# Patient Record
Sex: MALE | Race: WHITE | NOT HISPANIC OR LATINO | Employment: OTHER | URBAN - METROPOLITAN AREA
[De-identification: names, ages, dates, MRNs, and addresses within clinical notes are randomized per-mention and may not be internally consistent; named-entity substitution may affect disease eponyms.]

---

## 2017-01-19 ENCOUNTER — HOSPITAL ENCOUNTER (OUTPATIENT)
Dept: PET IMAGING | Facility: HOSPITAL | Age: 82
Discharge: HOME | End: 2017-01-19
Attending: INTERNAL MEDICINE | Admitting: INTERNAL MEDICINE

## 2017-01-19 ENCOUNTER — LAB (OUTPATIENT)
Dept: LAB | Facility: HOSPITAL | Age: 82
End: 2017-01-19

## 2017-01-19 ENCOUNTER — HOSPITAL ENCOUNTER (OUTPATIENT)
Dept: GENERAL RADIOLOGY | Facility: HOSPITAL | Age: 82
Discharge: HOME | End: 2017-01-19

## 2017-01-19 DIAGNOSIS — R05.9 COUGH: ICD-10-CM

## 2017-01-19 DIAGNOSIS — C20 RECTAL CANCER (HCC): ICD-10-CM

## 2017-01-19 DIAGNOSIS — R91.1 LUNG NODULE: ICD-10-CM

## 2017-01-19 LAB
ALBUMIN SERPL-MCNC: 4.4 G/DL (ref 3.5–5.2)
ALBUMIN/GLOB SERPL: 1.5 G/DL (ref 1.1–2.4)
ALP SERPL-CCNC: 76 U/L (ref 38–116)
ALT SERPL W P-5'-P-CCNC: 23 U/L (ref 0–41)
ANION GAP SERPL CALCULATED.3IONS-SCNC: 16.7 MMOL/L
AST SERPL-CCNC: 21 U/L (ref 0–40)
BASOPHILS # BLD AUTO: 0.06 10*3/MM3 (ref 0–0.1)
BASOPHILS NFR BLD AUTO: 0.7 % (ref 0–1.1)
BILIRUB SERPL-MCNC: 0.3 MG/DL (ref 0.1–1.2)
BUN BLD-MCNC: 37 MG/DL (ref 6–20)
BUN/CREAT SERPL: 18.9 (ref 7.3–30)
CALCIUM SPEC-SCNC: 9.8 MG/DL (ref 8.5–10.2)
CEA SERPL-MCNC: 5.2 NG/ML
CHLORIDE SERPL-SCNC: 100 MMOL/L (ref 98–107)
CO2 SERPL-SCNC: 24.3 MMOL/L (ref 22–29)
CREAT BLD-MCNC: 1.96 MG/DL (ref 0.7–1.3)
DEPRECATED RDW RBC AUTO: 42.5 FL (ref 37–49)
EOSINOPHIL # BLD AUTO: 0.17 10*3/MM3 (ref 0–0.36)
EOSINOPHIL NFR BLD AUTO: 1.9 % (ref 1–5)
ERYTHROCYTE [DISTWIDTH] IN BLOOD BY AUTOMATED COUNT: 12.4 % (ref 11.7–14.5)
GFR SERPL CREATININE-BSD FRML MDRD: 33 ML/MIN/1.73
GLOBULIN UR ELPH-MCNC: 3 GM/DL (ref 1.8–3.5)
GLUCOSE BLD-MCNC: 302 MG/DL (ref 74–124)
HCT VFR BLD AUTO: 43.4 % (ref 40–49)
HGB BLD-MCNC: 13.8 G/DL (ref 13.5–16.5)
IMM GRANULOCYTES # BLD: 0.07 10*3/MM3 (ref 0–0.03)
IMM GRANULOCYTES NFR BLD: 0.8 % (ref 0–0.5)
LYMPHOCYTES # BLD AUTO: 2.83 10*3/MM3 (ref 1–3.5)
LYMPHOCYTES NFR BLD AUTO: 32.1 % (ref 20–49)
MCH RBC QN AUTO: 29.6 PG (ref 27–33)
MCHC RBC AUTO-ENTMCNC: 31.8 G/DL (ref 32–35)
MCV RBC AUTO: 93.1 FL (ref 83–97)
MONOCYTES # BLD AUTO: 0.65 10*3/MM3 (ref 0.25–0.8)
MONOCYTES NFR BLD AUTO: 7.4 % (ref 4–12)
NEUTROPHILS # BLD AUTO: 5.04 10*3/MM3 (ref 1.5–7)
NEUTROPHILS NFR BLD AUTO: 57.1 % (ref 39–75)
NRBC BLD MANUAL-RTO: 0 /100 WBC (ref 0–0)
PLATELET # BLD AUTO: 321 10*3/MM3 (ref 150–375)
PMV BLD AUTO: 10.6 FL (ref 8.9–12.1)
POTASSIUM BLD-SCNC: 4.6 MMOL/L (ref 3.5–4.7)
PROT SERPL-MCNC: 7.4 G/DL (ref 6.3–8)
RBC # BLD AUTO: 4.66 10*6/MM3 (ref 4.3–5.5)
SODIUM BLD-SCNC: 141 MMOL/L (ref 134–145)
WBC NRBC COR # BLD: 8.82 10*3/MM3 (ref 4–10)

## 2017-01-19 PROCEDURE — 85025 COMPLETE CBC W/AUTO DIFF WBC: CPT

## 2017-01-19 PROCEDURE — 25510000001 DIATRIZOATE MEGLUMINE & SODIUM PER 1 ML: Performed by: INTERNAL MEDICINE

## 2017-01-19 PROCEDURE — 71020 HC CHEST PA AND LATERAL: CPT

## 2017-01-19 PROCEDURE — 71250 CT THORAX DX C-: CPT

## 2017-01-19 PROCEDURE — 80053 COMPREHEN METABOLIC PANEL: CPT

## 2017-01-19 PROCEDURE — 82378 CARCINOEMBRYONIC ANTIGEN: CPT | Performed by: INTERNAL MEDICINE

## 2017-01-19 PROCEDURE — 74176 CT ABD & PELVIS W/O CONTRAST: CPT

## 2017-01-19 PROCEDURE — 36415 COLL VENOUS BLD VENIPUNCTURE: CPT

## 2017-01-19 RX ADMIN — DIATRIZOATE MEGLUMINE AND DIATRIZOATE SODIUM 30 ML: 660; 100 LIQUID ORAL; RECTAL at 09:35

## 2017-01-24 ENCOUNTER — APPOINTMENT (OUTPATIENT)
Dept: GENERAL RADIOLOGY | Facility: HOSPITAL | Age: 82
End: 2017-01-24

## 2017-01-24 ENCOUNTER — HOSPITAL ENCOUNTER (EMERGENCY)
Facility: HOSPITAL | Age: 82
Discharge: HOME | End: 2017-01-24
Attending: FAMILY MEDICINE | Admitting: FAMILY MEDICINE

## 2017-01-24 VITALS
OXYGEN SATURATION: 96 % | DIASTOLIC BLOOD PRESSURE: 64 MMHG | WEIGHT: 152 LBS | HEART RATE: 68 BPM | HEIGHT: 68 IN | SYSTOLIC BLOOD PRESSURE: 118 MMHG | BODY MASS INDEX: 23.04 KG/M2 | RESPIRATION RATE: 16 BRPM | TEMPERATURE: 97.7 F

## 2017-01-24 DIAGNOSIS — IMO0001 UNCONTROLLED TYPE 2 DIABETES MELLITUS WITHOUT COMPLICATION, WITHOUT LONG-TERM CURRENT USE OF INSULIN: Primary | ICD-10-CM

## 2017-01-24 LAB
ACETONE BLD QL: NEGATIVE
ALBUMIN SERPL-MCNC: 4.1 G/DL (ref 3.5–5.2)
ALBUMIN/GLOB SERPL: 1.5 G/DL
ALP SERPL-CCNC: 63 U/L (ref 39–117)
ALT SERPL W P-5'-P-CCNC: 25 U/L (ref 1–41)
ANION GAP SERPL CALCULATED.3IONS-SCNC: 16.7 MMOL/L
AST SERPL-CCNC: 19 U/L (ref 1–40)
BASOPHILS # BLD AUTO: 0.03 10*3/MM3 (ref 0–0.2)
BASOPHILS NFR BLD AUTO: 0.2 % (ref 0–1.5)
BILIRUB SERPL-MCNC: 0.2 MG/DL (ref 0.1–1.2)
BILIRUB UR QL STRIP: NEGATIVE
BUN BLD-MCNC: 40 MG/DL (ref 8–23)
BUN/CREAT SERPL: 14.5 (ref 7–25)
CALCIUM SPEC-SCNC: 9.7 MG/DL (ref 8.6–10.5)
CHLORIDE SERPL-SCNC: 99 MMOL/L (ref 98–107)
CLARITY UR: CLEAR
CO2 SERPL-SCNC: 22.3 MMOL/L (ref 22–29)
COLOR UR: YELLOW
CREAT BLD-MCNC: 2.76 MG/DL (ref 0.76–1.27)
DEPRECATED RDW RBC AUTO: 42.7 FL (ref 37–54)
EOSINOPHIL # BLD AUTO: 0.07 10*3/MM3 (ref 0–0.7)
EOSINOPHIL NFR BLD AUTO: 0.4 % (ref 0.3–6.2)
ERYTHROCYTE [DISTWIDTH] IN BLOOD BY AUTOMATED COUNT: 12.6 % (ref 11.5–14.5)
GFR SERPL CREATININE-BSD FRML MDRD: 22 ML/MIN/1.73
GLOBULIN UR ELPH-MCNC: 2.8 GM/DL
GLUCOSE BLD-MCNC: 484 MG/DL (ref 65–99)
GLUCOSE BLDC GLUCOMTR-MCNC: 291 MG/DL (ref 70–130)
GLUCOSE BLDC GLUCOMTR-MCNC: 368 MG/DL (ref 70–130)
GLUCOSE BLDC GLUCOMTR-MCNC: 422 MG/DL (ref 70–130)
GLUCOSE UR STRIP-MCNC: ABNORMAL MG/DL
HCT VFR BLD AUTO: 40 % (ref 40.4–52.2)
HGB BLD-MCNC: 13.1 G/DL (ref 13.7–17.6)
HGB UR QL STRIP.AUTO: NEGATIVE
IMM GRANULOCYTES # BLD: 0.12 10*3/MM3 (ref 0–0.03)
IMM GRANULOCYTES NFR BLD: 0.7 % (ref 0–0.5)
KETONES UR QL STRIP: NEGATIVE
LEUKOCYTE ESTERASE UR QL STRIP.AUTO: NEGATIVE
LYMPHOCYTES # BLD AUTO: 2.91 10*3/MM3 (ref 0.9–4.8)
LYMPHOCYTES NFR BLD AUTO: 17.7 % (ref 19.6–45.3)
MCH RBC QN AUTO: 30.5 PG (ref 27–32.7)
MCHC RBC AUTO-ENTMCNC: 32.8 G/DL (ref 32.6–36.4)
MCV RBC AUTO: 93 FL (ref 79.8–96.2)
MONOCYTES # BLD AUTO: 0.83 10*3/MM3 (ref 0.2–1.2)
MONOCYTES NFR BLD AUTO: 5 % (ref 5–12)
NEUTROPHILS # BLD AUTO: 12.49 10*3/MM3 (ref 1.9–8.1)
NEUTROPHILS NFR BLD AUTO: 76 % (ref 42.7–76)
NITRITE UR QL STRIP: NEGATIVE
PH UR STRIP.AUTO: <=5 [PH] (ref 5–8)
PLATELET # BLD AUTO: 267 10*3/MM3 (ref 140–500)
PMV BLD AUTO: 10.6 FL (ref 6–12)
POTASSIUM BLD-SCNC: 5.1 MMOL/L (ref 3.5–5.2)
PROT SERPL-MCNC: 6.9 G/DL (ref 6–8.5)
PROT UR QL STRIP: NEGATIVE
RBC # BLD AUTO: 4.3 10*6/MM3 (ref 4.6–6)
SODIUM BLD-SCNC: 138 MMOL/L (ref 136–145)
SP GR UR STRIP: 1.03 (ref 1–1.03)
UROBILINOGEN UR QL STRIP: ABNORMAL
WBC NRBC COR # BLD: 16.45 10*3/MM3 (ref 4.5–10.7)

## 2017-01-24 PROCEDURE — 82962 GLUCOSE BLOOD TEST: CPT

## 2017-01-24 PROCEDURE — 96372 THER/PROPH/DIAG INJ SC/IM: CPT

## 2017-01-24 PROCEDURE — 82009 KETONE BODYS QUAL: CPT | Performed by: FAMILY MEDICINE

## 2017-01-24 PROCEDURE — 96361 HYDRATE IV INFUSION ADD-ON: CPT

## 2017-01-24 PROCEDURE — 63710000001 INSULIN ASPART PER 5 UNITS: Performed by: FAMILY MEDICINE

## 2017-01-24 PROCEDURE — 80053 COMPREHEN METABOLIC PANEL: CPT | Performed by: FAMILY MEDICINE

## 2017-01-24 PROCEDURE — 81003 URINALYSIS AUTO W/O SCOPE: CPT | Performed by: FAMILY MEDICINE

## 2017-01-24 PROCEDURE — 96360 HYDRATION IV INFUSION INIT: CPT

## 2017-01-24 PROCEDURE — 71020 HC CHEST PA AND LATERAL: CPT

## 2017-01-24 PROCEDURE — 99284 EMERGENCY DEPT VISIT MOD MDM: CPT

## 2017-01-24 PROCEDURE — 85025 COMPLETE CBC W/AUTO DIFF WBC: CPT | Performed by: FAMILY MEDICINE

## 2017-01-24 RX ADMIN — SODIUM CHLORIDE 500 ML: 9 INJECTION, SOLUTION INTRAVENOUS at 19:55

## 2017-01-24 RX ADMIN — SODIUM CHLORIDE 1000 ML: 9 INJECTION, SOLUTION INTRAVENOUS at 17:05

## 2017-01-24 RX ADMIN — INSULIN ASPART 10 UNITS: 100 INJECTION, SOLUTION INTRAVENOUS; SUBCUTANEOUS at 18:27

## 2017-01-24 NOTE — ED PROVIDER NOTES
EMERGENCY DEPARTMENT ENCOUNTER    CHIEF COMPLAINT  Chief Complaint: Hyperglycemia  History given by: Pt, EMS  History limited by: nothing  Room Number: 03/03  PMD: Ana María Atkinson MD      HPI:  Pt is a 81 y.o. male who presents complaining of hyperglycemia just PTA. Pt states he was bowling when he became dizzy and sat down. Per EMS, pt had a blood sugar>500 and has a hx of denise blood pressure, diabetes and was alert and awake while with them. Per EMS, pt did not faint but becomes lightheaded when coughing due to recent bronchitis. Pt also c/o a cough with an onset of last Friday. Pt does not know if he takes a steroid for that but suspects so. Pt denies chest pain, SOA, sight complications, loss of motion in his arms. Pt states he took his medication for blood sugar this morning but does not use insulin. Pt states he has not checked his blood sugar lately.     Duration:  Just PTA  Onset: gradual  Timing: constant  Quality: >500  Intensity/Severity: moderate  Progression: constant  Associated Symptoms: dizziness, lightheadedness when coughing  Aggravating Factors: coughing causes lightheadedness  Alleviating Factors: none  Previous Episodes: Pt has a hx of diabetes and high blood pressure.  Treatment before arrival: Pt was treated by EMS PTA.    PAST MEDICAL HISTORY  Active Ambulatory Problems     Diagnosis Date Noted   • Rectal cancer 05/06/2016   • Lung nodule 05/06/2016   • History of DVT (deep vein thrombosis) 05/06/2016     Resolved Ambulatory Problems     Diagnosis Date Noted   • No Resolved Ambulatory Problems     Past Medical History   Diagnosis Date   • Adenocarcinoma in situ    • Adenocarcinoma of rectum 2014   • Diabetes mellitus    • Duodenal ulcer 08/2014   • DVT (deep venous thrombosis) 01/2015   • Hemorrhagic shock    • Hypercholesterolemia    • Hypertension    • Peptic ulceration 2014       PAST SURGICAL HISTORY  Past Surgical History   Procedure Laterality Date   • Endoscopy  02/09/2015   •  Amputation foot  1971     PARTIAL DUE TO AN ELECTRICAL SHOCK INJURY   • Portacath placement  07/2014       FAMILY HISTORY  Family History   Problem Relation Age of Onset   • No Known Problems Mother    • No Known Problems Father    • No Known Problems Sister    • Cancer Brother      Lung cancer   • No Known Problems Daughter    • No Known Problems Son    • No Known Problems Maternal Aunt    • No Known Problems Maternal Uncle    • No Known Problems Paternal Aunt    • No Known Problems Paternal Uncle    • No Known Problems Maternal Grandmother    • No Known Problems Maternal Grandfather    • No Known Problems Paternal Grandmother    • No Known Problems Paternal Grandfather    • Diabetes Other        SOCIAL HISTORY  Social History     Social History   • Marital status: Single     Spouse name: N/A   • Number of children: N/A   • Years of education: High school     Occupational History   •  Retired     Social History Main Topics   • Smoking status: Former Smoker     Packs/day: 2.00     Years: 3.00     Types: Cigarettes   • Smokeless tobacco: Not on file   • Alcohol use No   • Drug use: No   • Sexual activity: Not on file     Other Topics Concern   • Not on file     Social History Narrative       ALLERGIES  Review of patient's allergies indicates no known allergies.    REVIEW OF SYSTEMS  Review of Systems   Constitutional: Negative for activity change, appetite change and fever.   HENT: Negative for congestion and sore throat.    Eyes: Negative.    Respiratory: Positive for cough. Negative for shortness of breath.    Cardiovascular: Negative for chest pain and leg swelling.   Gastrointestinal: Negative for abdominal pain, diarrhea and vomiting.   Endocrine: Negative.    Genitourinary: Negative for decreased urine volume and dysuria.   Musculoskeletal: Negative for neck pain.   Skin: Negative for rash and wound.   Allergic/Immunologic: Negative.    Neurological: Positive for dizziness and light-headedness. Negative for  weakness, numbness and headaches.   Hematological: Negative.    Psychiatric/Behavioral: Negative.    All other systems reviewed and are negative.      PHYSICAL EXAM  ED Triage Vitals   Temp Heart Rate Resp BP SpO2   01/24/17 1620 01/24/17 1620 01/24/17 1620 01/24/17 1620 01/24/17 1620   97.9 °F (36.6 °C) 100 16 116/68 97 %      Temp src Heart Rate Source Patient Position BP Location FiO2 (%)   01/24/17 1620 01/24/17 1620 01/24/17 1620 01/24/17 1620 --   Tympanic Monitor Lying Right arm        Physical Exam   Constitutional: He is oriented to person, place, and time and well-developed, well-nourished, and in no distress.   HENT:   Head: Normocephalic and atraumatic.   Right Ear: Decreased hearing is noted.   Left Ear: Decreased hearing is noted.   Eyes: EOM are normal. Pupils are equal, round, and reactive to light.   Neck: Normal range of motion. Neck supple.   Cardiovascular: Normal rate, regular rhythm and normal heart sounds.    Pulmonary/Chest: Effort normal and breath sounds normal. No respiratory distress.   Abdominal: Soft. There is no tenderness. There is no rebound and no guarding.   Abd ostomy    Musculoskeletal: Normal range of motion. He exhibits no edema.   Neurological: He is alert and oriented to person, place, and time. He has normal sensation and normal strength.   Skin: Skin is warm and dry.   Psychiatric: Mood and affect normal.   Nursing note and vitals reviewed.      LAB RESULTS  Lab Results (last 24 hours)     Procedure Component Value Units Date/Time    POC Glucose Fingerstick [[id number]]  (Abnormal) Collected:  01/24/17 1628    Specimen:  Blood Updated:  01/24/17 1630     Glucose 422 (H) mg/dL     Narrative:       Meter: [id number] : [id number] Tai Deleon    CBC & Differential [[id number]] Collected:  01/24/17 1705    Specimen:  Blood Updated:  01/24/17 1724    Narrative:       The following orders were created for panel order CBC & Differential.  Procedure                                Abnormality         Status                     ---------                               -----------         ------                     CBC Auto Differential[[id number]]         Abnormal            Final result                 Please view results for these tests on the individual orders.    Comprehensive Metabolic Panel [[id number]]  (Abnormal) Collected:  01/24/17 1705    Specimen:  Blood Updated:  01/24/17 1751     Glucose 484 (C) mg/dL      BUN 40 (H) mg/dL      Creatinine 2.76 (H) mg/dL      Sodium 138 mmol/L      Potassium 5.1 mmol/L      Chloride 99 mmol/L      CO2 22.3 mmol/L      Calcium 9.7 mg/dL      Total Protein 6.9 g/dL      Albumin 4.10 g/dL      ALT (SGPT) 25 U/L      AST (SGOT) 19 U/L      Alkaline Phosphatase 63 U/L      Total Bilirubin 0.2 mg/dL      eGFR Non African Amer 22 (L) mL/min/1.73      Globulin 2.8 gm/dL      A/G Ratio 1.5 g/dL      BUN/Creatinine Ratio 14.5      Anion Gap 16.7 mmol/L     Narrative:       The MDRD GFR formula is only valid for adults with stable renal function between ages 18 and 70.    Acetone [[id number]]  (Normal) Collected:  01/24/17 1705    Specimen:  Blood Updated:  01/24/17 1741     Acetone Negative     CBC Auto Differential [[id number]]  (Abnormal) Collected:  01/24/17 1705    Specimen:  Blood Updated:  01/24/17 1724     WBC 16.45 (H) 10*3/mm3      RBC 4.30 (L) 10*6/mm3      Hemoglobin 13.1 (L) g/dL      Hematocrit 40.0 (L) %      MCV 93.0 fL      MCH 30.5 pg      MCHC 32.8 g/dL      RDW 12.6 %      RDW-SD 42.7 fl      MPV 10.6 fL      Platelets 267 10*3/mm3      Neutrophil % 76.0 %      Lymphocyte % 17.7 (L) %      Monocyte % 5.0 %      Eosinophil % 0.4 %      Basophil % 0.2 %      Immature Grans % 0.7 (H) %      Neutrophils, Absolute 12.49 (H) 10*3/mm3      Lymphocytes, Absolute 2.91 10*3/mm3      Monocytes, Absolute 0.83 10*3/mm3      Eosinophils, Absolute 0.07 10*3/mm3      Basophils, Absolute 0.03 10*3/mm3      Immature Grans, Absolute 0.12 (H) 10*3/mm3     Urinalysis  With / Culture If Indicated [[id number]]  (Abnormal) Collected:  01/24/17 1803    Specimen:  Urine Updated:  01/24/17 1822     Color, UA Yellow      Appearance, UA Clear      pH, UA <=5.0      Specific Gravity, UA 1.028      Glucose, UA >=1000 mg/dL (3+) (A)      Ketones, UA Negative      Bilirubin, UA Negative      Blood, UA Negative      Protein, UA Negative      Leuk Esterase, UA Negative      Nitrite, UA Negative      Urobilinogen, UA 0.2 E.U./dL     Narrative:       Urine microscopic not indicated.    POC Glucose Fingerstick [[id number]]  (Abnormal) Collected:  01/24/17 1915    Specimen:  Blood Updated:  01/24/17 1921     Glucose 368 (H) mg/dL     Narrative:       Meter: [id number] : [id number] Haley HAWKINS    POC Glucose Fingerstick [[id number]]  (Abnormal) Collected:  01/24/17 1949    Specimen:  Blood Updated:  01/24/17 1953     Glucose 291 (H) mg/dL     Narrative:       Meter: [id number] : [id number] Tayla Boswell          I ordered the above labs and reviewed the results    RADIOLOGY  XR Chest 2 View   2 views of the chest demonstrates the heart to be within  normal limits in size. There is mild atelectasis at the left lung base  with no evidence of consolidation or of effusion.           I ordered the above noted radiological studies. Interpreted by radiologist. Discussed with radiologist. Reviewed by me in PACS.       PROCEDURES  Procedures      PROGRESS AND CONSULTS  ED Course   1647  Ordered CXR, blood work, UA w/ culture, and acetone for further evaluation    1748  Ordered novoLOG 10 units injection for hyperglycemia.     1817  Ordered POC glucose fingerstick for blood glucose analysis.     8:02 PM  Rechecked on pt who is resting with family. Discussed plan to stop steroid prescription. Discussed plan to discharge with f/u with her PMD. Discussed need to limit sugar intake. Pt and family understand and agree with plan and all questions were addressed.    18:25  Rechecked with pt, whose blood  sugar is below 300. Pt expresses desire for discharge to home.  A niece is here who says he was put on steroids and understands need to stop them.     MEDICAL DECISION MAKING  Results were reviewed/discussed with the patient and they were also made aware of online access. Pt also made aware that some labs, such as cultures, will not be resulted during ER visit and follow up with PMD is necessary.     MDM  Number of Diagnoses or Management Options     Amount and/or Complexity of Data Reviewed  Clinical lab tests: ordered and reviewed (High glucose has decreased to 291. )  Tests in the radiology section of CPT®: ordered and reviewed (CXR: 2 views of the chest demonstrates the heart to be within normal limits in size. There is mild atelectasis at the left lung base with no evidence of consolidation or of effusion.)           DIAGNOSIS  Final diagnoses:   Uncontrolled type 2 diabetes mellitus without complication, without long-term current use of insulin, due to recent steroid use.       DISPOSITION  DISCHARGE    Patient discharged in stable condition.    Reviewed implications of results, diagnosis, meds, responsibility to follow up, warning signs and symptoms of possible worsening, potential complications and reasons to return to ER.    Patient/Family voiced understanding of above instructions.    Discussed plan for discharge, as there is no emergent indication for admission.  Pt/family is agreeable and understands need for follow up and repeat testing.  Pt is aware that discharge does not mean that nothing is wrong but it indicates no emergency is present that requires admission and they must continue care with follow-up as given below or physician of their choice.     FOLLOW-UP  Ana María Atkinson MD  [address] [facility] [address] [phone]      Call in the AM for an appt later this week.         Medication List      Notice     No changes were made to your prescriptions during this visit.              Latest  Documented Vital Signs:  As of 1:12 AM  BP- 118/64 HR- 68 Temp- 97.7 °F (36.5 °C) (Tympanic) O2 sat- 96%    --  Documentation assistance provided by luzmaria Collins for Dr. Wray.  Information recorded by the scribe was done at my direction and has been verified and validated by me.            Henri Collins  01/24/17 2010       Henri Collins  01/24/17 2014       Henri Collins  01/24/17 2031       Lamin Wray MD  01/25/17 0114

## 2017-01-25 NOTE — DISCHARGE INSTRUCTIONS
Stop the steroids.  You can continue the antibiotic.  Monitor your blood sugars 3-4 times a day and record your values to show your MD.

## 2017-01-26 ENCOUNTER — OFFICE VISIT (OUTPATIENT)
Dept: ONCOLOGY | Facility: CLINIC | Age: 82
End: 2017-01-26

## 2017-01-26 ENCOUNTER — INFUSION (OUTPATIENT)
Dept: ONCOLOGY | Facility: HOSPITAL | Age: 82
End: 2017-01-26

## 2017-01-26 ENCOUNTER — APPOINTMENT (OUTPATIENT)
Dept: LAB | Facility: HOSPITAL | Age: 82
End: 2017-01-26

## 2017-01-26 VITALS
HEART RATE: 89 BPM | TEMPERATURE: 98.6 F | WEIGHT: 140.2 LBS | DIASTOLIC BLOOD PRESSURE: 72 MMHG | HEIGHT: 68 IN | OXYGEN SATURATION: 96 % | SYSTOLIC BLOOD PRESSURE: 132 MMHG | BODY MASS INDEX: 21.25 KG/M2 | RESPIRATION RATE: 16 BRPM

## 2017-01-26 DIAGNOSIS — C20 RECTAL CANCER (HCC): Primary | ICD-10-CM

## 2017-01-26 DIAGNOSIS — N17.9 ACUTE RENAL FAILURE, UNSPECIFIED ACUTE RENAL FAILURE TYPE (HCC): ICD-10-CM

## 2017-01-26 DIAGNOSIS — C20 RECTAL CANCER (HCC): ICD-10-CM

## 2017-01-26 LAB
ALBUMIN SERPL-MCNC: 4.2 G/DL (ref 3.5–5.2)
ALBUMIN/GLOB SERPL: 1.7 G/DL (ref 1.1–2.4)
ALP SERPL-CCNC: 62 U/L (ref 38–116)
ALT SERPL W P-5'-P-CCNC: 27 U/L (ref 0–41)
ANION GAP SERPL CALCULATED.3IONS-SCNC: 15.5 MMOL/L
AST SERPL-CCNC: 24 U/L (ref 0–40)
BILIRUB SERPL-MCNC: 0.3 MG/DL (ref 0.1–1.2)
BUN BLD-MCNC: 34 MG/DL (ref 6–20)
BUN/CREAT SERPL: 16.3 (ref 7.3–30)
CALCIUM SPEC-SCNC: 9.5 MG/DL (ref 8.5–10.2)
CHLORIDE SERPL-SCNC: 104 MMOL/L (ref 98–107)
CO2 SERPL-SCNC: 22.5 MMOL/L (ref 22–29)
CREAT BLD-MCNC: 2.08 MG/DL (ref 0.7–1.3)
GFR SERPL CREATININE-BSD FRML MDRD: 31 ML/MIN/1.73
GLOBULIN UR ELPH-MCNC: 2.5 GM/DL (ref 1.8–3.5)
GLUCOSE BLD-MCNC: 299 MG/DL (ref 74–124)
POTASSIUM BLD-SCNC: 4.6 MMOL/L (ref 3.5–4.7)
PROT SERPL-MCNC: 6.7 G/DL (ref 6.3–8)
SODIUM BLD-SCNC: 142 MMOL/L (ref 134–145)

## 2017-01-26 PROCEDURE — 96361 HYDRATE IV INFUSION ADD-ON: CPT | Performed by: INTERNAL MEDICINE

## 2017-01-26 PROCEDURE — 25010000002 ONDANSETRON PER 1 MG: Performed by: INTERNAL MEDICINE

## 2017-01-26 PROCEDURE — 96374 THER/PROPH/DIAG INJ IV PUSH: CPT | Performed by: INTERNAL MEDICINE

## 2017-01-26 PROCEDURE — 99214 OFFICE O/P EST MOD 30 MIN: CPT | Performed by: INTERNAL MEDICINE

## 2017-01-26 PROCEDURE — 36415 COLL VENOUS BLD VENIPUNCTURE: CPT | Performed by: INTERNAL MEDICINE

## 2017-01-26 PROCEDURE — 80053 COMPREHEN METABOLIC PANEL: CPT | Performed by: INTERNAL MEDICINE

## 2017-01-26 RX ORDER — SODIUM CHLORIDE 9 MG/ML
500 INJECTION, SOLUTION INTRAVENOUS ONCE
Status: COMPLETED | OUTPATIENT
Start: 2017-01-26 | End: 2017-01-26

## 2017-01-26 RX ORDER — ONDANSETRON 2 MG/ML
4 INJECTION INTRAMUSCULAR; INTRAVENOUS ONCE
Status: COMPLETED | OUTPATIENT
Start: 2017-01-26 | End: 2017-01-26

## 2017-01-26 RX ADMIN — SODIUM CHLORIDE 500 ML: 900 INJECTION, SOLUTION INTRAVENOUS at 13:15

## 2017-01-26 RX ADMIN — ONDANSETRON 4 MG: 2 INJECTION, SOLUTION INTRAMUSCULAR; INTRAVENOUS at 13:46

## 2017-01-26 NOTE — PROGRESS NOTES
REASONS FOR FOLLOW-UP:  1. Clinical T3N1M0 adenocarcinoma of the rectum, status post neoadjuvant chemoradiation with infusional 5-FU followed by low anterior resection by Dr. Armin Hurtado on 11/14/2014; pathology at surgery showed microscopic foci of residual adenocarcinoma in ulceration with the largest focus 2.5 mm.  He had a single tumor deposit but 14 benign lymph nodes.  Final pathologic stage [id number].   2. Completed 4 months of adjuvant Xeloda post-surgery.     3. Tiny right upper lobe pulmonary nodule being followed radiographically and stable      History of Present Illness    Mr. Stephen returns today for follow-up of his rectal cancer currently under observation.  He has history of a right upper lobe pulmonary nodule which has been stable radiographically.      He comes in today for scan and lab review.  He was doing well up until about one week ago when he developed cough productive of thick sputum and mild shortness of breath.  He was seen by his primary care physician and placed I believe on prednisone and an antibiotic.  On 1/24/17 he went bowling and became acutely dizzy and was found to have a blood sugar over 500.  EMS was called and the patient was evaluated at the [facility] emergency department and treated for hyperglycemia.  He was eventually discharged from the ER after the blood sugar was lowered.  It is noted that he had acute kidney injury on presentation with a creatinine of 2.7 from a baseline of 1.73 months ago.  He comes in today still with cough, generalized weakness, and reports poor oral intake with nausea and emesis for the past one week.  He denies fever.    Past Medical History    1. Diabetes.   2. Hypertension.  3. Hypercholesterolemia.  4. Hemorrhagic shock secondary to a duodenal ulcer August 2014   5. 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  Nondrinker.  Former smoker, but this is remote (35+ years ago). Assisted by his niece.    Family  "History have been reviewed and are without significant changes except as mentioned 11/16/15.    Review of Systems   Constitutional: Negative for activity change, fatigue and unexpected weight change.   Respiratory: Negative for cough and shortness of breath.    Cardiovascular: Negative for chest pain.   Gastrointestinal: Negative for abdominal pain, diarrhea and nausea.   Hematological: Negative for adenopathy.      A comprehensive 14 point review of systems was performed and was negative except as mentioned.    Medications:  The current medication list was reviewed in the EMR    ALLERGIES:  No Known Allergies    Objective      Vitals:    01/26/17 1148   BP: 132/72   Pulse: 89   Resp: 16   Temp: 98.6 °F (37 °C)   TempSrc: Oral   SpO2: 96%   Weight: 140 lb 3.2 oz (63.6 kg)   Height: 68\" (172.7 cm)   PainSc:   6     Current Status 1/26/2017   ECOG score 0       Physical Exam   Constitutional: He appears well-developed and well-nourished.   HENT:   Hard of hearing   Eyes: No scleral icterus.   Cardiovascular: Normal rate and regular rhythm.    Pulmonary/Chest: Effort normal and breath sounds normal. He has no wheezes.   Abdominal: Soft. Bowel sounds are normal. He exhibits no mass.   Ostomy present   Musculoskeletal: Normal range of motion. He exhibits no edema.   Skin: Skin is warm. No erythema.   Psychiatric: He has a normal mood and affect.          RECENT LABS:  Hematology WBC   Date Value Ref Range Status   01/24/2017 16.45 (H) 4.50 - 10.70 10*3/mm3 Final   04/02/2015 5.09 4.50 - 10.70 K/Cumm Final     RBC   Date Value Ref Range Status   01/24/2017 4.30 (L) 4.60 - 6.00 10*6/mm3 Final   04/02/2015 3.52 (L) 4.60 - 6.00 Million Final     HEMOGLOBIN   Date Value Ref Range Status   01/24/2017 13.1 (L) 13.7 - 17.6 g/dL Final   04/02/2015 10.8 (L) 13.7 - 17.6 g/dL Final     HEMATOCRIT   Date Value Ref Range Status   01/24/2017 40.0 (L) 40.4 - 52.2 % Final   04/02/2015 31.7 (L) 40.4 - 52.2 % Final     MCV   Date Value Ref " Range Status   01/24/2017 93.0 79.8 - 96.2 fL Final   04/02/2015 90.1 79.8 - 96.2 fL Final     MCH   Date Value Ref Range Status   01/24/2017 30.5 27.0 - 32.7 pg Final   04/02/2015 30.7 27.0 - 32.7 pg Final     MCHC   Date Value Ref Range Status   01/24/2017 32.8 32.6 - 36.4 g/dL Final   04/02/2015 34.1 32.6 - 36.4 g/dL Final     RDW   Date Value Ref Range Status   01/24/2017 12.6 11.5 - 14.5 % Final   04/02/2015 19.2 (H) 11.5 - 14.5 % Final     RDW-SD   Date Value Ref Range Status   01/24/2017 42.7 37.0 - 54.0 fl Final     MPV   Date Value Ref Range Status   01/24/2017 10.6 6.0 - 12.0 fL Final     PLATELETS   Date Value Ref Range Status   01/24/2017 267 140 - 500 10*3/mm3 Final   04/02/2015 167 140 - 500 K/Cumm Final     NEUTROPHIL %   Date Value Ref Range Status   01/24/2017 76.0 42.7 - 76.0 % Final     NEUTROPHIL REL %   Date Value Ref Range Status   04/02/2015 71.7 42.7 - 76.0 % Final     LYMPHOCYTE %   Date Value Ref Range Status   01/24/2017 17.7 (L) 19.6 - 45.3 % Final     LYMPHOCYTE REL %   Date Value Ref Range Status   04/02/2015 12.4 (L) 19.6 - 45.3 % Final     MONOCYTE %   Date Value Ref Range Status   01/24/2017 5.0 5.0 - 12.0 % Final     MONOCYTE REL %   Date Value Ref Range Status   04/02/2015 12.0 5.0 - 12.0 % Final     EOSINOPHIL %   Date Value Ref Range Status   01/24/2017 0.4 0.3 - 6.2 % Final     EOSINOPHIL REL %   Date Value Ref Range Status   04/02/2015 3.1 0.3 - 6.2 % Final     BASOPHIL %   Date Value Ref Range Status   01/24/2017 0.2 0.0 - 1.5 % Final     BASOPHIL REL %   Date Value Ref Range Status   04/02/2015 0.4 0.0 - 1.5 % Final     IMMATURE GRANS %   Date Value Ref Range Status   01/24/2017 0.7 (H) 0.0 - 0.5 % Final     NEUTROPHILS, ABSOLUTE   Date Value Ref Range Status   01/24/2017 12.49 (H) 1.90 - 8.10 10*3/mm3 Final     NEUTROPHILS ABSOLUTE   Date Value Ref Range Status   04/02/2015 3.7 1.9 - 8.1 K/Cumm Final     LYMPHOCYTES, ABSOLUTE   Date Value Ref Range Status   01/24/2017 2.91  0.90 - 4.80 10*3/mm3 Final     LYMPHOCYTES ABSOLUTE   Date Value Ref Range Status   04/02/2015 0.6 (L) 0.9 - 4.8 K/Cumm Final     MONOCYTES, ABSOLUTE   Date Value Ref Range Status   01/24/2017 0.83 0.20 - 1.20 10*3/mm3 Final     MONOCYTES ABSOLUTE   Date Value Ref Range Status   04/02/2015 0.6 0.2 - 1.2 K/Cumm Final     EOSINOPHILS, ABSOLUTE   Date Value Ref Range Status   01/24/2017 0.07 0.00 - 0.70 10*3/mm3 Final     EOSINOPHILS ABSOLUTE   Date Value Ref Range Status   04/02/2015 0.2 0.0 - 0.7 K/Cumm Final     BASOPHILS, ABSOLUTE   Date Value Ref Range Status   01/24/2017 0.03 0.00 - 0.20 10*3/mm3 Final     BASOPHILS ABSOLUTE   Date Value Ref Range Status   04/02/2015 0.0 0.0 - 0.2 K/Cumm Final     IMMATURE GRANS, ABSOLUTE   Date Value Ref Range Status   01/24/2017 0.12 (H) 0.00 - 0.03 10*3/mm3 Final     NRBC   Date Value Ref Range Status   01/19/2017 0.0 0.0 - 0.0 /100 WBC Final       GLUCOSE   Date Value Ref Range Status   01/24/2017 484 (C) 65 - 99 mg/dL Final   04/02/2015 174 (H) 65 - 99 mg/dL Final     SODIUM   Date Value Ref Range Status   01/24/2017 138 136 - 145 mmol/L Final   04/23/2015 141 136 - 145 mmol/L Final     POTASSIUM   Date Value Ref Range Status   01/24/2017 5.1 3.5 - 5.2 mmol/L Final   04/23/2015 4.7 3.5 - 5.2 mmol/L Final     CO2   Date Value Ref Range Status   01/24/2017 22.3 22.0 - 29.0 mmol/L Final   04/02/2015 21 (L) 22 - 29 mmol/L Final     TOTAL CO2   Date Value Ref Range Status   04/23/2015 23 22 - 29 mmol/L Final     CHLORIDE   Date Value Ref Range Status   01/24/2017 99 98 - 107 mmol/L Final   04/23/2015 105 98 - 107 mmol/L Final     ANION GAP   Date Value Ref Range Status   01/24/2017 16.7 mmol/L Final     CREATININE   Date Value Ref Range Status   01/24/2017 2.76 (H) 0.76 - 1.27 mg/dL Final   04/23/2015 1.47 (H) 0.76 - 1.27 mg/dL Final   04/02/2015 1.36 (H) 0.76 - 1.27 mg/dL Final     BUN   Date Value Ref Range Status   01/24/2017 40 (H) 8 - 23 mg/dL Final   04/23/2015 25 (H) 8 -  23 mg/dL Final     BUN/CREATININE RATIO   Date Value Ref Range Status   01/24/2017 14.5 7.0 - 25.0 Final   04/23/2015 17  Final     CALCIUM   Date Value Ref Range Status   01/24/2017 9.7 8.6 - 10.5 mg/dL Final   04/23/2015 10.2 8.6 - 10.5 mg/dL Final     EGFR NON  AMER   Date Value Ref Range Status   01/24/2017 22 (L) >60 mL/min/1.73 Final     EGFR NON  AM   Date Value Ref Range Status   04/23/2015 46 mL/min/1.732 Final     Comment:     GFR Normal                            >60  Chronic Kidney Disease          <60  Kidney Failure                         <15  The MDRD GFR formula is only valid for adults with stable renal function  between ages 18 and 70.       ALKALINE PHOSPHATASE   Date Value Ref Range Status   01/24/2017 63 39 - 117 U/L Final   04/23/2015 64 39 - 117 U/L Final     TOTAL PROTEIN   Date Value Ref Range Status   01/24/2017 6.9 6.0 - 8.5 g/dL Final   03/30/2015 6.1 6.0 - 8.5 g/dL Final     ALT (SGPT)   Date Value Ref Range Status   01/24/2017 25 1 - 41 U/L Final   04/23/2015 11 5 - 41 U/L Final     AST (SGOT)   Date Value Ref Range Status   01/24/2017 19 1 - 40 U/L Final   04/23/2015 14 5 - 40 U/L Final     TOTAL BILIRUBIN   Date Value Ref Range Status   01/24/2017 0.2 0.1 - 1.2 mg/dL Final   04/23/2015 0.3 0.1 - 1.2 mg/dL Final     ALBUMIN   Date Value Ref Range Status   01/24/2017 4.10 3.50 - 5.20 g/dL Final   04/23/2015 4.7 3.5 - 5.2 g/dL Final     GLOBULIN   Date Value Ref Range Status   01/24/2017 2.8 gm/dL Final     A/G RATIO   Date Value Ref Range Status   01/24/2017 1.5 g/dL Final   04/23/2015 2.1  Final     CEA 5.2     CT scans of chest abdomen and pelvis without IV contrast 1/19/17 shows a stable right upper lobe pulmonary nodule, equivocal 4 mm left lower lobe nodule versus vessel, calcified chest lymph nodes but no evidence of metastatic disease    Assessment/Plan   Mr. 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  He completed 4 months of Xeloda adjuvantly May 2015.   The CEA reviewed today is a little higher than usual at 5.2 but his CT scans disclosed no clear evidence of recurrent disease.  I recommended we repeat the CEA in 3 months and CT scans in 6 months unless the three-month CEA has further increased.    Incidentally, the patient comes in today after a ER visit with hyperglycemia related to steroids for treatment of bronchitis.  He had an elevated creatinine of 2.7 in the ER so I repeated a CMP today looks better with a creatinine of 2.0.  The blood sugar remains elevated at 300.  He is somewhat weak from nausea related to his bronchitis so I gave her a 500 cc fluid bolus today in the office.  He declined admission, but I recommended he see his PCP early next week with repeat chemistries and to make sure clinically improving from his bronchitis and nausea.  A copy of today's labs was sent with the patient and niece for his PCP review.  If he starts to feel worse, he will need to present to the ER.              1/26/2017      CC:

## 2017-03-27 RX ORDER — MONTELUKAST SODIUM 4 MG/1
TABLET, CHEWABLE ORAL
Qty: 360 TABLET | Refills: 0 | OUTPATIENT
Start: 2017-03-27

## 2017-04-20 ENCOUNTER — OFFICE VISIT (OUTPATIENT)
Dept: ONCOLOGY | Facility: CLINIC | Age: 82
End: 2017-04-20

## 2017-04-20 ENCOUNTER — LAB (OUTPATIENT)
Dept: LAB | Facility: HOSPITAL | Age: 82
End: 2017-04-20

## 2017-04-20 VITALS
SYSTOLIC BLOOD PRESSURE: 128 MMHG | RESPIRATION RATE: 18 BRPM | WEIGHT: 145.6 LBS | HEIGHT: 68 IN | DIASTOLIC BLOOD PRESSURE: 68 MMHG | TEMPERATURE: 98.3 F | BODY MASS INDEX: 22.07 KG/M2 | OXYGEN SATURATION: 95 % | HEART RATE: 78 BPM

## 2017-04-20 DIAGNOSIS — C20 RECTAL CANCER (HCC): Primary | ICD-10-CM

## 2017-04-20 DIAGNOSIS — C20 RECTAL CANCER (HCC): ICD-10-CM

## 2017-04-20 DIAGNOSIS — R91.1 LUNG NODULE: ICD-10-CM

## 2017-04-20 DIAGNOSIS — N17.9 ACUTE RENAL FAILURE, UNSPECIFIED ACUTE RENAL FAILURE TYPE (HCC): ICD-10-CM

## 2017-04-20 LAB
ALBUMIN SERPL-MCNC: 4.4 G/DL (ref 3.5–5.2)
ALBUMIN/GLOB SERPL: 1.8 G/DL (ref 1.1–2.4)
ALP SERPL-CCNC: 63 U/L (ref 38–116)
ALT SERPL W P-5'-P-CCNC: 11 U/L (ref 0–41)
ANION GAP SERPL CALCULATED.3IONS-SCNC: 16 MMOL/L
AST SERPL-CCNC: 14 U/L (ref 0–40)
BASOPHILS # BLD AUTO: 0.04 10*3/MM3 (ref 0–0.1)
BASOPHILS NFR BLD AUTO: 0.8 % (ref 0–1.1)
BILIRUB SERPL-MCNC: 0.3 MG/DL (ref 0.1–1.2)
BUN BLD-MCNC: 22 MG/DL (ref 6–20)
BUN/CREAT SERPL: 13.6 (ref 7.3–30)
CALCIUM SPEC-SCNC: 9.1 MG/DL (ref 8.5–10.2)
CEA SERPL-MCNC: 3.11 NG/ML
CHLORIDE SERPL-SCNC: 104 MMOL/L (ref 98–107)
CO2 SERPL-SCNC: 22 MMOL/L (ref 22–29)
CREAT BLD-MCNC: 1.62 MG/DL (ref 0.7–1.3)
DEPRECATED RDW RBC AUTO: 42.7 FL (ref 37–49)
EOSINOPHIL # BLD AUTO: 0.12 10*3/MM3 (ref 0–0.36)
EOSINOPHIL NFR BLD AUTO: 2.3 % (ref 1–5)
ERYTHROCYTE [DISTWIDTH] IN BLOOD BY AUTOMATED COUNT: 13 % (ref 11.7–14.5)
GFR SERPL CREATININE-BSD FRML MDRD: 41 ML/MIN/1.73
GLOBULIN UR ELPH-MCNC: 2.4 GM/DL (ref 1.8–3.5)
GLUCOSE BLD-MCNC: 302 MG/DL (ref 74–124)
HCT VFR BLD AUTO: 38.1 % (ref 40–49)
HGB BLD-MCNC: 12.8 G/DL (ref 13.5–16.5)
IMM GRANULOCYTES # BLD: 0.03 10*3/MM3 (ref 0–0.03)
IMM GRANULOCYTES NFR BLD: 0.6 % (ref 0–0.5)
LYMPHOCYTES # BLD AUTO: 1.21 10*3/MM3 (ref 1–3.5)
LYMPHOCYTES NFR BLD AUTO: 22.7 % (ref 20–49)
MCH RBC QN AUTO: 30.3 PG (ref 27–33)
MCHC RBC AUTO-ENTMCNC: 33.6 G/DL (ref 32–35)
MCV RBC AUTO: 90.3 FL (ref 83–97)
MONOCYTES # BLD AUTO: 0.35 10*3/MM3 (ref 0.25–0.8)
MONOCYTES NFR BLD AUTO: 6.6 % (ref 4–12)
NEUTROPHILS # BLD AUTO: 3.58 10*3/MM3 (ref 1.5–7)
NEUTROPHILS NFR BLD AUTO: 67 % (ref 39–75)
NRBC BLD MANUAL-RTO: 0 /100 WBC (ref 0–0)
PLATELET # BLD AUTO: 204 10*3/MM3 (ref 150–375)
PMV BLD AUTO: 10 FL (ref 8.9–12.1)
POTASSIUM BLD-SCNC: 4.4 MMOL/L (ref 3.5–4.7)
PROT SERPL-MCNC: 6.8 G/DL (ref 6.3–8)
RBC # BLD AUTO: 4.22 10*6/MM3 (ref 4.3–5.5)
SODIUM BLD-SCNC: 142 MMOL/L (ref 134–145)
WBC NRBC COR # BLD: 5.33 10*3/MM3 (ref 4–10)

## 2017-04-20 PROCEDURE — 99213 OFFICE O/P EST LOW 20 MIN: CPT | Performed by: INTERNAL MEDICINE

## 2017-04-20 PROCEDURE — 80053 COMPREHEN METABOLIC PANEL: CPT

## 2017-04-20 PROCEDURE — 85025 COMPLETE CBC W/AUTO DIFF WBC: CPT

## 2017-04-20 PROCEDURE — 82378 CARCINOEMBRYONIC ANTIGEN: CPT

## 2017-04-20 PROCEDURE — 36415 COLL VENOUS BLD VENIPUNCTURE: CPT

## 2017-04-20 RX ORDER — MULTIVIT WITH MINERALS/LUTEIN
250 TABLET ORAL DAILY
COMMUNITY
End: 2018-01-15

## 2017-04-20 NOTE — PROGRESS NOTES
REASONS FOR FOLLOW-UP:  1. Clinical T3N1M0 adenocarcinoma of the rectum, status post neoadjuvant chemoradiation with infusional 5-FU followed by low anterior resection by Dr. Armin Hurtado on 11/14/2014; pathology at surgery showed microscopic foci of residual adenocarcinoma in ulceration with the largest focus 2.5 mm.  He had a single tumor deposit but 14 benign lymph nodes.  Final pathologic stage [id number].   2. Completed 4 months of adjuvant Xeloda post-surgery.     3. Tiny right upper lobe pulmonary nodule being followed radiographically and stable      History of Present Illness    Mr. Stephen returns today for follow-up of his rectal cancer currently under observation.  He has history of a right upper lobe pulmonary nodule which has been stable radiographically.      He comes in today for follow-up doing reasonably well.  He has currently good energy and stamina.  He notes no blood in the stool and no abdominal pain.  He denies shortness of breath.  He has long-term mild dizziness and his niece notices some mild issues with his memory.  He is hard of hearing.    Past Medical History    1. Diabetes.   2. Hypertension.  3. Hypercholesterolemia.  4. Hemorrhagic shock secondary to a duodenal ulcer August 2014   5. 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  Nondrinker.  Former smoker, but this is remote (35+ years ago). Assisted by his niece.    Family History have been reviewed and are without significant changes except as mentioned 11/16/15.    Review of Systems   Constitutional: Negative for activity change, fatigue and unexpected weight change.   HENT: Positive for hearing loss.    Respiratory: Negative for cough and shortness of breath.    Cardiovascular: Negative for chest pain.   Gastrointestinal: Negative for abdominal pain, diarrhea and nausea.   Neurological: Positive for dizziness.   Hematological: Negative for adenopathy.      A comprehensive 14 point review of systems  "was performed and was negative except as mentioned.    Medications:  The current medication list was reviewed in the EMR    ALLERGIES:  No Known Allergies    Objective      Vitals:    04/20/17 1335   BP: 128/68   Pulse: 78   Resp: 18   Temp: 98.3 °F (36.8 °C)   SpO2: 95%   Weight: 145 lb 9.6 oz (66 kg)   Height: 68\" (172.7 cm)   PainSc: 0-No pain     Current Status 4/20/2017   ECOG score 1       Physical Exam   Constitutional: He appears well-developed and well-nourished.   HENT:   Hard of hearing   Eyes: No scleral icterus.   Cardiovascular: Normal rate and regular rhythm.    Pulmonary/Chest: Effort normal and breath sounds normal. He has no wheezes.   Abdominal: Soft. Bowel sounds are normal. He exhibits no mass.   Ostomy present   Musculoskeletal: Normal range of motion. He exhibits no edema.   Skin: Skin is warm. No erythema.   Psychiatric: He has a normal mood and affect.          RECENT LABS:  Hematology WBC   Date Value Ref Range Status   04/20/2017 5.33 4.00 - 10.00 10*3/mm3 Final     RBC   Date Value Ref Range Status   04/20/2017 4.22 (L) 4.30 - 5.50 10*6/mm3 Final     Hemoglobin   Date Value Ref Range Status   04/20/2017 12.8 (L) 13.5 - 16.5 g/dL Final     Hematocrit   Date Value Ref Range Status   04/20/2017 38.1 (L) 40.0 - 49.0 % Final     MCV   Date Value Ref Range Status   04/20/2017 90.3 83.0 - 97.0 fL Final     MCH   Date Value Ref Range Status   04/20/2017 30.3 27.0 - 33.0 pg Final     MCHC   Date Value Ref Range Status   04/20/2017 33.6 32.0 - 35.0 g/dL Final     RDW   Date Value Ref Range Status   04/20/2017 13.0 11.7 - 14.5 % Final     RDW-SD   Date Value Ref Range Status   04/20/2017 42.7 37.0 - 49.0 fl Final     MPV   Date Value Ref Range Status   04/20/2017 10.0 8.9 - 12.1 fL Final     Platelets   Date Value Ref Range Status   04/20/2017 204 150 - 375 10*3/mm3 Final     Neutrophil %   Date Value Ref Range Status   04/20/2017 67.0 39.0 - 75.0 % Final     Lymphocyte %   Date Value Ref Range " Status   04/20/2017 22.7 20.0 - 49.0 % Final     Monocyte %   Date Value Ref Range Status   04/20/2017 6.6 4.0 - 12.0 % Final     Eosinophil %   Date Value Ref Range Status   04/20/2017 2.3 1.0 - 5.0 % Final     Basophil %   Date Value Ref Range Status   04/20/2017 0.8 0.0 - 1.1 % Final     Immature Grans %   Date Value Ref Range Status   04/20/2017 0.6 (H) 0.0 - 0.5 % Final     Neutrophils, Absolute   Date Value Ref Range Status   04/20/2017 3.58 1.50 - 7.00 10*3/mm3 Final     Lymphocytes, Absolute   Date Value Ref Range Status   04/20/2017 1.21 1.00 - 3.50 10*3/mm3 Final     Monocytes, Absolute   Date Value Ref Range Status   04/20/2017 0.35 0.25 - 0.80 10*3/mm3 Final     Eosinophils, Absolute   Date Value Ref Range Status   04/20/2017 0.12 0.00 - 0.36 10*3/mm3 Final     Basophils, Absolute   Date Value Ref Range Status   04/20/2017 0.04 0.00 - 0.10 10*3/mm3 Final     Immature Grans, Absolute   Date Value Ref Range Status   04/20/2017 0.03 0.00 - 0.03 10*3/mm3 Final     nRBC   Date Value Ref Range Status   04/20/2017 0.0 0.0 - 0.0 /100 WBC Final       Glucose   Date Value Ref Range Status   01/26/2017 299 (H) 74 - 124 mg/dL Final     Sodium   Date Value Ref Range Status   01/26/2017 142 134 - 145 mmol/L Final     Potassium   Date Value Ref Range Status   01/26/2017 4.6 3.5 - 4.7 mmol/L Final     CO2   Date Value Ref Range Status   01/26/2017 22.5 22.0 - 29.0 mmol/L Final     Chloride   Date Value Ref Range Status   01/26/2017 104 98 - 107 mmol/L Final     Anion Gap   Date Value Ref Range Status   01/26/2017 15.5 mmol/L Final     Creatinine   Date Value Ref Range Status   01/26/2017 2.08 (C) 0.70 - 1.30 mg/dL Final     BUN   Date Value Ref Range Status   01/26/2017 34 (H) 6 - 20 mg/dL Final     BUN/Creatinine Ratio   Date Value Ref Range Status   01/26/2017 16.3 7.3 - 30.0 Final     Calcium   Date Value Ref Range Status   01/26/2017 9.5 8.5 - 10.2 mg/dL Final     eGFR Non  Amer   Date Value Ref Range Status    01/26/2017 31 (L) >60 mL/min/1.73 Final     Alkaline Phosphatase   Date Value Ref Range Status   01/26/2017 62 38 - 116 U/L Final     Total Protein   Date Value Ref Range Status   01/26/2017 6.7 6.3 - 8.0 g/dL Final     ALT (SGPT)   Date Value Ref Range Status   01/26/2017 27 0 - 41 U/L Final     AST (SGOT)   Date Value Ref Range Status   01/26/2017 24 0 - 40 U/L Final     Total Bilirubin   Date Value Ref Range Status   01/26/2017 0.3 0.1 - 1.2 mg/dL Final     Albumin   Date Value Ref Range Status   01/26/2017 4.20 3.50 - 5.20 g/dL Final     Globulin   Date Value Ref Range Status   01/26/2017 2.5 1.8 - 3.5 gm/dL Final     A/G Ratio   Date Value Ref Range Status   01/26/2017 1.7 1.1 - 2.4 g/dL Final     CEA 5.2-->Pending today         Assessment/Plan   Mr. Stephen return for follow-up of his adenocarcinoma of the rectosigmoid junction clinical stage III status post neoadjuvant chemoradiation followed by low anterior resection pathology wide [id number].  He completed 4 months of Xeloda adjuvantly May 2015.   He seems to be doing well today with no obvious signs or symptoms of recurrent disease.  Since his CEA was mildly elevated 3 months ago at 5.2 I recommended that we repeat a CT scan of the chest, abdomen, pelvis along with CBC, CMP, and repeat CEA 3 months from now.            4/20/2017      CC:

## 2017-05-18 RX ORDER — MONTELUKAST SODIUM 4 MG/1
TABLET, CHEWABLE ORAL
Qty: 360 TABLET | Refills: 0 | OUTPATIENT
Start: 2017-05-18

## 2017-05-25 RX ORDER — MONTELUKAST SODIUM 4 MG/1
TABLET, CHEWABLE ORAL
Qty: 360 TABLET | Refills: 0 | OUTPATIENT
Start: 2017-05-25

## 2017-06-02 RX ORDER — MONTELUKAST SODIUM 4 MG/1
TABLET, CHEWABLE ORAL
Qty: 360 TABLET | Refills: 0 | OUTPATIENT
Start: 2017-06-02

## 2017-06-09 RX ORDER — MONTELUKAST SODIUM 4 MG/1
TABLET, CHEWABLE ORAL
Qty: 360 TABLET | Refills: 0 | OUTPATIENT
Start: 2017-06-09

## 2017-07-10 ENCOUNTER — HOSPITAL ENCOUNTER (OUTPATIENT)
Dept: PET IMAGING | Facility: HOSPITAL | Age: 82
Discharge: HOME | End: 2017-07-10
Attending: INTERNAL MEDICINE | Admitting: INTERNAL MEDICINE

## 2017-07-10 ENCOUNTER — LAB (OUTPATIENT)
Dept: LAB | Facility: HOSPITAL | Age: 82
End: 2017-07-10

## 2017-07-10 DIAGNOSIS — R91.1 LUNG NODULE: ICD-10-CM

## 2017-07-10 DIAGNOSIS — C20 RECTAL CANCER (HCC): ICD-10-CM

## 2017-07-10 LAB
ALBUMIN SERPL-MCNC: 4.5 G/DL (ref 3.5–5.2)
ALBUMIN/GLOB SERPL: 1.5 G/DL (ref 1.1–2.4)
ALP SERPL-CCNC: 66 U/L (ref 38–116)
ALT SERPL W P-5'-P-CCNC: 8 U/L (ref 0–41)
ANION GAP SERPL CALCULATED.3IONS-SCNC: 16.6 MMOL/L
AST SERPL-CCNC: 13 U/L (ref 0–40)
BASOPHILS # BLD AUTO: 0.04 10*3/MM3 (ref 0–0.1)
BASOPHILS NFR BLD AUTO: 0.7 % (ref 0–1.1)
BILIRUB SERPL-MCNC: 0.4 MG/DL (ref 0.1–1.2)
BUN BLD-MCNC: 29 MG/DL (ref 6–20)
BUN/CREAT SERPL: 17.2 (ref 7.3–30)
CALCIUM SPEC-SCNC: 9.8 MG/DL (ref 8.5–10.2)
CEA SERPL-MCNC: 4.11 NG/ML
CHLORIDE SERPL-SCNC: 98 MMOL/L (ref 98–107)
CO2 SERPL-SCNC: 24.4 MMOL/L (ref 22–29)
CREAT BLD-MCNC: 1.69 MG/DL (ref 0.7–1.3)
DEPRECATED RDW RBC AUTO: 42 FL (ref 37–49)
EOSINOPHIL # BLD AUTO: 0.11 10*3/MM3 (ref 0–0.36)
EOSINOPHIL NFR BLD AUTO: 2 % (ref 1–5)
ERYTHROCYTE [DISTWIDTH] IN BLOOD BY AUTOMATED COUNT: 12.9 % (ref 11.7–14.5)
GFR SERPL CREATININE-BSD FRML MDRD: 39 ML/MIN/1.73
GLOBULIN UR ELPH-MCNC: 3.1 GM/DL (ref 1.8–3.5)
GLUCOSE BLD-MCNC: 251 MG/DL (ref 74–124)
HCT VFR BLD AUTO: 43.7 % (ref 40–49)
HGB BLD-MCNC: 14.4 G/DL (ref 13.5–16.5)
IMM GRANULOCYTES # BLD: 0.04 10*3/MM3 (ref 0–0.03)
IMM GRANULOCYTES NFR BLD: 0.7 % (ref 0–0.5)
LYMPHOCYTES # BLD AUTO: 1.26 10*3/MM3 (ref 1–3.5)
LYMPHOCYTES NFR BLD AUTO: 23 % (ref 20–49)
MCH RBC QN AUTO: 29.7 PG (ref 27–33)
MCHC RBC AUTO-ENTMCNC: 33 G/DL (ref 32–35)
MCV RBC AUTO: 90.1 FL (ref 83–97)
MONOCYTES # BLD AUTO: 0.48 10*3/MM3 (ref 0.25–0.8)
MONOCYTES NFR BLD AUTO: 8.7 % (ref 4–12)
NEUTROPHILS # BLD AUTO: 3.56 10*3/MM3 (ref 1.5–7)
NEUTROPHILS NFR BLD AUTO: 64.9 % (ref 39–75)
NRBC BLD MANUAL-RTO: 0 /100 WBC (ref 0–0)
PLATELET # BLD AUTO: 229 10*3/MM3 (ref 150–375)
PMV BLD AUTO: 11.5 FL (ref 8.9–12.1)
POTASSIUM BLD-SCNC: 4.8 MMOL/L (ref 3.5–4.7)
PROT SERPL-MCNC: 7.6 G/DL (ref 6.3–8)
RBC # BLD AUTO: 4.85 10*6/MM3 (ref 4.3–5.5)
SODIUM BLD-SCNC: 139 MMOL/L (ref 134–145)
WBC NRBC COR # BLD: 5.49 10*3/MM3 (ref 4–10)

## 2017-07-10 PROCEDURE — 74176 CT ABD & PELVIS W/O CONTRAST: CPT

## 2017-07-10 PROCEDURE — 85025 COMPLETE CBC W/AUTO DIFF WBC: CPT

## 2017-07-10 PROCEDURE — 80053 COMPREHEN METABOLIC PANEL: CPT

## 2017-07-10 PROCEDURE — 36415 COLL VENOUS BLD VENIPUNCTURE: CPT

## 2017-07-10 PROCEDURE — 71250 CT THORAX DX C-: CPT

## 2017-07-10 PROCEDURE — 82378 CARCINOEMBRYONIC ANTIGEN: CPT | Performed by: INTERNAL MEDICINE

## 2017-07-17 ENCOUNTER — APPOINTMENT (OUTPATIENT)
Dept: LAB | Facility: HOSPITAL | Age: 82
End: 2017-07-17

## 2017-07-17 ENCOUNTER — OFFICE VISIT (OUTPATIENT)
Dept: ONCOLOGY | Facility: CLINIC | Age: 82
End: 2017-07-17

## 2017-07-17 VITALS
HEART RATE: 69 BPM | WEIGHT: 143.8 LBS | TEMPERATURE: 98.5 F | OXYGEN SATURATION: 96 % | RESPIRATION RATE: 16 BRPM | BODY MASS INDEX: 21.79 KG/M2 | HEIGHT: 68 IN | DIASTOLIC BLOOD PRESSURE: 84 MMHG | SYSTOLIC BLOOD PRESSURE: 148 MMHG

## 2017-07-17 DIAGNOSIS — R91.1 LUNG NODULE: ICD-10-CM

## 2017-07-17 DIAGNOSIS — C20 RECTAL CANCER (HCC): Primary | ICD-10-CM

## 2017-07-17 PROCEDURE — G0463 HOSPITAL OUTPT CLINIC VISIT: HCPCS | Performed by: INTERNAL MEDICINE

## 2017-07-17 PROCEDURE — 99213 OFFICE O/P EST LOW 20 MIN: CPT | Performed by: INTERNAL MEDICINE

## 2017-07-17 RX ORDER — GLIPIZIDE 5 MG/1
5 TABLET ORAL
Status: ON HOLD | COMMUNITY
Start: 2017-07-13 | End: 2017-11-29

## 2017-07-17 NOTE — PROGRESS NOTES
REASONS FOR FOLLOW-UP:  1. Clinical T3N1M0 adenocarcinoma of the rectum, status post neoadjuvant chemoradiation with infusional 5-FU followed by low anterior resection by Dr. Armin Hurtado on 11/14/2014; pathology at surgery showed microscopic foci of residual adenocarcinoma in ulceration with the largest focus 2.5 mm.  He had a single tumor deposit but 14 benign lymph nodes.  Final pathologic stage [id number].   2. Completed 4 months of adjuvant Xeloda post-surgery.     3. Tiny right upper lobe pulmonary nodule being followed radiographically and stable      History of Present Illness    Mr. Stephen returns today for follow-up of his rectal cancer currently under observation.  He has history of a right upper lobe pulmonary nodule which has been stable radiographically.      He returns feeling well.  He has good appetite.  He denies changes in the bowel habits, blood in the stool.  He denies shortness of breath, cough, hemoptysis.    Past Medical History    1. Diabetes.   2. Hypertension.  3. Hypercholesterolemia.  4. Hemorrhagic shock secondary to a duodenal ulcer August 2014   5. 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  Nondrinker.  Former smoker, but this is remote (35+ years ago). Assisted by his niece.    Family History have been reviewed and are without significant changes except as mentioned 11/16/15.    Review of Systems   Constitutional: Negative for activity change, fatigue and unexpected weight change.   HENT: Positive for hearing loss.    Respiratory: Negative for cough and shortness of breath.    Cardiovascular: Negative for chest pain.   Gastrointestinal: Negative for abdominal pain, diarrhea and nausea.   Neurological: Positive for dizziness.   Hematological: Negative for adenopathy.      A comprehensive 14 point review of systems was performed and was negative except as mentioned.    Medications:  The current medication list was reviewed in the EMR    ALLERGIES:  No  Known Allergies    Objective      There were no vitals filed for this visit.  Current Status 4/20/2017   ECOG score 1       Physical Exam   Constitutional: He appears well-developed and well-nourished.   HENT:   Hard of hearing   Eyes: No scleral icterus.   Cardiovascular: Normal rate and regular rhythm.    Pulmonary/Chest: Effort normal and breath sounds normal. He has no wheezes.   Abdominal: Soft. Bowel sounds are normal. He exhibits no mass.   Ostomy present   Musculoskeletal: Normal range of motion. He exhibits no edema.   Skin: Skin is warm. No erythema.   Psychiatric: He has a normal mood and affect.          RECENT LABS:  Hematology WBC   Date Value Ref Range Status   07/10/2017 5.49 4.00 - 10.00 10*3/mm3 Final     RBC   Date Value Ref Range Status   07/10/2017 4.85 4.30 - 5.50 10*6/mm3 Final     Hemoglobin   Date Value Ref Range Status   07/10/2017 14.4 13.5 - 16.5 g/dL Final     Hematocrit   Date Value Ref Range Status   07/10/2017 43.7 40.0 - 49.0 % Final     MCV   Date Value Ref Range Status   07/10/2017 90.1 83.0 - 97.0 fL Final     MCH   Date Value Ref Range Status   07/10/2017 29.7 27.0 - 33.0 pg Final     MCHC   Date Value Ref Range Status   07/10/2017 33.0 32.0 - 35.0 g/dL Final     RDW   Date Value Ref Range Status   07/10/2017 12.9 11.7 - 14.5 % Final     RDW-SD   Date Value Ref Range Status   07/10/2017 42.0 37.0 - 49.0 fl Final     MPV   Date Value Ref Range Status   07/10/2017 11.5 8.9 - 12.1 fL Final     Platelets   Date Value Ref Range Status   07/10/2017 229 150 - 375 10*3/mm3 Final     Neutrophil %   Date Value Ref Range Status   07/10/2017 64.9 39.0 - 75.0 % Final     Lymphocyte %   Date Value Ref Range Status   07/10/2017 23.0 20.0 - 49.0 % Final     Monocyte %   Date Value Ref Range Status   07/10/2017 8.7 4.0 - 12.0 % Final     Eosinophil %   Date Value Ref Range Status   07/10/2017 2.0 1.0 - 5.0 % Final     Basophil %   Date Value Ref Range Status   07/10/2017 0.7 0.0 - 1.1 % Final      Immature Grans %   Date Value Ref Range Status   07/10/2017 0.7 (H) 0.0 - 0.5 % Final     Neutrophils, Absolute   Date Value Ref Range Status   07/10/2017 3.56 1.50 - 7.00 10*3/mm3 Final     Lymphocytes, Absolute   Date Value Ref Range Status   07/10/2017 1.26 1.00 - 3.50 10*3/mm3 Final     Monocytes, Absolute   Date Value Ref Range Status   07/10/2017 0.48 0.25 - 0.80 10*3/mm3 Final     Eosinophils, Absolute   Date Value Ref Range Status   07/10/2017 0.11 0.00 - 0.36 10*3/mm3 Final     Basophils, Absolute   Date Value Ref Range Status   07/10/2017 0.04 0.00 - 0.10 10*3/mm3 Final     Immature Grans, Absolute   Date Value Ref Range Status   07/10/2017 0.04 (H) 0.00 - 0.03 10*3/mm3 Final     nRBC   Date Value Ref Range Status   07/10/2017 0.0 0.0 - 0.0 /100 WBC Final       Glucose   Date Value Ref Range Status   07/10/2017 251 (H) 74 - 124 mg/dL Final     Sodium   Date Value Ref Range Status   07/10/2017 139 134 - 145 mmol/L Final     Potassium   Date Value Ref Range Status   07/10/2017 4.8 (H) 3.5 - 4.7 mmol/L Final     CO2   Date Value Ref Range Status   07/10/2017 24.4 22.0 - 29.0 mmol/L Final     Chloride   Date Value Ref Range Status   07/10/2017 98 98 - 107 mmol/L Final     Anion Gap   Date Value Ref Range Status   07/10/2017 16.6 mmol/L Final     Creatinine   Date Value Ref Range Status   07/10/2017 1.69 (H) 0.70 - 1.30 mg/dL Final     BUN   Date Value Ref Range Status   07/10/2017 29 (H) 6 - 20 mg/dL Final     BUN/Creatinine Ratio   Date Value Ref Range Status   07/10/2017 17.2 7.3 - 30.0 Final     Calcium   Date Value Ref Range Status   07/10/2017 9.8 8.5 - 10.2 mg/dL Final     eGFR Non  Amer   Date Value Ref Range Status   07/10/2017 39 (L) >60 mL/min/1.73 Final     Alkaline Phosphatase   Date Value Ref Range Status   07/10/2017 66 38 - 116 U/L Final     Total Protein   Date Value Ref Range Status   07/10/2017 7.6 6.3 - 8.0 g/dL Final     ALT (SGPT)   Date Value Ref Range Status   07/10/2017 8 0 -  41 U/L Final     AST (SGOT)   Date Value Ref Range Status   07/10/2017 13 0 - 40 U/L Final     Total Bilirubin   Date Value Ref Range Status   07/10/2017 0.4 0.1 - 1.2 mg/dL Final     Albumin   Date Value Ref Range Status   07/10/2017 4.50 3.50 - 5.20 g/dL Final     Globulin   Date Value Ref Range Status   07/10/2017 3.1 1.8 - 3.5 gm/dL Final     A/G Ratio   Date Value Ref Range Status   07/10/2017 1.5 1.1 - 2.4 g/dL Final     CEA 5.2-->4.1     CT chest, abdomen, pelvis reviewed from 7/10/17: This is a stable examination with no evidence of metastatic disease.  The tiny right upper lobe pulmonary nodule is stable (first mentioned May 2015)    Assessment/Plan   Mr. Stephen return for follow-up of his adenocarcinoma of the rectosigmoid junction clinical stage III status post neoadjuvant chemoradiation followed by low anterior resection pathology wide [id number].  He completed 4 months of Xeloda adjuvantly May 2015.       We reviewed today CT scan of the chest, abdomen, and pelvis in addition to CEA all of which show no evidence of recurrent disease.  There is been a stable right upper lobe lung nodule for approximately 2 years and now thought to be likely benign.  He has chronic kidney disease which is stable.  I recommended 6 month follow-up with CBC, CMP, CEA.  Pending his functional status, we will consider repeat CT scans in 1 year.              7/17/2017      CC:

## 2017-11-28 ENCOUNTER — APPOINTMENT (OUTPATIENT)
Dept: CT IMAGING | Facility: HOSPITAL | Age: 82
End: 2017-11-28

## 2017-11-28 ENCOUNTER — APPOINTMENT (OUTPATIENT)
Dept: GENERAL RADIOLOGY | Facility: HOSPITAL | Age: 82
End: 2017-11-28

## 2017-11-28 ENCOUNTER — HOSPITAL ENCOUNTER (INPATIENT)
Facility: HOSPITAL | Age: 82
LOS: 6 days | Discharge: HOME HEALTH | End: 2017-12-04
Attending: EMERGENCY MEDICINE | Admitting: HOSPITALIST

## 2017-11-28 DIAGNOSIS — N18.9 ACUTE ON CHRONIC RENAL INSUFFICIENCY: ICD-10-CM

## 2017-11-28 DIAGNOSIS — E87.5 HYPERKALEMIA: ICD-10-CM

## 2017-11-28 DIAGNOSIS — R26.89 DECREASED MOBILITY: ICD-10-CM

## 2017-11-28 DIAGNOSIS — R73.9 HYPERGLYCEMIA: ICD-10-CM

## 2017-11-28 DIAGNOSIS — G45.9 TRANSIENT CEREBRAL ISCHEMIA, UNSPECIFIED TYPE: Primary | ICD-10-CM

## 2017-11-28 DIAGNOSIS — IMO0002 UNCONTROLLED TYPE 2 DIABETES MELLITUS WITH OTHER DIABETIC KIDNEY COMPLICATION, WITHOUT LONG-TERM CURRENT USE OF INSULIN: ICD-10-CM

## 2017-11-28 DIAGNOSIS — N28.9 ACUTE ON CHRONIC RENAL INSUFFICIENCY: ICD-10-CM

## 2017-11-28 LAB
ALBUMIN SERPL-MCNC: 4.2 G/DL (ref 3.5–5.2)
ALBUMIN/GLOB SERPL: 1.5 G/DL
ALP SERPL-CCNC: 98 U/L (ref 39–117)
ALT SERPL W P-5'-P-CCNC: 18 U/L (ref 1–41)
ANION GAP SERPL CALCULATED.3IONS-SCNC: 14 MMOL/L
ANION GAP SERPL CALCULATED.3IONS-SCNC: 15.1 MMOL/L
ARTERIAL PATENCY WRIST A: POSITIVE
AST SERPL-CCNC: 12 U/L (ref 1–40)
ATMOSPHERIC PRESS: 753.5 MMHG
B-OH-BUTYR SERPL-SCNC: 0.13 MMOL/L (ref 0.02–0.27)
BASE EXCESS BLDA CALC-SCNC: -7.3 MMOL/L (ref 0–2)
BASOPHILS # BLD AUTO: 0.03 10*3/MM3 (ref 0–0.2)
BASOPHILS NFR BLD AUTO: 0.5 % (ref 0–1.5)
BDY SITE: ABNORMAL
BILIRUB SERPL-MCNC: 0.2 MG/DL (ref 0.1–1.2)
BILIRUB UR QL STRIP: NEGATIVE
BUN BLD-MCNC: 69 MG/DL (ref 8–23)
BUN BLD-MCNC: 76 MG/DL (ref 8–23)
BUN/CREAT SERPL: 25.7 (ref 7–25)
BUN/CREAT SERPL: 26.2 (ref 7–25)
CALCIUM SPEC-SCNC: 8.8 MG/DL (ref 8.6–10.5)
CALCIUM SPEC-SCNC: 9.2 MG/DL (ref 8.6–10.5)
CHLORIDE SERPL-SCNC: 101 MMOL/L (ref 98–107)
CHLORIDE SERPL-SCNC: 91 MMOL/L (ref 98–107)
CLARITY UR: CLEAR
CO2 SERPL-SCNC: 20.9 MMOL/L (ref 22–29)
CO2 SERPL-SCNC: 21 MMOL/L (ref 22–29)
COLOR UR: YELLOW
CREAT BLD-MCNC: 2.69 MG/DL (ref 0.76–1.27)
CREAT BLD-MCNC: 2.9 MG/DL (ref 0.76–1.27)
DEPRECATED RDW RBC AUTO: 42.7 FL (ref 37–54)
EOSINOPHIL # BLD AUTO: 0.08 10*3/MM3 (ref 0–0.7)
EOSINOPHIL NFR BLD AUTO: 1.3 % (ref 0.3–6.2)
ERYTHROCYTE [DISTWIDTH] IN BLOOD BY AUTOMATED COUNT: 12.6 % (ref 11.5–14.5)
GFR SERPL CREATININE-BSD FRML MDRD: 21 ML/MIN/1.73
GFR SERPL CREATININE-BSD FRML MDRD: 23 ML/MIN/1.73
GLOBULIN UR ELPH-MCNC: 2.8 GM/DL
GLUCOSE BLD-MCNC: 422 MG/DL (ref 65–99)
GLUCOSE BLD-MCNC: 904 MG/DL (ref 65–99)
GLUCOSE BLDC GLUCOMTR-MCNC: 365 MG/DL (ref 70–130)
GLUCOSE BLDC GLUCOMTR-MCNC: 375 MG/DL (ref 70–130)
GLUCOSE BLDC GLUCOMTR-MCNC: >599 MG/DL (ref 70–130)
GLUCOSE UR STRIP-MCNC: ABNORMAL MG/DL
HCO3 BLDA-SCNC: 17.8 MMOL/L (ref 22–28)
HCT VFR BLD AUTO: 38.9 % (ref 40.4–52.2)
HGB BLD-MCNC: 12.9 G/DL (ref 13.7–17.6)
HGB UR QL STRIP.AUTO: NEGATIVE
HOLD SPECIMEN: NORMAL
HOLD SPECIMEN: NORMAL
HOROWITZ INDEX BLD+IHG-RTO: 21 %
IMM GRANULOCYTES # BLD: 0.03 10*3/MM3 (ref 0–0.03)
IMM GRANULOCYTES NFR BLD: 0.5 % (ref 0–0.5)
KETONES UR QL STRIP: NEGATIVE
LEUKOCYTE ESTERASE UR QL STRIP.AUTO: NEGATIVE
LYMPHOCYTES # BLD AUTO: 0.86 10*3/MM3 (ref 0.9–4.8)
LYMPHOCYTES NFR BLD AUTO: 14.4 % (ref 19.6–45.3)
MAGNESIUM SERPL-MCNC: 2.3 MG/DL (ref 1.6–2.4)
MCH RBC QN AUTO: 30.8 PG (ref 27–32.7)
MCHC RBC AUTO-ENTMCNC: 33.2 G/DL (ref 32.6–36.4)
MCV RBC AUTO: 92.8 FL (ref 79.8–96.2)
MODALITY: ABNORMAL
MONOCYTES # BLD AUTO: 0.37 10*3/MM3 (ref 0.2–1.2)
MONOCYTES NFR BLD AUTO: 6.2 % (ref 5–12)
NEUTROPHILS # BLD AUTO: 4.6 10*3/MM3 (ref 1.9–8.1)
NEUTROPHILS NFR BLD AUTO: 77.1 % (ref 42.7–76)
NITRITE UR QL STRIP: NEGATIVE
O2 A-A PPRESDIFF RESPIRATORY: 0.7 MMHG
PCO2 BLDA: 33.7 MM HG (ref 35–45)
PH BLDA: 7.33 PH UNITS (ref 7.35–7.45)
PH UR STRIP.AUTO: <=5 [PH] (ref 5–8)
PLATELET # BLD AUTO: 199 10*3/MM3 (ref 140–500)
PMV BLD AUTO: 11.2 FL (ref 6–12)
PO2 BLDA: 86.3 MM HG (ref 80–100)
POTASSIUM BLD-SCNC: 4.2 MMOL/L (ref 3.5–5.2)
POTASSIUM BLD-SCNC: 6.5 MMOL/L (ref 3.5–5.2)
PROT SERPL-MCNC: 7 G/DL (ref 6–8.5)
PROT UR QL STRIP: NEGATIVE
RBC # BLD AUTO: 4.19 10*6/MM3 (ref 4.6–6)
SAO2 % BLDCOA: 96 % (ref 92–99)
SET MECH RESP RATE: 18
SODIUM BLD-SCNC: 126 MMOL/L (ref 136–145)
SODIUM BLD-SCNC: 137 MMOL/L (ref 136–145)
SP GR UR STRIP: >=1.03 (ref 1–1.03)
TROPONIN T SERPL-MCNC: <0.01 NG/ML (ref 0–0.03)
UROBILINOGEN UR QL STRIP: ABNORMAL
WBC NRBC COR # BLD: 5.97 10*3/MM3 (ref 4.5–10.7)
WHOLE BLOOD HOLD SPECIMEN: NORMAL
WHOLE BLOOD HOLD SPECIMEN: NORMAL

## 2017-11-28 PROCEDURE — 82962 GLUCOSE BLOOD TEST: CPT

## 2017-11-28 PROCEDURE — 94640 AIRWAY INHALATION TREATMENT: CPT

## 2017-11-28 PROCEDURE — 81003 URINALYSIS AUTO W/O SCOPE: CPT | Performed by: EMERGENCY MEDICINE

## 2017-11-28 PROCEDURE — 63710000001 INSULIN REGULAR HUMAN PER 5 UNITS: Performed by: EMERGENCY MEDICINE

## 2017-11-28 PROCEDURE — 84484 ASSAY OF TROPONIN QUANT: CPT | Performed by: EMERGENCY MEDICINE

## 2017-11-28 PROCEDURE — 70450 CT HEAD/BRAIN W/O DYE: CPT

## 2017-11-28 PROCEDURE — 85025 COMPLETE CBC W/AUTO DIFF WBC: CPT | Performed by: EMERGENCY MEDICINE

## 2017-11-28 PROCEDURE — 93005 ELECTROCARDIOGRAM TRACING: CPT

## 2017-11-28 PROCEDURE — 82010 KETONE BODYS QUAN: CPT | Performed by: EMERGENCY MEDICINE

## 2017-11-28 PROCEDURE — 83735 ASSAY OF MAGNESIUM: CPT | Performed by: EMERGENCY MEDICINE

## 2017-11-28 PROCEDURE — 71010 HC CHEST PA OR AP: CPT

## 2017-11-28 PROCEDURE — 83036 HEMOGLOBIN GLYCOSYLATED A1C: CPT | Performed by: HOSPITALIST

## 2017-11-28 PROCEDURE — 93010 ELECTROCARDIOGRAM REPORT: CPT | Performed by: INTERNAL MEDICINE

## 2017-11-28 PROCEDURE — 36600 WITHDRAWAL OF ARTERIAL BLOOD: CPT

## 2017-11-28 PROCEDURE — 99285 EMERGENCY DEPT VISIT HI MDM: CPT

## 2017-11-28 PROCEDURE — 80053 COMPREHEN METABOLIC PANEL: CPT | Performed by: EMERGENCY MEDICINE

## 2017-11-28 PROCEDURE — 82803 BLOOD GASES ANY COMBINATION: CPT

## 2017-11-28 RX ORDER — SODIUM CHLORIDE 0.9 % (FLUSH) 0.9 %
10 SYRINGE (ML) INJECTION AS NEEDED
Status: DISCONTINUED | OUTPATIENT
Start: 2017-11-28 | End: 2017-12-04

## 2017-11-28 RX ORDER — GLYBURIDE 3 MG/1
3 TABLET ORAL
Status: ON HOLD | COMMUNITY
End: 2017-11-29

## 2017-11-28 RX ORDER — SODIUM CHLORIDE 9 MG/ML
250 INJECTION, SOLUTION INTRAVENOUS CONTINUOUS
Status: DISCONTINUED | OUTPATIENT
Start: 2017-11-28 | End: 2017-11-29

## 2017-11-28 RX ORDER — ASPIRIN 325 MG
325 TABLET ORAL ONCE
Status: COMPLETED | OUTPATIENT
Start: 2017-11-28 | End: 2017-11-28

## 2017-11-28 RX ADMIN — ALBUTEROL SULFATE 10 MG: 2.5 SOLUTION RESPIRATORY (INHALATION) at 18:46

## 2017-11-28 RX ADMIN — ASPIRIN 325 MG: 325 TABLET ORAL at 21:25

## 2017-11-28 RX ADMIN — SODIUM CHLORIDE 250 ML/HR: 9 INJECTION, SOLUTION INTRAVENOUS at 19:31

## 2017-11-28 RX ADMIN — SODIUM CHLORIDE 1000 ML: 9 INJECTION, SOLUTION INTRAVENOUS at 18:17

## 2017-11-28 RX ADMIN — HUMAN INSULIN 10 UNITS: 100 INJECTION, SOLUTION SUBCUTANEOUS at 18:18

## 2017-11-29 ENCOUNTER — APPOINTMENT (OUTPATIENT)
Dept: ULTRASOUND IMAGING | Facility: HOSPITAL | Age: 82
End: 2017-11-29

## 2017-11-29 ENCOUNTER — APPOINTMENT (OUTPATIENT)
Dept: CARDIOLOGY | Facility: HOSPITAL | Age: 82
End: 2017-11-29
Attending: HOSPITALIST

## 2017-11-29 ENCOUNTER — APPOINTMENT (OUTPATIENT)
Dept: MRI IMAGING | Facility: HOSPITAL | Age: 82
End: 2017-11-29

## 2017-11-29 PROBLEM — E11.00 HYPEROSMOLAR NON-KETOTIC STATE IN PATIENT WITH TYPE 2 DIABETES MELLITUS (HCC): Status: ACTIVE | Noted: 2017-11-29

## 2017-11-29 PROBLEM — E87.1 HYPONATREMIA WITH EXTRACELLULAR FLUID DEPLETION: Status: ACTIVE | Noted: 2017-11-29

## 2017-11-29 PROBLEM — E13.00 DM (DIABETES MELLITUS), SECONDARY, UNCONTROLLED, WITH HYPEROSMOLARITY (HCC): Status: ACTIVE | Noted: 2017-11-29

## 2017-11-29 PROBLEM — E87.5 HYPERKALEMIA: Status: ACTIVE | Noted: 2017-11-29

## 2017-11-29 LAB
ANION GAP SERPL CALCULATED.3IONS-SCNC: 13.8 MMOL/L
BASOPHILS # BLD AUTO: 0.05 10*3/MM3 (ref 0–0.2)
BASOPHILS NFR BLD AUTO: 0.8 % (ref 0–1.5)
BH CV XLRA MEAS LEFT DIST CCA EDV: 18.9 CM/SEC
BH CV XLRA MEAS LEFT DIST CCA PSV: 99 CM/SEC
BH CV XLRA MEAS LEFT DIST ICA EDV: -11.8 CM/SEC
BH CV XLRA MEAS LEFT DIST ICA PSV: -50.1 CM/SEC
BH CV XLRA MEAS LEFT MID ICA EDV: -24.6 CM/SEC
BH CV XLRA MEAS LEFT MID ICA PSV: -79 CM/SEC
BH CV XLRA MEAS LEFT PROX CCA EDV: 22 CM/SEC
BH CV XLRA MEAS LEFT PROX CCA PSV: 83.3 CM/SEC
BH CV XLRA MEAS LEFT PROX ECA EDV: -15.7 CM/SEC
BH CV XLRA MEAS LEFT PROX ECA PSV: -123 CM/SEC
BH CV XLRA MEAS LEFT PROX ICA EDV: -11.8 CM/SEC
BH CV XLRA MEAS LEFT PROX ICA PSV: -83.5 CM/SEC
BH CV XLRA MEAS LEFT PROX SCLA EDV: 11.8 CM/SEC
BH CV XLRA MEAS LEFT PROX SCLA PSV: 97.6 CM/SEC
BH CV XLRA MEAS LEFT VERTEBRAL A EDV: -14.9 CM/SEC
BH CV XLRA MEAS LEFT VERTEBRAL A PSV: -60.5 CM/SEC
BH CV XLRA MEAS RIGHT DIST CCA EDV: -10.6 CM/SEC
BH CV XLRA MEAS RIGHT DIST CCA PSV: -63.9 CM/SEC
BH CV XLRA MEAS RIGHT DIST ICA EDV: -20.1 CM/SEC
BH CV XLRA MEAS RIGHT DIST ICA PSV: -63.4 CM/SEC
BH CV XLRA MEAS RIGHT MID ICA EDV: -21.2 CM/SEC
BH CV XLRA MEAS RIGHT MID ICA PSV: -69.9 CM/SEC
BH CV XLRA MEAS RIGHT PROX CCA EDV: 12.2 CM/SEC
BH CV XLRA MEAS RIGHT PROX CCA PSV: 75.8 CM/SEC
BH CV XLRA MEAS RIGHT PROX ECA EDV: -11 CM/SEC
BH CV XLRA MEAS RIGHT PROX ECA PSV: -127 CM/SEC
BH CV XLRA MEAS RIGHT PROX ICA EDV: -17.3 CM/SEC
BH CV XLRA MEAS RIGHT PROX ICA PSV: -61.3 CM/SEC
BH CV XLRA MEAS RIGHT PROX SCLA PSV: 195 CM/SEC
BH CV XLRA MEAS RIGHT VERTEBRAL A EDV: -8.5 CM/SEC
BH CV XLRA MEAS RIGHT VERTEBRAL A PSV: -47.8 CM/SEC
BUN BLD-MCNC: 56 MG/DL (ref 8–23)
BUN/CREAT SERPL: 24.2 (ref 7–25)
CALCIUM SPEC-SCNC: 8.8 MG/DL (ref 8.6–10.5)
CHLORIDE SERPL-SCNC: 104 MMOL/L (ref 98–107)
CHLORIDE UR-SCNC: 96 MMOL/L
CO2 SERPL-SCNC: 18.2 MMOL/L (ref 22–29)
CREAT BLD-MCNC: 2.31 MG/DL (ref 0.76–1.27)
CREAT UR-MCNC: 42.1 MG/DL
DEPRECATED RDW RBC AUTO: 41.3 FL (ref 37–54)
EOSINOPHIL # BLD AUTO: 0.27 10*3/MM3 (ref 0–0.7)
EOSINOPHIL NFR BLD AUTO: 4.5 % (ref 0.3–6.2)
ERYTHROCYTE [DISTWIDTH] IN BLOOD BY AUTOMATED COUNT: 12.4 % (ref 11.5–14.5)
GFR SERPL CREATININE-BSD FRML MDRD: 27 ML/MIN/1.73
GLUCOSE BLD-MCNC: 454 MG/DL (ref 65–99)
GLUCOSE BLDC GLUCOMTR-MCNC: 273 MG/DL (ref 70–130)
GLUCOSE BLDC GLUCOMTR-MCNC: 312 MG/DL (ref 70–130)
GLUCOSE BLDC GLUCOMTR-MCNC: 403 MG/DL (ref 70–130)
GLUCOSE BLDC GLUCOMTR-MCNC: 431 MG/DL (ref 70–130)
HBA1C MFR BLD: 12.58 % (ref 4.8–5.6)
HCT VFR BLD AUTO: 35.2 % (ref 40.4–52.2)
HGB BLD-MCNC: 11.7 G/DL (ref 13.7–17.6)
IMM GRANULOCYTES # BLD: 0.06 10*3/MM3 (ref 0–0.03)
IMM GRANULOCYTES NFR BLD: 1 % (ref 0–0.5)
LYMPHOCYTES # BLD AUTO: 1.54 10*3/MM3 (ref 0.9–4.8)
LYMPHOCYTES NFR BLD AUTO: 25.7 % (ref 19.6–45.3)
MCH RBC QN AUTO: 30.3 PG (ref 27–32.7)
MCHC RBC AUTO-ENTMCNC: 33.2 G/DL (ref 32.6–36.4)
MCV RBC AUTO: 91.2 FL (ref 79.8–96.2)
MONOCYTES # BLD AUTO: 0.57 10*3/MM3 (ref 0.2–1.2)
MONOCYTES NFR BLD AUTO: 9.5 % (ref 5–12)
NEUTROPHILS # BLD AUTO: 3.51 10*3/MM3 (ref 1.9–8.1)
NEUTROPHILS NFR BLD AUTO: 58.5 % (ref 42.7–76)
PLATELET # BLD AUTO: 177 10*3/MM3 (ref 140–500)
PMV BLD AUTO: 10.9 FL (ref 6–12)
POTASSIUM BLD-SCNC: 4.7 MMOL/L (ref 3.5–5.2)
PROT UR-MCNC: 5 MG/DL
RBC # BLD AUTO: 3.86 10*6/MM3 (ref 4.6–6)
RIGHT ARM BP: NORMAL MMHG
SODIUM BLD-SCNC: 136 MMOL/L (ref 136–145)
SODIUM UR-SCNC: 106 MMOL/L
WBC NRBC COR # BLD: 6 10*3/MM3 (ref 4.5–10.7)

## 2017-11-29 PROCEDURE — 85025 COMPLETE CBC W/AUTO DIFF WBC: CPT | Performed by: HOSPITALIST

## 2017-11-29 PROCEDURE — 63710000001 INSULIN ASPART PER 5 UNITS: Performed by: HOSPITALIST

## 2017-11-29 PROCEDURE — 63710000001 INSULIN DETEMER PER 5 UNITS: Performed by: HOSPITALIST

## 2017-11-29 PROCEDURE — 76775 US EXAM ABDO BACK WALL LIM: CPT

## 2017-11-29 PROCEDURE — 70551 MRI BRAIN STEM W/O DYE: CPT

## 2017-11-29 PROCEDURE — 82962 GLUCOSE BLOOD TEST: CPT

## 2017-11-29 PROCEDURE — 70544 MR ANGIOGRAPHY HEAD W/O DYE: CPT

## 2017-11-29 PROCEDURE — 82436 ASSAY OF URINE CHLORIDE: CPT | Performed by: INTERNAL MEDICINE

## 2017-11-29 PROCEDURE — 84300 ASSAY OF URINE SODIUM: CPT | Performed by: INTERNAL MEDICINE

## 2017-11-29 PROCEDURE — 93880 EXTRACRANIAL BILAT STUDY: CPT

## 2017-11-29 PROCEDURE — 99223 1ST HOSP IP/OBS HIGH 75: CPT | Performed by: NURSE PRACTITIONER

## 2017-11-29 PROCEDURE — 70547 MR ANGIOGRAPHY NECK W/O DYE: CPT

## 2017-11-29 PROCEDURE — 92610 EVALUATE SWALLOWING FUNCTION: CPT

## 2017-11-29 PROCEDURE — 82570 ASSAY OF URINE CREATININE: CPT | Performed by: INTERNAL MEDICINE

## 2017-11-29 PROCEDURE — 80048 BASIC METABOLIC PNL TOTAL CA: CPT | Performed by: HOSPITALIST

## 2017-11-29 PROCEDURE — 84156 ASSAY OF PROTEIN URINE: CPT | Performed by: INTERNAL MEDICINE

## 2017-11-29 RX ORDER — ASPIRIN 325 MG
325 TABLET ORAL ONCE
Status: DISCONTINUED | OUTPATIENT
Start: 2017-11-29 | End: 2017-11-29

## 2017-11-29 RX ORDER — MULTIPLE VITAMINS W/ MINERALS TAB 9MG-400MCG
1 TAB ORAL DAILY
Status: DISCONTINUED | OUTPATIENT
Start: 2017-11-29 | End: 2017-12-04 | Stop reason: HOSPADM

## 2017-11-29 RX ORDER — ASCORBIC ACID 500 MG
250 TABLET ORAL DAILY
Status: DISCONTINUED | OUTPATIENT
Start: 2017-11-29 | End: 2017-11-29

## 2017-11-29 RX ORDER — ONDANSETRON 2 MG/ML
4 INJECTION INTRAMUSCULAR; INTRAVENOUS EVERY 4 HOURS PRN
Status: DISCONTINUED | OUTPATIENT
Start: 2017-11-29 | End: 2017-12-04

## 2017-11-29 RX ORDER — ATORVASTATIN CALCIUM 80 MG/1
80 TABLET, FILM COATED ORAL DAILY
Status: DISCONTINUED | OUTPATIENT
Start: 2017-11-29 | End: 2017-12-02

## 2017-11-29 RX ORDER — NICOTINE POLACRILEX 4 MG
15 LOZENGE BUCCAL
Status: DISCONTINUED | OUTPATIENT
Start: 2017-11-29 | End: 2017-11-29

## 2017-11-29 RX ORDER — ASPIRIN 325 MG
325 TABLET ORAL DAILY
Status: DISCONTINUED | OUTPATIENT
Start: 2017-11-29 | End: 2017-12-04 | Stop reason: HOSPADM

## 2017-11-29 RX ORDER — L.ACID,PARA/B.BIFIDUM/S.THERM 8B CELL
1 CAPSULE ORAL DAILY
Status: DISCONTINUED | OUTPATIENT
Start: 2017-11-29 | End: 2017-12-04 | Stop reason: HOSPADM

## 2017-11-29 RX ORDER — DEXTROSE MONOHYDRATE 25 G/50ML
25 INJECTION, SOLUTION INTRAVENOUS
Status: DISCONTINUED | OUTPATIENT
Start: 2017-11-29 | End: 2017-11-29

## 2017-11-29 RX ORDER — PANTOPRAZOLE SODIUM 40 MG/1
40 TABLET, DELAYED RELEASE ORAL
Status: DISCONTINUED | OUTPATIENT
Start: 2017-11-29 | End: 2017-12-04 | Stop reason: HOSPADM

## 2017-11-29 RX ORDER — FERROUS SULFATE 325(65) MG
325 TABLET ORAL
Status: DISCONTINUED | OUTPATIENT
Start: 2017-11-29 | End: 2017-11-29

## 2017-11-29 RX ORDER — ACETAMINOPHEN 325 MG/1
650 TABLET ORAL EVERY 6 HOURS PRN
Status: DISCONTINUED | OUTPATIENT
Start: 2017-11-29 | End: 2017-12-04

## 2017-11-29 RX ORDER — SODIUM BICARBONATE 650 MG/1
650 TABLET ORAL 4 TIMES DAILY
Status: DISCONTINUED | OUTPATIENT
Start: 2017-11-29 | End: 2017-12-01

## 2017-11-29 RX ORDER — SODIUM CHLORIDE 9 MG/ML
75 INJECTION, SOLUTION INTRAVENOUS CONTINUOUS
Status: DISCONTINUED | OUTPATIENT
Start: 2017-11-29 | End: 2017-11-30

## 2017-11-29 RX ADMIN — INSULIN ASPART 24 UNITS: 100 INJECTION, SOLUTION INTRAVENOUS; SUBCUTANEOUS at 05:59

## 2017-11-29 RX ADMIN — INSULIN ASPART 5 UNITS: 100 INJECTION, SOLUTION INTRAVENOUS; SUBCUTANEOUS at 17:22

## 2017-11-29 RX ADMIN — INSULIN ASPART 7 UNITS: 100 INJECTION, SOLUTION INTRAVENOUS; SUBCUTANEOUS at 17:22

## 2017-11-29 RX ADMIN — INSULIN ASPART 3 UNITS: 100 INJECTION, SOLUTION INTRAVENOUS; SUBCUTANEOUS at 12:54

## 2017-11-29 RX ADMIN — SODIUM BICARBONATE 650 MG: 650 TABLET ORAL at 20:42

## 2017-11-29 RX ADMIN — SODIUM BICARBONATE 650 MG: 650 TABLET ORAL at 17:23

## 2017-11-29 RX ADMIN — PANTOPRAZOLE SODIUM 40 MG: 40 TABLET, DELAYED RELEASE ORAL at 05:59

## 2017-11-29 RX ADMIN — INSULIN ASPART 5 UNITS: 100 INJECTION, SOLUTION INTRAVENOUS; SUBCUTANEOUS at 21:23

## 2017-11-29 RX ADMIN — SODIUM CHLORIDE 125 ML/HR: 9 INJECTION, SOLUTION INTRAVENOUS at 00:25

## 2017-11-29 RX ADMIN — INSULIN DETEMIR 10 UNITS: 100 INJECTION, SOLUTION SUBCUTANEOUS at 22:03

## 2017-11-29 RX ADMIN — ATORVASTATIN CALCIUM 80 MG: 80 TABLET, FILM COATED ORAL at 09:00

## 2017-11-29 RX ADMIN — Medication 1 CAPSULE: at 17:23

## 2017-11-29 RX ADMIN — ASPIRIN 325 MG: 325 TABLET ORAL at 09:00

## 2017-11-29 RX ADMIN — MULTIPLE VITAMINS W/ MINERALS TAB 1 TABLET: TAB at 17:23

## 2017-11-29 RX ADMIN — INSULIN ASPART 5 UNITS: 100 INJECTION, SOLUTION INTRAVENOUS; SUBCUTANEOUS at 12:15

## 2017-11-30 ENCOUNTER — APPOINTMENT (OUTPATIENT)
Dept: CARDIOLOGY | Facility: HOSPITAL | Age: 82
End: 2017-11-30
Attending: HOSPITALIST

## 2017-11-30 PROBLEM — E03.9 HYPOTHYROIDISM: Status: ACTIVE | Noted: 2017-11-30

## 2017-11-30 LAB
ALBUMIN SERPL-MCNC: 3.6 G/DL (ref 3.5–5.2)
ALBUMIN/GLOB SERPL: 1.4 G/DL
ALP SERPL-CCNC: 65 U/L (ref 39–117)
ALT SERPL W P-5'-P-CCNC: 12 U/L (ref 1–41)
ANION GAP SERPL CALCULATED.3IONS-SCNC: 10.8 MMOL/L
AST SERPL-CCNC: 14 U/L (ref 1–40)
BASOPHILS # BLD AUTO: 0.03 10*3/MM3 (ref 0–0.2)
BASOPHILS NFR BLD AUTO: 0.5 % (ref 0–1.5)
BH CV ECHO MEAS - ACS: 1.8 CM
BH CV ECHO MEAS - AI DEC SLOPE: 174 CM/SEC^2
BH CV ECHO MEAS - AI MAX PG: 34.1 MMHG
BH CV ECHO MEAS - AI MAX VEL: 292 CM/SEC
BH CV ECHO MEAS - AI P1/2T: 491.5 MSEC
BH CV ECHO MEAS - AO MEAN PG (FULL): 2 MMHG
BH CV ECHO MEAS - AO MEAN PG: 5 MMHG
BH CV ECHO MEAS - AO ROOT AREA (BSA CORRECTED): 1.5
BH CV ECHO MEAS - AO ROOT AREA: 6.6 CM^2
BH CV ECHO MEAS - AO ROOT DIAM: 2.9 CM
BH CV ECHO MEAS - AO V2 MAX: 164 CM/SEC
BH CV ECHO MEAS - AO V2 MEAN: 105 CM/SEC
BH CV ECHO MEAS - AO V2 VTI: 33.7 CM
BH CV ECHO MEAS - ASC AORTA: 2.8 CM
BH CV ECHO MEAS - AVA(I,A): 2 CM^2
BH CV ECHO MEAS - AVA(I,D): 2 CM^2
BH CV ECHO MEAS - BSA(HAYCOCK): 1.9 M^2
BH CV ECHO MEAS - BSA: 1.9 M^2
BH CV ECHO MEAS - BZI_BMI: 24.7 KILOGRAMS/M^2
BH CV ECHO MEAS - BZI_METRIC_HEIGHT: 175.3 CM
BH CV ECHO MEAS - BZI_METRIC_WEIGHT: 75.8 KG
BH CV ECHO MEAS - CONTRAST EF (2CH): 59.7 ML/M^2
BH CV ECHO MEAS - CONTRAST EF 4CH: 62.8 ML/M^2
BH CV ECHO MEAS - EDV(CUBED): 148.9 ML
BH CV ECHO MEAS - EDV(MOD-SP2): 67 ML
BH CV ECHO MEAS - EDV(MOD-SP4): 78 ML
BH CV ECHO MEAS - EDV(TEICH): 135.3 ML
BH CV ECHO MEAS - EF(CUBED): 73.6 %
BH CV ECHO MEAS - EF(MOD-SP2): 59.7 %
BH CV ECHO MEAS - EF(MOD-SP4): 62.8 %
BH CV ECHO MEAS - EF(TEICH): 65 %
BH CV ECHO MEAS - ESV(CUBED): 39.3 ML
BH CV ECHO MEAS - ESV(MOD-SP2): 27 ML
BH CV ECHO MEAS - ESV(MOD-SP4): 29 ML
BH CV ECHO MEAS - ESV(TEICH): 47.4 ML
BH CV ECHO MEAS - FS: 35.8 %
BH CV ECHO MEAS - IVS/LVPW: 1
BH CV ECHO MEAS - IVSD: 1.3 CM
BH CV ECHO MEAS - LAT PEAK E' VEL: 6 CM/SEC
BH CV ECHO MEAS - LV DIASTOLIC VOL/BSA (35-75): 40.8 ML/M^2
BH CV ECHO MEAS - LV MASS(C)D: 286.9 GRAMS
BH CV ECHO MEAS - LV MASS(C)DI: 150 GRAMS/M^2
BH CV ECHO MEAS - LV MEAN PG: 3 MMHG
BH CV ECHO MEAS - LV SYSTOLIC VOL/BSA (12-30): 15.2 ML/M^2
BH CV ECHO MEAS - LV V1 MAX: 117 CM/SEC
BH CV ECHO MEAS - LV V1 MEAN: 73 CM/SEC
BH CV ECHO MEAS - LV V1 VTI: 21.5 CM
BH CV ECHO MEAS - LVIDD: 5.3 CM
BH CV ECHO MEAS - LVIDS: 3.4 CM
BH CV ECHO MEAS - LVLD AP2: 6.6 CM
BH CV ECHO MEAS - LVLD AP4: 7.3 CM
BH CV ECHO MEAS - LVLS AP2: 5.8 CM
BH CV ECHO MEAS - LVLS AP4: 6.1 CM
BH CV ECHO MEAS - LVOT AREA (M): 3.1 CM^2
BH CV ECHO MEAS - LVOT AREA: 3.1 CM^2
BH CV ECHO MEAS - LVOT DIAM: 2 CM
BH CV ECHO MEAS - LVPWD: 1.3 CM
BH CV ECHO MEAS - MED PEAK E' VEL: 3 CM/SEC
BH CV ECHO MEAS - MR MAX PG: 56.9 MMHG
BH CV ECHO MEAS - MR MAX VEL: 377 CM/SEC
BH CV ECHO MEAS - MV A DUR: 0.13 SEC
BH CV ECHO MEAS - MV A MAX VEL: 114 CM/SEC
BH CV ECHO MEAS - MV DEC SLOPE: 330 CM/SEC^2
BH CV ECHO MEAS - MV DEC TIME: 0.25 SEC
BH CV ECHO MEAS - MV E MAX VEL: 69.2 CM/SEC
BH CV ECHO MEAS - MV E/A: 0.61
BH CV ECHO MEAS - MV MEAN PG: 2 MMHG
BH CV ECHO MEAS - MV P1/2T MAX VEL: 90.3 CM/SEC
BH CV ECHO MEAS - MV P1/2T: 80.1 MSEC
BH CV ECHO MEAS - MV V2 MEAN: 62.8 CM/SEC
BH CV ECHO MEAS - MV V2 VTI: 38.3 CM
BH CV ECHO MEAS - MVA P1/2T LCG: 2.4 CM^2
BH CV ECHO MEAS - MVA(P1/2T): 2.7 CM^2
BH CV ECHO MEAS - MVA(VTI): 1.8 CM^2
BH CV ECHO MEAS - PA ACC SLOPE: 0 CM/SEC^2
BH CV ECHO MEAS - PA ACC TIME: 0.12 SEC
BH CV ECHO MEAS - PA MAX PG (FULL): 0.61 MMHG
BH CV ECHO MEAS - PA MAX PG: 4 MMHG
BH CV ECHO MEAS - PA PR(ACCEL): 26.8 MMHG
BH CV ECHO MEAS - PA V2 MAX: 99.9 CM/SEC
BH CV ECHO MEAS - PULM A REVS DUR: 0.11 SEC
BH CV ECHO MEAS - PULM A REVS VEL: 31.8 CM/SEC
BH CV ECHO MEAS - PULM DIAS VEL: 38.9 CM/SEC
BH CV ECHO MEAS - PULM S/D: 1.4
BH CV ECHO MEAS - PULM SYS VEL: 55.9 CM/SEC
BH CV ECHO MEAS - PVA(V,A): 4.2 CM^2
BH CV ECHO MEAS - PVA(V,D): 4.2 CM^2
BH CV ECHO MEAS - QP/QS: 1.1
BH CV ECHO MEAS - RAP SYSTOLE: 8 MMHG
BH CV ECHO MEAS - RV MAX PG: 3.4 MMHG
BH CV ECHO MEAS - RV MEAN PG: 2 MMHG
BH CV ECHO MEAS - RV V1 MAX: 91.9 CM/SEC
BH CV ECHO MEAS - RV V1 MEAN: 59.9 CM/SEC
BH CV ECHO MEAS - RV V1 VTI: 17.1 CM
BH CV ECHO MEAS - RVOT AREA: 4.5 CM^2
BH CV ECHO MEAS - RVOT DIAM: 2.4 CM
BH CV ECHO MEAS - RVSP: 31.4 MMHG
BH CV ECHO MEAS - SI(AO): 116.3 ML/M^2
BH CV ECHO MEAS - SI(CUBED): 57.3 ML/M^2
BH CV ECHO MEAS - SI(LVOT): 35.3 ML/M^2
BH CV ECHO MEAS - SI(MOD-SP2): 20.9 ML/M^2
BH CV ECHO MEAS - SI(MOD-SP4): 25.6 ML/M^2
BH CV ECHO MEAS - SI(TEICH): 45.9 ML/M^2
BH CV ECHO MEAS - SV(AO): 222.6 ML
BH CV ECHO MEAS - SV(CUBED): 109.6 ML
BH CV ECHO MEAS - SV(LVOT): 67.5 ML
BH CV ECHO MEAS - SV(MOD-SP2): 40 ML
BH CV ECHO MEAS - SV(MOD-SP4): 49 ML
BH CV ECHO MEAS - SV(RVOT): 77.4 ML
BH CV ECHO MEAS - SV(TEICH): 87.9 ML
BH CV ECHO MEAS - TAPSE (>1.6): 2.3 CM2
BH CV ECHO MEAS - TR MAX VEL: 242 CM/SEC
BH CV VAS BP RIGHT ARM: NORMAL MMHG
BH CV XLRA - RV BASE: 2.4 CM
BH CV XLRA - TDI S': 14 CM/SEC
BILIRUB SERPL-MCNC: 0.2 MG/DL (ref 0.1–1.2)
BUN BLD-MCNC: 36 MG/DL (ref 8–23)
BUN/CREAT SERPL: 18.2 (ref 7–25)
CALCIUM SPEC-SCNC: 9 MG/DL (ref 8.6–10.5)
CHLORIDE SERPL-SCNC: 112 MMOL/L (ref 98–107)
CHOLEST SERPL-MCNC: 167 MG/DL (ref 0–200)
CO2 SERPL-SCNC: 19.2 MMOL/L (ref 22–29)
CREAT BLD-MCNC: 1.98 MG/DL (ref 0.76–1.27)
DEPRECATED RDW RBC AUTO: 41.9 FL (ref 37–54)
E/E' RATIO: 17
EOSINOPHIL # BLD AUTO: 0.43 10*3/MM3 (ref 0–0.7)
EOSINOPHIL NFR BLD AUTO: 7.8 % (ref 0.3–6.2)
ERYTHROCYTE [DISTWIDTH] IN BLOOD BY AUTOMATED COUNT: 12.5 % (ref 11.5–14.5)
GFR SERPL CREATININE-BSD FRML MDRD: 33 ML/MIN/1.73
GLOBULIN UR ELPH-MCNC: 2.5 GM/DL
GLUCOSE BLD-MCNC: 258 MG/DL (ref 65–99)
GLUCOSE BLDC GLUCOMTR-MCNC: 204 MG/DL (ref 70–130)
GLUCOSE BLDC GLUCOMTR-MCNC: 228 MG/DL (ref 70–130)
GLUCOSE BLDC GLUCOMTR-MCNC: 242 MG/DL (ref 70–130)
GLUCOSE BLDC GLUCOMTR-MCNC: 302 MG/DL (ref 70–130)
HCT VFR BLD AUTO: 35 % (ref 40.4–52.2)
HDLC SERPL-MCNC: 35 MG/DL (ref 40–60)
HGB BLD-MCNC: 11.6 G/DL (ref 13.7–17.6)
IMM GRANULOCYTES # BLD: 0.03 10*3/MM3 (ref 0–0.03)
IMM GRANULOCYTES NFR BLD: 0.5 % (ref 0–0.5)
LDLC SERPL CALC-MCNC: 105 MG/DL (ref 0–100)
LDLC/HDLC SERPL: 3.01 {RATIO}
LEFT ATRIUM VOLUME INDEX: 17 ML/M2
LV EF 2D ECHO EST: 63 %
LYMPHOCYTES # BLD AUTO: 1.2 10*3/MM3 (ref 0.9–4.8)
LYMPHOCYTES NFR BLD AUTO: 21.9 % (ref 19.6–45.3)
MAXIMAL PREDICTED HEART RATE: 138 BPM
MCH RBC QN AUTO: 30.3 PG (ref 27–32.7)
MCHC RBC AUTO-ENTMCNC: 33.1 G/DL (ref 32.6–36.4)
MCV RBC AUTO: 91.4 FL (ref 79.8–96.2)
MONOCYTES # BLD AUTO: 0.38 10*3/MM3 (ref 0.2–1.2)
MONOCYTES NFR BLD AUTO: 6.9 % (ref 5–12)
NEUTROPHILS # BLD AUTO: 3.42 10*3/MM3 (ref 1.9–8.1)
NEUTROPHILS NFR BLD AUTO: 62.4 % (ref 42.7–76)
NT-PROBNP SERPL-MCNC: 856.5 PG/ML (ref 0–1800)
PLATELET # BLD AUTO: 177 10*3/MM3 (ref 140–500)
PMV BLD AUTO: 10.9 FL (ref 6–12)
POTASSIUM BLD-SCNC: 4.7 MMOL/L (ref 3.5–5.2)
PROT SERPL-MCNC: 6.1 G/DL (ref 6–8.5)
RBC # BLD AUTO: 3.83 10*6/MM3 (ref 4.6–6)
SODIUM BLD-SCNC: 142 MMOL/L (ref 136–145)
STRESS TARGET HR: 117 BPM
T3FREE SERPL-MCNC: 2.11 PG/ML (ref 2–4.4)
T4 FREE SERPL-MCNC: 0.91 NG/DL (ref 0.93–1.7)
TRIGL SERPL-MCNC: 133 MG/DL (ref 0–150)
TSH SERPL DL<=0.05 MIU/L-ACNC: 8.06 MIU/ML (ref 0.27–4.2)
VLDLC SERPL-MCNC: 26.6 MG/DL (ref 5–40)
WBC NRBC COR # BLD: 5.49 10*3/MM3 (ref 4.5–10.7)

## 2017-11-30 PROCEDURE — 82962 GLUCOSE BLOOD TEST: CPT

## 2017-11-30 PROCEDURE — 84481 FREE ASSAY (FT-3): CPT | Performed by: HOSPITALIST

## 2017-11-30 PROCEDURE — 84443 ASSAY THYROID STIM HORMONE: CPT | Performed by: HOSPITALIST

## 2017-11-30 PROCEDURE — 80061 LIPID PANEL: CPT | Performed by: HOSPITALIST

## 2017-11-30 PROCEDURE — 83880 ASSAY OF NATRIURETIC PEPTIDE: CPT | Performed by: HOSPITALIST

## 2017-11-30 PROCEDURE — 93306 TTE W/DOPPLER COMPLETE: CPT

## 2017-11-30 PROCEDURE — 80053 COMPREHEN METABOLIC PANEL: CPT | Performed by: HOSPITALIST

## 2017-11-30 PROCEDURE — 84439 ASSAY OF FREE THYROXINE: CPT | Performed by: HOSPITALIST

## 2017-11-30 PROCEDURE — 97162 PT EVAL MOD COMPLEX 30 MIN: CPT

## 2017-11-30 PROCEDURE — 85025 COMPLETE CBC W/AUTO DIFF WBC: CPT | Performed by: HOSPITALIST

## 2017-11-30 PROCEDURE — 63710000001 INSULIN DETEMER PER 5 UNITS: Performed by: HOSPITALIST

## 2017-11-30 PROCEDURE — 93306 TTE W/DOPPLER COMPLETE: CPT | Performed by: INTERNAL MEDICINE

## 2017-11-30 PROCEDURE — 63710000001 INSULIN ASPART PER 5 UNITS: Performed by: HOSPITALIST

## 2017-11-30 PROCEDURE — 99233 SBSQ HOSP IP/OBS HIGH 50: CPT | Performed by: NURSE PRACTITIONER

## 2017-11-30 RX ADMIN — SODIUM CHLORIDE 75 ML/HR: 9 INJECTION, SOLUTION INTRAVENOUS at 04:00

## 2017-11-30 RX ADMIN — MULTIPLE VITAMINS W/ MINERALS TAB 1 TABLET: TAB at 08:05

## 2017-11-30 RX ADMIN — SODIUM BICARBONATE 650 MG: 650 TABLET ORAL at 08:05

## 2017-11-30 RX ADMIN — INSULIN ASPART 3 UNITS: 100 INJECTION, SOLUTION INTRAVENOUS; SUBCUTANEOUS at 17:37

## 2017-11-30 RX ADMIN — ATORVASTATIN CALCIUM 80 MG: 80 TABLET, FILM COATED ORAL at 08:05

## 2017-11-30 RX ADMIN — SODIUM BICARBONATE 650 MG: 650 TABLET ORAL at 12:20

## 2017-11-30 RX ADMIN — INSULIN DETEMIR 20 UNITS: 100 INJECTION, SOLUTION SUBCUTANEOUS at 21:38

## 2017-11-30 RX ADMIN — INSULIN ASPART 5 UNITS: 100 INJECTION, SOLUTION INTRAVENOUS; SUBCUTANEOUS at 12:20

## 2017-11-30 RX ADMIN — SODIUM BICARBONATE 650 MG: 650 TABLET ORAL at 17:34

## 2017-11-30 RX ADMIN — SODIUM BICARBONATE 650 MG: 650 TABLET ORAL at 21:38

## 2017-11-30 RX ADMIN — INSULIN ASPART 3 UNITS: 100 INJECTION, SOLUTION INTRAVENOUS; SUBCUTANEOUS at 08:06

## 2017-11-30 RX ADMIN — PANTOPRAZOLE SODIUM 40 MG: 40 TABLET, DELAYED RELEASE ORAL at 06:32

## 2017-11-30 RX ADMIN — INSULIN ASPART 5 UNITS: 100 INJECTION, SOLUTION INTRAVENOUS; SUBCUTANEOUS at 08:05

## 2017-11-30 RX ADMIN — ASPIRIN 325 MG: 325 TABLET ORAL at 08:05

## 2017-11-30 RX ADMIN — INSULIN ASPART 7 UNITS: 100 INJECTION, SOLUTION INTRAVENOUS; SUBCUTANEOUS at 17:34

## 2017-11-30 RX ADMIN — Medication 1 CAPSULE: at 08:05

## 2017-11-30 RX ADMIN — INSULIN ASPART 3 UNITS: 100 INJECTION, SOLUTION INTRAVENOUS; SUBCUTANEOUS at 21:38

## 2017-12-01 LAB
ANION GAP SERPL CALCULATED.3IONS-SCNC: 10.7 MMOL/L
BASOPHILS # BLD AUTO: 0.05 10*3/MM3 (ref 0–0.2)
BASOPHILS NFR BLD AUTO: 0.9 % (ref 0–1.5)
BUN BLD-MCNC: 29 MG/DL (ref 8–23)
BUN/CREAT SERPL: 16.9 (ref 7–25)
CALCIUM SPEC-SCNC: 8.7 MG/DL (ref 8.6–10.5)
CHLORIDE SERPL-SCNC: 106 MMOL/L (ref 98–107)
CO2 SERPL-SCNC: 22.3 MMOL/L (ref 22–29)
CREAT BLD-MCNC: 1.72 MG/DL (ref 0.76–1.27)
DEPRECATED RDW RBC AUTO: 40.3 FL (ref 37–54)
EOSINOPHIL # BLD AUTO: 0.34 10*3/MM3 (ref 0–0.7)
EOSINOPHIL NFR BLD AUTO: 6.2 % (ref 0.3–6.2)
ERYTHROCYTE [DISTWIDTH] IN BLOOD BY AUTOMATED COUNT: 12.2 % (ref 11.5–14.5)
GFR SERPL CREATININE-BSD FRML MDRD: 38 ML/MIN/1.73
GLUCOSE BLD-MCNC: 163 MG/DL (ref 65–99)
GLUCOSE BLDC GLUCOMTR-MCNC: 166 MG/DL (ref 70–130)
GLUCOSE BLDC GLUCOMTR-MCNC: 228 MG/DL (ref 70–130)
GLUCOSE BLDC GLUCOMTR-MCNC: 229 MG/DL (ref 70–130)
GLUCOSE BLDC GLUCOMTR-MCNC: 392 MG/DL (ref 70–130)
HCT VFR BLD AUTO: 34.9 % (ref 40.4–52.2)
HGB BLD-MCNC: 11.8 G/DL (ref 13.7–17.6)
IMM GRANULOCYTES # BLD: 0.04 10*3/MM3 (ref 0–0.03)
IMM GRANULOCYTES NFR BLD: 0.7 % (ref 0–0.5)
LYMPHOCYTES # BLD AUTO: 1.49 10*3/MM3 (ref 0.9–4.8)
LYMPHOCYTES NFR BLD AUTO: 27.3 % (ref 19.6–45.3)
MCH RBC QN AUTO: 30.6 PG (ref 27–32.7)
MCHC RBC AUTO-ENTMCNC: 33.8 G/DL (ref 32.6–36.4)
MCV RBC AUTO: 90.4 FL (ref 79.8–96.2)
MONOCYTES # BLD AUTO: 0.4 10*3/MM3 (ref 0.2–1.2)
MONOCYTES NFR BLD AUTO: 7.3 % (ref 5–12)
NEUTROPHILS # BLD AUTO: 3.14 10*3/MM3 (ref 1.9–8.1)
NEUTROPHILS NFR BLD AUTO: 57.6 % (ref 42.7–76)
PLATELET # BLD AUTO: 167 10*3/MM3 (ref 140–500)
PMV BLD AUTO: 10.8 FL (ref 6–12)
POTASSIUM BLD-SCNC: 4.2 MMOL/L (ref 3.5–5.2)
RBC # BLD AUTO: 3.86 10*6/MM3 (ref 4.6–6)
SODIUM BLD-SCNC: 139 MMOL/L (ref 136–145)
WBC NRBC COR # BLD: 5.46 10*3/MM3 (ref 4.5–10.7)

## 2017-12-01 PROCEDURE — 80048 BASIC METABOLIC PNL TOTAL CA: CPT | Performed by: HOSPITALIST

## 2017-12-01 PROCEDURE — 82962 GLUCOSE BLOOD TEST: CPT

## 2017-12-01 PROCEDURE — 97110 THERAPEUTIC EXERCISES: CPT

## 2017-12-01 PROCEDURE — 63710000001 INSULIN ASPART PER 5 UNITS: Performed by: HOSPITALIST

## 2017-12-01 PROCEDURE — 85025 COMPLETE CBC W/AUTO DIFF WBC: CPT | Performed by: HOSPITALIST

## 2017-12-01 PROCEDURE — 97165 OT EVAL LOW COMPLEX 30 MIN: CPT

## 2017-12-01 RX ORDER — AMLODIPINE BESYLATE 5 MG/1
5 TABLET ORAL
Status: DISCONTINUED | OUTPATIENT
Start: 2017-12-01 | End: 2017-12-01

## 2017-12-01 RX ORDER — AMLODIPINE BESYLATE 10 MG/1
10 TABLET ORAL
Status: DISCONTINUED | OUTPATIENT
Start: 2017-12-02 | End: 2017-12-04 | Stop reason: HOSPADM

## 2017-12-01 RX ADMIN — INSULIN ASPART 3 UNITS: 100 INJECTION, SOLUTION INTRAVENOUS; SUBCUTANEOUS at 17:43

## 2017-12-01 RX ADMIN — INSULIN ASPART 2 UNITS: 100 INJECTION, SOLUTION INTRAVENOUS; SUBCUTANEOUS at 08:28

## 2017-12-01 RX ADMIN — ATORVASTATIN CALCIUM 80 MG: 80 TABLET, FILM COATED ORAL at 08:28

## 2017-12-01 RX ADMIN — SODIUM BICARBONATE 650 MG: 650 TABLET ORAL at 08:28

## 2017-12-01 RX ADMIN — SODIUM BICARBONATE 650 MG: 650 TABLET ORAL at 12:14

## 2017-12-01 RX ADMIN — INSULIN ASPART 3 UNITS: 100 INJECTION, SOLUTION INTRAVENOUS; SUBCUTANEOUS at 12:14

## 2017-12-01 RX ADMIN — INSULIN ASPART 7 UNITS: 100 INJECTION, SOLUTION INTRAVENOUS; SUBCUTANEOUS at 08:28

## 2017-12-01 RX ADMIN — PANTOPRAZOLE SODIUM 40 MG: 40 TABLET, DELAYED RELEASE ORAL at 06:46

## 2017-12-01 RX ADMIN — Medication 1 CAPSULE: at 08:27

## 2017-12-01 RX ADMIN — INSULIN ASPART 6 UNITS: 100 INJECTION, SOLUTION INTRAVENOUS; SUBCUTANEOUS at 22:13

## 2017-12-01 RX ADMIN — ASPIRIN 325 MG: 325 TABLET ORAL at 08:28

## 2017-12-01 RX ADMIN — ACETAMINOPHEN 650 MG: 325 TABLET ORAL at 00:29

## 2017-12-01 RX ADMIN — INSULIN ASPART 7 UNITS: 100 INJECTION, SOLUTION INTRAVENOUS; SUBCUTANEOUS at 12:14

## 2017-12-01 RX ADMIN — INSULIN ASPART 9 UNITS: 100 INJECTION, SOLUTION INTRAVENOUS; SUBCUTANEOUS at 17:43

## 2017-12-01 RX ADMIN — MULTIPLE VITAMINS W/ MINERALS TAB 1 TABLET: TAB at 08:28

## 2017-12-01 NOTE — PROGRESS NOTES
"Daily progress note    Chief complaints  Doing better  No new complaints    History of present illness  83-year-old white male with history of diabetes mellitus hypertension chronic kidney disease peptic ulcer disease hyperlipidemia presents to the [facility], [place] emergency room with generalized weakness and right-sided numbness not feeling well.  Patient evaluated in ER found to be in acute renal failure also high potassium admitted for management.  Patient denies any headache chest pain shortness of breath abdominal pain nausea vomiting diarrhea denies any vision problem but he said his gait is unsteady too.     REVIEW OF SYSTEMS  Review of Systems   Constitutional: Negative for chills and fever.   HENT: Negative.  Negative for sore throat.    Eyes: Negative.    Respiratory: Negative.  Negative for cough.    Cardiovascular: Negative.  Negative for chest pain.   Gastrointestinal: Negative.    Genitourinary: Negative.  Negative for dysuria.   Musculoskeletal: Positive for gait problem. Negative for back pain.   Skin: Negative.  Negative for rash.   Neurological: Positive for dizziness, weakness (RUE) and numbness (RUE). Negative for headaches.      PHYSICAL EXAM  Blood pressure 149/92, pulse 79, temperature 98 °F (36.7 °C), temperature source Oral, resp. rate 18, height 69\" (175.3 cm), weight 147 lb 14.4 oz (67.1 kg), SpO2 94 %.    Constitutional: He is oriented to person, place, and time. No distress  Head: Normocephalic and atraumatic.   Eyes: EOM are normal.   Neck: Normal range of motion.   Cardiovascular: Normal rate, regular rhythm and normal heart sounds.    Pulmonary/Chest: Effort normal and breath sounds normal. No respiratory distress.   Abdominal: Soft. There is no tenderness.   Left sided colostomy.    Musculoskeletal: He exhibits no edema.   Neurological: He is alert and oriented to person, place, and time.   Limb ataxia RUE   Skin: Skin is warm and dry.      LAB RESULTS  Lab Results (last 24 hours)  "    Procedure Component Value Units Date/Time    T4, Free [[id number]]  (Abnormal) Collected:  11/30/17 0538    Specimen:  Blood Updated:  11/30/17 1422     Free T4 0.91 (L) ng/dL     T3, Free [[id number]]  (Normal) Collected:  11/30/17 0538    Specimen:  Blood Updated:  11/30/17 1422     T3, Free 2.11 pg/mL     POC Glucose Fingerstick [[id number]]  (Abnormal) Collected:  11/30/17 1718    Specimen:  Blood Updated:  11/30/17 1720     Glucose 228 (H) mg/dL     Narrative:       Meter: [id number] : [id number] Marivel Mathis CNA    POC Glucose Fingerstick [[id number]]  (Abnormal) Collected:  11/30/17 2037    Specimen:  Blood Updated:  11/30/17 2038     Glucose 204 (H) mg/dL     Narrative:       Meter: [id number] : [id number] Jermaine Boswell    CBC & Differential [[id number]] Collected:  12/01/17 0505    Specimen:  Blood Updated:  12/01/17 0534    Narrative:       The following orders were created for panel order CBC & Differential.  Procedure                               Abnormality         Status                     ---------                               -----------         ------                     CBC Auto Differential[[id number]]        Abnormal            Final result                 Please view results for these tests on the individual orders.    CBC Auto Differential [[id number]]  (Abnormal) Collected:  12/01/17 0505    Specimen:  Blood Updated:  12/01/17 0534     WBC 5.46 10*3/mm3      RBC 3.86 (L) 10*6/mm3      Hemoglobin 11.8 (L) g/dL      Hematocrit 34.9 (L) %      MCV 90.4 fL      MCH 30.6 pg      MCHC 33.8 g/dL      RDW 12.2 %      RDW-SD 40.3 fl      MPV 10.8 fL      Platelets 167 10*3/mm3      Neutrophil % 57.6 %      Lymphocyte % 27.3 %      Monocyte % 7.3 %      Eosinophil % 6.2 %      Basophil % 0.9 %      Immature Grans % 0.7 (H) %      Neutrophils, Absolute 3.14 10*3/mm3      Lymphocytes, Absolute 1.49 10*3/mm3      Monocytes, Absolute 0.40 10*3/mm3      Eosinophils, Absolute 0.34 10*3/mm3       Basophils, Absolute 0.05 10*3/mm3      Immature Grans, Absolute 0.04 (H) 10*3/mm3     Basic Metabolic Panel [[id number]]  (Abnormal) Collected:  12/01/17 0505    Specimen:  Blood Updated:  12/01/17 0552     Glucose 163 (H) mg/dL      BUN 29 (H) mg/dL      Creatinine 1.72 (H) mg/dL      Sodium 139 mmol/L      Potassium 4.2 mmol/L      Chloride 106 mmol/L      CO2 22.3 mmol/L      Calcium 8.7 mg/dL      eGFR Non African Amer 38 (L) mL/min/1.73      BUN/Creatinine Ratio 16.9     Anion Gap 10.7 mmol/L     Narrative:       The MDRD GFR formula is only valid for adults with stable renal function between ages 18 and 70.    POC Glucose Fingerstick [[id number]]  (Abnormal) Collected:  12/01/17 0746    Specimen:  Blood Updated:  12/01/17 0748     Glucose 166 (H) mg/dL     Narrative:       Meter: [id number] : [id number] Marivel Mathis CNA    POC Glucose Fingerstick [[id number]]  (Abnormal) Collected:  12/01/17 1142    Specimen:  Blood Updated:  12/01/17 1147     Glucose 229 (H) mg/dL     Narrative:       Meter: [id number] : [id number] Benigno Crowell        Imaging Results (last 24 hours)     Procedure Component Value Units Date/Time    US Renal Limited [[id number]] Collected:  11/30/17 1311     Updated:  11/30/17 1405    Narrative:       BILATERAL RENAL SONOGRAM     HISTORY: Acute renal insufficiency.     TECHNIQUE: Bilateral renal sonogram was performed in standard fashion  and is correlated with noncontrast abdomen and pelvis CT dated  07/10/2017.     FINDINGS: The renal parenchyma appears normal bilaterally. There is no  hydronephrosis or parenchymal lesion. Both kidneys measure about 10 cm  long. The urinary bladder appears normal. Ureteral jets are observed  bilaterally as expected. There is some enlargement of the prostate  gland, which measures 4.7 x 5.1 x 4.3 cm.       Impression:       Enlarged prostate gland. Otherwise negative renal sonogram.  There is no evidence of obstruction.     This report was finalized on  11/30/2017 2:02 PM by Dr. Leroy Stearns MD.              ECG 12 Lead  Order: [id number]     Narrative      RR Interval= 870 ms  MA Interval= 180 ms  QRSD Interval= 124 ms  QT Interval= 412 ms  QTc Interval= 442 ms  Heart Rate= 69 ms  P Axis= 50 deg  QRS Axis= -42 deg  T Wave Axis= 21 deg  I: 40 Axis= 20 deg  T: 40 Axis= -49 deg  ST Axis= 6 deg  SINUS RHYTHM  LEFT BUNDLE BRANCH BLOCK  Possible ASMI  NO SIGNIFICANT CHANGE FROM PREVIOUS ECG                  Current Facility-Administered Medications:   •  acetaminophen (TYLENOL) tablet 650 mg, 650 mg, Oral, Q6H PRN, Jose Llanos MD, 650 mg at 12/01/17 0029  •  amLODIPine (NORVASC) tablet 5 mg, 5 mg, Oral, Q24H, Gurvinder Cuba MD  •  aspirin tablet 325 mg, 325 mg, Oral, Daily, Jose Llanos MD, 325 mg at 12/01/17 0828  •  atorvastatin (LIPITOR) tablet 80 mg, 80 mg, Oral, Daily, Jose Llanos MD, 80 mg at 12/01/17 0828  •  insulin aspart (novoLOG) injection 0-7 Units, 0-7 Units, Subcutaneous, 4x Daily With Meals & Nightly, Jose Llanos MD, 3 Units at 12/01/17 1214  •  insulin aspart (novoLOG) injection 7 Units, 7 Units, Subcutaneous, TID With Meals, Jose Llanos MD, 7 Units at 12/01/17 1214  •  insulin detemir (LEVEMIR) injection 20 Units, 20 Units, Subcutaneous, Nightly, Jose Llanos MD, 20 Units at 11/30/17 2138  •  lactobacillus acidophilus (RISAQUAD) capsule 1 capsule, 1 capsule, Oral, Daily, Jose Llanos MD, 1 capsule at 12/01/17 0827  •  multivitamin with minerals 1 tablet, 1 tablet, Oral, Daily, Jose lLanos MD, 1 tablet at 12/01/17 0828  •  ondansetron (ZOFRAN) injection 4 mg, 4 mg, Intravenous, Q4H PRN, Jose Llanos MD  •  pantoprazole (PROTONIX) EC tablet 40 mg, 40 mg, Oral, Q AM, Jose Llanos MD, 40 mg at 12/01/17 0646  •  sodium chloride 0.9 % flush 10 mL, 10 mL, Intravenous, PRN, Jonathan Mead MD     ASSESSMENT  Acute renal failure  Uncontrolled diabetes mellitus  Chronic kidney disease  Hyperkalemia  Transient cerebral  ischemia  Hypertension  Hyperlipidemia  Gastroesophageal reflux disease    PLAN  CPM  IV fluid  Neuro consult noted  PTOT   Aspirin and Lipitor  OFF ACE inhibitor  Stress ulcer DVT prophylaxis  Follow closely further recommendation chronic hospital course    RYLEE LE MD

## 2017-12-01 NOTE — PLAN OF CARE
Problem: Patient Care Overview (Adult)  Goal: Plan of Care Review  Outcome: Ongoing (interventions implemented as appropriate)    12/01/17 0442   Coping/Psychosocial Response Interventions   Plan Of Care Reviewed With patient   Patient Care Overview   Progress improving   Outcome Evaluation   Outcome Summary/Follow up Plan vitals stable. pt expressed pain. meds given per orders. diabetes education completed. will continue to monitor.          Problem: Diabetes, Type 2 (Adult)  Goal: Signs and Symptoms of Listed Potential Problems Will be Absent or Manageable (Diabetes, Type 2)  Outcome: Ongoing (interventions implemented as appropriate)    Problem: Fall Risk (Adult)  Goal: Identify Related Risk Factors and Signs and Symptoms  Outcome: Ongoing (interventions implemented as appropriate)  Goal: Absence of Falls  Outcome: Ongoing (interventions implemented as appropriate)

## 2017-12-01 NOTE — THERAPY TREATMENT NOTE
Acute Care - Physical Therapy Treatment Note  [facility]     Patient Name: Reece Stephen  : 1935  MRN: [id number]  Today's Date: 2017  Onset of Illness/Injury or Date of Surgery Date: 17     Referring Physician: Viet    Admit Date: 2017    Visit Dx:    ICD-10-CM ICD-9-CM   1. Transient cerebral ischemia, unspecified type G45.9 435.9   2. Acute on chronic renal insufficiency N28.9 593.9    N18.9 585.9   3. 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 Hyperkalemia (resolved) E87.5 276.7   5. Hyperglycemia R73.9 790.29   6. Decreased mobility R26.89 781.99     Patient Active Problem List   Diagnosis   • Rectal cancer   • Lung nodule   • History of DVT (deep vein thrombosis)   • Transient cerebral ischemia   • Hyperkalemia   • Hyperosmolar non-ketotic state in patient with type 2 diabetes mellitus   • DM (diabetes mellitus), secondary, uncontrolled, with hyperosmolarity   • Hyponatremia with extracellular fluid depletion   • Hypothyroidism               Adult Rehabilitation Note       17 1000          Rehab Assessment/Intervention    Discipline physical therapy assistant  -RW      Document Type therapy note (daily note)  -RW      Subjective Information agree to therapy;complains of;fatigue  -RW      Patient Effort, Rehab Treatment good  -RW      Precautions/Limitations fall precautions  -RW      Precautions/Limitations, Hearing hearing impairment, bilaterally  -RW      Recorded by [RW] Tenisha Polanco, PTA      Pain Assessment    Pain Assessment No/denies pain  -RW      Recorded by [RW] Tenisha Polanco, YOUNG      Cognitive Assessment/Intervention    Current Cognitive/Communication Assessment functional  -RW      Orientation Status oriented x 4  -RW      Follows Commands/Answers Questions 100% of the time;needs cueing  -RW      Personal Safety mild impairment;at risk behaviors  demonstrated;decreased awareness, need for assist;decreased awareness, need for safety  -RW      Personal Safety Interventions fall prevention program maintained;gait belt;nonskid shoes/slippers when out of bed  -RW      Recorded by [RW] Tenisha Polanco PTA      Bed Mobility, Assessment/Treatment    Bed Mobility, Assistive Device bed rails;head of bed elevated  -RW      Bed Mob, Supine to Sit, Ada conditional independence  -RW      Bed Mob, Sit to Supine, Ada not tested   Pt up in chair  -RW      Recorded by [RW] Tenisha Polanco PTA      Transfer Assessment/Treatment    Transfers, Sit-Stand [place] stand by assist  -RW      Transfers, Stand-Sit [place] stand by assist  -RW      Transfer, Impairments impaired balance  -RW      Recorded by [RW] Tenisha Polanco PTA      Gait Assessment/Treatment    Gait, [place] Level contact guard assist;minimum assist (75% patient effort);verbal cues required  -RW      Gait, Distance (Feet) 400   Pt ambulated 150' w/ RW and SBA as well  -RW      Gait, Gait Deviations forward flexed posture;bilateral:;stride width increased;weight-shifting ability decreased   anterior lean, very fast paced  -RW      Gait, Safety Issues balance decreased during turns;weight-shifting ability decreased  -RW      Gait, Impairments impaired balance  -RW      Gait, Comment 3 LOB requiring [place] to recover. Did better w/ use of RW.   -RW      Recorded by [RW] Tenisha Polanco PTA      Balance Skills Training    Standing-Level of Assistance Contact guard;Minimum assistance  -RW      Static Standing Balance Support No upper extremity supported  -RW      Standing-Balance Activities --   to use restroom  -RW      Recorded by [RW] Tenisha Polanco PTA      Positioning and Restraints    Pre-Treatment Position in bed  -RW      Post Treatment Position chair  -RW      In Chair reclined;call light within reach;encouraged to call for assist;exit alarm on;with nsg   NA present  -RW       Recorded by [RW] Tenisha Polanco PTA        User Key  (r) = Recorded By, (t) = Taken By, (c) = Cosigned By    Initials Name Effective Dates    RW Tenisha Polanco PTA 04/06/16 -                 IP PT Goals       11/30/17 1331          Transfer Training PT LTG    Transfer Training PT LTG, Date Established 11/30/17  -EE      Transfer Training PT LTG, Time to Achieve 1 wk  -EE      Transfer Training PT LTG, Activity Type all transfers  -EE      Transfer Training PT LTG, [place] Level supervision required  -EE      Gait Training PT LTG    Gait Training Goal PT LTG, Date Established 11/30/17  -EE      Gait Training Goal PT LTG, Time to Achieve 1 wk  -EE      Gait Training Goal PT LTG, [place] Level supervision required  -EE      Gait Training Goal PT LTG, Distance to Achieve 400  -EE      Dynamic Standing Balance PT LTG    Dynamic Standing Balance PT LTG, Date Established 11/30/17  -EE      Dynamic Standing Balance PT LTG, Time to Achieve 1 wk  -EE      Dynamic Standing Balance PT LTG, [place] Level supervision required   with dynamic balance activities  -EE        User Key  (r) = Recorded By, (t) = Taken By, (c) = Cosigned By    Initials Name Provider Type    EE Luz Elena Baez, PT Physical Therapist          Physical Therapy Education     Title: PT OT SLP Therapies (Active)     Topic: Physical Therapy (Active)     Point: Mobility training (Active)    Learning Progress Summary    Learner Readiness Method Response Comment Documented by Status   Patient Acceptance E,TB,D NR  RW 12/01/17 1035 Active    Acceptance E,TB VU,NR  EE 11/30/17 1211 Done               Point: Body mechanics (Active)    Learning Progress Summary    Learner Readiness Method Response Comment Documented by Status   Patient Acceptance E,TB,D NR  RW 12/01/17 1035 Active               Point: Precautions (Active)    Learning Progress Summary    Learner Readiness Method Response Comment Documented by Status   Patient Acceptance E,TB,D NR    12/01/17 1035 Active                      User Key     Initials Effective Dates Name Provider Type Discipline    EE 12/01/15 -  Luz Elena Baez, PT Physical Therapist PT    RW 04/06/16 -  Tenisha Polanco PTA Physical Therapy Assistant PT                    PT Recommendation and Plan  Anticipated Discharge Disposition: home, home with home health, home with outpatient services (may benefit from HH or OP PT for dynamic balance)  Planned Therapy Interventions: bed mobility training, gait training, balance training, home exercise program, patient/family education, strengthening, transfer training  PT Frequency: daily  Plan of Care Review  Plan Of Care Reviewed With: patient  Outcome Summary/Follow up Plan: Pt unsteady w/ ambulation. 3 LOB anteriorly during ambulation. Did better ambulating w/ use of RW. Impulsive and verbal cueing required for safety          Outcome Measures       12/01/17 1000 11/30/17 1300       How much help from another person do you currently need...    Turning from your back to your side while in flat bed without using bedrails? 4  -RW 4  -EE     Moving from lying on back to sitting on the side of a flat bed without bedrails? 4  -RW 4  -EE     Moving to and from a bed to a chair (including a wheelchair)? 3  -RW 3  -EE     Standing up from a chair using your arms (e.g., wheelchair, bedside chair)? 3  -RW 3  -EE     Climbing 3-5 steps with a railing? 3  -RW 3  -EE     To walk in hospital room? 3  -RW 3  -EE     AM-PAC 6 Clicks Score 20  -RW 20  -EE     Functional Assessment    Outcome Measure Options AM-PAC 6 Clicks Basic Mobility (PT)  -RW AM-PAC 6 Clicks Basic Mobility (PT)  -EE       User Key  (r) = Recorded By, (t) = Taken By, (c) = Cosigned By    Initials Name Provider Type    EE Luz Elena Baez, PT Physical Therapist    RW Tenisha Polanco PTA Physical Therapy Assistant           Time Calculation:         PT Charges       12/01/17 1017          Time Calculation    Start Time 1016  -RW      Stop Time  1032  -      Time Calculation (min) 16 min  -      PT Received On 12/01/17  -      PT - Next Appointment 12/02/17  -        User Key  (r) = Recorded By, (t) = Taken By, (c) = Cosigned By    Initials Name Provider Type     Tenisha Polanco PTA Physical Therapy Assistant          Therapy Charges for Today     Code Description Service Date Service Provider Modifiers Qty    65897699259 HC PT THER PROC EA 15 MIN 12/1/2017 Tenisha Polanco PTA GP 1          PT G-Codes  Outcome Measure Options: AM-PAC 6 Clicks Basic Mobility (PT)    Tenisha Polanco PTA  12/1/2017

## 2017-12-01 NOTE — PLAN OF CARE
Problem: Patient Care Overview (Adult)  Goal: Plan of Care Review  Outcome: Ongoing (interventions implemented as appropriate)    12/01/17 1436   Coping/Psychosocial Response Interventions   Plan Of Care Reviewed With patient   Outcome Evaluation   Outcome Summary/Follow up Plan Pt is close SBA/CGA for ambulation to bathroom, in room. Impulsive at times and cues to slow down. May benefit from skilled OT to increase safety and independence.          Problem: Inpatient Occupational Therapy  Goal: Transfer Training Goal 2 LTG- OT  Outcome: Ongoing (interventions implemented as appropriate)    12/01/17 1436   Transfer Training 2 OT LTG   Transfer Training 2 OT LTG, Date Established 12/01/17   Transfer Training 2 OT LTG, Time to Achieve 1 wk   Transfer Training 2 OT LTG, Activity Type sit to stand/stand to sit;bed to chair /chair to bed;toilet   Transfer Training 2 OT LTG, [place] Level (distant supervision)   Transfer Training 2 OT LTG, Assist Device (walker as needed)       Goal: ADL Goal LTG- OT  Outcome: Ongoing (interventions implemented as appropriate)    12/01/17 1436   ADL OT LTG   ADL OT LTG, Date Established 12/01/17   ADL OT LTG, Time to Achieve 1 wk   ADL OT LTG, Activity Type ADL skills   ADL OT LTG, [place] Level (distant SBA/ Mod I)       Goal: Functional Mobility Goal LTG- OT  Outcome: Ongoing (interventions implemented as appropriate)    12/01/17 1436   Functional Mobility OT LTG   Functional Mobility Goal OT LTG, Date Established 12/01/17   Functional Mobility Goal OT LTG, Time to Achieve 1 wk   Functional Mobility Goal OT LTG, [place] Level supervision   Functional Mobility Goal OT LTG, Distance to Achieve to the sink;to the bathroom

## 2017-12-01 NOTE — PLAN OF CARE
Problem: Patient Care Overview (Adult)  Goal: Plan of Care Review  Outcome: Ongoing (interventions implemented as appropriate)    12/01/17 1033   Coping/Psychosocial Response Interventions   Plan Of Care Reviewed With patient   Outcome Evaluation   Outcome Summary/Follow up Plan Pt unsteady w/ ambulation. 3 LOB anteriorly during ambulation. Did better ambulating w/ use of RW. Impulsive and verbal cueing required for safety

## 2017-12-01 NOTE — THERAPY EVALUATION
Acute Care - Occupational Therapy Initial Evaluation  [facility]     Patient Name: Reece Stephen  : 1935  MRN: [id number]  Today's Date: 2017  Onset of Illness/Injury or Date of Surgery Date: 17  Date of Referral to OT: 17  Referring Physician: Dr Llanos    Admit Date: 2017       ICD-10-CM ICD-9-CM   1. Transient cerebral ischemia, unspecified type G45.9 435.9   2. Acute on chronic renal insufficiency N28.9 593.9    N18.9 585.9   3. 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 Hyperkalemia (resolved) E87.5 276.7   5. Hyperglycemia R73.9 790.29   6. Decreased mobility R26.89 781.99     Patient Active Problem List   Diagnosis   • Rectal cancer   • Lung nodule   • History of DVT (deep vein thrombosis)   • Transient cerebral ischemia   • Hyperkalemia   • Hyperosmolar non-ketotic state in patient with type 2 diabetes mellitus   • DM (diabetes mellitus), secondary, uncontrolled, with hyperosmolarity   • Hyponatremia with extracellular fluid depletion   • Hypothyroidism     Past Medical History:   Diagnosis Date   • Adenocarcinoma in situ     RECTUM, T3N1M0   • Adenocarcinoma of rectum     T3N1M0   • Diabetes mellitus    • Duodenal ulcer 2014   • DVT (deep venous thrombosis) 2015    PORT ASSOCIATED OF LEFT UPPER EXTREMITY   • Hemorrhagic shock     SECONDARY TO BLEEDING DUODENAL ULCER   • Hypercholesterolemia    • Hypertension    • Peptic ulceration     Duodenal ulcer     Past Surgical History:   Procedure Laterality Date   • AMPUTATION FOOT  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    PARTIAL DUE TO AN ELECTRICAL SHOCK INJURY   • ENDOSCOPY  2015   • PORTACATH PLACEMENT  2014          OT ASSESSMENT FLOWSHEET (last 72 hours)      OT Evaluation       17 1423 17 1000 17 0924 17 1545 17 1135    Rehab Evaluation    Document Type evaluation  -LE therapy note (daily note)  -RW    evaluation  -EE    Subjective Information agree to therapy  -LE agree to therapy;complains of;fatigue  -RW   agree to therapy;no complaints  -EE    Evaluation Not Performed   other (see comments)   SLP following up today to see if patient in need of cognitive evaluation.Spoke w/ RN who confirmed that pt did not have a stroke. No further ST needed at this time.  -JJ      Patient Effort, Rehab Treatment good  -LE good  -RW   excellent  -EE    Symptoms Noted During/After Treatment     none  -EE    Symptoms Noted Comment notify aid pt needs ostomy pouch emptied.  pt states different pouch than he uses at home  -LE        General Information    Patient Profile Review yes  -LE        Onset of Illness/Injury or Date of Surgery Date     11/28/17  -EE    Referring Physician Dr Llanos  -LE    [place]  -EE    General Observations reclined in chair  -LE    Pt supine in bed in no acute distress  -EE    Pertinent History Of Current Problem from home wiht R side numbness  -LE    Admitted w/transient R UE weakness  -EE    Precautions/Limitations fall precautions   ostomy  -LE fall precautions  -RW   fall precautions  -EE    Prior Level of Function independent:;ADL's;transfer   manages ostomy, has a dog  -LE    independent:;all household mobility;community mobility  -EE    Equipment Currently Used at Home shower chair;walker, rolling  -LE   cane, straight;walker, rolling;other (see comments)   ostomy supplies  -KK none   has walker and cane; not using prior to admission  -EE    Plans/Goals Discussed With patient;agreed upon  -LE    patient;agreed upon  -EE    Barriers to Rehab     none identified  -EE    Living Environment    Lives With alone  -LE   alone  -KK alone  -EE    Living Arrangements house  -LE   house   SS home   -KK house  -EE    Home Accessibility    no concerns  -KK no concerns  -EE    Clinical Impression    Date of Referral to OT 11/29/17  -LE        OT Diagnosis need for assist with personal care  -LE         Impairments Found (describe specific impairments) gait, locomotion, and balance  -LE        Patient/Family Goals Statement return home  -LE        Criteria for Skilled Therapeutic Interventions Met yes;treatment indicated  -LE        Rehab Potential good, to achieve stated therapy goals  -LE        Therapy Frequency 3-5 times/wk  -LE        Anticipated Discharge Disposition home with home health  -LE        Pain Assessment    Pain Assessment No/denies pain  -LE No/denies pain  -RW   No/denies pain  -EE    Cognitive Assessment/Intervention    Current Cognitive/Communication Assessment functional  -LE functional  -RW   functional  -EE    Orientation Status oriented x 4  -LE oriented x 4  -RW   oriented to;person;place;situation;time  -EE    Follows Commands/Answers Questions  100% of the time;needs cueing  -RW   100% of the time;able to follow single-step instructions;needs repetition   very [place]  -EE    Personal Safety  mild impairment;at risk behaviors demonstrated;decreased awareness, need for assist;decreased awareness, need for safety  -RW   WNL/WFL  -EE    Personal Safety Interventions  fall prevention program maintained;gait belt;nonskid shoes/slippers when out of bed  -RW   fall prevention program maintained;gait belt;nonskid shoes/slippers when out of bed;supervised activity  -EE    ROM (Range of Motion)    General ROM no range of motion deficits identified   moves B UE freely.  states numbness resolved  -LE    no range of motion deficits identified  -EE    MMT (Manual Muscle Testing)    General MMT Assessment --   B UE 4+/5  -LE    no strength deficits identified  -EE    General MMT Assessment Detail     B LEs grossly 4+/5  -EE    Bed Mobility, Assessment/Treatment    Bed Mobility, Assistive Device  bed rails;head of bed elevated  -RW       Bed Mob, Supine to Sit, [place]  conditional independence  -RW   conditional independence  -EE    Bed Mob, Sit to Supine, [place] supervision required  -LE not  tested   Pt up in chair  -RW   not tested   sitting in chair  -EE    Transfer Assessment/Treatment    Transfers, Chair-Bed [place] supervision required  -LE        Transfers, Sit-Stand [place] set up required  -LE stand by assist  -RW   stand by assist  -EE    Transfers, Stand-Sit [place] set up required  -LE stand by assist  -RW   stand by assist  -EE    Toilet Transfer, [place] supervision required  -LE        Transfer, Impairments  impaired balance  -RW   impaired balance  -EE    Transfer, Comment initially with walker but then steps to side of walker.    -LE        Functional Mobility    Functional Mobility- Ind. Level supervision required;contact guard assist   began w/ walker, then without wx (pt doesn't think will use)  -LE        Functional Mobility- Device rolling walker  -LE        Functional Mobility-Distance (Feet) 40   chair to bathroom to sink to door to chair to bed  -LE        Functional Mobility- Safety Issues balance decreased during turns  -LE        Toileting Assessment/Training    Toileting Assess/Train, Comment has ostomy, nsg to empty. pt usually empties but has different pouch in hospital than has at home  -LE        Motor Skills/Interventions    Additional Documentation     Balance Skills Training (Group);Gross Motor Coordination Training (Group)  -EE    Balance Skills Training    Sitting-Level of Assistance     Independent  -EE    Standing-Level of Assistance  Contact guard;Minimum assistance  -RW   Close supervision  -EE    Static Standing Balance Support  No upper extremity supported  -RW       Standing-Balance Activities  --   to use restroom  -RW       Gait Balance-Level of Assistance     Close supervision;Contact guard  -EE    Gross Motor Coordination Training    Gross Motor Skill, Impairments Detail     B LE coordination grossly intact  -EE    Sensory Assessment/Intervention    Sensory Impairment     --   denies numbness/tingling  -EE    General Therapy  Interventions    Planned Therapy Interventions activity intolerance;ADL retraining;balance training;transfer training  -LE        Positioning and Restraints    Pre-Treatment Position sitting in chair/recliner  -LE in bed  -RW   in bed  -EE    Post Treatment Position bed  -LE chair  -RW   chair  -EE    In Bed notified nsg;supine;call light within reach;encouraged to call for assist;exit alarm on;heels elevated  -LE        In Chair  reclined;call light within reach;encouraged to call for assist;exit alarm on;with nsg   NA present  -RW   sitting;call light within reach;encouraged to call for assist;notified nsg   with MD  -EE      11/30/17 1035 11/29/17 1513 11/29/17 1000 11/28/17 2335 11/28/17 2327    Rehab Evaluation    Document Type   evaluation  -CP      Subjective Information   no complaints  -CP      Evaluation Not Performed --   pt off floor to cardio  -LE patient unavailable for evaluation   Off floor to vascular; will follow up tomorrow.   -EE       General Information    Equipment Currently Used at Home     none  -AK    Living Environment    Lives With    alone  -AK     Living Arrangements    house  -AK     Home Accessibility    no concerns  -AK     Stair Railings at Home    inside, present on left side  -AK     Type of Financial/Environmental Concern    none  -AK     Transportation Available    car  -AK     Functional Level Prior    Ambulation     0-->independent  -AK    Transferring     0-->independent  -AK    Toileting     0-->independent  -AK    Bathing     0-->independent  -AK    Dressing     0-->independent  -AK    Eating     0-->independent  -AK    Communication     0-->understands/communicates without difficulty  -AK    Swallowing     0-->swallows foods/liquids without difficulty  -AK    Cognitive Assessment/Intervention    Current Cognitive/Communication Assessment   impaired  -CP      Orientation Status   oriented to;person  -CP      Follows Commands/Answers Questions   needs repetition;other (see  comments)   confusion and VERY [place]  -CP        User Key  (r) = Recorded By, (t) = Taken By, (c) = Cosigned By    Initials Name Effective Dates    LE Randee Rowland, OTR 04/13/15 -     SUMEET [place], MS CCC-SLP 04/13/15 -     CP Tamra Ha, MS CCC-SLP 04/13/15 -     EE Luz Elena Baez, PT 12/01/15 -     KK Teri Newsome, RN 02/18/16 -     RW Tenisha Polanco, PTA 04/06/16 -     AK Ioana Stanford, AMBER 03/17/17 -            Occupational Therapy Education     Title: PT OT SLP Therapies (Active)     Topic: Occupational Therapy (Done)     Point: ADL training (Done)    Description: Instruct learner(s) on proper safety adaptation and remediation techniques during self care or transfers.   Instruct in proper use of assistive devices.    Learning Progress Summary    Learner Readiness Method Response Comment Documented by Status   Patient Acceptance E,D HENRIETTA,ROSA Ed pt on slowing down while walking to increase stability.  used walker intially but pt does not feel will use at home so CGA/close SBA without walker.  12/01/17 1435 Done                      User Key     Initials Effective Dates Name Provider Type Discipline     04/13/15 -  Randee Rowland, OTR Occupational Therapist OT                  OT Recommendation and Plan  Anticipated Discharge Disposition: home with home health  Planned Therapy Interventions: activity intolerance, ADL retraining, balance training, transfer training  Therapy Frequency: 3-5 times/wk  Plan of Care Review  Plan Of Care Reviewed With: patient  Outcome Summary/Follow up Plan: Pt is close SBA/CGA for ambulation to bathroom, in room.  Impulsive at times and cues to slow down.  May benefit from skilled OT to increase safety and independence.           OT Goals       12/01/17 1436          Transfer Training 2 OT LTG    Transfer Training 2 OT LTG, Date Established 12/01/17  -LE      Transfer Training 2 OT LTG, Time to Achieve 1 wk  -LE      Transfer Training 2 OT LTG, Activity Type sit to  stand/stand to sit;bed to chair /chair to bed;toilet  -LE      Transfer Training 2 OT LTG, [place] Level --   distant supervision  -LE      Transfer Training 2 OT LTG, Assist Device --   walker as needed  -LE      ADL OT LTG    ADL OT LTG, Date Established 12/01/17  -LE      ADL OT LTG, Time to Achieve 1 wk  -LE      ADL OT LTG, Activity Type ADL skills  -LE      ADL OT LTG, [place] Level --   distant SBA/ Mod I  -LE      Functional Mobility OT LTG    Functional Mobility Goal OT LTG, Date Established 12/01/17  -LE      Functional Mobility Goal OT LTG, Time to Achieve 1 wk  -LE      Functional Mobility Goal OT LTG, [place] Level supervision  -LE      Functional Mobility Goal OT LTG, Distance to Achieve to the sink;to the bathroom  -LE        User Key  (r) = Recorded By, (t) = Taken By, (c) = Cosigned By    Initials Name Provider Type    KIM Rowland OTR Occupational Therapist                Outcome Measures       12/01/17 1439 12/01/17 1400 12/01/17 1000    How much help from another person do you currently need...    Turning from your back to your side while in flat bed without using bedrails?   4  -RW    Moving from lying on back to sitting on the side of a flat bed without bedrails?   4  -RW    Moving to and from a bed to a chair (including a wheelchair)?   3  -RW    Standing up from a chair using your arms (e.g., wheelchair, bedside chair)?   3  -RW    Climbing 3-5 steps with a railing?   3  -RW    To walk in hospital room?   3  -RW    AM-PAC 6 Clicks Score   20  -RW    How much help from another is currently needed...    Putting on and taking off regular lower body clothing? 3  -LE      Bathing (including washing, rinsing, and drying) 3  -LE      Toileting (which includes using toilet bed pan or urinal) 3  -LE      Putting on and taking off regular upper body clothing 3  -LE      Taking care of personal grooming (such as brushing teeth) 3  -LE      Eating meals 4  -LE      Score 19  -LE       Functional Assessment    Outcome Measure Options  AM-PAC 6 Clicks Daily Activity (OT)  -LE AM-PAC 6 Clicks Basic Mobility (PT)  -RW      11/30/17 1300          How much help from another person do you currently need...    Turning from your back to your side while in flat bed without using bedrails? 4  -EE      Moving from lying on back to sitting on the side of a flat bed without bedrails? 4  -EE      Moving to and from a bed to a chair (including a wheelchair)? 3  -EE      Standing up from a chair using your arms (e.g., wheelchair, bedside chair)? 3  -EE      Climbing 3-5 steps with a railing? 3  -EE      To walk in hospital room? 3  -EE      AM-PAC 6 Clicks Score 20  -EE      Functional Assessment    Outcome Measure Options AM-PAC 6 Clicks Basic Mobility (PT)  -EE        User Key  (r) = Recorded By, (t) = Taken By, (c) = Cosigned By    Initials Name Provider Type    DOMINIK Mitchell Occupational Therapist    SAMSON Baez, PT Physical Therapist    RW Tenisha Polanco, PTA Physical Therapy Assistant          Time Calculation:   OT Start Time: 1407  OT Stop Time: 1421  OT Time Calculation (min): 14 min    Therapy Charges for Today     Code Description Service Date Service Provider Modifiers Qty    [id number] HC OT EVAL LOW COMPLEXITY 2 12/1/2017 DOMINIK Hermosillo GO 1               DOMINIK Hermosillo  12/1/2017

## 2017-12-01 NOTE — PROGRESS NOTES
"   LOS: 3 days   Patient Care Team:  Ana María Atkinson MD as PCP - General (Internal Medicine)  Jose Ochoa MD as Consulting Physician (Hematology and Oncology)  Armin Hurtado MD as Referring Physician (Colon and Rectal Surgery)    Chief Complaint/ Reason for encounter: Acute renal failure, dehydration        Subjective     Dizziness   Associated symptoms include weakness. Pertinent negatives include no chest pain, fever, nausea or vomiting.       Subjective:  Symptoms:  Improved.  He reports weakness.  No shortness of breath or chest pain.  (Resting, improved  Good oral intake, no shortness of breath).    Diet:  Adequate intake.  No nausea or vomiting.    Activity level: Returning to normal.    Pain:  He reports no pain.          History taken from: Patient and chart    Objective     Vital Signs  Temp:  [97.9 °F (36.6 °C)-98.3 °F (36.8 °C)] 98 °F (36.7 °C)  Heart Rate:  [62-79] 79  Resp:  [18-22] 18  BP: (144-185)/(67-98) 149/92       Wt Readings from Last 1 Encounters:   11/28/17 2319 147 lb 14.4 oz (67.1 kg)   11/28/17 1702 140 lb (63.5 kg)       Objective:  General Appearance:  Comfortable, well-appearing, in no acute distress and not in pain.    Vital signs: (most recent): Blood pressure 149/92, pulse 79, temperature 98 °F (36.7 °C), temperature source Oral, resp. rate 18, height 69\" (175.3 cm), weight 147 lb 14.4 oz (67.1 kg), SpO2 94 %.  Vital signs are normal.  No fever.    Output: Producing urine.    HEENT: Normal HEENT exam.    Lungs:  Normal respiratory rate and normal effort.  He is not in respiratory distress.  Breath sounds clear to auscultation.  There are decreased breath sounds.    Heart: Normal rate.  Regular rhythm.  S1 normal.    Abdomen: Abdomen is soft and non-distended.  Bowel sounds are normal.   There is no abdominal tenderness.  There is no epigastric area or no suprapubic area tenderness.     Extremities: Normal range of motion.  There is no deformity or dependent edema.    Pulses: " Distal pulses are intact.    Neurological: Patient is alert and oriented to person, place and time.    Skin:  Warm and dry.  No rash or cyanosis.             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  The following labs independently reviewed by me  Old records reviewed showing baseline stage III chronic kidney disease  Renal ultrasound independently reviewed, normal echotexture with no hydronephrosis or obstruction    Labs:   Recent Results (from the past 24 hour(s))   POC Glucose Fingerstick    Collection Time: 11/30/17  5:18 PM   Result Value Ref Range    Glucose 228 (H) 70 - 130 mg/dL   POC Glucose Fingerstick    Collection Time: 11/30/17  8:37 PM   Result Value Ref Range    Glucose 204 (H) 70 - 130 mg/dL   Basic Metabolic Panel    Collection Time: 12/01/17  5:05 AM   Result Value Ref Range    Glucose 163 (H) 65 - 99 mg/dL    BUN 29 (H) 8 - 23 mg/dL    Creatinine 1.72 (H) 0.76 - 1.27 mg/dL    Sodium 139 136 - 145 mmol/L    Potassium 4.2 3.5 - 5.2 mmol/L    Chloride 106 98 - 107 mmol/L    CO2 22.3 22.0 - 29.0 mmol/L    Calcium 8.7 8.6 - 10.5 mg/dL    eGFR Non African Amer 38 (L) >60 mL/min/1.73    BUN/Creatinine Ratio 16.9 7.0 - 25.0    Anion Gap 10.7 mmol/L   CBC Auto Differential     Collection Time: 12/01/17  5:05 AM   Result Value Ref Range    WBC 5.46 4.50 - 10.70 10*3/mm3    RBC 3.86 (L) 4.60 - 6.00 10*6/mm3    Hemoglobin 11.8 (L) 13.7 - 17.6 g/dL    Hematocrit 34.9 (L) 40.4 - 52.2 %    MCV 90.4 79.8 - 96.2 fL    MCH 30.6 27.0 - 32.7 pg    MCHC 33.8 32.6 - 36.4 g/dL    RDW 12.2 11.5 - 14.5 %    RDW-SD 40.3 37.0 - 54.0 fl    MPV 10.8 6.0 - 12.0 fL    Platelets 167 140 - 500 10*3/mm3    Neutrophil % 57.6 42.7 - 76.0 %    Lymphocyte % 27.3 19.6 - 45.3 %    Monocyte % 7.3 5.0 - 12.0 %    Eosinophil % 6.2 0.3 - 6.2 %    Basophil % 0.9 0.0 - 1.5 %    Immature Grans % 0.7 (H) 0.0 - 0.5 %    Neutrophils, Absolute 3.14 1.90 - 8.10 10*3/mm3    Lymphocytes, Absolute 1.49 0.90 - 4.80 10*3/mm3    Monocytes, Absolute 0.40 0.20 - 1.20 10*3/mm3    Eosinophils, Absolute 0.34 0.00 - 0.70 10*3/mm3    Basophils, Absolute 0.05 0.00 - 0.20 10*3/mm3    Immature Grans, Absolute 0.04 (H) 0.00 - 0.03 10*3/mm3   POC Glucose Fingerstick    Collection Time: 12/01/17  7:46 AM   Result Value Ref Range    Glucose 166 (H) 70 - 130 mg/dL   POC Glucose Fingerstick    Collection Time: 12/01/17 11:42 AM   Result Value Ref Range    Glucose 229 (H) 70 - 130 mg/dL       Radiology:  Imaging Results (last 24 hours)     Procedure Component Value Units Date/Time    US Renal Limited [[id number]] Collected:  11/30/17 1311     Updated:  11/30/17 1405    Narrative:       BILATERAL RENAL SONOGRAM     HISTORY: Acute renal insufficiency.     TECHNIQUE: Bilateral renal sonogram was performed in standard fashion  and is correlated with noncontrast abdomen and pelvis CT dated  07/10/2017.     FINDINGS: The renal parenchyma appears normal bilaterally. There is no  hydronephrosis or parenchymal lesion. Both kidneys measure about 10 cm  long. The urinary bladder appears normal. Ureteral jets are observed  bilaterally as expected. There is some enlargement of the prostate  gland, which measures 4.7 x 5.1 x 4.3 cm.       Impression:       Enlarged  prostate gland. Otherwise negative renal sonogram.  There is no evidence of obstruction.     This report was finalized on 11/30/2017 2:02 PM by Dr. Leroy Stearns MD.                Medications have been reviewed:  Current Facility-Administered Medications   Medication Dose Route Frequency Provider Last Rate Last Dose   • acetaminophen (TYLENOL) tablet 650 mg  650 mg Oral Q6H PRN Jose Llanos MD   650 mg at 12/01/17 0029   • amLODIPine (NORVASC) tablet 5 mg  5 mg Oral Q24H Gurvinder Cuba MD       • aspirin tablet 325 mg  325 mg Oral Daily Jose Llanos MD   325 mg at 12/01/17 0828   • atorvastatin (LIPITOR) tablet 80 mg  80 mg Oral Daily Jose Llanos MD   80 mg at 12/01/17 0828   • insulin aspart (novoLOG) injection 0-7 Units  0-7 Units Subcutaneous 4x Daily With Meals & Nightly Jose Llanos MD   3 Units at 12/01/17 1214   • insulin aspart (novoLOG) injection 7 Units  7 Units Subcutaneous TID With Meals Jose Llanos MD   7 Units at 12/01/17 1214   • insulin detemir (LEVEMIR) injection 20 Units  20 Units Subcutaneous Nightly Jose Llanos MD   20 Units at 11/30/17 2138   • lactobacillus acidophilus (RISAQUAD) capsule 1 capsule  1 capsule Oral Daily Jose Llanos MD   1 capsule at 12/01/17 0827   • multivitamin with minerals 1 tablet  1 tablet Oral Daily Jose Llanos MD   1 tablet at 12/01/17 0828   • ondansetron (ZOFRAN) injection 4 mg  4 mg Intravenous Q4H PRN Jose Llanos MD       • pantoprazole (PROTONIX) EC tablet 40 mg  40 mg Oral Q AM Jose Llanos MD   40 mg at 12/01/17 0646   • sodium chloride 0.9 % flush 10 mL  10 mL Intravenous PRN Jonathan Mead MD           Assessment/Plan     Active Problems:    Transient cerebral ischemia    Hyperkalemia    Hyperosmolar non-ketotic state in patient with type 2 diabetes mellitus    DM (diabetes mellitus), secondary, uncontrolled, with hyperosmolarity    Hyponatremia with extracellular fluid depletion    Hypothyroidism      Assessment:  (Assessment:  acute renal  failure, on chronic kidney disease stage III.  Improved, Secondary to dehydration from osmotic effects of glucosuria with profound hyperglycemia on admission  Chronic kidney disease stage III likely secondary to diabetes and hypertension given poor control  Osteoarthritis on NSAIDs  Metabolic acidosis likely more related to renal failure than DKA given negative ketones on urine  Diabetes mellitus type 2, very poor control, A1c 12.6  Hypertension, ACE inhibitor on hold currently  hyperkalemia from renal failure and insulin deficiency  Hyponatremia, pseudohyponatremia secondary to hyperglycemia, improved with fluids and improved glycemic control  Hyperosmolar nonketotic ketosis hyperglycemia   hypothyroidism, management per Dr. Llanos          Plan:   Renal function continues to improve, nearing baseline  Encourage oral fluid intake   Continue to hold lisinopril, avoid NSAIDs in the future  Discontinue oral sodium bicarbonate   add Norvasc for additional blood pressure control  Continue to monitor electrolytes and volume closely   Avoid IV contrast and nephrotoxic medications   Discharge planning).             Continue to monitor renal function, electroytes and volume closely   Please call me with any questions or concerns      Gurvinder Cuba MD   [facility]   [phone]    12/01/17  1:53 PM      Dictation performed using Dragon dictation software

## 2017-12-01 NOTE — NURSING NOTE
"Diabetes Education  Assessment/Teaching    Patient Name:  Reece Stephen  YOB: 1935  MRN: [id number]  Admit Date:  11/28/2017    Assessment Date:  11/30/17       Most Recent Value    General Information      Referral From:  Blood glucose    Height  5' 9\" (1.753 m)    Height Method  Stated    Weight  147 lb 14.4 oz (67.1 kg)    Weight Method  Bed scale    What type of diabetes do you have?  Type 2    Do you test your blood sugar at home?  no    Barriers to Learning  Pt is very [place]. Pt needs his glasses to see the dosage on the insulin pen as well as to see the number on an insulin syringe.     Assessment Topics     Taking Medication - Assessment  Needs education Assess today whether pt remembers instructions from yesterday on how to use Levemir/insulin pen.    Problem Solving - Assessment  Needs education    Healthy Coping - Assessment  Competent               Most Recent Value    DM Education Needs     Meter  Meter provided    Meter Type  Contour [replace pt's home BG meter. per pt he has a Contour.]    Medication  Insulin, Administration, Vial, Pen-Assess pt memory for steps to use insulin pen. Initiate instructions on how to draw up insulin from a vial. Pt bent the insulin needle in his attempts to draw up (NS) from a vial, but he was able to demonstrate drawing to 20 units. Pt was able to remember the dosage and frequency for his long-acting insulin but not able to remember (from yesterday) the steps to use the insulin pen.     Problem Solving  Hypoglycemia, Symptoms    Healthy Coping  Appropriate    Discharge Plan  Home    Motivation  Limited    Teaching Method  Teach back, Explanation, Demonstration, handouts    Patient Response  Needs reinforcement  Pt may benefit from practice/reinforcement but he would need supervision/assistance for any/all insulin administration at [place]. Possibly a family member may be amenable to this supervision with a once-daily insulin regimen.          "     Electronically signed by:  Tamra Waite RN, BSN, CDE   12/01/17 6:21 PM

## 2017-12-01 NOTE — SIGNIFICANT NOTE
12/01/17 0924   Rehab Treatment   Discipline speech language pathologist   Rehab Evaluation   Evaluation Not Performed other (see comments)  (SLP following up today to see if patient in need of cognitive evaluation.Spoke w/ RN who confirmed that pt did not have a stroke. No further ST needed at this time.)   Recommendations   SLP - Next Appointment (No further ST needed at this time. Please reconsult if needed.)

## 2017-12-02 LAB
ANION GAP SERPL CALCULATED.3IONS-SCNC: 9.4 MMOL/L
BUN BLD-MCNC: 31 MG/DL (ref 8–23)
BUN/CREAT SERPL: 16.3 (ref 7–25)
CALCIUM SPEC-SCNC: 8.9 MG/DL (ref 8.6–10.5)
CHLORIDE SERPL-SCNC: 104 MMOL/L (ref 98–107)
CO2 SERPL-SCNC: 25.6 MMOL/L (ref 22–29)
CREAT BLD-MCNC: 1.9 MG/DL (ref 0.76–1.27)
GFR SERPL CREATININE-BSD FRML MDRD: 34 ML/MIN/1.73
GLUCOSE BLD-MCNC: 203 MG/DL (ref 65–99)
GLUCOSE BLDC GLUCOMTR-MCNC: 110 MG/DL (ref 70–130)
GLUCOSE BLDC GLUCOMTR-MCNC: 160 MG/DL (ref 70–130)
GLUCOSE BLDC GLUCOMTR-MCNC: 224 MG/DL (ref 70–130)
GLUCOSE BLDC GLUCOMTR-MCNC: 231 MG/DL (ref 70–130)
POTASSIUM BLD-SCNC: 3.8 MMOL/L (ref 3.5–5.2)
SODIUM BLD-SCNC: 139 MMOL/L (ref 136–145)

## 2017-12-02 PROCEDURE — 97110 THERAPEUTIC EXERCISES: CPT

## 2017-12-02 PROCEDURE — 80048 BASIC METABOLIC PNL TOTAL CA: CPT | Performed by: HOSPITALIST

## 2017-12-02 PROCEDURE — 82962 GLUCOSE BLOOD TEST: CPT

## 2017-12-02 PROCEDURE — 63710000001 INSULIN ASPART PER 5 UNITS: Performed by: HOSPITALIST

## 2017-12-02 RX ORDER — ATORVASTATIN CALCIUM 20 MG/1
40 TABLET, FILM COATED ORAL DAILY
Status: DISCONTINUED | OUTPATIENT
Start: 2017-12-03 | End: 2017-12-04 | Stop reason: HOSPADM

## 2017-12-02 RX ADMIN — Medication 1 CAPSULE: at 09:43

## 2017-12-02 RX ADMIN — INSULIN ASPART 3 UNITS: 100 INJECTION, SOLUTION INTRAVENOUS; SUBCUTANEOUS at 20:56

## 2017-12-02 RX ADMIN — SODIUM CHLORIDE 500 ML: 9 INJECTION, SOLUTION INTRAVENOUS at 11:30

## 2017-12-02 RX ADMIN — INSULIN ASPART 9 UNITS: 100 INJECTION, SOLUTION INTRAVENOUS; SUBCUTANEOUS at 13:13

## 2017-12-02 RX ADMIN — INSULIN ASPART 9 UNITS: 100 INJECTION, SOLUTION INTRAVENOUS; SUBCUTANEOUS at 09:43

## 2017-12-02 RX ADMIN — ATORVASTATIN CALCIUM 80 MG: 80 TABLET, FILM COATED ORAL at 09:43

## 2017-12-02 RX ADMIN — PANTOPRAZOLE SODIUM 40 MG: 40 TABLET, DELAYED RELEASE ORAL at 06:45

## 2017-12-02 RX ADMIN — MULTIPLE VITAMINS W/ MINERALS TAB 1 TABLET: TAB at 09:44

## 2017-12-02 RX ADMIN — INSULIN ASPART 2 UNITS: 100 INJECTION, SOLUTION INTRAVENOUS; SUBCUTANEOUS at 09:44

## 2017-12-02 RX ADMIN — ASPIRIN 325 MG: 325 TABLET ORAL at 09:43

## 2017-12-02 RX ADMIN — INSULIN ASPART 3 UNITS: 100 INJECTION, SOLUTION INTRAVENOUS; SUBCUTANEOUS at 13:13

## 2017-12-02 RX ADMIN — INSULIN ASPART 11 UNITS: 100 INJECTION, SOLUTION INTRAVENOUS; SUBCUTANEOUS at 18:35

## 2017-12-02 RX ADMIN — AMLODIPINE BESYLATE 10 MG: 10 TABLET ORAL at 09:43

## 2017-12-02 NOTE — PLAN OF CARE
Problem: Patient Care Overview (Adult)  Goal: Plan of Care Review  Outcome: Ongoing (interventions implemented as appropriate)    12/02/17 0437   Coping/Psychosocial Response Interventions   Plan Of Care Reviewed With patient   Patient Care Overview   Progress progress toward functional goals as expected   Outcome Evaluation   Outcome Summary/Follow up Plan meds per order, no falls, see labs and vs       Goal: Adult Individualization and Mutuality  Outcome: Ongoing (interventions implemented as appropriate)  Goal: Discharge Needs Assessment  Outcome: Ongoing (interventions implemented as appropriate)    Problem: Stroke (Ischemic) (Adult)  Goal: Signs and Symptoms of Listed Potential Problems Will be Absent or Manageable (Stroke)  Outcome: Ongoing (interventions implemented as appropriate)    Problem: Diabetes, Type 2 (Adult)  Goal: Signs and Symptoms of Listed Potential Problems Will be Absent or Manageable (Diabetes, Type 2)  Outcome: Ongoing (interventions implemented as appropriate)    Problem: Fall Risk (Adult)  Goal: Identify Related Risk Factors and Signs and Symptoms  Outcome: Ongoing (interventions implemented as appropriate)  Goal: Absence of Falls  Outcome: Ongoing (interventions implemented as appropriate)

## 2017-12-02 NOTE — PROGRESS NOTES
"Daily progress note    Chief complaints  Doing same  No new complaints    History of present illness  83-year-old white male with history of diabetes mellitus hypertension chronic kidney disease peptic ulcer disease hyperlipidemia presents to the [facility] emergency room with generalized weakness and right-sided numbness not feeling well.  Patient evaluated in ER found to be in acute renal failure also high potassium admitted for management.  Patient denies any headache chest pain shortness of breath abdominal pain nausea vomiting diarrhea denies any vision problem but he said his gait is unsteady too.     REVIEW OF SYSTEMS  Review of Systems   Constitutional: Negative for chills and fever.   HENT: Negative.  Negative for sore throat.    Eyes: Negative.    Respiratory: Negative.  Negative for cough.    Cardiovascular: Negative.  Negative for chest pain.   Gastrointestinal: Negative.    Genitourinary: Negative.  Negative for dysuria.   Musculoskeletal: Positive for gait problem. Negative for back pain.   Skin: Negative.  Negative for rash.   Neurological: Positive for dizziness, weakness (RUE) and numbness (RUE). Negative for headaches.      PHYSICAL EXAM  Blood pressure 173/98, pulse 65, temperature 98.3 °F (36.8 °C), temperature source Oral, resp. rate 18, height 69\" (175.3 cm), weight 147 lb 14.4 oz (67.1 kg), SpO2 93 %.    Constitutional: He is oriented to person, place, and time. No distress  Head: Normocephalic and atraumatic.   Eyes: EOM are normal.   Neck: Normal range of motion.   Cardiovascular: Normal rate, regular rhythm and normal heart sounds.    Pulmonary/Chest: Effort normal and breath sounds normal. No respiratory distress.   Abdominal: Soft. There is no tenderness.   Left sided colostomy.    Musculoskeletal: He exhibits no edema.   Neurological: He is alert and oriented to person, place, and time.   Limb ataxia RUE   Skin: Skin is warm and dry.      LAB RESULTS  Lab Results (last 24 hours)  "    Procedure Component Value Units Date/Time    POC Glucose Fingerstick [[id number]]  (Abnormal) Collected:  12/01/17 1721    Specimen:  Blood Updated:  12/01/17 1727     Glucose 228 (H) mg/dL     Narrative:       Meter: [id number] : [id number] Sadi Dailey    POC Glucose Fingerstick [[id number]]  (Abnormal) Collected:  12/01/17 2136    Specimen:  Blood Updated:  12/01/17 2142     Glucose 392 (H) mg/dL     Narrative:       Meter: [id number] : [id number] Edison GARCIA    Basic Metabolic Panel [[id number]]  (Abnormal) Collected:  12/02/17 0525    Specimen:  Blood Updated:  12/02/17 0632     Glucose 203 (H) mg/dL      BUN 31 (H) mg/dL      Creatinine 1.90 (H) mg/dL      Sodium 139 mmol/L      Potassium 3.8 mmol/L      Chloride 104 mmol/L      CO2 25.6 mmol/L      Calcium 8.9 mg/dL      eGFR Non African Amer 34 (L) mL/min/1.73      BUN/Creatinine Ratio 16.3     Anion Gap 9.4 mmol/L     Narrative:       The MDRD GFR formula is only valid for adults with stable renal function between ages 18 and 70.    POC Glucose Fingerstick [[id number]]  (Abnormal) Collected:  12/02/17 0732    Specimen:  Blood Updated:  12/02/17 0734     Glucose 160 (H) mg/dL     Narrative:       Meter: [id number] : [id number] Blaze Macdonald    POC Glucose Fingerstick [[id number]]  (Abnormal) Collected:  12/02/17 1303    Specimen:  Blood Updated:  12/02/17 1304     Glucose 231 (H) mg/dL     Narrative:       Meter: [id number] : [id number] Blaze Macdonald        Imaging Results (last 24 hours)     ** No results found for the last 24 hours. **           ECG 12 Lead  Order: [id number]     Narrative      RR Interval= 870 ms  CO Interval= 180 ms  QRSD Interval= 124 ms  QT Interval= 412 ms  QTc Interval= 442 ms  Heart Rate= 69 ms  P Axis= 50 deg  QRS Axis= -42 deg  T Wave Axis= 21 deg  I: 40 Axis= 20 deg  T: 40 Axis= -49 deg  ST Axis= 6 deg  SINUS RHYTHM  LEFT BUNDLE BRANCH BLOCK  Possible ASMI  NO SIGNIFICANT CHANGE FROM PREVIOUS ECG                   Current Facility-Administered Medications:   •  acetaminophen (TYLENOL) tablet 650 mg, 650 mg, Oral, Q6H PRN, Rylee Llanos MD, 650 mg at 12/01/17 0029  •  amLODIPine (NORVASC) tablet 10 mg, 10 mg, Oral, Q24H, Rylee Llanos MD, 10 mg at 12/02/17 0943  •  aspirin tablet 325 mg, 325 mg, Oral, Daily, Rylee Llanos MD, 325 mg at 12/02/17 0943  •  atorvastatin (LIPITOR) tablet 80 mg, 80 mg, Oral, Daily, Rylee Llanos MD, 80 mg at 12/02/17 0943  •  insulin aspart (novoLOG) injection 0-7 Units, 0-7 Units, Subcutaneous, 4x Daily With Meals & Nightly, Rylee Llanos MD, 3 Units at 12/02/17 1313  •  insulin aspart (novoLOG) injection 9 Units, 9 Units, Subcutaneous, TID With Meals, Rylee Llanos MD, 9 Units at 12/02/17 1313  •  insulin detemir (LEVEMIR) injection 25 Units, 25 Units, Subcutaneous, Nightly, Rylee Llanos MD, 25 Units at 12/01/17 2214  •  lactobacillus acidophilus (RISAQUAD) capsule 1 capsule, 1 capsule, Oral, Daily, Rylee Llanos MD, 1 capsule at 12/02/17 0943  •  multivitamin with minerals 1 tablet, 1 tablet, Oral, Daily, Rylee Llanos MD, 1 tablet at 12/02/17 0944  •  ondansetron (ZOFRAN) injection 4 mg, 4 mg, Intravenous, Q4H PRN, Rylee Llanos MD  •  pantoprazole (PROTONIX) EC tablet 40 mg, 40 mg, Oral, Q AM, Rylee Llanos MD, 40 mg at 12/02/17 0645  •  sodium chloride 0.9 % bolus 500 mL, 500 mL, Intravenous, Once, Gurvinder Cuba MD, Last Rate: 125 mL/hr at 12/02/17 1130, 500 mL at 12/02/17 1130  •  sodium chloride 0.9 % flush 10 mL, 10 mL, Intravenous, PRN, Jonathan Mead MD     ASSESSMENT  Acute renal failure  Uncontrolled diabetes mellitus  Chronic kidney disease  Hyperkalemia  Transient cerebral ischemia  Hypertension  Hyperlipidemia  Gastroesophageal reflux disease    PLAN  CPM  IV fluid per renal  PTOT   Aspirin and Lipitor  OFF ACE inhibitor  Stress ulcer DVT prophylaxis  Discharge soon    RYLEEELVIA LLANOS MD

## 2017-12-02 NOTE — PLAN OF CARE
Problem: Patient Care Overview (Adult)  Goal: Plan of Care Review  Outcome: Ongoing (interventions implemented as appropriate)    12/01/17 1952   Coping/Psychosocial Response Interventions   Plan Of Care Reviewed With patient   Patient Care Overview   Progress progress towards functional goals is fair   Outcome Evaluation   Outcome Summary/Follow up Plan vital signs stable. no C/O nausea or vomiting. LOCX4. PT requires continued education on self administration of insulin injections.         Problem: Stroke (Ischemic) (Adult)  Goal: Signs and Symptoms of Listed Potential Problems Will be Absent or Manageable (Stroke)  Outcome: Ongoing (interventions implemented as appropriate)    11/30/17 1439 12/01/17 1952   Stroke (Ischemic)   Problems Assessed (Stroke (Ischemic)/TIA) all --    Problems Present (Stroke (Ischemic)/TIA) --  none         Problem: Diabetes, Type 2 (Adult)  Goal: Signs and Symptoms of Listed Potential Problems Will be Absent or Manageable (Diabetes, Type 2)  Outcome: Ongoing (interventions implemented as appropriate)    12/01/17 0442 12/01/17 1952   Diabetes, Type 2   Problems Assessed (Type 2 Diabetes) --  all   Problems Present (Type 2 Diabetes) impaired glycemic control;situational response --          Problem: Fall Risk (Adult)  Goal: Identify Related Risk Factors and Signs and Symptoms  Outcome: Ongoing (interventions implemented as appropriate)    12/01/17 0442   Fall Risk   Fall Risk: Related Risk Factors environment unfamiliar   Fall Risk: Signs and Symptoms presence of risk factors       Goal: Absence of Falls  Outcome: Ongoing (interventions implemented as appropriate)    12/01/17 1952   Fall Risk (Adult)   Absence of Falls making progress toward outcome

## 2017-12-02 NOTE — THERAPY TREATMENT NOTE
Acute Care - Physical Therapy Treatment Note  [facility]     Patient Name: Reece Stephen  : 1935  MRN: [id number]  Today's Date: 2017  Onset of Illness/Injury or Date of Surgery Date: 17     Referring Physician: Dr Llanos    Admit Date: 2017    Visit Dx:    ICD-10-CM ICD-9-CM   1. Transient cerebral ischemia, unspecified type G45.9 435.9   2. Acute on chronic renal insufficiency N28.9 593.9    N18.9 585.9   3. 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 Hyperkalemia (resolved) E87.5 276.7   5. Hyperglycemia R73.9 790.29   6. Decreased mobility R26.89 781.99     Patient Active Problem List   Diagnosis   • Rectal cancer   • Lung nodule   • History of DVT (deep vein thrombosis)   • Transient cerebral ischemia   • Hyperkalemia   • Hyperosmolar non-ketotic state in patient with type 2 diabetes mellitus   • DM (diabetes mellitus), secondary, uncontrolled, with hyperosmolarity   • Hyponatremia with extracellular fluid depletion   • Hypothyroidism               Adult Rehabilitation Note       17 1000 17 1000       Rehab Assessment/Intervention    Discipline physical therapy assistant  - physical therapy assistant  -     Document Type therapy note (daily note)  - therapy note (daily note)  -RW     Subjective Information agree to therapy  - agree to therapy;complains of;fatigue  -RW     Patient Effort, Rehab Treatment good  -RH good  -RW     Precautions/Limitations fall precautions  -RH fall precautions  -RW     Precautions/Limitations, Vision WFL  -RH      Precautions/Limitations, Hearing hearing impairment, left;hearing impairment, right  -RH hearing impairment, bilaterally  -RW     Recorded by [RH] Darryl Ward, PTA [RW] Tenisha Polanco, YOUNG     Vital Signs    Pre Systolic BP Rehab 173  -RH      Pre Treatment Diastolic BP 98  -RH      O2 Delivery Pre Treatment room  air  -RH      Recorded by [RH] Darryl Ward PTA      Pain Assessment    Pain Assessment No/denies pain  -RH No/denies pain  -RW     Recorded by [RH] Darryl Ward PTA [RW] Tenisha Polanco PTA     Cognitive Assessment/Intervention    Current Cognitive/Communication Assessment functional  -RH functional  -RW     Orientation Status  oriented x 4  -RW     Follows Commands/Answers Questions  100% of the time;needs cueing  -RW     Personal Safety  mild impairment;at risk behaviors demonstrated;decreased awareness, need for assist;decreased awareness, need for safety  -RW     Personal Safety Interventions  fall prevention program maintained;gait belt;nonskid shoes/slippers when out of bed  -RW     Recorded by [RH] Darryl Ward PTA [RW] Tenisha Polanco PTA     ROM (Range of Motion)    General ROM no range of motion deficits identified  -RH      Recorded by [RH] Darryl Ward PTA      Bed Mobility, Assessment/Treatment    Bed Mobility, Assistive Device  bed rails;head of bed elevated  -RW     Bed Mob, Supine to Sit, [place]  conditional independence  -RW     Bed Mob, Sit to Supine, [place]  not tested   Pt up in chair  -RW     Recorded by  [RW] Tenisha Polanco PTA     Transfer Assessment/Treatment    Transfers, Bed-Chair [place] contact guard assist  -RH      Transfers, Bed-Chair-Bed, Assist Device rolling walker  -RH      Transfers, Sit-Stand [place] stand by assist  -RH stand by assist  -RW     Transfers, Stand-Sit [place] stand by assist  -RH stand by assist  -RW     Transfer, Impairments  impaired balance  -RW     Recorded by [RH] Darryl Ward PTA [RW] Tenisha Polanco PTA     Gait Assessment/Treatment    Gait, [place] Level contact guard assist  -RH contact guard assist;minimum assist (75% patient effort);verbal cues required  -RW     Gait, Assistive Device rolling walker  -RH      Gait, Distance (Feet) 200  -   Pt ambulated 150' w/ RW and SBA as well   -RW     Gait, Gait Deviations polo decreased  -RH forward flexed posture;bilateral:;stride width increased;weight-shifting ability decreased   anterior lean, very fast paced  -RW     Gait, Safety Issues sequencing ability decreased  -RH balance decreased during turns;weight-shifting ability decreased  -RW     Gait, Impairments strength decreased;impaired balance  - impaired balance  -RW     Gait, Comment  3 LOB requiring [place] to recover. Did better w/ use of RW.   -RW     Recorded by [] Darryl Ward PTA [] Tenisha Polanco PTA     Balance Skills Training    Sitting-Level of Assistance Close supervision  -RH      Sitting-Balance Support Feet supported  -RH      Standing-Level of Assistance Contact guard  -RH Contact guard;Minimum assistance  -RW     Static Standing Balance Support No upper extremity supported  - No upper extremity supported  -RW     Standing-Balance Activities  --   to use restroom  -RW     Recorded by [] Darryl Ward PTA [RW] Tenisha Polanco PTA     Positioning and Restraints    Pre-Treatment Position in bed  -RH in bed  -RW     Post Treatment Position chair  -RH chair  -RW     In Chair notified nsg;reclined;call light within reach;exit alarm on  -RH reclined;call light within reach;encouraged to call for assist;exit alarm on;with nsg   NA present  -RW     Recorded by [] Darryl Ward PTA [] Tenisha Polanco PTA       User Key  (r) = Recorded By, (t) = Taken By, (c) = Cosigned By    Initials Name Effective Dates     Darryl Ward, YOUNG 02/18/16 -     RW Tenisha Polanco, YOUNG 04/06/16 -                 IP PT Goals       11/30/17 1331          Transfer Training PT LTG    Transfer Training PT LTG, Date Established 11/30/17  -EE      Transfer Training PT LTG, Time to Achieve 1 wk  -EE      Transfer Training PT LTG, Activity Type all transfers  -EE      Transfer Training PT LTG, [place] Level supervision required  -EE      Gait Training PT LTG    Gait Training  Goal PT LTG, Date Established 11/30/17  -EE      Gait Training Goal PT LTG, Time to Achieve 1 wk  -EE      Gait Training Goal PT LTG, [place] Level supervision required  -EE      Gait Training Goal PT LTG, Distance to Achieve 400  -EE      Dynamic Standing Balance PT LTG    Dynamic Standing Balance PT LTG, Date Established 11/30/17  -EE      Dynamic Standing Balance PT LTG, Time to Achieve 1 wk  -EE      Dynamic Standing Balance PT LTG, [place] Level supervision required   with dynamic balance activities  -EE        User Key  (r) = Recorded By, (t) = Taken By, (c) = Cosigned By    Initials Name Provider Type    EE Luz Elena Baez, PT Physical Therapist          Physical Therapy Education     Title: PT OT SLP Therapies (Active)     Topic: Physical Therapy (Active)     Point: Mobility training (Active)    Learning Progress Summary    Learner Readiness Method Response Comment Documented by Status   Patient Acceptance E,TB,D NR   12/01/17 1035 Active    Acceptance E,TB VU,NR  EE 11/30/17 1211 Done               Point: Body mechanics (Active)    Learning Progress Summary    Learner Readiness Method Response Comment Documented by Status   Patient Acceptance E,TB,D NR   12/01/17 1035 Active               Point: Precautions (Active)    Learning Progress Summary    Learner Readiness Method Response Comment Documented by Status   Patient Acceptance E,TB,D NR   12/01/17 1035 Active                      User Key     Initials Effective Dates Name Provider Type Discipline     12/01/15 -  Luz Elena Baez, PT Physical Therapist PT     04/06/16 -  Tenisha Polanco PTA Physical Therapy Assistant PT                    PT Recommendation and Plan  Anticipated Discharge Disposition: home, home with home health, home with outpatient services (may benefit from HH or OP PT for dynamic balance)  Planned Therapy Interventions: bed mobility training, gait training, balance training, home exercise program, patient/family education,  strengthening, transfer training  PT Frequency: daily  Plan of Care Review  Plan Of Care Reviewed With: patient  Progress: improving          Outcome Measures       12/02/17 1000 12/01/17 1439 12/01/17 1400    How much help from another person do you currently need...    Turning from your back to your side while in flat bed without using bedrails? 3  -RH      Moving from lying on back to sitting on the side of a flat bed without bedrails? 3  -RH      Moving to and from a bed to a chair (including a wheelchair)? 3  -RH      Standing up from a chair using your arms (e.g., wheelchair, bedside chair)? 3  -RH      Climbing 3-5 steps with a railing? 3  -RH      To walk in hospital room? 3  -RH      AM-PAC 6 Clicks Score 18  -RH      How much help from another is currently needed...    Putting on and taking off regular lower body clothing?  3  -LE     Bathing (including washing, rinsing, and drying)  3  -LE     Toileting (which includes using toilet bed pan or urinal)  3  -LE     Putting on and taking off regular upper body clothing  3  -LE     Taking care of personal grooming (such as brushing teeth)  3  -LE     Eating meals  4  -LE     Score  19  -LE     Functional Assessment    Outcome Measure Options   AM-PAC 6 Clicks Daily Activity (OT)  -LE      12/01/17 1000 11/30/17 1300       How much help from another person do you currently need...    Turning from your back to your side while in flat bed without using bedrails? 4  -RW 4  -EE     Moving from lying on back to sitting on the side of a flat bed without bedrails? 4  -RW 4  -EE     Moving to and from a bed to a chair (including a wheelchair)? 3  -RW 3  -EE     Standing up from a chair using your arms (e.g., wheelchair, bedside chair)? 3  -RW 3  -EE     Climbing 3-5 steps with a railing? 3  -RW 3  -EE     To walk in hospital room? 3  -RW 3  -EE     AM-PAC 6 Clicks Score 20  -RW 20  -EE     Functional Assessment    Outcome Measure Options AM-PAC 6 Clicks Basic Mobility  (PT)  -RW AM-PAC 6 Clicks Basic Mobility (PT)  -EE       User Key  (r) = Recorded By, (t) = Taken By, (c) = Cosigned By    Initials Name Provider Type    KIM Rowland, OTR Occupational Therapist    EE Luz Elena Baez, PT Physical Therapist     Darryl Ward, PTA Physical Therapy Assistant    RW Tenisha Polanco, PTA Physical Therapy Assistant           Time Calculation:         PT Charges       12/02/17 1044          Time Calculation    Start Time 1030  -RH      Stop Time 1045  -RH      Time Calculation (min) 15 min  -      PT Received On 12/02/17  -      PT - Next Appointment 12/03/17  -        User Key  (r) = Recorded By, (t) = Taken By, (c) = Cosigned By    Initials Name Provider Type     Darryl Ward, YOUNG Physical Therapy Assistant          Therapy Charges for Today     Code Description Service Date Service Provider Modifiers Qty    04922035441 HC PT THER PROC EA 15 MIN 12/2/2017 Darryl Ward, YOUNG GP 1          PT G-Codes  Outcome Measure Options: AM-PAC 6 Clicks Daily Activity (OT)    Darryl Ward PTA  12/2/2017

## 2017-12-02 NOTE — PROGRESS NOTES
"   LOS: 4 days   Patient Care Team:  Ana María Atkinson MD as PCP - General (Internal Medicine)  Jose Ochoa MD as Consulting Physician (Hematology and Oncology)  Armin Hurtado MD as Referring Physician (Colon and Rectal Surgery)    Chief Complaint/ Reason for encounter: Acute renal failure, dehydration        Subjective     Dizziness   Associated symptoms include weakness. Pertinent negatives include no chest pain, fever, nausea or vomiting.       Subjective:  Symptoms:  Improved.  He reports weakness.  No shortness of breath or chest pain.  (Resting, improved  Good oral intake, no shortness of breath  No new complaints, he feels well and is requesting to go home soon).    Diet:  Adequate intake.  No nausea or vomiting.    Activity level: Returning to normal.    Pain:  He reports no pain.          History taken from: Patient and chart    Objective     Vital Signs  Temp:  [97.8 °F (36.6 °C)-98.7 °F (37.1 °C)] 98.7 °F (37.1 °C)  Heart Rate:  [68-85] 68  Resp:  [16-18] 16  BP: (117-156)/(57-92) 156/91       Wt Readings from Last 1 Encounters:   11/28/17 2319 147 lb 14.4 oz (67.1 kg)   11/28/17 1702 140 lb (63.5 kg)       Objective:  General Appearance:  Comfortable, well-appearing, in no acute distress and not in pain.    Vital signs: (most recent): Blood pressure 156/91, pulse 68, temperature 98.7 °F (37.1 °C), temperature source Oral, resp. rate 16, height 69\" (175.3 cm), weight 147 lb 14.4 oz (67.1 kg), SpO2 96 %.  Vital signs are normal.  No fever.    Output: Producing urine.    HEENT: Normal HEENT exam.    Lungs:  Normal respiratory rate and normal effort.  He is not in respiratory distress.  Breath sounds clear to auscultation.  There are decreased breath sounds.    Heart: Normal rate.  Regular rhythm.  S1 normal.    Abdomen: Abdomen is soft and non-distended.  Bowel sounds are normal.   There is no epigastric area or no suprapubic area tenderness.     Extremities: Normal range of motion.  There is no " deformity or dependent edema.    Pulses: Distal pulses are intact.    Neurological: Patient is alert and oriented to person, place and time.    Skin:  Warm and dry.  No rash or cyanosis.             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  The following labs independently reviewed by me  Old records reviewed showing baseline stage III chronic kidney disease  Renal ultrasound independently reviewed, normal echotexture with no hydronephrosis or obstruction    Labs:   Recent Results (from the past 24 hour(s))   POC Glucose Fingerstick    Collection Time: 12/01/17 11:42 AM   Result Value Ref Range    Glucose 229 (H) 70 - 130 mg/dL   POC Glucose Fingerstick    Collection Time: 12/01/17  5:21 PM   Result Value Ref Range    Glucose 228 (H) 70 - 130 mg/dL   POC Glucose Fingerstick    Collection Time: 12/01/17  9:36 PM   Result Value Ref Range    Glucose 392 (H) 70 - 130 mg/dL   Basic Metabolic Panel    Collection Time: 12/02/17  5:25 AM   Result Value Ref Range    Glucose 203 (H) 65 - 99 mg/dL    BUN 31 (H) 8 - 23 mg/dL    Creatinine 1.90 (H) 0.76 - 1.27 mg/dL    Sodium 139 136 - 145 mmol/L    Potassium 3.8 3.5 - 5.2 mmol/L    Chloride 104 98 - 107 mmol/L    CO2 25.6 22.0 - 29.0 mmol/L     Calcium 8.9 8.6 - 10.5 mg/dL    eGFR Non African Amer 34 (L) >60 mL/min/1.73    BUN/Creatinine Ratio 16.3 7.0 - 25.0    Anion Gap 9.4 mmol/L   POC Glucose Fingerstick    Collection Time: 12/02/17  7:32 AM   Result Value Ref Range    Glucose 160 (H) 70 - 130 mg/dL       Radiology:  Imaging Results (last 24 hours)     ** No results found for the last 24 hours. **             Medications have been reviewed:  Current Facility-Administered Medications   Medication Dose Route Frequency Provider Last Rate Last Dose   • acetaminophen (TYLENOL) tablet 650 mg  650 mg Oral Q6H PRN Jose Llanos MD   650 mg at 12/01/17 0029   • amLODIPine (NORVASC) tablet 10 mg  10 mg Oral Q24H Jose Llanos MD   10 mg at 12/02/17 0943   • aspirin tablet 325 mg  325 mg Oral Daily Jose Llanos MD   325 mg at 12/02/17 0943   • atorvastatin (LIPITOR) tablet 80 mg  80 mg Oral Daily Jose Llanos MD   80 mg at 12/02/17 0943   • insulin aspart (novoLOG) injection 0-7 Units  0-7 Units Subcutaneous 4x Daily With Meals & Nightly Jose Llanos MD   2 Units at 12/02/17 0944   • insulin aspart (novoLOG) injection 9 Units  9 Units Subcutaneous TID With Meals Jose Llanos MD   9 Units at 12/02/17 0943   • insulin detemir (LEVEMIR) injection 25 Units  25 Units Subcutaneous Nightly Jose Llanos MD   25 Units at 12/01/17 2214   • lactobacillus acidophilus (RISAQUAD) capsule 1 capsule  1 capsule Oral Daily Jose Llanos MD   1 capsule at 12/02/17 0943   • multivitamin with minerals 1 tablet  1 tablet Oral Daily Jose Llanos MD   1 tablet at 12/02/17 0944   • ondansetron (ZOFRAN) injection 4 mg  4 mg Intravenous Q4H PRN Jose Llanos MD       • pantoprazole (PROTONIX) EC tablet 40 mg  40 mg Oral Q AM Jose Llanos MD   40 mg at 12/02/17 0645   • sodium chloride 0.9 % bolus 500 mL  500 mL Intravenous Once Gurvinder Cuba MD       • sodium chloride 0.9 % flush 10 mL  10 mL Intravenous PRN Jonathan Mead MD           Assessment/Plan     Active Problems:     Transient cerebral ischemia    Hyperkalemia    Hyperosmolar non-ketotic state in patient with type 2 diabetes mellitus    DM (diabetes mellitus), secondary, uncontrolled, with hyperosmolarity    Hyponatremia with extracellular fluid depletion    Hypothyroidism      Assessment:  (Assessment:  acute renal failure, on chronic kidney disease stage III.  Improved, Secondary to dehydration from osmotic effects of glucosuria with profound hyperglycemia on admission  Chronic kidney disease stage III likely secondary to diabetes and hypertension given poor control  Osteoarthritis on NSAIDs  Metabolic acidosis likely more related to renal failure than DKA given negative ketones on urine  Diabetes mellitus type 2, very poor control, A1c 12.6  Hypertension, ACE inhibitor on hold currently  hyperkalemia from renal failure and insulin deficiency  Hyponatremia, pseudohyponatremia secondary to hyperglycemia, improved with fluids and improved glycemic control  Hyperosmolar nonketotic ketosis hyperglycemia   hypothyroidism, management per Dr. Llanos          Plan:   Renal function had been improving slowly better but worse today  Normal saline 500 cc times one today, Encourage oral fluid intake   Continue to hold lisinopril, avoid NSAIDs in the future  Continue Norvasc, monitor blood pressure trends  Continue to monitor electrolytes and volume closely   Avoid IV contrast and nephrotoxic medications   Discharge planning).             Continue to monitor renal function, electroytes and volume closely   Please call me with any questions or concerns      Gurvinder Cuba MD   [facility]   [phone]    12/02/17  9:52 AM      Dictation performed using Dragon dictation software

## 2017-12-03 LAB
ANION GAP SERPL CALCULATED.3IONS-SCNC: 11.6 MMOL/L
BUN BLD-MCNC: 27 MG/DL (ref 8–23)
BUN/CREAT SERPL: 13.8 (ref 7–25)
CALCIUM SPEC-SCNC: 8.9 MG/DL (ref 8.6–10.5)
CHLORIDE SERPL-SCNC: 108 MMOL/L (ref 98–107)
CO2 SERPL-SCNC: 22.4 MMOL/L (ref 22–29)
CREAT BLD-MCNC: 1.95 MG/DL (ref 0.76–1.27)
GFR SERPL CREATININE-BSD FRML MDRD: 33 ML/MIN/1.73
GLUCOSE BLD-MCNC: 74 MG/DL (ref 65–99)
GLUCOSE BLDC GLUCOMTR-MCNC: 196 MG/DL (ref 70–130)
GLUCOSE BLDC GLUCOMTR-MCNC: 214 MG/DL (ref 70–130)
GLUCOSE BLDC GLUCOMTR-MCNC: 337 MG/DL (ref 70–130)
GLUCOSE BLDC GLUCOMTR-MCNC: 79 MG/DL (ref 70–130)
GLUCOSE BLDC GLUCOMTR-MCNC: 86 MG/DL (ref 70–130)
POTASSIUM BLD-SCNC: 3.7 MMOL/L (ref 3.5–5.2)
SODIUM BLD-SCNC: 142 MMOL/L (ref 136–145)

## 2017-12-03 PROCEDURE — 97110 THERAPEUTIC EXERCISES: CPT

## 2017-12-03 PROCEDURE — 82962 GLUCOSE BLOOD TEST: CPT

## 2017-12-03 PROCEDURE — 80048 BASIC METABOLIC PNL TOTAL CA: CPT | Performed by: HOSPITALIST

## 2017-12-03 PROCEDURE — 63710000001 INSULIN ASPART PER 5 UNITS: Performed by: HOSPITALIST

## 2017-12-03 RX ADMIN — INSULIN ASPART 11 UNITS: 100 INJECTION, SOLUTION INTRAVENOUS; SUBCUTANEOUS at 17:27

## 2017-12-03 RX ADMIN — PANTOPRAZOLE SODIUM 40 MG: 40 TABLET, DELAYED RELEASE ORAL at 05:29

## 2017-12-03 RX ADMIN — INSULIN ASPART 2 UNITS: 100 INJECTION, SOLUTION INTRAVENOUS; SUBCUTANEOUS at 21:26

## 2017-12-03 RX ADMIN — INSULIN ASPART 11 UNITS: 100 INJECTION, SOLUTION INTRAVENOUS; SUBCUTANEOUS at 12:01

## 2017-12-03 RX ADMIN — ASPIRIN 325 MG: 325 TABLET ORAL at 09:26

## 2017-12-03 RX ADMIN — INSULIN ASPART 3 UNITS: 100 INJECTION, SOLUTION INTRAVENOUS; SUBCUTANEOUS at 17:27

## 2017-12-03 RX ADMIN — Medication 1 CAPSULE: at 09:27

## 2017-12-03 RX ADMIN — ATORVASTATIN CALCIUM 40 MG: 20 TABLET, FILM COATED ORAL at 09:27

## 2017-12-03 RX ADMIN — MULTIPLE VITAMINS W/ MINERALS TAB 1 TABLET: TAB at 09:26

## 2017-12-03 RX ADMIN — AMLODIPINE BESYLATE 10 MG: 10 TABLET ORAL at 09:26

## 2017-12-03 RX ADMIN — INSULIN ASPART 5 UNITS: 100 INJECTION, SOLUTION INTRAVENOUS; SUBCUTANEOUS at 12:01

## 2017-12-03 NOTE — PLAN OF CARE
Problem: Patient Care Overview (Adult)  Goal: Plan of Care Review  Outcome: Ongoing (interventions implemented as appropriate)    12/02/17 2001   Coping/Psychosocial Response Interventions   Plan Of Care Reviewed With patient;durable power of    Patient Care Overview   Progress improving   Outcome Evaluation   Outcome Summary/Follow up Plan placement versus home. had bolus today per md order. glucose more controlled. meds adjusted. no complaints.          Problem: Stroke (Ischemic) (Adult)  Goal: Signs and Symptoms of Listed Potential Problems Will be Absent or Manageable (Stroke)  Outcome: Outcome(s) achieved Date Met:  12/02/17    Problem: Diabetes, Type 2 (Adult)  Goal: Signs and Symptoms of Listed Potential Problems Will be Absent or Manageable (Diabetes, Type 2)  Outcome: Ongoing (interventions implemented as appropriate)    Problem: Fall Risk (Adult)  Goal: Identify Related Risk Factors and Signs and Symptoms  Outcome: Outcome(s) achieved Date Met:  12/02/17  Goal: Absence of Falls  Outcome: Ongoing (interventions implemented as appropriate)

## 2017-12-03 NOTE — THERAPY TREATMENT NOTE
Acute Care - Physical Therapy Treatment Note  [facility]     Patient Name: Reece Stephen  : 1935  MRN: [id number]  Today's Date: 12/3/2017  Onset of Illness/Injury or Date of Surgery Date: 17     Referring Physician: Dr Llanos    Admit Date: 2017    Visit Dx:    ICD-10-CM ICD-9-CM   1. Transient cerebral ischemia, unspecified type G45.9 435.9   2. Acute on chronic renal insufficiency N28.9 593.9    N18.9 585.9   3. 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 Hyperkalemia (resolved) E87.5 276.7   5. Hyperglycemia R73.9 790.29   6. Decreased mobility R26.89 781.99     Patient Active Problem List   Diagnosis   • Rectal cancer   • Lung nodule   • History of DVT (deep vein thrombosis)   • Transient cerebral ischemia   • Hyperkalemia   • Hyperosmolar non-ketotic state in patient with type 2 diabetes mellitus   • DM (diabetes mellitus), secondary, uncontrolled, with hyperosmolarity   • Hyponatremia with extracellular fluid depletion   • Hypothyroidism               Adult Rehabilitation Note       17 1000 17 1000 17 1000    Rehab Assessment/Intervention    Discipline physical therapy assistant  - physical therapy assistant  - physical therapy assistant  -    Document Type therapy note (daily note)  - therapy note (daily note)  - therapy note (daily note)  -RW    Subjective Information agree to therapy  - agree to therapy  - agree to therapy;complains of;fatigue  -RW    Patient Effort, Rehab Treatment good  -RH good  -RH good  -RW    Precautions/Limitations fall precautions  -RH fall precautions  -RH fall precautions  -RW    Precautions/Limitations, Vision WFL  - WFL  -     Precautions/Limitations, Hearing hearing impairment, left;hearing impairment, right  -RH hearing impairment, left;hearing impairment, right  -RH hearing impairment, bilaterally  -RW     Recorded by [RH] Darryl Ward PTA [RH] Darryl Ward PTA [RW] Tenisha Polanco PTA    Vital Signs    Pre Systolic BP Rehab  173  -RH     Pre Treatment Diastolic BP  98  -RH     Posttreatment Heart Rate (beats/min) 77  -RH      O2 Delivery Pre Treatment room air  -RH room air  -RH     Recorded by [RH] Darryl Ward PTA [RH] Darryl Ward PTA     Pain Assessment    Pain Assessment 0-10  -RH No/denies pain  -RH No/denies pain  -RW    Pain Score 1  -RH      Pain Location Ankle   c/o minor L ankle pain duringg steps only.  -RH      Pain Orientation Left  -RH      Pain Frequency Rarely  -RH      Pain Onset Sudden   temporary  -RH      Recorded by [RH] Darryl Ward PTA [RH] Darryl Ward, YOUNG [RW] Tenisha Polanco PTA    Cognitive Assessment/Intervention    Current Cognitive/Communication Assessment functional  -RH functional  -RH functional  -RW    Orientation Status oriented x 4;required verbal cueing (specifiy in comments)  -RH  oriented x 4  -RW    Follows Commands/Answers Questions 100% of the time;able to follow single-step instructions;needs cueing  -RH  100% of the time;needs cueing  -RW    Personal Safety mild impairment;at risk behaviors demonstrated  -RH  mild impairment;at risk behaviors demonstrated;decreased awareness, need for assist;decreased awareness, need for safety  -RW    Personal Safety Interventions fall prevention program maintained;gait belt;nonskid shoes/slippers when out of bed  -RH  fall prevention program maintained;gait belt;nonskid shoes/slippers when out of bed  -RW    Recorded by [RH] Darryl Ward PTA [RH] Darryl Ward PTA [RW] Tenisha Polanco PTA    ROM (Range of Motion)    General ROM no range of motion deficits identified  -RH no range of motion deficits identified  -RH     Recorded by [RH] Darryl Ward PTA [RH] Darryl Ward PTA     Bed Mobility, Assessment/Treatment    Bed Mobility, Assistive Device bed rails  -RH  bed rails;head  of bed elevated  -RW    Bed Mob, Supine to Sit, [place] conditional independence  -RH  conditional independence  -RW    Bed Mob, Sit to Supine, [place] --   in chair  -RH  not tested   Pt up in chair  -RW    Recorded by [RH] Darryl Ward, YOUNG  [RW] Tenisha Polanco PTA    Transfer Assessment/Treatment    Transfers, Bed-Chair [place] stand by assist;contact guard assist  -RH contact guard assist  -RH     Transfers, Bed-Chair-Bed, Assist Device rolling walker  -RH rolling walker  -RH     Transfers, Sit-Stand [place] stand by assist  -RH stand by assist  -RH stand by assist  -RW    Transfers, Stand-Sit [place] stand by assist  -RH stand by assist  -RH stand by assist  -RW    Transfers, Sit-Stand-Sit, Assist Device rolling walker  -RH      Transfer, Impairments   impaired balance  -RW    Recorded by [RH] Darryl Ward PTA [RH] Darryl Ward PTA [RW] Tenisha Polanco PTA    Gait Assessment/Treatment    Gait, [place] Level stand by assist;contact guard assist  -RH contact guard assist  -RH contact guard assist;minimum assist (75% patient effort);verbal cues required  -RW    Gait, Assistive Device rolling walker  -RH rolling walker  -RH     Gait, Distance (Feet) 450  -  -   Pt ambulated 150' w/ RW and SBA as well  -RW    Gait, Gait Deviations polo decreased;decreased heel strike;step length decreased  -RH polo decreased  -RH forward flexed posture;bilateral:;stride width increased;weight-shifting ability decreased   anterior lean, very fast paced  -RW    Gait, Safety Issues step length decreased  -RH sequencing ability decreased  -RH balance decreased during turns;weight-shifting ability decreased  -RW    Gait, Impairments strength decreased;impaired balance  -RH strength decreased;impaired balance  -RH impaired balance  -RW    Gait, Comment   3 LOB requiring [place] to recover. Did better w/ use of RW.   -RW    Recorded by [RH] Darryl Ward PTA [RH] Darryl  ALESHIA Ward PTA [RW] Tenisha Polanco PTA    Stairs Assessment/Treatment    Number of Stairs 12  -RH      Stairs, Handrail Location both sides  -RH      Stairs, [place] Level contact guard assist  -RH      Stairs, Technique Used step over step (ascending);step to step (descending)  -      Stairs, Safety Issues sequencing ability decreased  -RH      Stairs, Impairments --   knee instability   -RH      Recorded by [] Darryl Ward PTA      Balance Skills Training    Sitting-Level of Assistance Independent  -RH Close supervision  -RH     Sitting-Balance Support Feet supported  -RH Feet supported  -RH     Standing-Level of Assistance Close supervision  -RH Contact guard  -RH Contact guard;Minimum assistance  -RW    Static Standing Balance Support assistive device  - No upper extremity supported  -RH No upper extremity supported  -RW    Standing-Balance Activities   --   to use restroom  -RW    Recorded by [] Darryl Ward PTA [RH] Darryl Ward PTA [RW] Tenisha Polanco PTA    Positioning and Restraints    Pre-Treatment Position in bed  -RH in bed  -RH in bed  -RW    Post Treatment Position chair  -RH chair  -RH chair  -RW    In Chair sitting;call light within reach;exit alarm on  -RH notified nsg;reclined;call light within reach;exit alarm on  -RH reclined;call light within reach;encouraged to call for assist;exit alarm on;with nsg   NA present  -RW    Recorded by [] Darryl Ward PTA [RH] Darryl Ward PTA [RW] Tenisha Polanco PTA      User Key  (r) = Recorded By, (t) = Taken By, (c) = Cosigned By    Initials Name Effective Dates     Darryl Ward PTA 02/18/16 -     RW Tenisha Polanco PTA 04/06/16 -                 IP PT Goals       11/30/17 1331          Transfer Training PT LTG    Transfer Training PT LTG, Date Established 11/30/17  -EE      Transfer Training PT LTG, Time to Achieve 1 wk  -EE      Transfer Training PT LTG, Activity Type all transfers  -EE      Transfer  Training PT LTG, [place] Level supervision required  -EE      Gait Training PT LTG    Gait Training Goal PT LTG, Date Established 11/30/17  -EE      Gait Training Goal PT LTG, Time to Achieve 1 wk  -EE      Gait Training Goal PT LTG, [place] Level supervision required  -EE      Gait Training Goal PT LTG, Distance to Achieve 400  -EE      Dynamic Standing Balance PT LTG    Dynamic Standing Balance PT LTG, Date Established 11/30/17  -EE      Dynamic Standing Balance PT LTG, Time to Achieve 1 wk  -EE      Dynamic Standing Balance PT LTG, [place] Level supervision required   with dynamic balance activities  -EE        User Key  (r) = Recorded By, (t) = Taken By, (c) = Cosigned By    Initials Name Provider Type    EE Luz Elena Baez, PT Physical Therapist          Physical Therapy Education     Title: PT OT SLP Therapies (Active)     Topic: Physical Therapy (Active)     Point: Mobility training (Active)    Learning Progress Summary    Learner Readiness Method Response Comment Documented by Status   Patient Acceptance E,TB,D NR   12/01/17 1035 Active    Acceptance E,TB VU,NR  EE 11/30/17 1211 Done               Point: Body mechanics (Active)    Learning Progress Summary    Learner Readiness Method Response Comment Documented by Status   Patient Acceptance E,TB,D NR   12/01/17 1035 Active               Point: Precautions (Active)    Learning Progress Summary    Learner Readiness Method Response Comment Documented by Status   Patient Acceptance E,TB,D NR   12/01/17 1035 Active                      User Key     Initials Effective Dates Name Provider Type Discipline     12/01/15 -  Luz Elena Baez, PT Physical Therapist PT     04/06/16 -  Tenisha Polanco, YOUNG Physical Therapy Assistant PT                    PT Recommendation and Plan  Anticipated Discharge Disposition: home, home with home health, home with outpatient services (may benefit from HH or OP PT for dynamic balance)  Planned Therapy Interventions:  bed mobility training, gait training, balance training, home exercise program, patient/family education, strengthening, transfer training  PT Frequency: daily  Plan of Care Review  Plan Of Care Reviewed With: patient  Progress: improving          Outcome Measures       12/03/17 1000 12/02/17 1000 12/01/17 1439    How much help from another person do you currently need...    Turning from your back to your side while in flat bed without using bedrails? 3  -RH 3  -RH     Moving from lying on back to sitting on the side of a flat bed without bedrails? 3  -RH 3  -RH     Moving to and from a bed to a chair (including a wheelchair)? 3  -RH 3  -RH     Standing up from a chair using your arms (e.g., wheelchair, bedside chair)? 3  -RH 3  -RH     Climbing 3-5 steps with a railing? 3  -RH 3  -RH     To walk in hospital room? 3  -RH 3  -RH     AM-PAC 6 Clicks Score 18  -RH 18  -RH     How much help from another is currently needed...    Putting on and taking off regular lower body clothing?   3  -LE    Bathing (including washing, rinsing, and drying)   3  -LE    Toileting (which includes using toilet bed pan or urinal)   3  -LE    Putting on and taking off regular upper body clothing   3  -LE    Taking care of personal grooming (such as brushing teeth)   3  -LE    Eating meals   4  -LE    Score   19  -LE      12/01/17 1400 12/01/17 1000 11/30/17 1300    How much help from another person do you currently need...    Turning from your back to your side while in flat bed without using bedrails?  4  -RW 4  -EE    Moving from lying on back to sitting on the side of a flat bed without bedrails?  4  -RW 4  -EE    Moving to and from a bed to a chair (including a wheelchair)?  3  -RW 3  -EE    Standing up from a chair using your arms (e.g., wheelchair, bedside chair)?  3  -RW 3  -EE    Climbing 3-5 steps with a railing?  3  -RW 3  -EE    To walk in hospital room?  3  -RW 3  -EE    AM-PAC 6 Clicks Score  20  -RW 20  -EE    Functional  Assessment    Outcome Measure Options AM-PAC 6 Clicks Daily Activity (OT)  -LE AM-PAC 6 Clicks Basic Mobility (PT)  -RW AM-PAC 6 Clicks Basic Mobility (PT)  -EE      User Key  (r) = Recorded By, (t) = Taken By, (c) = Cosigned By    Initials Name Provider Type    LE Randee Rowland, OTR Occupational Therapist    EE Luz Elena Baez, PT Physical Therapist     Darryl Ward, PTA Physical Therapy Assistant     Tenisha Polanco, PTA Physical Therapy Assistant           Time Calculation:         PT Charges       12/03/17 1043          Time Calculation    Start Time 1024  -      Stop Time 1044  -      Time Calculation (min) 20 min  -RH      PT Received On 12/03/17  -      PT - Next Appointment 12/04/17  -        User Key  (r) = Recorded By, (t) = Taken By, (c) = Cosigned By    Initials Name Provider Type     Darryl Ward, PTA Physical Therapy Assistant          Therapy Charges for Today     Code Description Service Date Service Provider Modifiers Qty    09630662752 HC PT THER PROC EA 15 MIN 12/2/2017 Darryl Ward, YOUNG GP 1    35695290531 HC PT THER PROC EA 15 MIN 12/3/2017 Darryl Ward, PTA GP 1          PT G-Codes  Outcome Measure Options: AM-PAC 6 Clicks Daily Activity (OT)    Darryl Ward PTA  12/3/2017

## 2017-12-03 NOTE — PLAN OF CARE
Problem: Patient Care Overview (Adult)  Goal: Plan of Care Review  Outcome: Ongoing (interventions implemented as appropriate)    12/03/17 1738   Coping/Psychosocial Response Interventions   Plan Of Care Reviewed With patient   Patient Care Overview   Progress no change   Outcome Evaluation   Outcome Summary/Follow up Plan A&Ox3, minimal assist needed for ADL's Patient requires frequent education on care for colostomy and insulin therapy. Placement encouraged at dc for medical assistance.          Problem: Diabetes, Type 2 (Adult)  Goal: Signs and Symptoms of Listed Potential Problems Will be Absent or Manageable (Diabetes, Type 2)  Outcome: Ongoing (interventions implemented as appropriate)    12/03/17 1738   Diabetes, Type 2   Problems Assessed (Type 2 Diabetes) all         Problem: Fall Risk (Adult)  Goal: Absence of Falls  Outcome: Ongoing (interventions implemented as appropriate)    12/03/17 1738   Fall Risk (Adult)   Absence of Falls making progress toward outcome

## 2017-12-03 NOTE — PLAN OF CARE
Problem: Patient Care Overview (Adult)  Goal: Plan of Care Review  Outcome: Ongoing (interventions implemented as appropriate)    12/03/17 0324   Coping/Psychosocial Response Interventions   Plan Of Care Reviewed With patient   Patient Care Overview   Progress improving   Outcome Evaluation   Outcome Summary/Follow up Plan VSS; SERG tonight was 224; No complaints noted; Will continue to monitor         Problem: Diabetes, Type 2 (Adult)  Goal: Signs and Symptoms of Listed Potential Problems Will be Absent or Manageable (Diabetes, Type 2)  Outcome: Ongoing (interventions implemented as appropriate)    Problem: Fall Risk (Adult)  Goal: Absence of Falls  Outcome: Ongoing (interventions implemented as appropriate)

## 2017-12-03 NOTE — PROGRESS NOTES
"   LOS: 5 days   Patient Care Team:  Ana María Atkinson MD as PCP - General (Internal Medicine)  Jose Ochoa MD as Consulting Physician (Hematology and Oncology)  Armin Hurtado MD as Referring Physician (Colon and Rectal Surgery)    Chief Complaint/ Reason for encounter: Acute renal failure, dehydration        Subjective     Dizziness   Pertinent negatives include no chest pain, fever, nausea or vomiting.       Subjective:  Symptoms:  Improved.  No shortness of breath or chest pain.  (  Good oral intake, no shortness of breath  No new complaints, he feels well and is requesting to go home soon).    Diet:  Adequate intake.  No nausea or vomiting.    Activity level: Returning to normal.    Pain:  He reports no pain.          History taken from: Patient and chart    Objective     Vital Signs  Temp:  [98 °F (36.7 °C)-98.4 °F (36.9 °C)] 98 °F (36.7 °C)  Heart Rate:  [65-78] 73  Resp:  [18-20] 18  BP: (138-173)/(77-98) 148/78       Wt Readings from Last 1 Encounters:   11/28/17 2319 147 lb 14.4 oz (67.1 kg)   11/28/17 1702 140 lb (63.5 kg)       Objective:  General Appearance:  Comfortable, well-appearing, in no acute distress and not in pain.    Vital signs: (most recent): Blood pressure 148/78, pulse 73, temperature 98 °F (36.7 °C), temperature source Oral, resp. rate 18, height 69\" (175.3 cm), weight 147 lb 14.4 oz (67.1 kg), SpO2 94 %.  Vital signs are normal.  No fever.    Output: Producing urine.    HEENT: Normal HEENT exam.    Lungs:  Normal respiratory rate and normal effort.  He is not in respiratory distress.  Breath sounds clear to auscultation.  No decreased breath sounds.    Heart: Normal rate.  Regular rhythm.  S1 normal.    Abdomen: Abdomen is soft and non-distended.  Bowel sounds are normal.   There is no epigastric area or no suprapubic area tenderness.     Extremities: Normal range of motion.  There is no deformity or dependent edema.    Pulses: Distal pulses are intact.    Neurological: Patient is " alert and oriented to person, place and time.    Skin:  Warm and dry.  No rash or cyanosis.             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  The following labs independently reviewed by me  Old records reviewed showing baseline stage III chronic kidney disease  Renal ultrasound independently reviewed, normal echotexture with no hydronephrosis or obstruction    Labs:   Recent Results (from the past 24 hour(s))   POC Glucose Fingerstick    Collection Time: 12/02/17  1:03 PM   Result Value Ref Range    Glucose 231 (H) 70 - 130 mg/dL   POC Glucose Fingerstick    Collection Time: 12/02/17  3:56 PM   Result Value Ref Range    Glucose 110 70 - 130 mg/dL   POC Glucose Fingerstick    Collection Time: 12/02/17  8:17 PM   Result Value Ref Range    Glucose 224 (H) 70 - 130 mg/dL   Basic Metabolic Panel    Collection Time: 12/03/17  5:27 AM   Result Value Ref Range    Glucose 74 65 - 99 mg/dL    BUN 27 (H) 8 - 23 mg/dL    Creatinine 1.95 (H) 0.76 - 1.27 mg/dL    Sodium 142 136 - 145 mmol/L    Potassium 3.7 3.5 - 5.2 mmol/L    Chloride 108 (H) 98 - 107 mmol/L    CO2 22.4 22.0 - 29.0 mmol/L    Calcium 8.9 8.6 - 10.5 mg/dL    eGFR Non African Amer 33 (L) >60 mL/min/1.73    BUN/Creatinine Ratio  13.8 7.0 - 25.0    Anion Gap 11.6 mmol/L   POC Glucose Fingerstick    Collection Time: 12/03/17  6:07 AM   Result Value Ref Range    Glucose 86 70 - 130 mg/dL   POC Glucose Fingerstick    Collection Time: 12/03/17  7:44 AM   Result Value Ref Range    Glucose 79 70 - 130 mg/dL       Radiology:  Imaging Results (last 24 hours)     ** No results found for the last 24 hours. **             Medications have been reviewed:  Current Facility-Administered Medications   Medication Dose Route Frequency Provider Last Rate Last Dose   • acetaminophen (TYLENOL) tablet 650 mg  650 mg Oral Q6H PRN Jose Llanos MD   650 mg at 12/01/17 0029   • amLODIPine (NORVASC) tablet 10 mg  10 mg Oral Q24H Jose Llanos MD   10 mg at 12/03/17 0926   • aspirin tablet 325 mg  325 mg Oral Daily Jose Llanos MD   325 mg at 12/03/17 0926   • atorvastatin (LIPITOR) tablet 40 mg  40 mg Oral Daily Jose Llanos MD   40 mg at 12/03/17 0927   • insulin aspart (novoLOG) injection 0-7 Units  0-7 Units Subcutaneous 4x Daily With Meals & Nightly Jose Llanos MD   3 Units at 12/02/17 2056   • insulin aspart (novoLOG) injection 11 Units  11 Units Subcutaneous TID With Meals Jose Llanos MD   11 Units at 12/02/17 1835   • insulin detemir (LEVEMIR) injection 30 Units  30 Units Subcutaneous Nightly Jose Llanos MD   30 Units at 12/02/17 2056   • lactobacillus acidophilus (RISAQUAD) capsule 1 capsule  1 capsule Oral Daily Jose Llanos MD   1 capsule at 12/03/17 0927   • multivitamin with minerals 1 tablet  1 tablet Oral Daily Jose Llanos MD   1 tablet at 12/03/17 0926   • ondansetron (ZOFRAN) injection 4 mg  4 mg Intravenous Q4H PRN Jose Llanos MD       • pantoprazole (PROTONIX) EC tablet 40 mg  40 mg Oral Q AM Jose Llanos MD   40 mg at 12/03/17 0529   • sodium chloride 0.9 % flush 10 mL  10 mL Intravenous PRN Jonathan Mead MD           Assessment/Plan     Active Problems:    Transient cerebral ischemia    Hyperkalemia    Hyperosmolar non-ketotic state in  patient with type 2 diabetes mellitus    DM (diabetes mellitus), secondary, uncontrolled, with hyperosmolarity    Hyponatremia with extracellular fluid depletion    Hypothyroidism      Assessment:  (Assessment:  acute renal failure, on chronic kidney disease stage III.  Improved, Secondary to dehydration from osmotic effects of glucosuria with profound hyperglycemia on admission  Chronic kidney disease stage III likely secondary to diabetes and hypertension given poor control  Osteoarthritis on NSAIDs  Metabolic acidosis likely more related to renal failure than DKA given negative ketones on urine  Diabetes mellitus type 2, very poor control, A1c 12.6  Hypertension, ACE inhibitor on hold currently  hyperkalemia from renal failure and insulin deficiency  Hyponatremia, pseudohyponatremia secondary to hyperglycemia, improved with fluids and improved glycemic control  Hyperosmolar nonketotic ketosis hyperglycemia   hypothyroidism, management per Dr. Llanos          Plan:   Renal function fairly stable, may be near baseline  Encourage oral fluid intake   Continue to hold lisinopril, avoid NSAIDs in the future  Continue Norvasc, blood pressure at goal  Discharge planning).             Continue to monitor renal function, electroytes and volume closely   Please call me with any questions or concerns      Gurvinder Cuba MD   [facility]   [phone]    12/03/17  10:19 AM      Dictation performed using Dragon dictation software

## 2017-12-03 NOTE — PROGRESS NOTES
"Daily progress note    Chief complaints  Doing same  No new complaints    History of present illness  83-year-old white male with history of diabetes mellitus hypertension chronic kidney disease peptic ulcer disease hyperlipidemia presents to the [facility] emergency room with generalized weakness and right-sided numbness not feeling well.  Patient evaluated in ER found to be in acute renal failure also high potassium admitted for management.  Patient denies any headache chest pain shortness of breath abdominal pain nausea vomiting diarrhea denies any vision problem but he said his gait is unsteady too.     REVIEW OF SYSTEMS  Review of Systems   Constitutional: Negative for chills and fever.   HENT: Negative.  Negative for sore throat.    Eyes: Negative.    Respiratory: Negative.  Negative for cough.    Cardiovascular: Negative.  Negative for chest pain.   Gastrointestinal: Negative.    Genitourinary: Negative.  Negative for dysuria.   Musculoskeletal: Positive for gait problem. Negative for back pain.   Skin: Negative.  Negative for rash.   Neurological: Positive for dizziness, weakness (RUE) and numbness (RUE). Negative for headaches.      PHYSICAL EXAM  Blood pressure 126/95, pulse 80, temperature 97.8 °F (36.6 °C), temperature source Oral, resp. rate 18, height 69\" (175.3 cm), weight 147 lb 14.4 oz (67.1 kg), SpO2 97 %.    Constitutional: He is oriented to person, place, and time. No distress  Head: Normocephalic and atraumatic.   Eyes: EOM are normal.   Neck: Normal range of motion.   Cardiovascular: Normal rate, regular rhythm and normal heart sounds.    Pulmonary/Chest: Effort normal and breath sounds normal. No respiratory distress.   Abdominal: Soft. There is no tenderness.   Left sided colostomy.    Musculoskeletal: He exhibits no edema.   Neurological: He is alert and oriented to person, place, and time.   Limb ataxia RUE   Skin: Skin is warm and dry.      LAB RESULTS  Lab Results (last 24 hours)  "    Procedure Component Value Units Date/Time    POC Glucose Fingerstick [[id number]]  (Normal) Collected:  12/02/17 1556    Specimen:  Blood Updated:  12/02/17 1558     Glucose 110 mg/dL     Narrative:       Meter: [id number] : [id number] Don Farideh LEVON DOE    POC Glucose Fingerstick [[id number]]  (Abnormal) Collected:  12/02/17 2017    Specimen:  Blood Updated:  12/02/17 2019     Glucose 224 (H) mg/dL     Narrative:       Meter: [id number] : [id number] Don DOE    POC Glucose Fingerstick [[id number]]  (Normal) Collected:  12/03/17 0607    Specimen:  Blood Updated:  12/03/17 0607     Glucose 86 mg/dL     Narrative:       Meter: [id number] : [id number] Arturo DOE    Basic Metabolic Panel [[id number]]  (Abnormal) Collected:  12/03/17 0527    Specimen:  Blood Updated:  12/03/17 0715     Glucose 74 mg/dL      BUN 27 (H) mg/dL      Creatinine 1.95 (H) mg/dL      Sodium 142 mmol/L      Potassium 3.7 mmol/L      Chloride 108 (H) mmol/L      CO2 22.4 mmol/L      Calcium 8.9 mg/dL      eGFR Non African Amer 33 (L) mL/min/1.73      BUN/Creatinine Ratio 13.8     Anion Gap 11.6 mmol/L     Narrative:       The MDRD GFR formula is only valid for adults with stable renal function between ages 18 and 70.    POC Glucose Fingerstick [[id number]]  (Normal) Collected:  12/03/17 0744    Specimen:  Blood Updated:  12/03/17 0745     Glucose 79 mg/dL     Narrative:       Meter: [id number] : [id number] [employer]    POC Glucose Fingerstick [[id number]]  (Abnormal) Collected:  12/03/17 1124    Specimen:  Blood Updated:  12/03/17 1125     Glucose 337 (H) mg/dL     Narrative:       Meter: [id number] : [id number]        Imaging Results (last 24 hours)     ** No results found for the last 24 hours. **           ECG 12 Lead  Order: [id number]     Narrative      RR Interval= 870 ms  MN Interval= 180 ms  QRSD Interval= 124 ms  QT Interval= 412 ms  QTc Interval= 442 ms  Heart Rate= 69 ms  P Axis= 50  deg  QRS Axis= -42 deg  T Wave Axis= 21 deg  I: 40 Axis= 20 deg  T: 40 Axis= -49 deg  ST Axis= 6 deg  SINUS RHYTHM  LEFT BUNDLE BRANCH BLOCK  Possible ASMI  NO SIGNIFICANT CHANGE FROM PREVIOUS ECG                  Current Facility-Administered Medications:   •  acetaminophen (TYLENOL) tablet 650 mg, 650 mg, Oral, Q6H PRN, Rylee Llanos MD, 650 mg at 12/01/17 0029  •  amLODIPine (NORVASC) tablet 10 mg, 10 mg, Oral, Q24H, Rylee Llanos MD, 10 mg at 12/03/17 0926  •  aspirin tablet 325 mg, 325 mg, Oral, Daily, Rylee Llanos MD, 325 mg at 12/03/17 0926  •  atorvastatin (LIPITOR) tablet 40 mg, 40 mg, Oral, Daily, Rylee Llanos MD, 40 mg at 12/03/17 0927  •  insulin aspart (novoLOG) injection 0-7 Units, 0-7 Units, Subcutaneous, 4x Daily With Meals & Nightly, Rylee Llanos MD, 5 Units at 12/03/17 1201  •  insulin aspart (novoLOG) injection 11 Units, 11 Units, Subcutaneous, TID With Meals, Rylee Llanos MD, 11 Units at 12/03/17 1201  •  insulin detemir (LEVEMIR) injection 30 Units, 30 Units, Subcutaneous, Nightly, Rylee Llanos MD, 30 Units at 12/02/17 2056  •  lactobacillus acidophilus (RISAQUAD) capsule 1 capsule, 1 capsule, Oral, Daily, Rylee Llanos MD, 1 capsule at 12/03/17 0927  •  multivitamin with minerals 1 tablet, 1 tablet, Oral, Daily, Rylee Llanos MD, 1 tablet at 12/03/17 0926  •  ondansetron (ZOFRAN) injection 4 mg, 4 mg, Intravenous, Q4H PRN, Rylee Llanos MD  •  pantoprazole (PROTONIX) EC tablet 40 mg, 40 mg, Oral, Q AM, Rylee Llanos MD, 40 mg at 12/03/17 0529  •  sodium chloride 0.9 % flush 10 mL, 10 mL, Intravenous, PRN, Jonathan Mead MD     ASSESSMENT  Acute renal failure Resolving  Uncontrolled diabetes mellitus  Chronic kidney disease  Hyperkalemia  Transient cerebral ischemia  Hypertension  Hyperlipidemia  Gastroesophageal reflux disease    PLAN  CPM  IV fluid per renal  PTOT   Aspirin and Lipitor  OFF ACE inhibitor  Stress ulcer DVT prophylaxis  Discharge to subacute rehabilitation in am    RYLEE LLANOS  MD

## 2017-12-04 VITALS
OXYGEN SATURATION: 92 % | SYSTOLIC BLOOD PRESSURE: 145 MMHG | WEIGHT: 147.9 LBS | TEMPERATURE: 98 F | BODY MASS INDEX: 21.91 KG/M2 | RESPIRATION RATE: 16 BRPM | HEART RATE: 80 BPM | HEIGHT: 69 IN | DIASTOLIC BLOOD PRESSURE: 85 MMHG

## 2017-12-04 LAB
ANION GAP SERPL CALCULATED.3IONS-SCNC: 14.7 MMOL/L
BUN BLD-MCNC: 30 MG/DL (ref 8–23)
BUN/CREAT SERPL: 14.6 (ref 7–25)
CALCIUM SPEC-SCNC: 8.8 MG/DL (ref 8.6–10.5)
CHLORIDE SERPL-SCNC: 104 MMOL/L (ref 98–107)
CO2 SERPL-SCNC: 21.3 MMOL/L (ref 22–29)
CREAT BLD-MCNC: 2.06 MG/DL (ref 0.76–1.27)
GFR SERPL CREATININE-BSD FRML MDRD: 31 ML/MIN/1.73
GLUCOSE BLD-MCNC: 147 MG/DL (ref 65–99)
GLUCOSE BLDC GLUCOMTR-MCNC: 208 MG/DL (ref 70–130)
GLUCOSE BLDC GLUCOMTR-MCNC: 244 MG/DL (ref 70–130)
GLUCOSE BLDC GLUCOMTR-MCNC: 84 MG/DL (ref 70–130)
POTASSIUM BLD-SCNC: 4.1 MMOL/L (ref 3.5–5.2)
SODIUM BLD-SCNC: 140 MMOL/L (ref 136–145)

## 2017-12-04 PROCEDURE — 63710000001 INSULIN ASPART PER 5 UNITS: Performed by: HOSPITALIST

## 2017-12-04 PROCEDURE — 80048 BASIC METABOLIC PNL TOTAL CA: CPT | Performed by: HOSPITALIST

## 2017-12-04 PROCEDURE — 82962 GLUCOSE BLOOD TEST: CPT

## 2017-12-04 PROCEDURE — 97110 THERAPEUTIC EXERCISES: CPT

## 2017-12-04 RX ORDER — ATORVASTATIN CALCIUM 40 MG/1
40 TABLET, FILM COATED ORAL DAILY
Qty: 30 TABLET | Refills: 0 | Status: SHIPPED | OUTPATIENT
Start: 2017-12-05 | End: 2018-01-04

## 2017-12-04 RX ORDER — ASPIRIN 325 MG
325 TABLET ORAL DAILY
Qty: 30 TABLET | Refills: 0 | Status: SHIPPED | OUTPATIENT
Start: 2017-12-05 | End: 2018-01-04

## 2017-12-04 RX ORDER — AMLODIPINE BESYLATE 10 MG/1
10 TABLET ORAL
Qty: 30 TABLET | Refills: 0 | Status: SHIPPED | OUTPATIENT
Start: 2017-12-05 | End: 2018-01-04

## 2017-12-04 RX ORDER — PANTOPRAZOLE SODIUM 40 MG/1
40 TABLET, DELAYED RELEASE ORAL DAILY
Qty: 30 TABLET | Refills: 0 | Status: SHIPPED | OUTPATIENT
Start: 2017-12-04 | End: 2018-01-03

## 2017-12-04 RX ADMIN — AMLODIPINE BESYLATE 10 MG: 10 TABLET ORAL at 08:04

## 2017-12-04 RX ADMIN — ATORVASTATIN CALCIUM 40 MG: 20 TABLET, FILM COATED ORAL at 08:04

## 2017-12-04 RX ADMIN — PANTOPRAZOLE SODIUM 40 MG: 40 TABLET, DELAYED RELEASE ORAL at 06:01

## 2017-12-04 RX ADMIN — ASPIRIN 325 MG: 325 TABLET ORAL at 08:04

## 2017-12-04 RX ADMIN — MULTIPLE VITAMINS W/ MINERALS TAB 1 TABLET: TAB at 08:04

## 2017-12-04 RX ADMIN — INSULIN ASPART 3 UNITS: 100 INJECTION, SOLUTION INTRAVENOUS; SUBCUTANEOUS at 11:48

## 2017-12-04 RX ADMIN — Medication 1 CAPSULE: at 08:04

## 2017-12-04 RX ADMIN — ACETAMINOPHEN 650 MG: 325 TABLET ORAL at 02:11

## 2017-12-04 NOTE — PROGRESS NOTES
"   LOS: 6 days   Patient Care Team:  Ana María Atkinson MD as PCP - General (Internal Medicine)  Jose Ochoa MD as Consulting Physician (Hematology and Oncology)  Armin Hurtado MD as Referring Physician (Colon and Rectal Surgery)    Chief Complaint/ Reason for encounter: Acute renal failure, dehydration        Subjective     Dizziness   Pertinent negatives include no chest pain, fever, nausea or vomiting.       Subjective:  Symptoms:  Improved.  No shortness of breath or chest pain.  (Feeling well, no new complaints  Anxious for discharge).    Diet:  Adequate intake.  No nausea or vomiting.    Activity level: Normal.    Pain:  He reports no pain.          History taken from: Patient and chart    Objective     Vital Signs  Temp:  [97.2 °F (36.2 °C)-98.2 °F (36.8 °C)] 97.8 °F (36.6 °C)  Heart Rate:  [64-68] 65  Resp:  [16-18] 16  BP: (129-145)/(68-86) 143/78       Wt Readings from Last 1 Encounters:   11/28/17 2319 147 lb 14.4 oz (67.1 kg)   11/28/17 1702 140 lb (63.5 kg)       Objective:  General Appearance:  Comfortable, well-appearing, in no acute distress and not in pain.    Vital signs: (most recent): Blood pressure 143/78, pulse 65, temperature 97.8 °F (36.6 °C), temperature source Oral, resp. rate 16, height 69\" (175.3 cm), weight 147 lb 14.4 oz (67.1 kg), SpO2 94 %.  Vital signs are normal.  No fever.    Output: Producing urine.    HEENT: Normal HEENT exam.    Lungs:  Normal respiratory rate and normal effort.  He is not in respiratory distress.  Breath sounds clear to auscultation.  No decreased breath sounds.    Heart: Normal rate.  Regular rhythm.  S1 normal.    Abdomen: Abdomen is soft and non-distended.  Bowel sounds are normal.   There is no epigastric area or no suprapubic area tenderness.     Extremities: Normal range of motion.  There is no deformity or dependent edema.    Pulses: Distal pulses are intact.    Neurological: Patient is alert and oriented to person, place and time.    Skin:  Warm and " dry.  No rash or cyanosis.             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  The following labs independently reviewed by me  Old records reviewed showing baseline stage III chronic kidney disease  Renal ultrasound independently reviewed, normal echotexture with no hydronephrosis or obstruction    Labs:   Recent Results (from the past 24 hour(s))   POC Glucose Fingerstick    Collection Time: 12/03/17  4:55 PM   Result Value Ref Range    Glucose 214 (H) 70 - 130 mg/dL   POC Glucose Fingerstick    Collection Time: 12/03/17  8:26 PM   Result Value Ref Range    Glucose 196 (H) 70 - 130 mg/dL   Basic Metabolic Panel    Collection Time: 12/04/17  4:52 AM   Result Value Ref Range    Glucose 147 (H) 65 - 99 mg/dL    BUN 30 (H) 8 - 23 mg/dL    Creatinine 2.06 (H) 0.76 - 1.27 mg/dL    Sodium 140 136 - 145 mmol/L    Potassium 4.1 3.5 - 5.2 mmol/L    Chloride 104 98 - 107 mmol/L    CO2 21.3 (L) 22.0 - 29.0 mmol/L    Calcium 8.8 8.6 - 10.5 mg/dL    eGFR Non African Amer 31 (L) >60 mL/min/1.73    BUN/Creatinine Ratio 14.6 7.0 - 25.0    Anion Gap 14.7 mmol/L   POC Glucose Fingerstick    Collection Time: 12/04/17  7:46 AM   Result Value Ref Range    Glucose 84 70 - 130 mg/dL   POC Glucose  Fingerstick    Collection Time: 12/04/17 11:25 AM   Result Value Ref Range    Glucose 208 (H) 70 - 130 mg/dL       Radiology:  Imaging Results (last 24 hours)     ** No results found for the last 24 hours. **             Medications have been reviewed:  Current Facility-Administered Medications   Medication Dose Route Frequency Provider Last Rate Last Dose   • amLODIPine (NORVASC) tablet 10 mg  10 mg Oral Q24H Jose Llanos MD   10 mg at 12/04/17 0804   • aspirin tablet 325 mg  325 mg Oral Daily Jose Llanos MD   325 mg at 12/04/17 0804   • atorvastatin (LIPITOR) tablet 40 mg  40 mg Oral Daily Jose Llanos MD   40 mg at 12/04/17 0804   • insulin aspart (novoLOG) injection 11 Units  11 Units Subcutaneous TID With Meals Jose Llanos MD   11 Units at 12/03/17 1727   • insulin detemir (LEVEMIR) injection 30 Units  30 Units Subcutaneous Nightly Jose Llanos MD   30 Units at 12/03/17 2126   • lactobacillus acidophilus (RISAQUAD) capsule 1 capsule  1 capsule Oral Daily Jose Llanos MD   1 capsule at 12/04/17 0804   • multivitamin with minerals 1 tablet  1 tablet Oral Daily Jose Llanos MD   1 tablet at 12/04/17 0804   • pantoprazole (PROTONIX) EC tablet 40 mg  40 mg Oral Q AM Jose Llanos MD   40 mg at 12/04/17 0601       Assessment/Plan     Active Problems:    Transient cerebral ischemia    Hyperkalemia    Hyperosmolar non-ketotic state in patient with type 2 diabetes mellitus    DM (diabetes mellitus), secondary, uncontrolled, with hyperosmolarity    Hyponatremia with extracellular fluid depletion    Hypothyroidism      Assessment:  (Assessment:  acute renal failure, on chronic kidney disease stage III.  Improved, Secondary to dehydration from osmotic effects of glucosuria with profound hyperglycemia on admission  Chronic kidney disease stage III likely secondary to diabetes and hypertension given poor control  Osteoarthritis on NSAIDs  Metabolic acidosis likely more related to renal failure than DKA given negative  ketones on urine  Diabetes mellitus type 2, very poor control, A1c 12.6  Hypertension, ACE inhibitor on hold currently  hyperkalemia from renal failure and insulin deficiency  Hyponatremia, pseudohyponatremia secondary to hyperglycemia, improved with fluids and improved glycemic control  Hyperosmolar nonketotic ketosis hyperglycemia   hypothyroidism, management per Dr. Llanos          Plan:   Renal function fairly stable, very likely near baseline  Continue to Encourage oral fluid intake   Continue to hold lisinopril, avoid NSAIDs in the future  Continue Norvasc, blood pressure at goal  Discharge planning).             Continue to monitor renal function, electroytes and volume closely   Please call me with any questions or concerns      Gurvinder Cuba MD   [facility]   [phone]    12/04/17  1:38 PM      Dictation performed using Dragon dictation software

## 2017-12-04 NOTE — NURSING NOTE
"Diabetes Education  Assessment/Teaching    Patient Name:  Reece Stephen  YOB: 1935  MRN: [id number]  Admit Date:  11/28/2017      Assessment Date:  11/30/17       Most Recent Value    General Information      Referral From:  A1c second f/u with pt at bedside in attempt to reinforce insulin pen teaching.    Height  5' 9\" (1.753 m)    Height Method  Stated    Weight  147 lb 14.4 oz (67.1 kg)    What type of diabetes do you have?  Type 2    Do you test your blood sugar at home?  no    Barriers to Learning  hearing loss- pt is very [place]. He was able to 'dial the dose' correctly on the Levemir practice pen without glasses, but he does have glasses.     Assessment Topics     Taking Medication - Assessment  -- pt able today to correctly state frequency ie once- to use (Levemir) insulin pen without additional instruction. Review: Levemir/insulin pen use.     Problem Solving - Assessment  -- Review: hypoglycemia tx.     Healthy Coping - Assessment  Competent    Monitoring - Assessment  -- Review: BG monitoring. Pt has reported that he checked BG at home in the past.     DM Goals     Monitoring - Goal  0-7 days from discharge               Most Recent Value    DM Education Needs     Meter  Meter provided    Meter Type  Contour [pt has checked BG in past;  review meter use. ]    Medication  Review Insulin, Pen- Levemir scheduling/frequency.     Problem Solving  Hypoglycemia, Treatment    Healthy Coping  Appropriate    Discharge Plan  Home [plan is home with home health f/u. ]    Motivation  Limited pt is anxious to dc home    Teaching Method  Teach back, Explanation, Handouts    Patient Response  Verbalized understanding, Needs reinforcement  Pt able to return correct demo for BG check with little assistance/cues.  this afternoon after lunch.            Electronically signed by:  Tamra Waite, RN, BSN, CDE   12/04/17 4:39 PM  "

## 2017-12-04 NOTE — PLAN OF CARE
Problem: Patient Care Overview (Adult)  Goal: Plan of Care Review  Outcome: Ongoing (interventions implemented as appropriate)    12/04/17 0453   Coping/Psychosocial Response Interventions   Plan Of Care Reviewed With patient;durable power of    Patient Care Overview   Progress no change   Outcome Evaluation   Outcome Summary/Follow up Plan A&Ox4. NIH remains 0. Patient still requiring frequent education on colostomy care and tx of dm 2. CCP needs to see later today to figure out where to go upon discharge. Pt neashley states she cannot take care of him upon discharge.          Problem: Diabetes, Type 2 (Adult)  Goal: Signs and Symptoms of Listed Potential Problems Will be Absent or Manageable (Diabetes, Type 2)  Outcome: Ongoing (interventions implemented as appropriate)    Problem: Fall Risk (Adult)  Goal: Absence of Falls  Outcome: Ongoing (interventions implemented as appropriate)

## 2017-12-04 NOTE — PROGRESS NOTES
"Daily progress note    Chief complaints  Doing better  No new complaints  Wants to go home with family support and refusing to go to rehabilitation    History of present illness  83-year-old white male with history of diabetes mellitus hypertension chronic kidney disease peptic ulcer disease hyperlipidemia presents to the [facility] emergency room with generalized weakness and right-sided numbness not feeling well.  Patient evaluated in ER found to be in acute renal failure also high potassium admitted for management.  Patient denies any headache chest pain shortness of breath abdominal pain nausea vomiting diarrhea denies any vision problem but he said his gait is unsteady too.     REVIEW OF SYSTEMS  Review of Systems   Constitutional: Negative for chills and fever.   HENT: Negative.  Negative for sore throat.    Eyes: Negative.    Respiratory: Negative.  Negative for cough.    Cardiovascular: Negative.  Negative for chest pain.   Gastrointestinal: Negative.    Genitourinary: Negative.  Negative for dysuria.   Musculoskeletal: Positive for gait problem. Negative for back pain.   Skin: Negative.  Negative for rash.   Neurological: Positive for dizziness, weakness (RUE) and numbness (RUE). Negative for headaches.      PHYSICAL EXAM  Blood pressure 143/78, pulse 65, temperature 97.8 °F (36.6 °C), temperature source Oral, resp. rate 16, height 69\" (175.3 cm), weight 147 lb 14.4 oz (67.1 kg), SpO2 94 %.    Constitutional: He is oriented to person, place, and time. No distress  Head: Normocephalic and atraumatic.   Eyes: EOM are normal.   Neck: Normal range of motion.   Cardiovascular: Normal rate, regular rhythm and normal heart sounds.    Pulmonary/Chest: Effort normal and breath sounds normal. No respiratory distress.   Abdominal: Soft. There is no tenderness.   Left sided colostomy.    Musculoskeletal: He exhibits no edema.   Neurological: He is alert and oriented to person, place, and time.   Limb ataxia RUE "   Skin: Skin is warm and dry.      LAB RESULTS  Lab Results (last 24 hours)     Procedure Component Value Units Date/Time    POC Glucose Fingerstick [[id number]]  (Abnormal) Collected:  12/03/17 1655    Specimen:  Blood Updated:  12/03/17 1656     Glucose 214 (H) mg/dL     Narrative:       Meter: [id number] : [id number] Benigno Crowell    POC Glucose Fingerstick [[id number]]  (Abnormal) Collected:  12/03/17 2026    Specimen:  Blood Updated:  12/03/17 2029     Glucose 196 (H) mg/dL     Narrative:       Meter: [id number] : [id number] Don DOE    Basic Metabolic Panel [[id number]]  (Abnormal) Collected:  12/04/17 0452    Specimen:  Blood Updated:  12/04/17 0551     Glucose 147 (H) mg/dL      BUN 30 (H) mg/dL      Creatinine 2.06 (H) mg/dL      Sodium 140 mmol/L      Potassium 4.1 mmol/L      Chloride 104 mmol/L      CO2 21.3 (L) mmol/L      Calcium 8.8 mg/dL      eGFR Non African Amer 31 (L) mL/min/1.73      BUN/Creatinine Ratio 14.6     Anion Gap 14.7 mmol/L     Narrative:       The MDRD GFR formula is only valid for adults with stable renal function between ages 18 and 70.    POC Glucose Fingerstick [[id number]]  (Normal) Collected:  12/04/17 0746    Specimen:  Blood Updated:  12/04/17 0747     Glucose 84 mg/dL     Narrative:       Meter: [id number] : [id number] Laurel Ochoa    POC Glucose Fingerstick [[id number]]  (Abnormal) Collected:  12/04/17 1125    Specimen:  Blood Updated:  12/04/17 1129     Glucose 208 (H) mg/dL     Narrative:       Meter: [id number] : [id number] Laurel Ochoa        Imaging Results (last 24 hours)     ** No results found for the last 24 hours. **           ECG 12 Lead  Order: [id number]     Narrative      RR Interval= 870 ms  TX Interval= 180 ms  QRSD Interval= 124 ms  QT Interval= 412 ms  QTc Interval= 442 ms  Heart Rate= 69 ms  P Axis= 50 deg  QRS Axis= -42 deg  T Wave Axis= 21 deg  I: 40 Axis= 20 deg  T: 40 Axis= -49 deg  ST Axis= 6 deg  SINUS RHYTHM  LEFT BUNDLE  BRANCH BLOCK  Possible ASMI  NO SIGNIFICANT CHANGE FROM PREVIOUS ECG                  Current Facility-Administered Medications:   •  acetaminophen (TYLENOL) tablet 650 mg, 650 mg, Oral, Q6H PRN, Rylee Llanos MD, 650 mg at 12/04/17 0211  •  amLODIPine (NORVASC) tablet 10 mg, 10 mg, Oral, Q24H, Rylee Llanos MD, 10 mg at 12/04/17 0804  •  aspirin tablet 325 mg, 325 mg, Oral, Daily, Rylee Llanos MD, 325 mg at 12/04/17 0804  •  atorvastatin (LIPITOR) tablet 40 mg, 40 mg, Oral, Daily, Rylee Llanos MD, 40 mg at 12/04/17 0804  •  insulin aspart (novoLOG) injection 0-7 Units, 0-7 Units, Subcutaneous, 4x Daily With Meals & Nightly, Rylee Llanos MD, 3 Units at 12/04/17 1148  •  insulin aspart (novoLOG) injection 11 Units, 11 Units, Subcutaneous, TID With Meals, Rylee Llanos MD, 11 Units at 12/03/17 1727  •  insulin detemir (LEVEMIR) injection 30 Units, 30 Units, Subcutaneous, Nightly, Rylee Llanos MD, 30 Units at 12/03/17 2126  •  lactobacillus acidophilus (RISAQUAD) capsule 1 capsule, 1 capsule, Oral, Daily, Rylee Llanos MD, 1 capsule at 12/04/17 0804  •  multivitamin with minerals 1 tablet, 1 tablet, Oral, Daily, Rylee Llanos MD, 1 tablet at 12/04/17 0804  •  ondansetron (ZOFRAN) injection 4 mg, 4 mg, Intravenous, Q4H PRN, Rylee Llanos MD  •  pantoprazole (PROTONIX) EC tablet 40 mg, 40 mg, Oral, Q AM, Rylee Llanos MD, 40 mg at 12/04/17 0601  •  sodium chloride 0.9 % flush 10 mL, 10 mL, Intravenous, PRN, Jonathan Mead MD     ASSESSMENT  Acute renal failure Resolving  Uncontrolled diabetes mellitus  Chronic kidney disease  Hyperkalemia  Transient cerebral ischemia  Hypertension  Hyperlipidemia  Gastroesophageal reflux disease    PLAN  Discharge home with family support and home health  Discharge summary dictated    RYLEE LLANOS MD

## 2017-12-04 NOTE — THERAPY TREATMENT NOTE
Acute Care - Physical Therapy Treatment Note  [facility]     Patient Name: Reece Stephen  : 1935  MRN: [id number]  Today's Date: 2017  Onset of Illness/Injury or Date of Surgery Date: 17     Referring Physician: Dr Llanos    Admit Date: 2017    Visit Dx:    ICD-10-CM ICD-9-CM   1. Transient cerebral ischemia, unspecified type G45.9 435.9   2. Acute on chronic renal insufficiency N28.9 593.9    N18.9 585.9   3. 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 Hyperkalemia (resolved) E87.5 276.7   5. Hyperglycemia R73.9 790.29   6. Decreased mobility R26.89 781.99     Patient Active Problem List   Diagnosis   • Rectal cancer   • Lung nodule   • History of DVT (deep vein thrombosis)   • Transient cerebral ischemia   • Hyperkalemia   • Hyperosmolar non-ketotic state in patient with type 2 diabetes mellitus   • DM (diabetes mellitus), secondary, uncontrolled, with hyperosmolarity   • Hyponatremia with extracellular fluid depletion   • Hypothyroidism               Adult Rehabilitation Note       17 1035 17 1000 17 1000    Rehab Assessment/Intervention    Discipline physical therapist  -EE physical therapy assistant  -RH physical therapy assistant  -RH    Document Type therapy note (daily note)  -EE therapy note (daily note)  -RH therapy note (daily note)  -RH    Subjective Information agree to therapy;no complaints  -EE agree to therapy  -RH agree to therapy  -RH    Patient Effort, Rehab Treatment good  -EE good  -RH good  -RH    Symptoms Noted During/After Treatment none  -EE      Precautions/Limitations fall precautions  -EE fall precautions  -RH fall precautions  -RH    Precautions/Limitations, Vision  WFL  -RH WFL  -RH    Precautions/Limitations, Hearing hearing impairment, bilaterally  -EE hearing impairment, left;hearing impairment, right  -RH hearing impairment,  left;hearing impairment, right  -RH    Recorded by [EE] Luz Elena Baez, PT [RH] Darryl Ward, YOUNG [RH] Darryl Ward PTA    Vital Signs    Pre Systolic BP Rehab  138   in sitting after ambulating   -  -RH    Pre Treatment Diastolic BP  92  -RH 98  -RH    Posttreatment Heart Rate (beats/min)  77  -RH     O2 Delivery Pre Treatment  room air  -RH room air  -RH    Recorded by  [RH] Darryl Ward PTA [] Darryl Ward PTA    Pain Assessment    Pain Assessment No/denies pain  -EE 0-10  -RH No/denies pain  -RH    Pain Score  1  -RH     Pain Location  Ankle   c/o minor L ankle pain duringg steps only.  -RH     Pain Orientation  Left  -RH     Pain Frequency  Rarely  -RH     Pain Onset  Sudden   temporary  -RH     Recorded by [EE] Luz Elena Baez, PT [RH] Darryl Ward, YOUNG [RH] Darryl Ward PTA    Cognitive Assessment/Intervention    Current Cognitive/Communication Assessment functional  -EE functional  -RH functional  -RH    Orientation Status oriented x 4  -EE oriented x 4;required verbal cueing (specifiy in comments)  -RH     Follows Commands/Answers Questions 100% of the time  -% of the time;able to follow single-step instructions;needs cueing  -RH     Personal Safety WNL/WFL  -EE mild impairment;at risk behaviors demonstrated  -RH     Personal Safety Interventions fall prevention program maintained;gait belt;muscle strengthening facilitated;nonskid shoes/slippers when out of bed;supervised activity  -EE fall prevention program maintained;gait belt;nonskid shoes/slippers when out of bed  -RH     Recorded by [EE] Luz Elena Baez, RADHA [] Darryl Ward, YOUNG [] Darryl Ward PTA    ROM (Range of Motion)    General ROM  no range of motion deficits identified  -RH no range of motion deficits identified  -RH    Recorded by  [] Darryl Ward, YOUNG [] Darryl Ward PTA    Bed Mobility, Assessment/Treatment    Bed Mobility, Assistive Device bed rails  -EE bed rails  -RH      Bed Mob, Supine to Sit, [place] conditional independence  -EE conditional independence  -RH     Bed Mob, Sit to Supine, [place] not tested   up in chair  -EE --   in chair  -RH     Recorded by [EE] Luz Elena Baez, PT [RH] Darryl Ward, YOUNG     Transfer Assessment/Treatment    Transfers, Bed-Chair [place]  stand by assist;contact guard assist  -RH contact guard assist  -RH    Transfers, Bed-Chair-Bed, Assist Device  rolling walker  -RH rolling walker  -RH    Transfers, Sit-Stand [place] stand by assist  -EE stand by assist  -RH stand by assist  -RH    Transfers, Stand-Sit [place] stand by assist  -EE stand by assist  -RH stand by assist  -RH    Transfers, Sit-Stand-Sit, Assist Device rolling walker  -EE rolling walker  -RH     Transfer, Impairments impaired balance  -EE      Recorded by [EE] Luz Elena Baez, PT [RH] Darryl Ward, PTA [RH] Darryl Ward, PTA    Gait Assessment/Treatment    Gait, [place] Level stand by assist;verbal cues required  -EE stand by assist;contact guard assist  -RH contact guard assist  -RH    Gait, Assistive Device rolling walker  -EE rolling walker  -RH rolling walker  -RH    Gait, Distance (Feet) 400  -  -  -RH    Gait, Gait Deviations forward flexed posture;weight-shifting ability decreased  -EE polo decreased;decreased heel strike;step length decreased  -RH polo decreased  -RH    Gait, Safety Issues balance decreased during turns;weight-shifting ability decreased  -EE step length decreased  -RH sequencing ability decreased  -RH    Gait, Impairments impaired balance  -EE strength decreased;impaired balance  -RH strength decreased;impaired balance  -RH    Gait, Comment Sightly unsteady during turns but no overt LOB. Verbal cues to stay close to rwx for safety.  -EE      Recorded by [EE] Luz Elena Baez, PT [RH] Darryl Ward, PTA [RH] Darryl Ward, PTA    Stairs Assessment/Treatment    Number of Stairs  12  -RH     Stairs,  Handrail Location  both sides  -RH     Stairs, [place] Level  contact guard assist  -RH     Stairs, Technique Used  step over step (ascending);step to step (descending)  -RH     Stairs, Safety Issues  sequencing ability decreased  -RH     Stairs, Impairments  --   knee instability   -RH     Recorded by  [RH] Darryl Ward PTA     Balance Skills Training    Sitting-Level of Assistance  Independent  -RH Close supervision  -RH    Sitting-Balance Support  Feet supported  -RH Feet supported  -RH    Standing-Level of Assistance  Close supervision  -RH Contact guard  -RH    Static Standing Balance Support  assistive device  -RH No upper extremity supported  -RH    Recorded by  [RH] Darryl Ward PTA [RH] Darryl Ward PTA    Positioning and Restraints    Pre-Treatment Position in bed  -EE in bed  -RH in bed  -RH    Post Treatment Position chair  -EE chair  -RH chair  -RH    In Chair reclined;call light within reach;encouraged to call for assist;exit alarm on;legs elevated;with nsg   with nsg assistant  -EE sitting;call light within reach;exit alarm on  -RH notified nsg;reclined;call light within reach;exit alarm on  -RH    Recorded by [EE] Luz Elena Baez, PT [RH] Darryl Ward, YOUNG [RH] Darryl Ward PTA      User Key  (r) = Recorded By, (t) = Taken By, (c) = Cosigned By    Initials Name Effective Dates    EE Luz Elena Baez, PT 12/01/15 -     RH Darryl Ward, YOUNG 02/18/16 -                 IP PT Goals       11/30/17 1331          Transfer Training PT LTG    Transfer Training PT LTG, Date Established 11/30/17  -EE      Transfer Training PT LTG, Time to Achieve 1 wk  -EE      Transfer Training PT LTG, Activity Type all transfers  -EE      Transfer Training PT LTG, [place] Level supervision required  -EE      Gait Training PT LTG    Gait Training Goal PT LTG, Date Established 11/30/17  -EE      Gait Training Goal PT LTG, Time to Achieve 1 wk  -EE      Gait Training Goal PT LTG, [place]  Level supervision required  -EE      Gait Training Goal PT LTG, Distance to Achieve 400  -EE      Dynamic Standing Balance PT LTG    Dynamic Standing Balance PT LTG, Date Established 11/30/17  -EE      Dynamic Standing Balance PT LTG, Time to Achieve 1 wk  -EE      Dynamic Standing Balance PT LTG, [place] Level supervision required   with dynamic balance activities  -EE        User Key  (r) = Recorded By, (t) = Taken By, (c) = Cosigned By    Initials Name Provider Type    EE Luz Elena Baez, PT Physical Therapist          Physical Therapy Education     Title: PT OT SLP Therapies (Active)     Topic: Physical Therapy (Active)     Point: Mobility training (Done)    Learning Progress Summary    Learner Readiness Method Response Comment Documented by Status   Patient Acceptance E VU,NR  EE 12/04/17 1050 Done    Acceptance E,TB,D NR   12/01/17 1035 Active    Acceptance E,TB VU,NR  EE 11/30/17 1211 Done               Point: Body mechanics (Active)    Learning Progress Summary    Learner Readiness Method Response Comment Documented by Status   Patient Acceptance E,TB,D NR   12/01/17 1035 Active               Point: Precautions (Active)    Learning Progress Summary    Learner Readiness Method Response Comment Documented by Status   Patient Acceptance E,TB,D NR   12/01/17 1035 Active                      User Key     Initials Effective Dates Name Provider Type Discipline     12/01/15 -  Luz Elena Baez, PT Physical Therapist PT     04/06/16 -  Tenisha Polanco PTA Physical Therapy Assistant PT                    PT Recommendation and Plan  Anticipated Discharge Disposition: home, home with home health, home with outpatient services (may benefit from HH or OP PT for dynamic balance)  Planned Therapy Interventions: bed mobility training, gait training, balance training, home exercise program, patient/family education, strengthening, transfer training  PT Frequency: daily  Plan of Care Review  Plan Of Care Reviewed With:  patient  Progress: progress toward functional goals as expected  Outcome Summary/Follow up Plan: Pt very pleasant and agreeable to PT. Ambulating in hallway with rwx; continues to require SBA and verbal cues for balance and safety. No new PT concerns; pt hopeful to DC home today. Recommend  PT for continued balance training.           Outcome Measures       12/04/17 1000 12/03/17 1000 12/02/17 1000    How much help from another person do you currently need...    Turning from your back to your side while in flat bed without using bedrails? 4  -EE 3  -RH 3  -RH    Moving from lying on back to sitting on the side of a flat bed without bedrails? 4  -EE 3  -RH 3  -RH    Moving to and from a bed to a chair (including a wheelchair)? 3  -EE 3  -RH 3  -RH    Standing up from a chair using your arms (e.g., wheelchair, bedside chair)? 3  -EE 3  -RH 3  -RH    Climbing 3-5 steps with a railing? 3  -EE 3  -RH 3  -RH    To walk in hospital room? 3  -EE 3  -RH 3  -RH    AM-PAC 6 Clicks Score 20  -EE 18  -RH 18  -RH    Functional Assessment    Outcome Measure Options AM-PAC 6 Clicks Basic Mobility (PT)  -EE        12/01/17 1439 12/01/17 1400       How much help from another is currently needed...    Putting on and taking off regular lower body clothing? 3  -LE      Bathing (including washing, rinsing, and drying) 3  -LE      Toileting (which includes using toilet bed pan or urinal) 3  -LE      Putting on and taking off regular upper body clothing 3  -LE      Taking care of personal grooming (such as brushing teeth) 3  -LE      Eating meals 4  -LE      Score 19  -LE      Functional Assessment    Outcome Measure Options  AM-PAC 6 Clicks Daily Activity (OT)  -LE       User Key  (r) = Recorded By, (t) = Taken By, (c) = Cosigned By    Initials Name Provider Type    KIM Rowland, OTR Occupational Therapist    SAMSON Baez, PT Physical Therapist    RH Darryl Ward, PTA Physical Therapy Assistant           Time Calculation:          PT Charges       12/04/17 1051          Time Calculation    Start Time 1035  -EE      Stop Time 1045  -EE      Time Calculation (min) 10 min  -EE      PT Received On 12/04/17  -EE      PT - Next Appointment 12/05/17  -EE        User Key  (r) = Recorded By, (t) = Taken By, (c) = Cosigned By    Initials Name Provider Type    EE Luz Elena Baez PT Physical Therapist          Therapy Charges for Today     Code Description Service Date Service Provider Modifiers Qty    91619958595 HC PT THER PROC EA 15 MIN 12/4/2017 Luz Elena Baez PT GP 1          PT G-Codes  Outcome Measure Options: AM-PAC 6 Clicks Basic Mobility (PT)    Luz Elena Baez PT  12/4/2017

## 2017-12-04 NOTE — NURSING NOTE
"Concerned about patient going home on insulin.  Spoke w/ Dr. Llanos, Diabetes Educator Tamra and CCP Trixie.  Handed patient syringe w/ lunchtime sliding scale (3 units) and asked how many units were pulled up and he replied \"20\".  Reinforced teaching.  Plan is to reinforce education and have home health check on patient.    "

## 2017-12-04 NOTE — DISCHARGE SUMMARY
Discharge summary    Date of admission 11/28/2017  Date of discharge  12/4/2017    Final diagnosis  Acute renal failure Resolving  Uncontrolled diabetes mellitus  Chronic kidney disease based 3  Hyperkalemia  Transient cerebral ischemia  Hypertension  Hyperlipidemia  Gastroesophageal reflux disease    Discharge medications    Current Facility-Administered Medications:   •  amLODIPine (NORVASC) tablet 10 mg, 10 mg, Oral, Q24H, Jose Llanos MD, 10 mg at 12/04/17 0804  •  aspirin tablet 325 mg, 325 mg, Oral, Daily, Jose Llanos MD, 325 mg at 12/04/17 0804  •  atorvastatin (LIPITOR) tablet 40 mg, 40 mg, Oral, Daily, Jose Llanos MD, 40 mg at 12/04/17 0804  •  insulin aspart (novoLOG) injection 11 Units, 11 Units, Subcutaneous, TID With Meals, Jose Llanos MD, 11 Units at 12/03/17 1727  •  insulin detemir (LEVEMIR) injection 30 Units, 30 Units, Subcutaneous, Nightly, Jose Llanos MD, 30 Units at 12/03/17 2126  •  lactobacillus acidophilus (RISAQUAD) capsule 1 capsule, 1 capsule, Oral, Daily, Joes Llanos MD, 1 capsule at 12/04/17 0804  •  multivitamin with minerals 1 tablet, 1 tablet, Oral, Daily, Jose Llanos MD, 1 tablet at 12/04/17 0804  •  pantoprazole (PROTONIX) EC tablet 40 mg, 40 mg, Oral, Q AM, Jose Llanos MD, 40 mg at 12/04/17 0601     Consult obtained  Nephrology  Neurology  Diabetic education nurse    Procedures  None    Hospital course  83-year-old white male with history of diabetes hypertension chronic kidney disease admitted through emergency room with high blood sugar and unsteady gait right-sided numbness.  Patient evaluated found to be in acute kidney injury with uncontrolled diabetes admitted for management.  Patient admitted his blood sugar controlled with insulin is acute kidney injury resolved and BUN and creatinine to baseline 30 and 2.06.  Patient probably had transient cerebral ischemia which is treated with aspirin and Lipitor.  Patient is doing better refusing to go to rehabilitation.  Patient  will be discharged home with home health and family support.    Discharge diet diabetic    Activity as tolerated    Medication as above    Follow-up with primary doctor in 1 week and follow with nephrology per their instruction and take medication as directed    RYLEE LE MD

## 2017-12-04 NOTE — PROGRESS NOTES
Continued Stay Note  [facility]     Patient Name: Reece Stephen  MRN: [id number]  Today's Date: 12/4/2017    Admit Date: 11/28/2017          Discharge Plan       12/04/17 1214    Case Management/Social Work Plan    Plan Home [facility] health    Additional Comments Met with patient at bedside.  Tried to call neice but no answer and voice mail full.  Patient wants to go home at discharge.  Discussed skilled care option and refuses.  Will follow for d/c needs.              Discharge Codes     None        Expected Discharge Date and Time     Expected Discharge Date Expected Discharge Time    Dec 1, 2017             Trixie Thakkar RN

## 2017-12-04 NOTE — PROGRESS NOTES
Continued Stay Note  [facility]     Patient Name: Reece Stephen  MRN: [id number]  Today's Date: 12/4/2017    Admit Date: 11/28/2017          Discharge Plan       12/04/17 1626    Case Management/Social Work Plan    Additional Comments Met with beth Iniguez who will take patient home today.  [facility] to follow with nursing and .  Discussed skilled care options for a short stay but patient refusing.  Patient is up and ambulating without difficulty.      Final Note    Final Note Home with [facility]              Discharge Codes       12/04/17 1630    Discharge Codes    Discharge Codes CC  Home with [facility] with [facility]        Expected Discharge Date and Time     Expected Discharge Date Expected Discharge Time    Dec 4, 2017             Trixie Thakkar RN

## 2017-12-04 NOTE — PROGRESS NOTES
Case Management Discharge Note    Final Note: Home with [facility]    Discharge Placement     Facility/Agency Request Status Selected? Address Phone Number Fax Number    [facility] Accepted    Yes [id number] [address]       [facility] Pending - No Request Sent     [address] [facility] [id number] [phone]         Other: Other (car)    Discharge Codes: CC  Home with A with [facility]

## 2017-12-04 NOTE — PLAN OF CARE
Problem: Patient Care Overview (Adult)  Goal: Plan of Care Review    12/04/17 1050   Coping/Psychosocial Response Interventions   Plan Of Care Reviewed With patient   Patient Care Overview   Progress progress toward functional goals as expected   Outcome Evaluation   Outcome Summary/Follow up Plan Pt very pleasant and agreeable to PT. Ambulating in hallway with rwx; continues to require SBA and verbal cues for balance and safety. No new PT concerns; pt hopeful to DC home today. Recommend  PT for continued balance training.

## 2018-01-04 ENCOUNTER — HOSPITAL ENCOUNTER (EMERGENCY)
Facility: HOSPITAL | Age: 83
Discharge: HOME | End: 2018-01-04
Attending: EMERGENCY MEDICINE | Admitting: EMERGENCY MEDICINE

## 2018-01-04 VITALS
HEIGHT: 69 IN | SYSTOLIC BLOOD PRESSURE: 170 MMHG | OXYGEN SATURATION: 98 % | DIASTOLIC BLOOD PRESSURE: 89 MMHG | WEIGHT: 160 LBS | RESPIRATION RATE: 16 BRPM | BODY MASS INDEX: 23.7 KG/M2 | TEMPERATURE: 98.1 F | HEART RATE: 71 BPM

## 2018-01-04 DIAGNOSIS — E16.2 HYPOGLYCEMIA: Primary | ICD-10-CM

## 2018-01-04 LAB
ALBUMIN SERPL-MCNC: 4.3 G/DL (ref 3.5–5.2)
ALBUMIN/GLOB SERPL: 1.3 G/DL
ALP SERPL-CCNC: 72 U/L (ref 39–117)
ALT SERPL W P-5'-P-CCNC: 17 U/L (ref 1–41)
ANION GAP SERPL CALCULATED.3IONS-SCNC: 12.7 MMOL/L
AST SERPL-CCNC: 18 U/L (ref 1–40)
BACTERIA UR QL AUTO: NORMAL /HPF
BASOPHILS # BLD AUTO: 0.02 10*3/MM3 (ref 0–0.2)
BASOPHILS NFR BLD AUTO: 0.3 % (ref 0–1.5)
BILIRUB SERPL-MCNC: 0.2 MG/DL (ref 0.1–1.2)
BILIRUB UR QL STRIP: NEGATIVE
BUN BLD-MCNC: 29 MG/DL (ref 8–23)
BUN/CREAT SERPL: 15.5 (ref 7–25)
CALCIUM SPEC-SCNC: 9.2 MG/DL (ref 8.6–10.5)
CHLORIDE SERPL-SCNC: 101 MMOL/L (ref 98–107)
CLARITY UR: CLEAR
CO2 SERPL-SCNC: 23.3 MMOL/L (ref 22–29)
COLOR UR: YELLOW
CREAT BLD-MCNC: 1.87 MG/DL (ref 0.76–1.27)
DEPRECATED RDW RBC AUTO: 45.5 FL (ref 37–54)
EOSINOPHIL # BLD AUTO: 0.04 10*3/MM3 (ref 0–0.7)
EOSINOPHIL NFR BLD AUTO: 0.5 % (ref 0.3–6.2)
ERYTHROCYTE [DISTWIDTH] IN BLOOD BY AUTOMATED COUNT: 13.2 % (ref 11.5–14.5)
GFR SERPL CREATININE-BSD FRML MDRD: 35 ML/MIN/1.73
GLOBULIN UR ELPH-MCNC: 3.2 GM/DL
GLUCOSE BLD-MCNC: 282 MG/DL (ref 65–99)
GLUCOSE BLDC GLUCOMTR-MCNC: 113 MG/DL (ref 70–130)
GLUCOSE BLDC GLUCOMTR-MCNC: 211 MG/DL (ref 70–130)
GLUCOSE BLDC GLUCOMTR-MCNC: 62 MG/DL (ref 70–130)
GLUCOSE UR STRIP-MCNC: ABNORMAL MG/DL
HCT VFR BLD AUTO: 40.2 % (ref 40.4–52.2)
HGB BLD-MCNC: 12.9 G/DL (ref 13.7–17.6)
HGB UR QL STRIP.AUTO: NEGATIVE
HYALINE CASTS UR QL AUTO: NORMAL /LPF
IMM GRANULOCYTES # BLD: 0.04 10*3/MM3 (ref 0–0.03)
IMM GRANULOCYTES NFR BLD: 0.5 % (ref 0–0.5)
KETONES UR QL STRIP: NEGATIVE
LEUKOCYTE ESTERASE UR QL STRIP.AUTO: NEGATIVE
LYMPHOCYTES # BLD AUTO: 0.89 10*3/MM3 (ref 0.9–4.8)
LYMPHOCYTES NFR BLD AUTO: 11.2 % (ref 19.6–45.3)
MCH RBC QN AUTO: 30.4 PG (ref 27–32.7)
MCHC RBC AUTO-ENTMCNC: 32.1 G/DL (ref 32.6–36.4)
MCV RBC AUTO: 94.6 FL (ref 79.8–96.2)
MONOCYTES # BLD AUTO: 0.65 10*3/MM3 (ref 0.2–1.2)
MONOCYTES NFR BLD AUTO: 8.2 % (ref 5–12)
NEUTROPHILS # BLD AUTO: 6.32 10*3/MM3 (ref 1.9–8.1)
NEUTROPHILS NFR BLD AUTO: 79.3 % (ref 42.7–76)
NITRITE UR QL STRIP: NEGATIVE
PH UR STRIP.AUTO: <=5 [PH] (ref 5–8)
PLATELET # BLD AUTO: 195 10*3/MM3 (ref 140–500)
PMV BLD AUTO: 10.4 FL (ref 6–12)
POTASSIUM BLD-SCNC: 3.9 MMOL/L (ref 3.5–5.2)
PROT SERPL-MCNC: 7.5 G/DL (ref 6–8.5)
PROT UR QL STRIP: ABNORMAL
RBC # BLD AUTO: 4.25 10*6/MM3 (ref 4.6–6)
RBC # UR: NORMAL /HPF
REF LAB TEST METHOD: NORMAL
SODIUM BLD-SCNC: 137 MMOL/L (ref 136–145)
SP GR UR STRIP: 1.02 (ref 1–1.03)
SQUAMOUS #/AREA URNS HPF: NORMAL /HPF
UROBILINOGEN UR QL STRIP: ABNORMAL
WBC NRBC COR # BLD: 7.96 10*3/MM3 (ref 4.5–10.7)
WBC UR QL AUTO: NORMAL /HPF

## 2018-01-04 PROCEDURE — 85025 COMPLETE CBC W/AUTO DIFF WBC: CPT | Performed by: NURSE PRACTITIONER

## 2018-01-04 PROCEDURE — 99284 EMERGENCY DEPT VISIT MOD MDM: CPT

## 2018-01-04 PROCEDURE — 82962 GLUCOSE BLOOD TEST: CPT

## 2018-01-04 PROCEDURE — 80053 COMPREHEN METABOLIC PANEL: CPT | Performed by: NURSE PRACTITIONER

## 2018-01-04 PROCEDURE — 81001 URINALYSIS AUTO W/SCOPE: CPT | Performed by: NURSE PRACTITIONER

## 2018-01-04 PROCEDURE — 96374 THER/PROPH/DIAG INJ IV PUSH: CPT

## 2018-01-04 RX ORDER — NICOTINE POLACRILEX 4 MG
24 LOZENGE BUCCAL AS NEEDED
COMMUNITY
Start: 2018-01-04 | End: 2018-01-15

## 2018-01-04 RX ORDER — DEXTROSE MONOHYDRATE 25 G/50ML
25 INJECTION, SOLUTION INTRAVENOUS ONCE
Status: COMPLETED | OUTPATIENT
Start: 2018-01-04 | End: 2018-01-04

## 2018-01-04 RX ORDER — DEXTROSE MONOHYDRATE 25 G/50ML
INJECTION, SOLUTION INTRAVENOUS
Status: COMPLETED
Start: 2018-01-04 | End: 2018-01-04

## 2018-01-04 RX ADMIN — DEXTROSE MONOHYDRATE 25 G: 25 INJECTION, SOLUTION INTRAVENOUS at 14:40

## 2018-01-04 NOTE — ED PROVIDER NOTES
EMERGENCY DEPARTMENT ENCOUNTER    CHIEF COMPLAINT  Chief Complaint: Hypoglycemia  History given by: Patient  History limited by: Nothing  Room Number: 11/11  PMD: Ana María Atkinson MD      HPI:  Pt is a 82 y.o. male who presents with hypoglycemia onset earlier today. The pt states he gave himself 11 units of Novolog because he was expecting to eat, but the pt states he did not get anything to eat. The pt arrived at an [facility] with diaphoresis and trouble staying awake.The pt denies pain and states his symptoms have improved.    Duration: Since the pt gave himself insulin earlier today  Timing: Constant  Radiation: None  Quality: Hypoglycemia  Intensity/Severity: Moderate  Progression: Improving  Associated Symptoms: None  Aggravating Factors: None stated  Alleviating Factors: None stated  Previous Episodes: None  Treatment before arrival: The pt states he gave himself 11 units of Novolog because he was expecting to eat, but the pt states he did not get anything to eat.    MEDICAL RECORD REVIEW  The pt has a hx of DVT, HTN, and insulin dependent diabetes. The pt's only blood thinner is ASA.  The pt was admitted from 11-28-17 to 12-4-17 secondary to TIA with electrolyte abnormalities. The pt was seen at a [facility] after being confused and diaphoretic after taking 11 units of fast acting insulin.    PAST MEDICAL HISTORY  Active Ambulatory Problems     Diagnosis Date Noted   • Rectal cancer 05/06/2016   • Lung nodule 05/06/2016   • History of DVT (deep vein thrombosis) 05/06/2016   • Transient cerebral ischemia 11/28/2017   • Hyperkalemia 11/29/2017   • Hyperosmolar non-ketotic state in patient with type 2 diabetes mellitus 11/29/2017   • DM (diabetes mellitus), secondary, uncontrolled, with hyperosmolarity 11/29/2017   • Hyponatremia with extracellular fluid depletion 11/29/2017   • Hypothyroidism 11/30/2017     Resolved Ambulatory Problems     Diagnosis Date Noted   • No Resolved Ambulatory Problems     Past Medical  History:   Diagnosis Date   • Adenocarcinoma in situ    • Adenocarcinoma of rectum 2014   • Diabetes mellitus    • Duodenal ulcer 08/2014   • DVT (deep venous thrombosis) 01/2015   • Hemorrhagic shock    • Hypercholesterolemia    • Hypertension    • Peptic ulceration 2014       PAST SURGICAL HISTORY  Past Surgical History:   Procedure Laterality Date   • AMPUTATION FOOT  1971    PARTIAL DUE TO AN ELECTRICAL SHOCK INJURY   • ENDOSCOPY  02/09/2015   • PORTACATH PLACEMENT  07/2014       FAMILY HISTORY  Family History   Problem Relation Age of Onset   • No Known Problems Mother    • No Known Problems Father    • No Known Problems Sister    • Cancer Brother      Lung cancer   • No Known Problems Daughter    • No Known Problems Son    • No Known Problems Maternal Aunt    • No Known Problems Maternal Uncle    • No Known Problems Paternal Aunt    • No Known Problems Paternal Uncle    • No Known Problems Maternal Grandmother    • No Known Problems Maternal Grandfather    • No Known Problems Paternal Grandmother    • No Known Problems Paternal Grandfather    • Diabetes Other        SOCIAL HISTORY  Social History     Social History   • Marital status: Single     Spouse name: N/A   • Number of children: N/A   • Years of education: High school     Occupational History   •  Retired     Social History Main Topics   • Smoking status: Former Smoker     Packs/day: 2.00     Years: 3.00     Types: Cigarettes   • Smokeless tobacco: Not on file   • Alcohol use No   • Drug use: No   • Sexual activity: Defer     Other Topics Concern   • Not on file     Social History Narrative       ALLERGIES  Review of patient's allergies indicates no known allergies.    REVIEW OF SYSTEMS  Review of Systems   Constitutional: Negative for activity change, appetite change and fever.        Hypoglycemia     HENT: Negative for congestion and sore throat.    Eyes: Negative.    Respiratory: Negative for cough and shortness of breath.    Cardiovascular: Negative  for chest pain and leg swelling.   Gastrointestinal: Negative for abdominal pain, diarrhea and vomiting.   Endocrine: Negative.    Genitourinary: Negative for decreased urine volume and dysuria.   Musculoskeletal: Negative for neck pain.   Skin: Negative for rash and wound.   Allergic/Immunologic: Negative.    Neurological: Negative for weakness, numbness and headaches.   Hematological: Negative.    Psychiatric/Behavioral: Negative.    All other systems reviewed and are negative.      PHYSICAL EXAM  ED Triage Vitals   Temp Heart Rate Resp BP SpO2   01/04/18 1227 01/04/18 1141 01/04/18 1218 01/04/18 1141 01/04/18 1141   94 °F (34.4 °C) 73 16 158/85 96 %      Temp src Heart Rate Source Patient Position BP Location FiO2 (%)   01/04/18 1227 -- -- -- --   Oral           Physical Exam   Constitutional: He is oriented to person, place, and time and well-developed, well-nourished, and in no distress. No distress.   HENT:   Head: Normocephalic and atraumatic.   Mouth/Throat: Oropharynx is clear and moist.   Eyes: EOM are normal. Pupils are equal, round, and reactive to light.   Neck: Normal range of motion. Neck supple.   Cardiovascular: Normal rate, regular rhythm and normal heart sounds.    Pulmonary/Chest: Effort normal and breath sounds normal. No respiratory distress. He has no wheezes. He exhibits no tenderness.   Abdominal: Soft. He exhibits no distension. There is no tenderness. There is no rebound and no guarding.   Musculoskeletal: Normal range of motion. He exhibits no edema.   Lymphadenopathy:     He has no cervical adenopathy.   Neurological: He is alert and oriented to person, place, and time.   Skin: Skin is warm and dry. No rash noted. He is not diaphoretic. No pallor.   Psychiatric: Mood, memory, affect and judgment normal.   Nursing note and vitals reviewed.      LAB RESULTS  Recent Results (from the past 24 hour(s))   POC Glucose Once    Collection Time: 01/04/18 12:22 PM   Result Value Ref Range     Glucose 113 70 - 130 mg/dL   POC Glucose Once    Collection Time: 01/04/18  2:20 PM   Result Value Ref Range    Glucose 62 (L) 70 - 130 mg/dL   Comprehensive Metabolic Panel    Collection Time: 01/04/18  2:47 PM   Result Value Ref Range    Glucose 282 (H) 65 - 99 mg/dL    BUN 29 (H) 8 - 23 mg/dL    Creatinine 1.87 (H) 0.76 - 1.27 mg/dL    Sodium 137 136 - 145 mmol/L    Potassium 3.9 3.5 - 5.2 mmol/L    Chloride 101 98 - 107 mmol/L    CO2 23.3 22.0 - 29.0 mmol/L    Calcium 9.2 8.6 - 10.5 mg/dL    Total Protein 7.5 6.0 - 8.5 g/dL    Albumin 4.30 3.50 - 5.20 g/dL    ALT (SGPT) 17 1 - 41 U/L    AST (SGOT) 18 1 - 40 U/L    Alkaline Phosphatase 72 39 - 117 U/L    Total Bilirubin 0.2 0.1 - 1.2 mg/dL    eGFR Non African Amer 35 (L) >60 mL/min/1.73    Globulin 3.2 gm/dL    A/G Ratio 1.3 g/dL    BUN/Creatinine Ratio 15.5 7.0 - 25.0    Anion Gap 12.7 mmol/L   CBC Auto Differential    Collection Time: 01/04/18  2:47 PM   Result Value Ref Range    WBC 7.96 4.50 - 10.70 10*3/mm3    RBC 4.25 (L) 4.60 - 6.00 10*6/mm3    Hemoglobin 12.9 (L) 13.7 - 17.6 g/dL    Hematocrit 40.2 (L) 40.4 - 52.2 %    MCV 94.6 79.8 - 96.2 fL    MCH 30.4 27.0 - 32.7 pg    MCHC 32.1 (L) 32.6 - 36.4 g/dL    RDW 13.2 11.5 - 14.5 %    RDW-SD 45.5 37.0 - 54.0 fl    MPV 10.4 6.0 - 12.0 fL    Platelets 195 140 - 500 10*3/mm3    Neutrophil % 79.3 (H) 42.7 - 76.0 %    Lymphocyte % 11.2 (L) 19.6 - 45.3 %    Monocyte % 8.2 5.0 - 12.0 %    Eosinophil % 0.5 0.3 - 6.2 %    Basophil % 0.3 0.0 - 1.5 %    Immature Grans % 0.5 0.0 - 0.5 %    Neutrophils, Absolute 6.32 1.90 - 8.10 10*3/mm3    Lymphocytes, Absolute 0.89 (L) 0.90 - 4.80 10*3/mm3    Monocytes, Absolute 0.65 0.20 - 1.20 10*3/mm3    Eosinophils, Absolute 0.04 0.00 - 0.70 10*3/mm3    Basophils, Absolute 0.02 0.00 - 0.20 10*3/mm3    Immature Grans, Absolute 0.04 (H) 0.00 - 0.03 10*3/mm3   POC Glucose Once    Collection Time: 01/04/18  3:34 PM   Result Value Ref Range    Glucose 211 (H) 70 - 130 mg/dL       I  "ordered the above labs and reviewed the results    COURSE & MEDICAL DECISION MAKING  Pertinent Labs and Imaging studies that were ordered and reviewed are noted above.  Results were reviewed/discussed with the patient and they were also made aware of online assess.  Pt also made aware that some labs, such as cultures, will not be resulted during ER visit and follow up with PMD is necessary.       PROGRESS AND CONSULTS    Progress Notes:    ED Course     1436  The pt received D50 during our initial encounter. The pt will have to pass a swallow screen before we allow him to eat.    1439  Labs ordered for further evaluation.    1545  The pt's BS has been rechecked and he has improved to 211. The pt will be discharged home due to his hypoglycemia being resolved.    1528  Reviewed pt's history and workup with Dr. Duque.  After a bedside evaluation; Dr. Duque agrees with the plan of care.    1547  The patient's history, physical exam, and lab findings were discussed with the physician, who also performed a face to face history and physical exam.  I discussed all results and noted any abnormalities with patient.  Discussed absoute need to recheck abnormalities with their family physician.  I answered any of the patient's questions.  Discussed plan for discharge, as there is no emergent indication for admission.  Pt is agreeable and understands need for follow up and repeat testing.  Pt is aware that discharge does not mean that nothing is wrong but it indicates no emergency is present and they must continue care with their family physician.  Pt is discharged with instructions to follow up with primary care doctor to have their blood pressure rechecked.            MEDICATIONS GIVEN IN ER  Medications   dextrose (D50W) solution 25 g (25 g Intravenous Given 1/4/18 1440)       /92  Pulse 71  Temp (S) 94 °F (34.4 °C) (Oral)   Resp 14  Ht 175.3 cm (69\")  Wt 72.6 kg (160 lb)  SpO2 98%  BMI 23.63 " kg/m2      DIAGNOSIS  Final diagnoses:   Hypoglycemia       FOLLOW UP   Ana María Atkinson MD  [address]  [facility] [address]  [phone]            RX     Medication List      Stop          insulin aspart 100 UNIT/ML injection   Commonly known as:  novoLOG       insulin detemir 100 UNIT/ML injection   Commonly known as:  LEVEMIR       pantoprazole 40 MG EC tablet   Commonly known as:  PROTONIX           Risks, benefits, alternatives discussed with patient.  Pt consents to treatment and agrees to follow up with PMD tomorrow for further care and any other prescriptions.       Documentation assistance provided by luzmaria Coleman for Sakshi Cruz PA-C.  Information recorded by the luzmaria was done at my direction and has been verified and validated by me.  Electronically signed by Barry Coleman on 1/4/2018 at time 3:50 PM       Barry Coleman  01/04/18 0252       Sakshi Cruz PA-C  01/04/18 [id number]

## 2018-01-04 NOTE — DISCHARGE INSTRUCTIONS
PLEASE READ AND REVIEW ALL DISCHARGE INSTRUCTIONS.     Please follow up with your primary care physician for any further evaluation/treatment and further management of your blood pressure.     Recheck in emergency department for any worsening and/or concerning symptoms.     Take all prescribed medicine as written and continue chronic medication.    Follow up with your family physician for refills of your chronic medications.     Continue to monitor your blood glucose.

## 2018-01-04 NOTE — ED NOTES
82 year old male presents to Ed with C/O hypoglycemia.  Pt reports that he took his insulin thinking that he would be eating soon.  Pt did not have lunch.  EMS was contacted for confusion.  Pt found to have low BS.  D50 given per EMS.  BS then was 255.  Upon rechecking POC BS while in ED, pt found to have glucose level of 62.  MD notified and orders received. Pt placed on monitor and provided with boxed lunch after passing swallow screen.      Tabitha Garcia RN  01/04/18 [id number]

## 2018-01-04 NOTE — ED PROVIDER NOTES
Pt with a hx of diabetes presents complaining of hypoglycemia onset PTA. He reports he did not eat after taking his 11 units of Novolog this morning. Pt reports he began to experience generalized weakness and diaphoresis shortly after taking his insulin. EMS found patient's accucheck as low and gave D50 PTA.  Here, patient's sugar was 66.  He was given a meal and OJ and now feels fine.     On exam:  Heart: RRR without murmur  Lungs: CTAB without respiratory distress  Abd: colostomy bag in L side of abd, soft nontender    Pt has had a meal and received dextrose while being in the ED. Pt's recent glucose levels have been about 282. He denies any current symptoms. Suggested pt follow up with his PCP and check his blood sugar often. I reviewed pt's lab results. Will discharge. Pt understands and agrees with the plan. All questions answered.    I supervised care provided by the midlevel provider.    We have discussed this patient's history, physical exam, and treatment plan.   I have reviewed the note and personally saw and examined the patient and agree with the plan of care.  Documentation assistance provided by luzmaria Merlos for Dr. Duque.  Information recorded by the luzmaria was done at my direction and has been verified and validated by me.     Arcadio Merlos  01/04/18 1542       Christiano Duque MD  01/04/18 [id number]

## 2018-01-04 NOTE — ED TRIAGE NOTES
"Patient states to this RN during triage assessment, \"I don't know why I cant stay awake\" patient was found in lobby sleeping, patient unkempt and was hard to arouse in lobby. Blood sugar was checked, please see labs for result.   Patient sent to ER today for low blood glucose due to patient not eating post insulin treatment   "

## 2018-01-15 ENCOUNTER — APPOINTMENT (OUTPATIENT)
Dept: LAB | Facility: HOSPITAL | Age: 83
End: 2018-01-15

## 2018-01-15 ENCOUNTER — LAB (OUTPATIENT)
Dept: LAB | Facility: HOSPITAL | Age: 83
End: 2018-01-15

## 2018-01-15 ENCOUNTER — OFFICE VISIT (OUTPATIENT)
Dept: ONCOLOGY | Facility: CLINIC | Age: 83
End: 2018-01-15

## 2018-01-15 ENCOUNTER — APPOINTMENT (OUTPATIENT)
Dept: ONCOLOGY | Facility: CLINIC | Age: 83
End: 2018-01-15

## 2018-01-15 VITALS
OXYGEN SATURATION: 97 % | TEMPERATURE: 97.8 F | WEIGHT: 156.6 LBS | DIASTOLIC BLOOD PRESSURE: 64 MMHG | BODY MASS INDEX: 23.19 KG/M2 | HEART RATE: 68 BPM | RESPIRATION RATE: 16 BRPM | SYSTOLIC BLOOD PRESSURE: 116 MMHG | HEIGHT: 69 IN

## 2018-01-15 DIAGNOSIS — C20 RECTAL CANCER (HCC): Primary | ICD-10-CM

## 2018-01-15 DIAGNOSIS — C20 RECTAL CANCER (HCC): ICD-10-CM

## 2018-01-15 DIAGNOSIS — R91.1 LUNG NODULE: ICD-10-CM

## 2018-01-15 LAB
ALBUMIN SERPL-MCNC: 4.4 G/DL (ref 3.5–5.2)
ALBUMIN/GLOB SERPL: 1.6 G/DL (ref 1.1–2.4)
ALP SERPL-CCNC: 73 U/L (ref 38–116)
ALT SERPL W P-5'-P-CCNC: 19 U/L (ref 0–41)
ANION GAP SERPL CALCULATED.3IONS-SCNC: 12.5 MMOL/L
AST SERPL-CCNC: 22 U/L (ref 0–40)
BASOPHILS # BLD AUTO: 0.07 10*3/MM3 (ref 0–0.1)
BASOPHILS NFR BLD AUTO: 1.1 % (ref 0–1.1)
BILIRUB SERPL-MCNC: 0.3 MG/DL (ref 0.1–1.2)
BUN BLD-MCNC: 43 MG/DL (ref 6–20)
BUN/CREAT SERPL: 18.5 (ref 7.3–30)
CALCIUM SPEC-SCNC: 9.7 MG/DL (ref 8.5–10.2)
CEA SERPL-MCNC: 4.94 NG/ML
CHLORIDE SERPL-SCNC: 106 MMOL/L (ref 98–107)
CO2 SERPL-SCNC: 25.5 MMOL/L (ref 22–29)
CREAT BLD-MCNC: 2.33 MG/DL (ref 0.7–1.3)
DEPRECATED RDW RBC AUTO: 43.8 FL (ref 37–49)
EOSINOPHIL # BLD AUTO: 0.21 10*3/MM3 (ref 0–0.36)
EOSINOPHIL NFR BLD AUTO: 3.4 % (ref 1–5)
ERYTHROCYTE [DISTWIDTH] IN BLOOD BY AUTOMATED COUNT: 13.1 % (ref 11.7–14.5)
GFR SERPL CREATININE-BSD FRML MDRD: 27 ML/MIN/1.73
GLOBULIN UR ELPH-MCNC: 2.7 GM/DL (ref 1.8–3.5)
GLUCOSE BLD-MCNC: 112 MG/DL (ref 74–124)
HCT VFR BLD AUTO: 39.7 % (ref 40–49)
HGB BLD-MCNC: 13.1 G/DL (ref 13.5–16.5)
IMM GRANULOCYTES # BLD: 0.07 10*3/MM3 (ref 0–0.03)
IMM GRANULOCYTES NFR BLD: 1.1 % (ref 0–0.5)
LYMPHOCYTES # BLD AUTO: 1.34 10*3/MM3 (ref 1–3.5)
LYMPHOCYTES NFR BLD AUTO: 22 % (ref 20–49)
MCH RBC QN AUTO: 30.5 PG (ref 27–33)
MCHC RBC AUTO-ENTMCNC: 33 G/DL (ref 32–35)
MCV RBC AUTO: 92.3 FL (ref 83–97)
MONOCYTES # BLD AUTO: 0.52 10*3/MM3 (ref 0.25–0.8)
MONOCYTES NFR BLD AUTO: 8.5 % (ref 4–12)
NEUTROPHILS # BLD AUTO: 3.88 10*3/MM3 (ref 1.5–7)
NEUTROPHILS NFR BLD AUTO: 63.9 % (ref 39–75)
NRBC BLD MANUAL-RTO: 0 /100 WBC (ref 0–0)
PLATELET # BLD AUTO: 247 10*3/MM3 (ref 150–375)
PMV BLD AUTO: 9.9 FL (ref 8.9–12.1)
POTASSIUM BLD-SCNC: 4.9 MMOL/L (ref 3.5–4.7)
PROT SERPL-MCNC: 7.1 G/DL (ref 6.3–8)
RBC # BLD AUTO: 4.3 10*6/MM3 (ref 4.3–5.5)
SODIUM BLD-SCNC: 144 MMOL/L (ref 134–145)
WBC NRBC COR # BLD: 6.09 10*3/MM3 (ref 4–10)

## 2018-01-15 PROCEDURE — 36415 COLL VENOUS BLD VENIPUNCTURE: CPT | Performed by: INTERNAL MEDICINE

## 2018-01-15 PROCEDURE — 82378 CARCINOEMBRYONIC ANTIGEN: CPT | Performed by: INTERNAL MEDICINE

## 2018-01-15 PROCEDURE — 99213 OFFICE O/P EST LOW 20 MIN: CPT | Performed by: INTERNAL MEDICINE

## 2018-01-15 PROCEDURE — 85025 COMPLETE CBC W/AUTO DIFF WBC: CPT | Performed by: INTERNAL MEDICINE

## 2018-01-15 PROCEDURE — 80053 COMPREHEN METABOLIC PANEL: CPT | Performed by: INTERNAL MEDICINE

## 2018-01-15 RX ORDER — INSULIN DETEMIR 100 [IU]/ML
INJECTION, SOLUTION SUBCUTANEOUS
COMMUNITY
Start: 2017-12-04 | End: 2019-11-20 | Stop reason: HOSPADM

## 2018-01-15 RX ORDER — GLYBURIDE 3 MG/1
3 TABLET ORAL
COMMUNITY
End: 2022-05-18

## 2018-01-15 RX ORDER — SIMVASTATIN 40 MG
40 TABLET ORAL NIGHTLY
COMMUNITY
End: 2019-02-15

## 2018-01-15 RX ORDER — PIOGLITAZONEHYDROCHLORIDE 45 MG/1
TABLET ORAL
COMMUNITY
Start: 2017-10-25 | End: 2019-02-15

## 2018-01-15 RX ORDER — LISINOPRIL 20 MG/1
20 TABLET ORAL DAILY
COMMUNITY
End: 2019-11-20 | Stop reason: HOSPADM

## 2018-01-15 NOTE — PROGRESS NOTES
REASONS FOR FOLLOW-UP:  1. Clinical T3N1M0 adenocarcinoma of the rectum, status post neoadjuvant chemoradiation with infusional 5-FU followed by low anterior resection by Dr. Armin Hurtado on 11/14/2014; pathology at surgery showed microscopic foci of residual adenocarcinoma in ulceration with the largest focus 2.5 mm.  He had a single tumor deposit but 14 benign lymph nodes.  Final pathologic stage [id number].   2. Completed 4 months of adjuvant Xeloda post-surgery.     3. Tiny right upper lobe pulmonary nodule being followed radiographically and stable      History of Present Illness    Mr. Stephen returns today for follow-up of his rectal cancer currently under observation.  He has history of a right upper lobe pulmonary nodule which has been stable radiographically.      He returned today doing okay.  He has had no unusual weight loss, abdominal pain or changes in bowel habit.  He had a hospital admission in December for acute kidney injury and elevated blood sugar.  No other medical problems have developed.    Past Medical History    1. Diabetes.   2. Hypertension.  3. Hypercholesterolemia.  4. Hemorrhagic shock secondary to a duodenal ulcer August 2014   5. 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  Nondrinker.  Former smoker, but this is remote (35+ years ago). Assisted by his niece.    Family History have been reviewed and are without significant changes except as mentioned 11/16/15.    Review of Systems   Constitutional: Negative for activity change, fatigue and unexpected weight change.   HENT: Positive for hearing loss.    Respiratory: Negative for cough and shortness of breath.    Cardiovascular: Negative for chest pain.   Gastrointestinal: Negative for abdominal pain, diarrhea and nausea.   Neurological: Positive for dizziness.   Hematological: Negative for adenopathy.      A comprehensive 14 point review of systems was performed and was negative except as  "mentioned.    Medications:  The current medication list was reviewed in the EMR    ALLERGIES:  No Known Allergies    Objective      Vitals:    01/15/18 1010   BP: 116/64   Pulse: 68   Resp: 16   Temp: 97.8 °F (36.6 °C)   TempSrc: Oral   SpO2: 97%   Weight: 71 kg (156 lb 9.6 oz)   Height: 175.3 cm (69.02\")   PainSc: 0-No pain     Current Status 1/15/2018   ECOG score 0       Physical Exam   Constitutional: He appears well-developed and well-nourished.   HENT:   Hard of hearing   Eyes: No scleral icterus.   Cardiovascular: Normal rate and regular rhythm.    Pulmonary/Chest: Effort normal and breath sounds normal. He has no wheezes.   Abdominal: Soft. Bowel sounds are normal. He exhibits no mass.   Ostomy present   Musculoskeletal: Normal range of motion. He exhibits no edema.   Skin: Skin is warm. No erythema.   Psychiatric: He has a normal mood and affect.          RECENT LABS:  Hematology WBC   Date Value Ref Range Status   01/15/2018 6.09 4.00 - 10.00 10*3/mm3 Final     RBC   Date Value Ref Range Status   01/15/2018 4.30 4.30 - 5.50 10*6/mm3 Final     Hemoglobin   Date Value Ref Range Status   01/15/2018 13.1 (L) 13.5 - 16.5 g/dL Final     Hematocrit   Date Value Ref Range Status   01/15/2018 39.7 (L) 40.0 - 49.0 % Final     MCV   Date Value Ref Range Status   01/15/2018 92.3 83.0 - 97.0 fL Final     MCH   Date Value Ref Range Status   01/15/2018 30.5 27.0 - 33.0 pg Final     MCHC   Date Value Ref Range Status   01/15/2018 33.0 32.0 - 35.0 g/dL Final     RDW   Date Value Ref Range Status   01/15/2018 13.1 11.7 - 14.5 % Final     RDW-SD   Date Value Ref Range Status   01/15/2018 43.8 37.0 - 49.0 fl Final     MPV   Date Value Ref Range Status   01/15/2018 9.9 8.9 - 12.1 fL Final     Platelets   Date Value Ref Range Status   01/15/2018 247 150 - 375 10*3/mm3 Final     Neutrophil %   Date Value Ref Range Status   01/15/2018 63.9 39.0 - 75.0 % Final     Lymphocyte %   Date Value Ref Range Status   01/15/2018 22.0 20.0 - " 49.0 % Final     Monocyte %   Date Value Ref Range Status   01/15/2018 8.5 4.0 - 12.0 % Final     Eosinophil %   Date Value Ref Range Status   01/15/2018 3.4 1.0 - 5.0 % Final     Basophil %   Date Value Ref Range Status   01/15/2018 1.1 0.0 - 1.1 % Final     Immature Grans %   Date Value Ref Range Status   01/15/2018 1.1 (H) 0.0 - 0.5 % Final     Neutrophils, Absolute   Date Value Ref Range Status   01/15/2018 3.88 1.50 - 7.00 10*3/mm3 Final     Lymphocytes, Absolute   Date Value Ref Range Status   01/15/2018 1.34 1.00 - 3.50 10*3/mm3 Final     Monocytes, Absolute   Date Value Ref Range Status   01/15/2018 0.52 0.25 - 0.80 10*3/mm3 Final     Eosinophils, Absolute   Date Value Ref Range Status   01/15/2018 0.21 0.00 - 0.36 10*3/mm3 Final     Basophils, Absolute   Date Value Ref Range Status   01/15/2018 0.07 0.00 - 0.10 10*3/mm3 Final     Immature Grans, Absolute   Date Value Ref Range Status   01/15/2018 0.07 (H) 0.00 - 0.03 10*3/mm3 Final     nRBC   Date Value Ref Range Status   01/15/2018 0.0 0.0 - 0.0 /100 WBC Final       Glucose   Date Value Ref Range Status   01/04/2018 282 (H) 65 - 99 mg/dL Final     Sodium   Date Value Ref Range Status   01/04/2018 137 136 - 145 mmol/L Final     Potassium   Date Value Ref Range Status   01/04/2018 3.9 3.5 - 5.2 mmol/L Final     CO2   Date Value Ref Range Status   01/04/2018 23.3 22.0 - 29.0 mmol/L Final     Chloride   Date Value Ref Range Status   01/04/2018 101 98 - 107 mmol/L Final     Anion Gap   Date Value Ref Range Status   01/04/2018 12.7 mmol/L Final     Creatinine   Date Value Ref Range Status   01/04/2018 1.87 (H) 0.76 - 1.27 mg/dL Final     BUN   Date Value Ref Range Status   01/04/2018 29 (H) 8 - 23 mg/dL Final     BUN/Creatinine Ratio   Date Value Ref Range Status   01/04/2018 15.5 7.0 - 25.0 Final     Calcium   Date Value Ref Range Status   01/04/2018 9.2 8.6 - 10.5 mg/dL Final     eGFR Non  Amer   Date Value Ref Range Status   01/04/2018 35 (L) >60  mL/min/1.73 Final     Alkaline Phosphatase   Date Value Ref Range Status   01/04/2018 72 39 - 117 U/L Final     Total Protein   Date Value Ref Range Status   01/04/2018 7.5 6.0 - 8.5 g/dL Final     ALT (SGPT)   Date Value Ref Range Status   01/04/2018 17 1 - 41 U/L Final     AST (SGOT)   Date Value Ref Range Status   01/04/2018 18 1 - 40 U/L Final     Total Bilirubin   Date Value Ref Range Status   01/04/2018 0.2 0.1 - 1.2 mg/dL Final     Albumin   Date Value Ref Range Status   01/04/2018 4.30 3.50 - 5.20 g/dL Final     Globulin   Date Value Ref Range Status   01/04/2018 3.2 gm/dL Final     A/G Ratio   Date Value Ref Range Status   01/04/2018 1.3 g/dL Final     CEA 5.2-->4.1     CT chest, abdomen, pelvis reviewed from 7/10/17: This is a stable examination with no evidence of metastatic disease.  The tiny right upper lobe pulmonary nodule is stable (first mentioned May 2015)    Assessment/Plan   Mr. 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  He completed 4 months of Xeloda adjuvantly May 2015.       The patient seems to be doing well today with no concerning signs or symptoms of recurrent disease.  I recommended he follow-up in 6 months with CBC, CMP, CEA and noncontrasted CT scans of the chest, abdomen, and pelvis for surveillance.  Today's CEA is pending and I will follow-up results when available.              1/15/2018      CC:

## 2018-01-22 ENCOUNTER — TELEPHONE (OUTPATIENT)
Dept: NEUROLOGY | Facility: CLINIC | Age: 83
End: 2018-01-22

## 2018-01-22 NOTE — TELEPHONE ENCOUNTER
"I called and spoke with Mr. Stephen.  He said he is doing just fine, feels back to normal.  He said his blood sugars have been good and he is eating well.  I told him I was making a follow up for our neuro office and he said \"good enough\".  We reviewed his current medications:  Norvasc 10 mg every day, SS Insulin and Detemir, Lipitor 40 mg at night and  mg daily.  We reviewed well signs and symptoms of stroke and to call 911 immediately.  He said he no longer has any home health.  mRS 1.  He can call me with any further questions.  LEVON White RN  "

## 2018-04-25 ENCOUNTER — OFFICE VISIT (OUTPATIENT)
Dept: NEUROLOGY | Facility: CLINIC | Age: 83
End: 2018-04-25

## 2018-04-25 ENCOUNTER — LAB (OUTPATIENT)
Dept: LAB | Facility: HOSPITAL | Age: 83
End: 2018-04-25

## 2018-04-25 VITALS
OXYGEN SATURATION: 97 % | DIASTOLIC BLOOD PRESSURE: 70 MMHG | HEART RATE: 70 BPM | WEIGHT: 169 LBS | BODY MASS INDEX: 25.03 KG/M2 | HEIGHT: 69 IN | SYSTOLIC BLOOD PRESSURE: 128 MMHG

## 2018-04-25 DIAGNOSIS — E13.00: ICD-10-CM

## 2018-04-25 DIAGNOSIS — G45.9 TRANSIENT CEREBRAL ISCHEMIA, UNSPECIFIED TYPE: ICD-10-CM

## 2018-04-25 DIAGNOSIS — E78.5 HYPERLIPIDEMIA LDL GOAL <70: ICD-10-CM

## 2018-04-25 DIAGNOSIS — E11.42 DIABETIC POLYNEUROPATHY ASSOCIATED WITH TYPE 2 DIABETES MELLITUS (HCC): ICD-10-CM

## 2018-04-25 LAB
ALBUMIN SERPL-MCNC: 4.3 G/DL (ref 3.5–5.2)
ALBUMIN/GLOB SERPL: 1.6 G/DL
ALP SERPL-CCNC: 72 U/L (ref 39–117)
ALT SERPL W P-5'-P-CCNC: 17 U/L (ref 1–41)
ANION GAP SERPL CALCULATED.3IONS-SCNC: 13.3 MMOL/L
AST SERPL-CCNC: 18 U/L (ref 1–40)
BILIRUB SERPL-MCNC: 0.2 MG/DL (ref 0.1–1.2)
BUN BLD-MCNC: 44 MG/DL (ref 8–23)
BUN/CREAT SERPL: 17.3 (ref 7–25)
CALCIUM SPEC-SCNC: 8.9 MG/DL (ref 8.6–10.5)
CHLORIDE SERPL-SCNC: 107 MMOL/L (ref 98–107)
CHOLEST SERPL-MCNC: 193 MG/DL (ref 0–200)
CO2 SERPL-SCNC: 22.7 MMOL/L (ref 22–29)
CREAT BLD-MCNC: 2.55 MG/DL (ref 0.76–1.27)
GFR SERPL CREATININE-BSD FRML MDRD: 24 ML/MIN/1.73
GLOBULIN UR ELPH-MCNC: 2.7 GM/DL
GLUCOSE BLD-MCNC: 135 MG/DL (ref 65–99)
HDLC SERPL-MCNC: 35 MG/DL (ref 40–60)
LDLC SERPL CALC-MCNC: 125 MG/DL (ref 0–100)
LDLC/HDLC SERPL: 3.57 {RATIO}
POTASSIUM BLD-SCNC: 4.8 MMOL/L (ref 3.5–5.2)
PROT SERPL-MCNC: 7 G/DL (ref 6–8.5)
SODIUM BLD-SCNC: 143 MMOL/L (ref 136–145)
TRIGL SERPL-MCNC: 166 MG/DL (ref 0–150)
VLDLC SERPL-MCNC: 33.2 MG/DL (ref 5–40)

## 2018-04-25 PROCEDURE — 80061 LIPID PANEL: CPT | Performed by: NURSE PRACTITIONER

## 2018-04-25 PROCEDURE — 80053 COMPREHEN METABOLIC PANEL: CPT

## 2018-04-25 PROCEDURE — 36415 COLL VENOUS BLD VENIPUNCTURE: CPT

## 2018-04-25 PROCEDURE — 99213 OFFICE O/P EST LOW 20 MIN: CPT | Performed by: NURSE PRACTITIONER

## 2018-04-25 RX ORDER — AMLODIPINE BESYLATE 10 MG/1
TABLET ORAL
COMMUNITY
Start: 2018-01-26 | End: 2022-05-18

## 2018-04-25 RX ORDER — ATORVASTATIN CALCIUM 40 MG/1
40 TABLET, FILM COATED ORAL DAILY
COMMUNITY
Start: 2018-01-26 | End: 2022-06-09 | Stop reason: HOSPADM

## 2018-04-25 NOTE — PROGRESS NOTES
"DOS: 2018  NAME: Reece Stephen   : 1935  PCP: Ana María Atkinson MD    Chief Complaint   Patient presents with   • Transient cerebral ischemia        Neurological Problem and Interval History:  82 y.o. RHW male with DM, HTN, HLD, renal insufficiency and Hx of DVT and colon CA s/p colectomy, radiation and chemo who presents today for follow-up of TIA and is accompanied by his niece.    Mr. Stephen presented on on  after having transient right hand weakness and numbness while bowling and dropping his bowling ball. He was evaluated by Dr. Gee and his imaging was negative for stroke but event was likely a TIA. He was not on any antiplatelet therapy and had uncontrolled risk factors (A1C was 12.58). He was discharged home on  mg and Lipitor 40 mg.     He presents today and states he is not on aspirin due to \"problems in the past with bleeding\" and \"I'm already taking aleeve\". He takes aleeve regularly to \"help him relax\". He lives alone and is independent of all his activities,manages his medications, finances and continues to drive and bowl. He was never  and with no children. He presents with his niece who helps when needed. He denies any recurrence of right sided weakness, vision changes, speech difficulty, headaches or any new stroke/TIA symptoms. He does not take his blood pressure regularly but states that it is well-controlled. He states he takes his blood sugar daily and that it runs in the \"130s\". We discussed his A1C done in December of last year was 12.58 which corresponds to an approximate daily average of 312. He states his PCP manages his medications. He denies smoking. States he quit drinking \"years ago\".      Review of Systems:        Review of Systems   Constitutional: Negative for chills, fatigue and fever.   HENT: Positive for hearing loss and sneezing. Negative for tinnitus and trouble swallowing.    Eyes: Negative for pain, redness, itching and visual disturbance. " "  Respiratory: Negative for cough, shortness of breath and wheezing.    Cardiovascular: Negative for chest pain, palpitations and leg swelling.   Gastrointestinal: Negative for constipation, diarrhea, nausea and vomiting.   Endocrine: Positive for cold intolerance. Negative for heat intolerance and polydipsia.   Genitourinary: Negative for decreased urine volume, difficulty urinating, frequency and urgency.   Musculoskeletal: Positive for back pain and gait problem. Negative for neck pain and neck stiffness.   Skin: Positive for pallor. Negative for color change, rash and wound.   Allergic/Immunologic: Negative for environmental allergies, food allergies and immunocompromised state.   Neurological: Positive for speech difficulty and weakness. Negative for dizziness, tremors, seizures, syncope, facial asymmetry, light-headedness, numbness and headaches.   Hematological: Negative for adenopathy. Does not bruise/bleed easily.   Psychiatric/Behavioral: Positive for decreased concentration. Negative for behavioral problems, confusion, dysphoric mood, hallucinations, self-injury, sleep disturbance and suicidal ideas. The patient is nervous/anxious. The patient is not hyperactive.        \"The following portions of the patient's history were reviewed and updated as appropriate: allergies, current medications, past family history, past medical history, past social history, past surgical history and problem list.\"  Review of Imaging:   MRI brain 11/29/1: IMPRESSION:  No evidence of acute infarction. Area of increased signal  intensity on the T1 sequence related to the head of the caudate nucleus  on the left extending posteriorly to the genu of the internal capsule  nonspecific but potentially representing an area of subacute petechial  hemorrhage. This may represent dystrophic mineralization. This is new  versus the MRI examination of 03/30/2015. Clinical correlation is  recommended. Further evaluation could be performed " with a follow-up MRI  examination of the brain with and without contrast.  MRA head 11/29/17: IMPRESSION: No evidence of focal high-grade stenosis or of aneurysm  MRA neck 11/29/17: IMPRESSION: Lack of contrast and patient motion limits evaluation but  there is 0% stenosis involving the carotid bifurcations. The origins of  the vertebral arteries could not be well visualized    Carotid US 11/29/17:   Blood Pressure Measurements       Right Side   Blood Pressure 133/63 mmHg            Carotid Velocities - Right Side      Systolic Diastolic   CCA Prox 75.8 cm/sec       12.2 cm/sec         CCA Mid           CCA Dist -63.9 cm/sec       -10.6 cm/sec         ICA Prox -61.3 cm/sec       -17.3 cm/sec         ICA Mid -69.9 cm/sec       -21.2 cm/sec         ICA Dist -63.4 cm/sec       -20.1 cm/sec         ECA -127 cm/sec       -11 cm/sec         Vertebral -47.8 cm/sec       -8.5 cm/sec         Subclavian 195 cm/sec             ICA/CCA              Carotid Velocities - Left Side      Systolic Diastolic   CCA Prox 83.3 cm/sec       22 cm/sec         CCA Mid           CCA Dist 99 cm/sec       18.9 cm/sec         ICA Prox -83.5 cm/sec       -11.8 cm/sec         ICA Mid -79 cm/sec       -24.6 cm/sec         ICA Dist -50.1 cm/sec       -11.8 cm/sec         ECA -123 cm/sec       -15.7 cm/sec         Vertebral -60.5 cm/sec       -14.9 cm/sec         Subclavian 97.6 cm/sec       11.8 cm/sec         ICA/CCA            EKG 11/28/17: SR, LBBB  TTE 11/30/17: LVEF 62%, moderate concentric hypertrophy; RV normal size; LA normal volume, saline tests negative; RA normal size    Laboratory Results:             Lab Results   Component Value Date    HGBA1C 12.58 (H) 11/28/2017         Lab Results   Component Value Date    CHOL 167 11/30/2017         Lab Results   Component Value Date    HDL 35 (L) 11/30/2017    HDL 44 04/23/2015         Lab Results   Component Value Date     (H) 11/30/2017    LDL 15 04/23/2015         Lab Results    Component Value Date    TRIG 133 11/30/2017    TRIG 245 (H) 04/23/2015     No results found for: RPR  Lab Results   Component Value Date    TSH 8.060 (H) 11/30/2017     Lab Results   Component Value Date    MRSYKPCX71 363 03/31/2015     Lab Results   Component Value Date    CHOL 167 11/30/2017    CHLPL 108 04/23/2015    TRIG 133 11/30/2017    HDL 35 (L) 11/30/2017     (H) 11/30/2017         Physical Examination: NIHSS: 2 mRS: 0  General Appearance:   Well developed, well nourished, well groomed, alert, and cooperative.  HEENT: Normocephalic.    Neck and Spine: Normal range of motion.  Normal alignment. No mass or tenderness.   Cardiac: Regular rate and rhythm. No murmurs.  Peripheral Vasculature: Radial pulses are equal and symmetric. No signs of distal embolization.  Extremities:    No edema. Missing left baby toe d/t injury as child. Normal joint ROM.  Skin:    No rashes or birth marks.    Neurological examination:  Higher Integrative  Function: Oriented to time, place and person. Normal registration, recall (2/3 with 1 clue), attention span and concentration. Normal language  including comprehension, spontaneous speech, repetition, reading, writing, naming and vocabulary. No neglect with normal visual-spatial function and construction. Normal fund of knowledge and higher integrative function.  CN II: Pupils are equal, round, and reactive to light. Normal visual acuity and visual fields.    CN III IV VI: Extraocular movements are full without nystagmus.   CN V: Normal facial sensation and strength of muscles of mastication.  CN VII: Facial movements are symmetric. No weakness.  CN VIII:   Decrease acuity bilaterally  CN IX & X:   Symmetric palatal movement. Mild dysarthria  CN XI: Sternocleidomastoid and trapezius are normal.  No weakness.  CN XII:   The tongue is midline.  No atrophy or fasciculations.  Motor: Normal muscle strength, bulk and tone in upper and lower extremities. Subtle decreased FFM on  the left. No fasciculations, rigidity, spasticity, or abnormal movements.  Sensation: Normal to light touch,vibration, temperature in upper extremities. Decreased vibratory sense up to knees bilaterally. Mild unsteadiness on Romberg testing.  Normal graphesthesia and no extinction on DSS.  Station and Gait: Normal gait and station.    Coordination: Finger to nose test shows no dysmetria.  Heel to shin normal.    Diagnoses / Discussion:  Mr. Stephen is doing well following his admission in December of last year with transient right sided weakness. He has essentially remained at his neurologic baseline and his event was likely a TIA. He was discharged on ASA and Liptor; however patient has not been taking his ASA. We discussed at length the importance of antiplatelet therapy for stroke prevention as well as the importance of aggressive control of risk factors, especially his blood sugar. He has not had a lipid panel rechecked since being discharged, will check today. For now, continue Lipitor 40 mg and restart his ASA 81 mg enteric coated daily. He and his niece voiced understanding. We reviewed the signs/symptoms of stroke and the importance of calling 911 if he were to experience any of these. Follow-up in 9 months, sooner if symptoms warrant.     Plan:   ASA 81 mg EC daily   Avoid NSAIDs, use extra strength tylenol for pain (< 4g per 24 hr period)   Check a lipid panel and CMP today   Blood pressure control to <130/80   Goal LDL <70-recommend high dose statins-    Aggressive control of blood sugar   Serum glucose < 140     Call 911 for stroke any stroke symptoms   Follow-up in 9 months  Reece was seen today for transient cerebral ischemia.    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

## 2018-05-02 ENCOUNTER — TELEPHONE (OUTPATIENT)
Dept: NEUROLOGY | Facility: CLINIC | Age: 83
End: 2018-05-02

## 2018-05-02 DIAGNOSIS — E78.5 HYPERLIPIDEMIA LDL GOAL <70: Primary | ICD-10-CM

## 2018-05-02 NOTE — TELEPHONE ENCOUNTER
I s/w pt niece and asked if he was taking the lipitor daily. She stated he organizes his pills and wants to live alone. She will go to his house and check his medications. She is aware that he needs to start taking 80mg daily and recheck his lipid panel in 6 weeks.

## 2018-05-02 NOTE — TELEPHONE ENCOUNTER
----- Message from María Carmen sent at 5/2/2018 11:21 AM EDT -----      ----- Message -----  From: María Carmen  Sent: 5/2/2018  11:11 AM  To: Violet Abbott MA        ----- Message -----  From: CARITO Boswell  Sent: 4/29/2018   8:33 AM  To: Whitley White RN    Please let patient (and his niece) know that I have reviewed his recent lab work and his LDL has increased since his discharge. Please verify that he is compliant with Lipitor 40 mg, if so, he will need to increase his dose to 80 mg. Thanks.

## 2018-07-09 ENCOUNTER — HOSPITAL ENCOUNTER (OUTPATIENT)
Dept: CT IMAGING | Facility: HOSPITAL | Age: 83
Discharge: HOME | End: 2018-07-09
Attending: INTERNAL MEDICINE | Admitting: INTERNAL MEDICINE

## 2018-07-09 ENCOUNTER — LAB (OUTPATIENT)
Dept: LAB | Facility: HOSPITAL | Age: 83
End: 2018-07-09

## 2018-07-09 DIAGNOSIS — C20 RECTAL CANCER (HCC): ICD-10-CM

## 2018-07-09 DIAGNOSIS — R91.1 LUNG NODULE: ICD-10-CM

## 2018-07-09 DIAGNOSIS — E78.5 HYPERLIPIDEMIA LDL GOAL <70: ICD-10-CM

## 2018-07-09 LAB
ALBUMIN SERPL-MCNC: 4.8 G/DL (ref 3.5–5.2)
ALBUMIN/GLOB SERPL: 1.7 G/DL
ALP SERPL-CCNC: 72 U/L (ref 39–117)
ALT SERPL W P-5'-P-CCNC: 16 U/L (ref 1–41)
ANION GAP SERPL CALCULATED.3IONS-SCNC: 13.7 MMOL/L
AST SERPL-CCNC: 15 U/L (ref 1–40)
BASOPHILS # BLD AUTO: 0.06 10*3/MM3 (ref 0–0.2)
BASOPHILS NFR BLD AUTO: 1.2 % (ref 0–1.5)
BILIRUB SERPL-MCNC: 0.3 MG/DL (ref 0.1–1.2)
BUN BLD-MCNC: 49 MG/DL (ref 8–23)
BUN/CREAT SERPL: 18.4 (ref 7–25)
CALCIUM SPEC-SCNC: 9.6 MG/DL (ref 8.6–10.5)
CEA SERPL-MCNC: 6.32 NG/ML
CHLORIDE SERPL-SCNC: 104 MMOL/L (ref 98–107)
CHOLEST SERPL-MCNC: 136 MG/DL (ref 0–200)
CO2 SERPL-SCNC: 23.3 MMOL/L (ref 22–29)
CREAT BLD-MCNC: 2.67 MG/DL (ref 0.76–1.27)
DEPRECATED RDW RBC AUTO: 44.8 FL (ref 37–54)
EOSINOPHIL # BLD AUTO: 0.19 10*3/MM3 (ref 0–0.7)
EOSINOPHIL NFR BLD AUTO: 3.7 % (ref 0.3–6.2)
ERYTHROCYTE [DISTWIDTH] IN BLOOD BY AUTOMATED COUNT: 13.2 % (ref 11.5–14.5)
GFR SERPL CREATININE-BSD FRML MDRD: 23 ML/MIN/1.73
GLOBULIN UR ELPH-MCNC: 2.8 GM/DL
GLUCOSE BLD-MCNC: 132 MG/DL (ref 65–99)
HCT VFR BLD AUTO: 40.3 % (ref 40.4–52.2)
HDLC SERPL-MCNC: 40 MG/DL (ref 40–60)
HGB BLD-MCNC: 13.2 G/DL (ref 13.7–17.6)
IMM GRANULOCYTES # BLD: 0.03 10*3/MM3 (ref 0–0.03)
IMM GRANULOCYTES NFR BLD: 0.6 % (ref 0–0.5)
LDLC SERPL CALC-MCNC: 77 MG/DL (ref 0–100)
LDLC/HDLC SERPL: 1.92 {RATIO}
LYMPHOCYTES # BLD AUTO: 1.46 10*3/MM3 (ref 0.9–4.8)
LYMPHOCYTES NFR BLD AUTO: 28.5 % (ref 19.6–45.3)
MCH RBC QN AUTO: 30.3 PG (ref 27–32.7)
MCHC RBC AUTO-ENTMCNC: 32.8 G/DL (ref 32.6–36.4)
MCV RBC AUTO: 92.6 FL (ref 79.8–96.2)
MONOCYTES # BLD AUTO: 0.52 10*3/MM3 (ref 0.2–1.2)
MONOCYTES NFR BLD AUTO: 10.1 % (ref 5–12)
NEUTROPHILS # BLD AUTO: 2.9 10*3/MM3 (ref 1.9–8.1)
NEUTROPHILS NFR BLD AUTO: 56.5 % (ref 42.7–76)
PLATELET # BLD AUTO: 219 10*3/MM3 (ref 140–500)
PMV BLD AUTO: 10.4 FL (ref 6–12)
POTASSIUM BLD-SCNC: 5.3 MMOL/L (ref 3.5–5.2)
PROT SERPL-MCNC: 7.6 G/DL (ref 6–8.5)
RBC # BLD AUTO: 4.35 10*6/MM3 (ref 4.6–6)
SODIUM BLD-SCNC: 141 MMOL/L (ref 136–145)
TRIGL SERPL-MCNC: 97 MG/DL (ref 0–150)
VLDLC SERPL-MCNC: 19.4 MG/DL (ref 5–40)
WBC NRBC COR # BLD: 5.13 10*3/MM3 (ref 4.5–10.7)

## 2018-07-09 PROCEDURE — 80053 COMPREHEN METABOLIC PANEL: CPT

## 2018-07-09 PROCEDURE — 85025 COMPLETE CBC W/AUTO DIFF WBC: CPT

## 2018-07-09 PROCEDURE — 82378 CARCINOEMBRYONIC ANTIGEN: CPT

## 2018-07-09 PROCEDURE — 36415 COLL VENOUS BLD VENIPUNCTURE: CPT

## 2018-07-09 PROCEDURE — 80061 LIPID PANEL: CPT

## 2018-07-09 PROCEDURE — 71250 CT THORAX DX C-: CPT

## 2018-07-09 PROCEDURE — 74176 CT ABD & PELVIS W/O CONTRAST: CPT

## 2018-07-18 ENCOUNTER — TELEPHONE (OUTPATIENT)
Dept: NEUROLOGY | Facility: CLINIC | Age: 83
End: 2018-07-18

## 2018-07-18 NOTE — TELEPHONE ENCOUNTER
I spoke with Lorena, Mr. Stephen's health care surrogate and let her know that Randee ARZATE reviewed his recent Lipid Panel and he should continue on the same medication regimen.  She verbalized she understood this.  LEVON White RN

## 2018-07-18 NOTE — TELEPHONE ENCOUNTER
----- Message from CARITO Boswell sent at 7/18/2018  1:52 PM EDT -----  Please let patient know that I have reviewed his recent lipid panel and he should continue on the same medication regimen. Thanks.

## 2018-07-19 ENCOUNTER — APPOINTMENT (OUTPATIENT)
Dept: LAB | Facility: HOSPITAL | Age: 83
End: 2018-07-19

## 2018-07-19 ENCOUNTER — OFFICE VISIT (OUTPATIENT)
Dept: ONCOLOGY | Facility: CLINIC | Age: 83
End: 2018-07-19

## 2018-07-19 VITALS
OXYGEN SATURATION: 96 % | WEIGHT: 168.8 LBS | BODY MASS INDEX: 25 KG/M2 | TEMPERATURE: 98.1 F | HEIGHT: 69 IN | SYSTOLIC BLOOD PRESSURE: 138 MMHG | DIASTOLIC BLOOD PRESSURE: 78 MMHG | HEART RATE: 72 BPM

## 2018-07-19 DIAGNOSIS — R91.8 LUNG NODULE, MULTIPLE: Primary | ICD-10-CM

## 2018-07-19 DIAGNOSIS — C20 RECTAL CANCER (HCC): ICD-10-CM

## 2018-07-19 DIAGNOSIS — N18.30 STAGE 3 CHRONIC KIDNEY DISEASE (HCC): ICD-10-CM

## 2018-07-19 PROCEDURE — 99214 OFFICE O/P EST MOD 30 MIN: CPT | Performed by: INTERNAL MEDICINE

## 2018-07-19 NOTE — PROGRESS NOTES
REASONS FOR FOLLOW-UP:  1. Clinical T3N1M0 adenocarcinoma of the rectum, status post neoadjuvant chemoradiation with infusional 5-FU followed by low anterior resection by Dr. Armin Hurtado on 11/14/2014; pathology at surgery showed microscopic foci of residual adenocarcinoma in ulceration with the largest focus 2.5 mm.  He had a single tumor deposit but 14 benign lymph nodes.  Final pathologic stage [id number].   2. Completed 4 months of adjuvant Xeloda post-surgery.     3. Tiny right upper lobe pulmonary nodule being followed radiographically and stable      History of Present Illness    Mr. Stephen returns today for follow-up of his rectal cancer currently under observation.  He has history of a right upper lobe pulmonary nodule which has been stable radiographically.      Returned today for review.  He denies cough or shortness of breath.  He complains of abdominal distention.  He complains that his ostomy bag does not fit well and he has frequent accidents.  He complains of leg weakness.        Past Medical History    1. Diabetes.   2. Hypertension.  3. Hypercholesterolemia.  4. 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 CKD    Social History:  Single.  Nondrinker.  Former smoker, but this is remote (35+ years ago). Assisted by his niece.    Family History have been reviewed and are without significant changes except as mentioned 11/16/15.    Review of Systems   Constitutional: Negative for activity change, fatigue and unexpected weight change.   HENT: Positive for hearing loss.    Respiratory: Negative for cough and shortness of breath.    Cardiovascular: Negative for chest pain.   Gastrointestinal: Positive for abdominal distention. Negative for abdominal pain, diarrhea and nausea.   Neurological: Positive for dizziness and weakness.   Hematological: Negative for adenopathy.      A comprehensive 14 point review of systems was performed and  was negative except as mentioned.    Medications:  The current medication list was reviewed in the EMR    ALLERGIES:  No Known Allergies    Objective      There were no vitals filed for this visit.  Current Status 1/15/2018   ECOG score 0       Physical Exam   Constitutional: He appears well-developed and well-nourished.   HENT:   Hard of hearing   Eyes: No scleral icterus.   Cardiovascular: Normal rate and regular rhythm.    Pulmonary/Chest: Effort normal and breath sounds normal. He has no wheezes.   Abdominal: Soft. Bowel sounds are normal. He exhibits no mass.   Ostomy present   Musculoskeletal: Normal range of motion. He exhibits no edema.   Skin: Skin is warm. No erythema.   Psychiatric: He has a normal mood and affect.          RECENT LABS:  Hematology WBC   Date Value Ref Range Status   07/09/2018 5.13 4.50 - 10.70 10*3/mm3 Final     RBC   Date Value Ref Range Status   07/09/2018 4.35 (L) 4.60 - 6.00 10*6/mm3 Final     Hemoglobin   Date Value Ref Range Status   07/09/2018 13.2 (L) 13.7 - 17.6 g/dL Final     Hematocrit   Date Value Ref Range Status   07/09/2018 40.3 (L) 40.4 - 52.2 % Final     MCV   Date Value Ref Range Status   07/09/2018 92.6 79.8 - 96.2 fL Final     MCH   Date Value Ref Range Status   07/09/2018 30.3 27.0 - 32.7 pg Final     MCHC   Date Value Ref Range Status   07/09/2018 32.8 32.6 - 36.4 g/dL Final     RDW   Date Value Ref Range Status   07/09/2018 13.2 11.5 - 14.5 % Final     RDW-SD   Date Value Ref Range Status   07/09/2018 44.8 37.0 - 54.0 fl Final     MPV   Date Value Ref Range Status   07/09/2018 10.4 6.0 - 12.0 fL Final     Platelets   Date Value Ref Range Status   07/09/2018 219 140 - 500 10*3/mm3 Final     Neutrophil %   Date Value Ref Range Status   07/09/2018 56.5 42.7 - 76.0 % Final     Lymphocyte %   Date Value Ref Range Status   07/09/2018 28.5 19.6 - 45.3 % Final     Monocyte %   Date Value Ref Range Status   07/09/2018 10.1 5.0 - 12.0 % Final     Eosinophil %   Date Value  Ref Range Status   07/09/2018 3.7 0.3 - 6.2 % Final     Basophil %   Date Value Ref Range Status   07/09/2018 1.2 0.0 - 1.5 % Final     Immature Grans %   Date Value Ref Range Status   07/09/2018 0.6 (H) 0.0 - 0.5 % Final     Neutrophils, Absolute   Date Value Ref Range Status   07/09/2018 2.90 1.90 - 8.10 10*3/mm3 Final     Lymphocytes, Absolute   Date Value Ref Range Status   07/09/2018 1.46 0.90 - 4.80 10*3/mm3 Final     Monocytes, Absolute   Date Value Ref Range Status   07/09/2018 0.52 0.20 - 1.20 10*3/mm3 Final     Eosinophils, Absolute   Date Value Ref Range Status   07/09/2018 0.19 0.00 - 0.70 10*3/mm3 Final     Basophils, Absolute   Date Value Ref Range Status   07/09/2018 0.06 0.00 - 0.20 10*3/mm3 Final     Immature Grans, Absolute   Date Value Ref Range Status   07/09/2018 0.03 0.00 - 0.03 10*3/mm3 Final     nRBC   Date Value Ref Range Status   01/15/2018 0.0 0.0 - 0.0 /100 WBC Final       Glucose   Date Value Ref Range Status   07/09/2018 132 (H) 65 - 99 mg/dL Final     Sodium   Date Value Ref Range Status   07/09/2018 141 136 - 145 mmol/L Final     Potassium   Date Value Ref Range Status   07/09/2018 5.3 (H) 3.5 - 5.2 mmol/L Final     CO2   Date Value Ref Range Status   07/09/2018 23.3 22.0 - 29.0 mmol/L Final     Chloride   Date Value Ref Range Status   07/09/2018 104 98 - 107 mmol/L Final     Anion Gap   Date Value Ref Range Status   07/09/2018 13.7 mmol/L Final     Creatinine   Date Value Ref Range Status   07/09/2018 2.67 (H) 0.76 - 1.27 mg/dL Final     BUN   Date Value Ref Range Status   07/09/2018 49 (H) 8 - 23 mg/dL Final     BUN/Creatinine Ratio   Date Value Ref Range Status   07/09/2018 18.4 7.0 - 25.0 Final     Calcium   Date Value Ref Range Status   07/09/2018 9.6 8.6 - 10.5 mg/dL Final     eGFR Non  Amer   Date Value Ref Range Status   07/09/2018 23 (L) >60 mL/min/1.73 Final     Alkaline Phosphatase   Date Value Ref Range Status   07/09/2018 72 39 - 117 U/L Final     Total Protein    Date Value Ref Range Status   07/09/2018 7.6 6.0 - 8.5 g/dL Final     ALT (SGPT)   Date Value Ref Range Status   07/09/2018 16 1 - 41 U/L Final     AST (SGOT)   Date Value Ref Range Status   07/09/2018 15 1 - 40 U/L Final     Total Bilirubin   Date Value Ref Range Status   07/09/2018 0.3 0.1 - 1.2 mg/dL Final     Albumin   Date Value Ref Range Status   07/09/2018 4.80 3.50 - 5.20 g/dL Final     Globulin   Date Value Ref Range Status   07/09/2018 2.8 gm/dL Final     A/G Ratio   Date Value Ref Range Status   04/23/2015 2.1  Final     CEA 5.2-->4.1-->6.32     CT chest, abdomen, pelvis reviewed from 7/9/18:  There are new tiny lung nodules in the left lower lobe, right lower lobe and a new mixed lytic/sclerotic lesion in the right ilium and subtle sclerosis of the lateral eighth rib.  Liver is unremarkable.  Personally reviewed the CT of the chest and agree with the radiologist interpretation    Assessment/Plan       1.   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  He completed 4 months of Xeloda adjuvantly May 2015.       2.  Bilateral pulmonary nodules (new):  CT scan of the chest, abdomen, pelvis reviewed today show new subcentimeter lung nodules in both lungs.  Unclear etiology but metastatic disease is on the differential.  In addition the CEA is slightly elevated 6.32 which is worrisome.  I reviewed the CT images, and the lung nodules are too small to biopsy at this time.  I recommended and ordered a repeat CT CAP in 4 months along with a repeat CEA.    3.  CKD:  Worsening.  I will refer back to Dr. Gurvinder Cuba who has seen him during previous hospitalization; no IV contrast for scans              7/19/2018      CC:

## 2018-07-30 ENCOUNTER — TRANSCRIBE ORDERS (OUTPATIENT)
Dept: ADMINISTRATIVE | Facility: HOSPITAL | Age: 83
End: 2018-07-30

## 2018-07-30 DIAGNOSIS — N18.30 CHRONIC KIDNEY DISEASE, STAGE III (MODERATE) (HCC): Primary | ICD-10-CM

## 2018-10-12 ENCOUNTER — HOSPITAL ENCOUNTER (OUTPATIENT)
Dept: PET IMAGING | Facility: HOSPITAL | Age: 83
Discharge: HOME | End: 2018-10-12
Attending: INTERNAL MEDICINE | Admitting: INTERNAL MEDICINE

## 2018-10-12 ENCOUNTER — LAB (OUTPATIENT)
Dept: LAB | Facility: HOSPITAL | Age: 83
End: 2018-10-12

## 2018-10-12 DIAGNOSIS — C20 RECTAL CANCER (HCC): ICD-10-CM

## 2018-10-12 DIAGNOSIS — R91.8 LUNG NODULE, MULTIPLE: ICD-10-CM

## 2018-10-12 LAB
ALBUMIN SERPL-MCNC: 4.8 G/DL (ref 3.5–5.2)
ALBUMIN/GLOB SERPL: 1.8 G/DL (ref 1.1–2.4)
ALP SERPL-CCNC: 76 U/L (ref 38–116)
ALT SERPL W P-5'-P-CCNC: 14 U/L (ref 0–41)
ANION GAP SERPL CALCULATED.3IONS-SCNC: 14.8 MMOL/L
AST SERPL-CCNC: 18 U/L (ref 0–40)
BASOPHILS # BLD AUTO: 0.04 10*3/MM3 (ref 0–0.1)
BASOPHILS NFR BLD AUTO: 0.6 % (ref 0–1.1)
BILIRUB SERPL-MCNC: 0.4 MG/DL (ref 0.1–1.2)
BUN BLD-MCNC: 44 MG/DL (ref 6–20)
BUN/CREAT SERPL: 19.2 (ref 7.3–30)
CALCIUM SPEC-SCNC: 9.5 MG/DL (ref 8.5–10.2)
CEA SERPL-MCNC: 5.78 NG/ML
CHLORIDE SERPL-SCNC: 102 MMOL/L (ref 98–107)
CO2 SERPL-SCNC: 23.2 MMOL/L (ref 22–29)
CREAT BLD-MCNC: 2.29 MG/DL (ref 0.7–1.3)
DEPRECATED RDW RBC AUTO: 43.8 FL (ref 37–49)
EOSINOPHIL # BLD AUTO: 0.21 10*3/MM3 (ref 0–0.36)
EOSINOPHIL NFR BLD AUTO: 3.2 % (ref 1–5)
ERYTHROCYTE [DISTWIDTH] IN BLOOD BY AUTOMATED COUNT: 12.8 % (ref 11.7–14.5)
GFR SERPL CREATININE-BSD FRML MDRD: 27 ML/MIN/1.73
GLOBULIN UR ELPH-MCNC: 2.7 GM/DL (ref 1.8–3.5)
GLUCOSE BLD-MCNC: 106 MG/DL (ref 74–124)
HCT VFR BLD AUTO: 43.5 % (ref 40–49)
HGB BLD-MCNC: 14.1 G/DL (ref 13.5–16.5)
IMM GRANULOCYTES # BLD: 0.04 10*3/MM3 (ref 0–0.03)
IMM GRANULOCYTES NFR BLD: 0.6 % (ref 0–0.5)
LYMPHOCYTES # BLD AUTO: 1.38 10*3/MM3 (ref 1–3.5)
LYMPHOCYTES NFR BLD AUTO: 21.1 % (ref 20–49)
MCH RBC QN AUTO: 30.1 PG (ref 27–33)
MCHC RBC AUTO-ENTMCNC: 32.4 G/DL (ref 32–35)
MCV RBC AUTO: 92.9 FL (ref 83–97)
MONOCYTES # BLD AUTO: 0.64 10*3/MM3 (ref 0.25–0.8)
MONOCYTES NFR BLD AUTO: 9.8 % (ref 4–12)
NEUTROPHILS # BLD AUTO: 4.23 10*3/MM3 (ref 1.5–7)
NEUTROPHILS NFR BLD AUTO: 64.7 % (ref 39–75)
NRBC BLD MANUAL-RTO: 0 /100 WBC (ref 0–0)
PLATELET # BLD AUTO: 263 10*3/MM3 (ref 150–375)
PMV BLD AUTO: 10.9 FL (ref 8.9–12.1)
POTASSIUM BLD-SCNC: 4.4 MMOL/L (ref 3.5–4.7)
PROT SERPL-MCNC: 7.5 G/DL (ref 6.3–8)
RBC # BLD AUTO: 4.68 10*6/MM3 (ref 4.3–5.5)
SODIUM BLD-SCNC: 140 MMOL/L (ref 134–145)
WBC NRBC COR # BLD: 6.54 10*3/MM3 (ref 4–10)

## 2018-10-12 PROCEDURE — 82378 CARCINOEMBRYONIC ANTIGEN: CPT | Performed by: INTERNAL MEDICINE

## 2018-10-12 PROCEDURE — 71250 CT THORAX DX C-: CPT

## 2018-10-12 PROCEDURE — 80053 COMPREHEN METABOLIC PANEL: CPT | Performed by: INTERNAL MEDICINE

## 2018-10-12 PROCEDURE — 74176 CT ABD & PELVIS W/O CONTRAST: CPT

## 2018-10-12 PROCEDURE — 85025 COMPLETE CBC W/AUTO DIFF WBC: CPT | Performed by: INTERNAL MEDICINE

## 2018-10-12 PROCEDURE — 36415 COLL VENOUS BLD VENIPUNCTURE: CPT | Performed by: INTERNAL MEDICINE

## 2018-10-19 ENCOUNTER — OFFICE VISIT (OUTPATIENT)
Dept: ONCOLOGY | Facility: CLINIC | Age: 83
End: 2018-10-19

## 2018-10-19 ENCOUNTER — APPOINTMENT (OUTPATIENT)
Dept: LAB | Facility: HOSPITAL | Age: 83
End: 2018-10-19

## 2018-10-19 VITALS
DIASTOLIC BLOOD PRESSURE: 66 MMHG | TEMPERATURE: 97.6 F | HEIGHT: 69 IN | RESPIRATION RATE: 14 BRPM | BODY MASS INDEX: 24.73 KG/M2 | WEIGHT: 167 LBS | HEART RATE: 69 BPM | SYSTOLIC BLOOD PRESSURE: 110 MMHG | OXYGEN SATURATION: 98 %

## 2018-10-19 DIAGNOSIS — R91.8 LUNG NODULE, MULTIPLE: ICD-10-CM

## 2018-10-19 DIAGNOSIS — C20 RECTAL CANCER (HCC): Primary | ICD-10-CM

## 2018-10-19 PROCEDURE — 99214 OFFICE O/P EST MOD 30 MIN: CPT | Performed by: INTERNAL MEDICINE

## 2018-10-19 PROCEDURE — G0463 HOSPITAL OUTPT CLINIC VISIT: HCPCS | Performed by: INTERNAL MEDICINE

## 2018-10-19 NOTE — PROGRESS NOTES
REASONS FOR FOLLOW-UP:  1. Clinical T3N1M0 adenocarcinoma of the rectum, status post neoadjuvant chemoradiation with infusional 5-FU followed by low anterior resection by Dr. Armin Hurtado on 11/14/2014; pathology at surgery showed microscopic foci of residual adenocarcinoma in ulceration with the largest focus 2.5 mm.  He had a single tumor deposit but 14 benign lymph nodes.  Final pathologic stage [id number].   2. Completed 4 months of adjuvant Xeloda post-surgery.     3. Tiny right upper lobe pulmonary nodule being followed radiographically and stable      History of Present Illness    Mr. Stephen returns today for follow-up of his rectal cancer currently under observation.  He returns today doing well denying shortness of breath, cough, abdominal pain, nausea, vomiting.  He has unsteady gait probably related to age and arthritis.      Past Medical History    1. Diabetes.   2. Hypertension.  3. Hypercholesterolemia.  4. 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 CKD    Social History:  Single.  Nondrinker.  Former smoker, but this is remote (35+ years ago). Assisted by his niece.    Family History have been reviewed and are without significant changes except as mentioned 11/16/15.    Review of Systems   Constitutional: Negative for activity change, fatigue and unexpected weight change.   HENT: Positive for hearing loss.    Respiratory: Negative for cough and shortness of breath.    Cardiovascular: Negative for chest pain.   Gastrointestinal: Negative for abdominal distention, abdominal pain, diarrhea and nausea.   Musculoskeletal: Positive for gait problem.   Neurological: Negative for dizziness and weakness.   Hematological: Negative for adenopathy.          Medications:  The current medication list was reviewed in the EMR    ALLERGIES:  No Known Allergies    Objective      Vitals:    10/19/18 1107   BP: 110/66   Pulse: 69   Resp: 14   Temp:  "97.6 °F (36.4 °C)   TempSrc: Oral   SpO2: 98%   Weight: 75.8 kg (167 lb)   Height: 175.3 cm (69.02\")   PainSc: 0-No pain     Current Status 10/19/2018   ECOG score 0       Physical Exam   Constitutional: He appears well-developed and well-nourished.   HENT:   Hard of hearing   Eyes: No scleral icterus.   Cardiovascular: Normal rate and regular rhythm.    Pulmonary/Chest: Effort normal and breath sounds normal. He has no wheezes.   Abdominal: Soft. Bowel sounds are normal. He exhibits no mass.   Ostomy present   Musculoskeletal: Normal range of motion. He exhibits no edema.   Skin: Skin is warm. No erythema.   Psychiatric: He has a normal mood and affect.        Exam unchanged-10-19-18    RECENT LABS:  Hematology WBC   Date Value Ref Range Status   10/12/2018 6.54 4.00 - 10.00 10*3/mm3 Final     RBC   Date Value Ref Range Status   10/12/2018 4.68 4.30 - 5.50 10*6/mm3 Final     Hemoglobin   Date Value Ref Range Status   10/12/2018 14.1 13.5 - 16.5 g/dL Final     Hematocrit   Date Value Ref Range Status   10/12/2018 43.5 40.0 - 49.0 % Final     MCV   Date Value Ref Range Status   10/12/2018 92.9 83.0 - 97.0 fL Final     MCH   Date Value Ref Range Status   10/12/2018 30.1 27.0 - 33.0 pg Final     MCHC   Date Value Ref Range Status   10/12/2018 32.4 32.0 - 35.0 g/dL Final     RDW   Date Value Ref Range Status   10/12/2018 12.8 11.7 - 14.5 % Final     RDW-SD   Date Value Ref Range Status   10/12/2018 43.8 37.0 - 49.0 fl Final     MPV   Date Value Ref Range Status   10/12/2018 10.9 8.9 - 12.1 fL Final     Platelets   Date Value Ref Range Status   10/12/2018 263 150 - 375 10*3/mm3 Final     Neutrophil %   Date Value Ref Range Status   10/12/2018 64.7 39.0 - 75.0 % Final     Lymphocyte %   Date Value Ref Range Status   10/12/2018 21.1 20.0 - 49.0 % Final     Monocyte %   Date Value Ref Range Status   10/12/2018 9.8 4.0 - 12.0 % Final     Eosinophil %   Date Value Ref Range Status   10/12/2018 3.2 1.0 - 5.0 % Final "     Basophil %   Date Value Ref Range Status   10/12/2018 0.6 0.0 - 1.1 % Final     Immature Grans %   Date Value Ref Range Status   10/12/2018 0.6 (H) 0.0 - 0.5 % Final     Neutrophils, Absolute   Date Value Ref Range Status   10/12/2018 4.23 1.50 - 7.00 10*3/mm3 Final     Lymphocytes, Absolute   Date Value Ref Range Status   10/12/2018 1.38 1.00 - 3.50 10*3/mm3 Final     Monocytes, Absolute   Date Value Ref Range Status   10/12/2018 0.64 0.25 - 0.80 10*3/mm3 Final     Eosinophils, Absolute   Date Value Ref Range Status   10/12/2018 0.21 0.00 - 0.36 10*3/mm3 Final     Basophils, Absolute   Date Value Ref Range Status   10/12/2018 0.04 0.00 - 0.10 10*3/mm3 Final     Immature Grans, Absolute   Date Value Ref Range Status   10/12/2018 0.04 (H) 0.00 - 0.03 10*3/mm3 Final     nRBC   Date Value Ref Range Status   10/12/2018 0.0 0.0 - 0.0 /100 WBC Final       Glucose   Date Value Ref Range Status   10/12/2018 106 74 - 124 mg/dL Final     Sodium   Date Value Ref Range Status   10/12/2018 140 134 - 145 mmol/L Final     Potassium   Date Value Ref Range Status   10/12/2018 4.4 3.5 - 4.7 mmol/L Final     CO2   Date Value Ref Range Status   10/12/2018 23.2 22.0 - 29.0 mmol/L Final     Chloride   Date Value Ref Range Status   10/12/2018 102 98 - 107 mmol/L Final     Anion Gap   Date Value Ref Range Status   10/12/2018 14.8 mmol/L Final     Creatinine   Date Value Ref Range Status   10/12/2018 2.29 (C) 0.70 - 1.30 mg/dL Final     BUN   Date Value Ref Range Status   10/12/2018 44 (H) 6 - 20 mg/dL Final     BUN/Creatinine Ratio   Date Value Ref Range Status   10/12/2018 19.2 7.3 - 30.0 Final     Calcium   Date Value Ref Range Status   10/12/2018 9.5 8.5 - 10.2 mg/dL Final     eGFR Non  Amer   Date Value Ref Range Status   10/12/2018 27 (L) >60 mL/min/1.73 Final     Alkaline Phosphatase   Date Value Ref Range Status   10/12/2018 76 38 - 116 U/L Final     Total Protein   Date Value Ref Range Status   10/12/2018 7.5 6.3 - 8.0  g/dL Final     ALT (SGPT)   Date Value Ref Range Status   10/12/2018 14 0 - 41 U/L Final     AST (SGOT)   Date Value Ref Range Status   10/12/2018 18 0 - 40 U/L Final     Total Bilirubin   Date Value Ref Range Status   10/12/2018 0.4 0.1 - 1.2 mg/dL Final     Albumin   Date Value Ref Range Status   10/12/2018 4.80 3.50 - 5.20 g/dL Final     Globulin   Date Value Ref Range Status   10/12/2018 2.7 1.8 - 3.5 gm/dL Final     A/G Ratio   Date Value Ref Range Status   04/23/2015 2.1  Final     CEA 5.2-->4.1-->6.32-->5.78     CT chest, abdomen, pelvis reviewed from 10/12/18: There has been resolution of the nodular opacities in the left lower lobe.  A few other tiny opacities are stable.  No new pulmonary nodules, fluid or lymphadenopathy, no evidence of disease in the abdomen or pelvis.  I personally reviewed the CT scan of the chest.  There has been improvement in the left lower lobe nodularity.    Assessment/Plan       1.   Adenocarcinoma of the rectosigmoid junction clinical stage III status post neoadjuvant chemoradiation followed by low anterior resection, [id number].  He completed 4 months of Xeloda adjuvantly May 2015.   CT of the chest, abdomen, pelvis reviewed today shows improved left lower lobe nodularities, no evidence of progressive or recurrent cancer.  We will check his CBC, CMP, CEA in 6 months and consider a follow-up CT scan for surveillance in one year unless the six-month CEA is higher.    2.  Bilateral pulmonary nodules:  Follow-up CT of the chest reviewed today shows improvement in nodular opacities in the left lower lobe.  A few other tiny opacities are stable.  CEA is stable at 5.78.    3.  CKD:  Followed by Dr. Gurvinder Cuba of nephrology: Creatinine stable 2.29 this visit              10/19/2018      CC:

## 2019-01-02 ENCOUNTER — LAB (OUTPATIENT)
Dept: LAB | Facility: HOSPITAL | Age: 84
End: 2019-01-02

## 2019-01-02 ENCOUNTER — TRANSCRIBE ORDERS (OUTPATIENT)
Dept: ADMINISTRATIVE | Facility: HOSPITAL | Age: 84
End: 2019-01-02

## 2019-01-02 DIAGNOSIS — I12.9 HYPERTENSIVE NEPHROPATHY: ICD-10-CM

## 2019-01-02 DIAGNOSIS — N18.30 CHRONIC KIDNEY DISEASE, STAGE III (MODERATE) (HCC): Primary | ICD-10-CM

## 2019-01-02 DIAGNOSIS — N18.30 CHRONIC KIDNEY DISEASE, STAGE III (MODERATE) (HCC): ICD-10-CM

## 2019-01-02 LAB
25(OH)D3 SERPL-MCNC: 22.2 NG/ML (ref 30–100)
ANION GAP SERPL CALCULATED.3IONS-SCNC: 13.8 MMOL/L
BASOPHILS # BLD AUTO: 0.03 10*3/MM3 (ref 0–0.2)
BASOPHILS NFR BLD AUTO: 0.5 % (ref 0–1.5)
BILIRUB UR QL STRIP: NEGATIVE
BUN BLD-MCNC: 40 MG/DL (ref 8–23)
BUN/CREAT SERPL: 17.5 (ref 7–25)
CALCIUM SPEC-SCNC: 9.5 MG/DL (ref 8.6–10.5)
CHLORIDE SERPL-SCNC: 105 MMOL/L (ref 98–107)
CLARITY UR: CLEAR
CO2 SERPL-SCNC: 22.2 MMOL/L (ref 22–29)
COLOR UR: YELLOW
CREAT BLD-MCNC: 2.29 MG/DL (ref 0.76–1.27)
CREAT UR-MCNC: 223.8 MG/DL
DEPRECATED RDW RBC AUTO: 44.9 FL (ref 37–54)
EOSINOPHIL # BLD AUTO: 0.2 10*3/MM3 (ref 0–0.7)
EOSINOPHIL NFR BLD AUTO: 3.6 % (ref 0.3–6.2)
ERYTHROCYTE [DISTWIDTH] IN BLOOD BY AUTOMATED COUNT: 13.3 % (ref 11.5–14.5)
GFR SERPL CREATININE-BSD FRML MDRD: 27 ML/MIN/1.73
GLUCOSE BLD-MCNC: 139 MG/DL (ref 65–99)
GLUCOSE UR STRIP-MCNC: ABNORMAL MG/DL
HCT VFR BLD AUTO: 42 % (ref 40.4–52.2)
HGB BLD-MCNC: 13.5 G/DL (ref 13.7–17.6)
HGB UR QL STRIP.AUTO: NEGATIVE
IMM GRANULOCYTES # BLD AUTO: 0.05 10*3/MM3 (ref 0–0.03)
IMM GRANULOCYTES NFR BLD AUTO: 0.9 % (ref 0–0.5)
KETONES UR QL STRIP: ABNORMAL
LEUKOCYTE ESTERASE UR QL STRIP.AUTO: NEGATIVE
LYMPHOCYTES # BLD AUTO: 1.77 10*3/MM3 (ref 0.9–4.8)
LYMPHOCYTES NFR BLD AUTO: 31.7 % (ref 19.6–45.3)
MCH RBC QN AUTO: 29.9 PG (ref 27–32.7)
MCHC RBC AUTO-ENTMCNC: 32.1 G/DL (ref 32.6–36.4)
MCV RBC AUTO: 93.1 FL (ref 79.8–96.2)
MONOCYTES # BLD AUTO: 0.62 10*3/MM3 (ref 0.2–1.2)
MONOCYTES NFR BLD AUTO: 11.1 % (ref 5–12)
NEUTROPHILS # BLD AUTO: 2.92 10*3/MM3 (ref 1.9–8.1)
NEUTROPHILS NFR BLD AUTO: 52.2 % (ref 42.7–76)
NITRITE UR QL STRIP: NEGATIVE
PH UR STRIP.AUTO: <=5 [PH] (ref 5–8)
PLATELET # BLD AUTO: 224 10*3/MM3 (ref 140–500)
PMV BLD AUTO: 9.9 FL (ref 6–12)
POTASSIUM BLD-SCNC: 4.4 MMOL/L (ref 3.5–5.2)
PROT UR QL STRIP: NEGATIVE
PROT UR-MCNC: 23 MG/DL
PROT/CREAT UR: 102.8 MG/G CREA (ref 0–200)
RBC # BLD AUTO: 4.51 10*6/MM3 (ref 4.6–6)
SODIUM BLD-SCNC: 141 MMOL/L (ref 136–145)
SP GR UR STRIP: 1.02 (ref 1–1.03)
UROBILINOGEN UR QL STRIP: ABNORMAL
WBC NRBC COR # BLD: 5.59 10*3/MM3 (ref 4.5–10.7)

## 2019-01-02 PROCEDURE — 82306 VITAMIN D 25 HYDROXY: CPT

## 2019-01-02 PROCEDURE — 84156 ASSAY OF PROTEIN URINE: CPT

## 2019-01-02 PROCEDURE — 36415 COLL VENOUS BLD VENIPUNCTURE: CPT

## 2019-01-02 PROCEDURE — 81003 URINALYSIS AUTO W/O SCOPE: CPT

## 2019-01-02 PROCEDURE — 85025 COMPLETE CBC W/AUTO DIFF WBC: CPT

## 2019-01-02 PROCEDURE — 82570 ASSAY OF URINE CREATININE: CPT

## 2019-01-02 PROCEDURE — 80048 BASIC METABOLIC PNL TOTAL CA: CPT

## 2019-02-15 ENCOUNTER — OFFICE VISIT (OUTPATIENT)
Dept: NEUROLOGY | Facility: CLINIC | Age: 84
End: 2019-02-15

## 2019-02-15 VITALS
DIASTOLIC BLOOD PRESSURE: 66 MMHG | HEIGHT: 69 IN | HEART RATE: 69 BPM | OXYGEN SATURATION: 94 % | SYSTOLIC BLOOD PRESSURE: 119 MMHG | BODY MASS INDEX: 24.73 KG/M2 | WEIGHT: 167 LBS

## 2019-02-15 DIAGNOSIS — Z91.14 NON COMPLIANCE W MEDICATION REGIMEN: ICD-10-CM

## 2019-02-15 DIAGNOSIS — E13.00: ICD-10-CM

## 2019-02-15 DIAGNOSIS — G45.8 OTHER SPECIFIED TRANSIENT CEREBRAL ISCHEMIAS: Primary | ICD-10-CM

## 2019-02-15 DIAGNOSIS — I10 ESSENTIAL HYPERTENSION: ICD-10-CM

## 2019-02-15 DIAGNOSIS — E78.5 HYPERLIPIDEMIA LDL GOAL <70: ICD-10-CM

## 2019-02-15 PROCEDURE — 99214 OFFICE O/P EST MOD 30 MIN: CPT | Performed by: NURSE PRACTITIONER

## 2019-02-15 RX ORDER — PANTOPRAZOLE SODIUM 40 MG/1
TABLET, DELAYED RELEASE ORAL
COMMUNITY
End: 2019-02-15

## 2019-02-15 NOTE — PROGRESS NOTES
"DOS: 2019  NAME: Reece Stephen   : 1935  PCP: Ana María Atkinson MD    Chief Complaint   Patient presents with   • Transient cerebral ischemia      SUBJECTIVE  Neurological Problem:  83 y.o. RHW male with DM, HTN, HLD, renal insufficiency and Hx of DVT and colon CA s/p colectomy, radiation and chemo.  He presents today for follow-up of TIA.  He is accompanied by his niece.      Interval History:   Mr. Stephen initially presented on on 17 after having transient right hand weakness and numbness while bowling and dropping his bowling ball. He was evaluated by Dr. Gee and his imaging was negative for stroke but event was likely a TIA. He was not on any antiplatelet therapy and had uncontrolled risk factors (A1C was 12.58). He was discharged home on  mg and Lipitor 40 mg.     He presents today and states he is not taking his ASA due history of gastric problems \"with bleeding\". Instead he has been taking Aleeve which was addressed at his last visit. He denies any recurrence of right-sided weakness, changes, speech difficulty headaches or any new stroke/TIA symptoms.  His niece was present agrees.  He denies any changes in his health since his last visit also states that he is not very compliant with his medications.    He lives alone and is independent of all his activities,manages his medications, finances and continues to drive and bowl. He was never  and with no children. He presents with his niece who helps when needed. Review of his most recent lab work from PCP shows A1C of 10, he does not have an endocrinonologist.  He does not take his blood pressure regularly but states it is well-controlled.  He denies smoking. He denies alcohol.     Review of Systems:Review of Systems   Constitutional: Negative for activity change, appetite change and fatigue.   HENT: Negative for ear pain, facial swelling and trouble swallowing.    Eyes: Negative for photophobia, pain and visual disturbance. "   Respiratory: Negative for choking, chest tightness and shortness of breath.    Cardiovascular: Negative for chest pain, palpitations and leg swelling.   Gastrointestinal: Negative for abdominal pain, constipation and nausea.   Endocrine: Negative for polydipsia, polyphagia and polyuria.   Genitourinary: Negative for difficulty urinating, frequency and urgency.   Musculoskeletal: Positive for gait problem. Negative for back pain and neck pain.   Skin: Negative for color change, rash and wound.   Allergic/Immunologic: Negative for environmental allergies, food allergies and immunocompromised state.   Neurological: Positive for weakness (legs). Negative for dizziness, tremors, seizures, syncope, facial asymmetry, speech difficulty, light-headedness, numbness and headaches.   Hematological: Negative for adenopathy. Does not bruise/bleed easily.   Psychiatric/Behavioral: Positive for sleep disturbance (insomnia). Negative for agitation, behavioral problems, confusion, decreased concentration, dysphoric mood, hallucinations, self-injury and suicidal ideas. The patient is not nervous/anxious and is not hyperactive.     Above ROS reviewed    Current Medications:   Current Outpatient Medications:   •  amLODIPine (NORVASC) 10 MG tablet, , Disp: , Rfl:   •  atorvastatin (LIPITOR) 40 MG tablet, , Disp: , Rfl:   •  glyBURIDE micronized (GLYNASE) 3 MG tablet, Take 3 mg by mouth 2 (Two) Times a Day Before Meals., Disp: , Rfl:   •  LEVEMIR 100 UNIT/ML injection, , Disp: , Rfl:   •  lisinopril (PRINIVIL,ZESTRIL) 20 MG tablet, Take 20 mg by mouth Daily., Disp: , Rfl:     The following portions of the patient's history were reviewed and updated as appropriate: allergies, current medications, past family history, past medical history, past social history, past surgical history and problem list.    OBJECTIVE  Vitals:    02/15/19 1021   BP: 119/66   Pulse: 69   SpO2: 94%       Diagnostics:    Laboratory Results:         Lab Results    Component Value Date    WBC 5.59 01/02/2019    HGB 13.5 (L) 01/02/2019    HCT 42.0 01/02/2019    MCV 93.1 01/02/2019     01/02/2019     Lab Results   Component Value Date    GLUCOSE 139 (H) 01/02/2019    BUN 40 (H) 01/02/2019    CREATININE 2.29 (H) 01/02/2019    EGFRIFNONA 27 (L) 01/02/2019    EGFRIFAFRI 56 04/23/2015    BCR 17.5 01/02/2019    K 4.4 01/02/2019    CO2 22.2 01/02/2019    CALCIUM 9.5 01/02/2019    PROTENTOTREF 6.9 04/23/2015    ALBUMIN 4.80 10/12/2018    LABIL2 2.1 04/23/2015    AST 18 10/12/2018    ALT 14 10/12/2018     Lab Results   Component Value Date    HGBA1C 12.58 (H) 11/28/2017     Lab Results   Component Value Date    CHOL 136 07/09/2018    CHOL 193 04/25/2018    CHOL 167 11/30/2017     Lab Results   Component Value Date    HDL 40 07/09/2018    HDL 35 (L) 04/25/2018    HDL 35 (L) 11/30/2017     Lab Results   Component Value Date    LDL 77 07/09/2018     (H) 04/25/2018     (H) 11/30/2017     Lab Results   Component Value Date    TRIG 97 07/09/2018    TRIG 166 (H) 04/25/2018    TRIG 133 11/30/2017     No results found for: RPR  Lab Results   Component Value Date    TSH 8.060 (H) 11/30/2017     Lab Results   Component Value Date    [id number] 363 03/31/2015     Outside labs (source: Dr. Atkinson's office, 2/1/19):  HgA1c: 10.0  No recent lipid panel    Physical Examination:   General Appearance:   Well developed, abdominal obesity, well groomed, alert, and cooperative.  HEENT: Normocephalic.    Neck and Spine: Normal range of motion.  Normal alignment. No mass or tenderness. No bruits.  Cardiac: Regular rate and rhythm. No murmurs.  Peripheral Vasculature: Radial pulses are equal and symmetric. No signs of distal embolization.  Extremities:    No edema or deformities. Normal joint ROM.  Skin:    No rashes or birth marks.    Neurological examination:  Higher Integrative  Function: Oriented to time, place and person. Normal registration, recall (3/3 with 1 clue), attention span  and concentration. Normal language including comprehension, spontaneous speech, repetition, reading, writing, naming and vocabulary. No neglect with normal visual-spatial function and construction. Fair fund of knowledge and higher integrative function.  CN II: Pupils are equal, round, and reactive to light. Normal visual acuity and visual fields.    CN III IV VI: Extraocular movements are full without nystagmus.   CN V: Normal facial sensation and strength of muscles of mastication.  CN VII: Facial movements are symmetric. No weakness.  CN VIII:   Auditory acuity is normal.  CN IX & X:   Symmetric palatal movement.  CN XI: Sternocleidomastoid and trapezius are normal.  No weakness.  CN XII:   The tongue is midline.  No atrophy or fasciculations.  Motor: Normal muscle strength, bulk and tone in upper and lower extremities.  No fasciculations, rigidity, spasticity, or abnormal movements.  Sensation: Normal to light touch, vibration and temperature in upper extremiteis. Decreased vibratory sense up to knees bilaterally. Normal graphesthesia and no extinction on DSS.  Station and Gait: Normal gait and station.    Coordination: Finger to nose test shows no dysmetria.  Heel to shin normal.    Impression:  Mr. Stephen continues to do well following his admission in November 2017 with right-sided weakness concerning for a TIA.  He was discharged at that time on aspirin and Lipitor however the patient continues to be noncompliant with medications and has not been taking his ASA regularly.  Review of his lab work from PCP show A1c has improved significantly from nearly 13 down to 10; however, there are no lipid panel results.  I discussed at length with patient to medication compliance.  I urged him to take a daily enteric coated baby aspirin and discontinue other NSAID use.  Also encouraged him to take a statin regularly. We discussed at length his personal risk factors for stroke and the importance of risk factor control  for stroke prevention, including BP and cholesterol control. He will monitor BP regularly and f/u with PCP for continued cholesterol surveillance.  He voiced understanding. We discussed the signs and symptoms of stroke and importance of calling 911 if he were to experience any of these.  He will follow-up in one year, sooner symptoms warrant.    Plan:     Obtain lab work from PCP  -- done  Start enteric coated ASA 81 mg, continue Lipitor  Warned against using other NSAIDs (ibuprofen, Aleeve)  Monitor BP regularly  Consider endocrinologist for better blood sugar control.  Keep well hydrated  Encouraged regular physical activity  Secondary stroke prevention: Ideal targets for stroke prevention would be Blood pressure < 130/80; B12 > 500 TSH in normal range and LDL < 70; HbA1c < 6.5 and smoking cessation if applicable.    Call 911 for stroke symptoms  Follow-up in one year      I spent 30 minutes face to face with patient and niece, with  > 50% spent counseling patient regarding diagnosis, review of diagnostics, personal risk factors for stroke and importance of risk factor control for stroke prevention.     Reece was seen today for transient cerebral ischemia.    Diagnoses and all orders for this visit:    Other specified transient cerebral ischemias    Hyperlipidemia LDL goal <70    DM (diabetes mellitus), secondary, uncontrolled, with hyperosmolarity (CMS/[facility])    Non compliance w medication regimen    Essential hypertension        Coding

## 2019-02-19 PROBLEM — Z91.148 NON COMPLIANCE W MEDICATION REGIMEN: Status: ACTIVE | Noted: 2019-02-19

## 2019-02-19 PROBLEM — Z91.14 NON COMPLIANCE W MEDICATION REGIMEN: Status: ACTIVE | Noted: 2019-02-19

## 2019-02-19 PROBLEM — I10 ESSENTIAL HYPERTENSION: Status: ACTIVE | Noted: 2019-02-19

## 2019-04-05 ENCOUNTER — APPOINTMENT (OUTPATIENT)
Dept: LAB | Facility: HOSPITAL | Age: 84
End: 2019-04-05

## 2019-04-05 ENCOUNTER — APPOINTMENT (OUTPATIENT)
Dept: ONCOLOGY | Facility: CLINIC | Age: 84
End: 2019-04-05

## 2019-04-12 ENCOUNTER — OFFICE VISIT (OUTPATIENT)
Dept: ONCOLOGY | Facility: CLINIC | Age: 84
End: 2019-04-12

## 2019-04-12 ENCOUNTER — LAB (OUTPATIENT)
Dept: LAB | Facility: HOSPITAL | Age: 84
End: 2019-04-12

## 2019-04-12 VITALS
BODY MASS INDEX: 24.56 KG/M2 | SYSTOLIC BLOOD PRESSURE: 107 MMHG | HEART RATE: 64 BPM | HEIGHT: 69 IN | TEMPERATURE: 98.5 F | DIASTOLIC BLOOD PRESSURE: 60 MMHG | OXYGEN SATURATION: 95 % | WEIGHT: 165.8 LBS | RESPIRATION RATE: 16 BRPM

## 2019-04-12 DIAGNOSIS — R91.8 LUNG NODULE, MULTIPLE: ICD-10-CM

## 2019-04-12 DIAGNOSIS — C20 RECTAL CANCER (HCC): ICD-10-CM

## 2019-04-12 DIAGNOSIS — C20 RECTAL CANCER (HCC): Primary | ICD-10-CM

## 2019-04-12 LAB
ALBUMIN SERPL-MCNC: 4.4 G/DL (ref 3.5–5.2)
ALBUMIN/GLOB SERPL: 1.6 G/DL (ref 1.1–2.4)
ALP SERPL-CCNC: 75 U/L (ref 38–116)
ALT SERPL W P-5'-P-CCNC: 19 U/L (ref 0–41)
ANION GAP SERPL CALCULATED.3IONS-SCNC: 14.3 MMOL/L
AST SERPL-CCNC: 19 U/L (ref 0–40)
BASOPHILS # BLD AUTO: 0.05 10*3/MM3 (ref 0–0.2)
BASOPHILS NFR BLD AUTO: 0.8 % (ref 0–1.5)
BILIRUB SERPL-MCNC: 0.3 MG/DL (ref 0.2–1.2)
BUN BLD-MCNC: 38 MG/DL (ref 6–20)
BUN/CREAT SERPL: 16.2 (ref 7.3–30)
CALCIUM SPEC-SCNC: 9.8 MG/DL (ref 8.5–10.2)
CEA SERPL-MCNC: 8.22 NG/ML
CHLORIDE SERPL-SCNC: 106 MMOL/L (ref 98–107)
CO2 SERPL-SCNC: 20.7 MMOL/L (ref 22–29)
CREAT BLD-MCNC: 2.35 MG/DL (ref 0.7–1.3)
DEPRECATED RDW RBC AUTO: 42.2 FL (ref 37–54)
EOSINOPHIL # BLD AUTO: 0.15 10*3/MM3 (ref 0–0.4)
EOSINOPHIL NFR BLD AUTO: 2.4 % (ref 0.3–6.2)
ERYTHROCYTE [DISTWIDTH] IN BLOOD BY AUTOMATED COUNT: 12.7 % (ref 12.3–15.4)
GFR SERPL CREATININE-BSD FRML MDRD: 27 ML/MIN/1.73
GLOBULIN UR ELPH-MCNC: 2.8 GM/DL (ref 1.8–3.5)
GLUCOSE BLD-MCNC: 170 MG/DL (ref 74–124)
HCT VFR BLD AUTO: 39 % (ref 37.5–51)
HGB BLD-MCNC: 12.9 G/DL (ref 13–17.7)
IMM GRANULOCYTES # BLD AUTO: 0.06 10*3/MM3 (ref 0–0.05)
IMM GRANULOCYTES NFR BLD AUTO: 1 % (ref 0–0.5)
LYMPHOCYTES # BLD AUTO: 1.52 10*3/MM3 (ref 0.7–3.1)
LYMPHOCYTES NFR BLD AUTO: 24.2 % (ref 19.6–45.3)
MCH RBC QN AUTO: 30.1 PG (ref 26.6–33)
MCHC RBC AUTO-ENTMCNC: 33.1 G/DL (ref 31.5–35.7)
MCV RBC AUTO: 91.1 FL (ref 79–97)
MONOCYTES # BLD AUTO: 0.59 10*3/MM3 (ref 0.1–0.9)
MONOCYTES NFR BLD AUTO: 9.4 % (ref 5–12)
NEUTROPHILS # BLD AUTO: 3.9 10*3/MM3 (ref 1.4–7)
NEUTROPHILS NFR BLD AUTO: 62.2 % (ref 42.7–76)
NRBC BLD AUTO-RTO: 0 /100 WBC (ref 0–0)
PLATELET # BLD AUTO: 220 10*3/MM3 (ref 140–450)
PMV BLD AUTO: 9.9 FL (ref 6–12)
POTASSIUM BLD-SCNC: 5 MMOL/L (ref 3.5–4.7)
PROT SERPL-MCNC: 7.2 G/DL (ref 6.3–8)
RBC # BLD AUTO: 4.28 10*6/MM3 (ref 4.14–5.8)
SODIUM BLD-SCNC: 141 MMOL/L (ref 134–145)
WBC NRBC COR # BLD: 6.27 10*3/MM3 (ref 3.4–10.8)

## 2019-04-12 PROCEDURE — 99213 OFFICE O/P EST LOW 20 MIN: CPT | Performed by: INTERNAL MEDICINE

## 2019-04-12 PROCEDURE — 36415 COLL VENOUS BLD VENIPUNCTURE: CPT | Performed by: INTERNAL MEDICINE

## 2019-04-12 PROCEDURE — 85025 COMPLETE CBC W/AUTO DIFF WBC: CPT | Performed by: INTERNAL MEDICINE

## 2019-04-12 PROCEDURE — 80053 COMPREHEN METABOLIC PANEL: CPT | Performed by: INTERNAL MEDICINE

## 2019-04-12 PROCEDURE — 82378 CARCINOEMBRYONIC ANTIGEN: CPT | Performed by: INTERNAL MEDICINE

## 2019-04-12 NOTE — PROGRESS NOTES
REASONS FOR FOLLOW-UP:  1. Clinical T3N1M0 adenocarcinoma of the rectum, status post neoadjuvant chemoradiation with infusional 5-FU followed by low anterior resection by Dr. Armin Hurtado on 11/14/2014; pathology at surgery showed microscopic foci of residual adenocarcinoma in ulceration with the largest focus 2.5 mm.  He had a single tumor deposit but 14 benign lymph nodes.  Final pathologic stage [id number].   2. Completed 4 months of adjuvant Xeloda post-surgery.     3. Tiny right upper lobe pulmonary nodule being followed radiographically and stable      History of Present Illness    Mr. Stephen returns today for follow-up of his rectal cancer currently under observation.   He returned today doing well.  He denied abdominal pain, nausea, diarrhea, changes in the stool habit.  He denies cough or shortness of breath.    Past Medical History    1. Diabetes.   2. Hypertension.  3. Hypercholesterolemia.  4. 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 CKD    Social History:  Single.  Nondrinker.  Former smoker, but this is remote (35+ years ago). Assisted by his niece.    Family History have been reviewed and are without significant changes except as mentioned 11/16/15.    Review of Systems   Constitutional: Negative for activity change, fatigue and unexpected weight change.   HENT: Positive for hearing loss.    Respiratory: Negative for cough and shortness of breath.    Cardiovascular: Negative for chest pain.   Gastrointestinal: Negative for abdominal distention, abdominal pain, diarrhea and nausea.   Musculoskeletal: Positive for gait problem.   Neurological: Negative for dizziness and weakness.   Hematological: Negative for adenopathy.   ROS unchanged-4/12/2019      Medications:  The current medication list was reviewed in the EMR    ALLERGIES:  No Known Allergies    Objective      Vitals:    04/12/19 1330   BP: 107/60   Pulse: 64   Resp: 16  "  Temp: 98.5 °F (36.9 °C)   SpO2: 95%   Weight: 75.2 kg (165 lb 12.8 oz)   Height: 175.3 cm (69.02\")   PainSc:   2   PainLoc: Leg  Comment: legs     Current Status 4/12/2019   ECOG score 1       Physical Exam   Constitutional: He appears well-developed and well-nourished.   HENT:   Hard of hearing   Eyes: No scleral icterus.   Cardiovascular: Normal rate and regular rhythm.   Pulmonary/Chest: Effort normal and breath sounds normal. He has no wheezes.   Abdominal: Soft. Bowel sounds are normal. He exhibits no mass.   Ostomy present   Musculoskeletal: Normal range of motion. He exhibits no edema.   Skin: Skin is warm. No erythema.   Psychiatric: He has a normal mood and affect.        Exam unchanged 4/12/19    RECENT LABS:  Hematology WBC   Date Value Ref Range Status   04/12/2019 6.27 3.40 - 10.80 10*3/mm3 Final     RBC   Date Value Ref Range Status   04/12/2019 4.28 4.14 - 5.80 10*6/mm3 Final     Hemoglobin   Date Value Ref Range Status   04/12/2019 12.9 (L) 13.0 - 17.7 g/dL Final     Hematocrit   Date Value Ref Range Status   04/12/2019 39.0 37.5 - 51.0 % Final     MCV   Date Value Ref Range Status   04/12/2019 91.1 79.0 - 97.0 fL Final     MCH   Date Value Ref Range Status   04/12/2019 30.1 26.6 - 33.0 pg Final     MCHC   Date Value Ref Range Status   04/12/2019 33.1 31.5 - 35.7 g/dL Final     RDW   Date Value Ref Range Status   04/12/2019 12.7 12.3 - 15.4 % Final     RDW-SD   Date Value Ref Range Status   04/12/2019 42.2 37.0 - 54.0 fl Final     MPV   Date Value Ref Range Status   04/12/2019 9.9 6.0 - 12.0 fL Final     Platelets   Date Value Ref Range Status   04/12/2019 220 140 - 450 10*3/mm3 Final     Neutrophil %   Date Value Ref Range Status   04/12/2019 62.2 42.7 - 76.0 % Final     Lymphocyte %   Date Value Ref Range Status   04/12/2019 24.2 19.6 - 45.3 % Final     Monocyte %   Date Value Ref Range Status   04/12/2019 9.4 5.0 - 12.0 % Final     Eosinophil %   Date Value Ref Range Status   04/12/2019 2.4 0.3 " - 6.2 % Final     Basophil %   Date Value Ref Range Status   04/12/2019 0.8 0.0 - 1.5 % Final     Immature Grans %   Date Value Ref Range Status   04/12/2019 1.0 (H) 0.0 - 0.5 % Final     Neutrophils, Absolute   Date Value Ref Range Status   04/12/2019 3.90 1.40 - 7.00 10*3/mm3 Final     Lymphocytes, Absolute   Date Value Ref Range Status   04/12/2019 1.52 0.70 - 3.10 10*3/mm3 Final     Monocytes, Absolute   Date Value Ref Range Status   04/12/2019 0.59 0.10 - 0.90 10*3/mm3 Final     Eosinophils, Absolute   Date Value Ref Range Status   04/12/2019 0.15 0.00 - 0.40 10*3/mm3 Final     Basophils, Absolute   Date Value Ref Range Status   04/12/2019 0.05 0.00 - 0.20 10*3/mm3 Final     Immature Grans, Absolute   Date Value Ref Range Status   04/12/2019 0.06 (H) 0.00 - 0.05 10*3/mm3 Final     nRBC   Date Value Ref Range Status   04/12/2019 0.0 0.0 - 0.0 /100 WBC Final       Glucose   Date Value Ref Range Status   01/02/2019 139 (H) 65 - 99 mg/dL Final     Sodium   Date Value Ref Range Status   01/02/2019 141 136 - 145 mmol/L Final     Potassium   Date Value Ref Range Status   01/02/2019 4.4 3.5 - 5.2 mmol/L Final     CO2   Date Value Ref Range Status   01/02/2019 22.2 22.0 - 29.0 mmol/L Final     Chloride   Date Value Ref Range Status   01/02/2019 105 98 - 107 mmol/L Final     Anion Gap   Date Value Ref Range Status   01/02/2019 13.8 mmol/L Final     Creatinine   Date Value Ref Range Status   01/02/2019 2.29 (H) 0.76 - 1.27 mg/dL Final     BUN   Date Value Ref Range Status   01/02/2019 40 (H) 8 - 23 mg/dL Final     BUN/Creatinine Ratio   Date Value Ref Range Status   01/02/2019 17.5 7.0 - 25.0 Final     Calcium   Date Value Ref Range Status   01/02/2019 9.5 8.6 - 10.5 mg/dL Final     eGFR Non  Amer   Date Value Ref Range Status   01/02/2019 27 (L) >60 mL/min/1.73 Final     Alkaline Phosphatase   Date Value Ref Range Status   10/12/2018 76 38 - 116 U/L Final     Total Protein   Date Value Ref Range Status    10/12/2018 7.5 6.3 - 8.0 g/dL Final     ALT (SGPT)   Date Value Ref Range Status   10/12/2018 14 0 - 41 U/L Final     AST (SGOT)   Date Value Ref Range Status   10/12/2018 18 0 - 40 U/L Final     Total Bilirubin   Date Value Ref Range Status   10/12/2018 0.4 0.1 - 1.2 mg/dL Final     Albumin   Date Value Ref Range Status   10/12/2018 4.80 3.50 - 5.20 g/dL Final     Globulin   Date Value Ref Range Status   10/12/2018 2.7 1.8 - 3.5 gm/dL Final     A/G Ratio   Date Value Ref Range Status   04/23/2015 2.1  Final     CEA 5.2-->4.1-->6.32-->5.78     CT chest, abdomen, pelvis reviewed from 10/12/18: There has been resolution of the nodular opacities in the left lower lobe.  A few other tiny opacities are stable.  No new pulmonary nodules, fluid or lymphadenopathy, no evidence of disease in the abdomen or pelvis.  I personally reviewed the CT scan of the chest.  There has been improvement in the left lower lobe nodularity.    Assessment/Plan       1.   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  He completed 4 months of Xeloda adjuvantly May 2015.   He is doing well today with no concerning symptoms.  CEA pending.  I ordered a follow-up CBC, CMP, CEA and CT scan of the chest, abdomen, pelvis without IV contrast for 6 months.    2.  Bilateral pulmonary nodules: Follow-up CT chest ordered in 6 months.    3.  CKD:  Followed by Dr. Gurvinder Cuba of nephrology              4/12/2019      CC:

## 2019-07-11 ENCOUNTER — TELEPHONE (OUTPATIENT)
Dept: SURGERY | Facility: CLINIC | Age: 84
End: 2019-07-11

## 2019-07-11 DIAGNOSIS — Z93.9 HISTORY OF CREATION OF OSTOMY (HCC): Primary | ICD-10-CM

## 2019-07-11 NOTE — TELEPHONE ENCOUNTER
Patients niece (Geetha Stephen--[id number]) called asking if he can see the ostomy nurse. His bags don't fit properly.

## 2019-07-18 ENCOUNTER — HOSPITAL ENCOUNTER (OUTPATIENT)
Dept: WOUND CARE | Facility: HOSPITAL | Age: 84
Discharge: HOME | End: 2019-07-18

## 2019-07-18 NOTE — NURSING NOTE
CWOCN consult for outpatient visit. Patient has been having trouble keeping the pouch on at times. His niece helps him with groceries, cleaning, activities, but not with ostomy. Patient has been wearing a flat Coloplast. He has a hernia midline but the skin at the stoma is irregular. He may benefit from convexity so we tried him with the new Convatec 1 piece flexconvex. We also measured him for a hernia- this is likely causing some of his pouching problems. Geetha (niece) had mentioned on the phone that sometimes his stool is soft/liquid. We discussed considering imodium to slow the stool down.   Stoma is moist, red healthy.   They will be in touch. We send pouches home. I will call Katina with measurements and get the number for the hernia belt and update this note when I have that number. I will call Geetha with the number as well. She understands this might be next week.

## 2019-08-19 ENCOUNTER — TRANSCRIBE ORDERS (OUTPATIENT)
Dept: LAB | Facility: HOSPITAL | Age: 84
End: 2019-08-19

## 2019-08-19 ENCOUNTER — LAB (OUTPATIENT)
Dept: LAB | Facility: HOSPITAL | Age: 84
End: 2019-08-19

## 2019-08-19 DIAGNOSIS — N18.30 CHRONIC KIDNEY DISEASE, STAGE III (MODERATE) (HCC): ICD-10-CM

## 2019-08-19 DIAGNOSIS — D64.9 ANEMIA, UNSPECIFIED TYPE: ICD-10-CM

## 2019-08-19 DIAGNOSIS — I12.9 HYPERTENSIVE NEPHROPATHY: Primary | ICD-10-CM

## 2019-08-19 DIAGNOSIS — I12.9 HYPERTENSIVE NEPHROPATHY: ICD-10-CM

## 2019-08-19 DIAGNOSIS — E55.9 VITAMIN D DEFICIENCY, UNSPECIFIED: ICD-10-CM

## 2019-08-19 LAB
25(OH)D3 SERPL-MCNC: 41.5 NG/ML (ref 30–100)
ANION GAP SERPL CALCULATED.3IONS-SCNC: 15.1 MMOL/L (ref 5–15)
BILIRUB UR QL STRIP: NEGATIVE
BUN BLD-MCNC: 33 MG/DL (ref 8–23)
BUN/CREAT SERPL: 14 (ref 7–25)
CALCIUM SPEC-SCNC: 9.1 MG/DL (ref 8.6–10.5)
CHLORIDE SERPL-SCNC: 105 MMOL/L (ref 98–107)
CLARITY UR: CLEAR
CO2 SERPL-SCNC: 19.9 MMOL/L (ref 22–29)
COLOR UR: ABNORMAL
CREAT BLD-MCNC: 2.35 MG/DL (ref 0.76–1.27)
DEPRECATED RDW RBC AUTO: 44.3 FL (ref 37–54)
ERYTHROCYTE [DISTWIDTH] IN BLOOD BY AUTOMATED COUNT: 13.2 % (ref 12.3–15.4)
FERRITIN SERPL-MCNC: 88.9 NG/ML (ref 30–400)
GFR SERPL CREATININE-BSD FRML MDRD: 27 ML/MIN/1.73
GLUCOSE BLD-MCNC: 315 MG/DL (ref 65–99)
GLUCOSE UR STRIP-MCNC: ABNORMAL MG/DL
HCT VFR BLD AUTO: 39.3 % (ref 37.5–51)
HGB BLD-MCNC: 12.7 G/DL (ref 13–17.7)
HGB UR QL STRIP.AUTO: NEGATIVE
IRON 24H UR-MRATE: 71 MCG/DL (ref 59–158)
IRON SATN MFR SERPL: 22 % (ref 20–50)
KETONES UR QL STRIP: ABNORMAL
LEUKOCYTE ESTERASE UR QL STRIP.AUTO: NEGATIVE
MCH RBC QN AUTO: 29.7 PG (ref 26.6–33)
MCHC RBC AUTO-ENTMCNC: 32.3 G/DL (ref 31.5–35.7)
MCV RBC AUTO: 92 FL (ref 79–97)
NITRITE UR QL STRIP: NEGATIVE
PH UR STRIP.AUTO: <=5 [PH] (ref 5–8)
PLATELET # BLD AUTO: 234 10*3/MM3 (ref 140–450)
PMV BLD AUTO: 10.5 FL (ref 6–12)
POTASSIUM BLD-SCNC: 4.8 MMOL/L (ref 3.5–5.2)
PROT UR QL STRIP: NEGATIVE
RBC # BLD AUTO: 4.27 10*6/MM3 (ref 4.14–5.8)
SODIUM BLD-SCNC: 140 MMOL/L (ref 136–145)
SP GR UR STRIP: 1.02 (ref 1–1.03)
TIBC SERPL-MCNC: 323 MCG/DL (ref 298–536)
TRANSFERRIN SERPL-MCNC: 217 MG/DL (ref 200–360)
UROBILINOGEN UR QL STRIP: ABNORMAL
WBC NRBC COR # BLD: 6.33 10*3/MM3 (ref 3.4–10.8)

## 2019-08-19 PROCEDURE — 36415 COLL VENOUS BLD VENIPUNCTURE: CPT

## 2019-08-19 PROCEDURE — 82306 VITAMIN D 25 HYDROXY: CPT

## 2019-08-19 PROCEDURE — 84466 ASSAY OF TRANSFERRIN: CPT

## 2019-08-19 PROCEDURE — 82728 ASSAY OF FERRITIN: CPT

## 2019-08-19 PROCEDURE — 85027 COMPLETE CBC AUTOMATED: CPT

## 2019-08-19 PROCEDURE — 81003 URINALYSIS AUTO W/O SCOPE: CPT

## 2019-08-19 PROCEDURE — 83540 ASSAY OF IRON: CPT

## 2019-08-19 PROCEDURE — 80048 BASIC METABOLIC PNL TOTAL CA: CPT

## 2019-09-10 ENCOUNTER — OFFICE VISIT (OUTPATIENT)
Dept: SURGERY | Facility: CLINIC | Age: 84
End: 2019-09-10

## 2019-09-10 VITALS
SYSTOLIC BLOOD PRESSURE: 120 MMHG | HEIGHT: 69 IN | OXYGEN SATURATION: 96 % | TEMPERATURE: 97.9 F | HEART RATE: 73 BPM | DIASTOLIC BLOOD PRESSURE: 84 MMHG | BODY MASS INDEX: 23.55 KG/M2 | WEIGHT: 159 LBS

## 2019-09-10 DIAGNOSIS — C20 RECTAL CANCER (HCC): Primary | ICD-10-CM

## 2019-09-10 PROBLEM — M19.90 OSTEOARTHRITIS: Status: ACTIVE | Noted: 2019-09-10

## 2019-09-10 PROBLEM — D64.9 ANEMIA: Status: ACTIVE | Noted: 2019-09-10

## 2019-09-10 PROBLEM — T30.0 BURN INJURY: Status: ACTIVE | Noted: 2019-09-10

## 2019-09-10 PROBLEM — K21.9 GASTROESOPHAGEAL REFLUX DISEASE: Status: ACTIVE | Noted: 2019-09-10

## 2019-09-10 PROBLEM — K92.2 UPPER GASTROINTESTINAL HEMORRHAGE: Status: ACTIVE | Noted: 2019-09-10

## 2019-09-10 PROBLEM — I82.409 DEEP VEIN THROMBOSIS (HCC): Status: ACTIVE | Noted: 2019-09-10

## 2019-09-10 PROBLEM — A04.72 COLITIS DUE TO CLOSTRIDIUM DIFFICILE: Status: ACTIVE | Noted: 2019-09-10

## 2019-09-10 PROCEDURE — 99204 OFFICE O/P NEW MOD 45 MIN: CPT | Performed by: COLON & RECTAL SURGERY

## 2019-09-10 RX ORDER — LEVOTHYROXINE SODIUM 0.07 MG/1
75 TABLET ORAL
COMMUNITY
Start: 2019-08-09 | End: 2022-08-04 | Stop reason: HOSPADM

## 2019-09-27 ENCOUNTER — LAB (OUTPATIENT)
Dept: LAB | Facility: HOSPITAL | Age: 84
End: 2019-09-27

## 2019-09-27 ENCOUNTER — HOSPITAL ENCOUNTER (OUTPATIENT)
Dept: CT IMAGING | Facility: HOSPITAL | Age: 84
Discharge: HOME | End: 2019-09-27
Admitting: INTERNAL MEDICINE

## 2019-09-27 DIAGNOSIS — C20 RECTAL CANCER (HCC): ICD-10-CM

## 2019-09-27 DIAGNOSIS — R91.8 LUNG NODULE, MULTIPLE: ICD-10-CM

## 2019-09-27 LAB
ALBUMIN SERPL-MCNC: 4.6 G/DL (ref 3.5–5.2)
ALBUMIN/GLOB SERPL: 1.8 G/DL
ALP SERPL-CCNC: 77 U/L (ref 39–117)
ALT SERPL W P-5'-P-CCNC: 31 U/L (ref 1–41)
ANION GAP SERPL CALCULATED.3IONS-SCNC: 11.8 MMOL/L (ref 5–15)
AST SERPL-CCNC: 29 U/L (ref 1–40)
BASOPHILS # BLD AUTO: 0.06 10*3/MM3 (ref 0–0.2)
BASOPHILS NFR BLD AUTO: 1.1 % (ref 0–1.5)
BILIRUB SERPL-MCNC: 0.3 MG/DL (ref 0.1–1.2)
BUN BLD-MCNC: 34 MG/DL (ref 8–23)
BUN/CREAT SERPL: 15.9 (ref 7–25)
CALCIUM SPEC-SCNC: 10 MG/DL (ref 8.6–10.5)
CEA SERPL-MCNC: 7.04 NG/ML
CHLORIDE SERPL-SCNC: 108 MMOL/L (ref 98–107)
CO2 SERPL-SCNC: 22.2 MMOL/L (ref 22–29)
CREAT BLD-MCNC: 2.14 MG/DL (ref 0.76–1.27)
DEPRECATED RDW RBC AUTO: 42.8 FL (ref 37–54)
EOSINOPHIL # BLD AUTO: 0.24 10*3/MM3 (ref 0–0.4)
EOSINOPHIL NFR BLD AUTO: 4.3 % (ref 0.3–6.2)
ERYTHROCYTE [DISTWIDTH] IN BLOOD BY AUTOMATED COUNT: 13.1 % (ref 12.3–15.4)
GFR SERPL CREATININE-BSD FRML MDRD: 30 ML/MIN/1.73
GLOBULIN UR ELPH-MCNC: 2.6 GM/DL
GLUCOSE BLD-MCNC: 95 MG/DL (ref 65–99)
HCT VFR BLD AUTO: 38.9 % (ref 37.5–51)
HGB BLD-MCNC: 13 G/DL (ref 13–17.7)
IMM GRANULOCYTES # BLD AUTO: 0.03 10*3/MM3 (ref 0–0.05)
IMM GRANULOCYTES NFR BLD AUTO: 0.5 % (ref 0–0.5)
LYMPHOCYTES # BLD AUTO: 1.3 10*3/MM3 (ref 0.7–3.1)
LYMPHOCYTES NFR BLD AUTO: 23.3 % (ref 19.6–45.3)
MCH RBC QN AUTO: 29.8 PG (ref 26.6–33)
MCHC RBC AUTO-ENTMCNC: 33.4 G/DL (ref 31.5–35.7)
MCV RBC AUTO: 89.2 FL (ref 79–97)
MONOCYTES # BLD AUTO: 0.63 10*3/MM3 (ref 0.1–0.9)
MONOCYTES NFR BLD AUTO: 11.3 % (ref 5–12)
NEUTROPHILS # BLD AUTO: 3.31 10*3/MM3 (ref 1.7–7)
NEUTROPHILS NFR BLD AUTO: 59.5 % (ref 42.7–76)
NRBC BLD AUTO-RTO: 0 /100 WBC (ref 0–0.2)
PLATELET # BLD AUTO: 203 10*3/MM3 (ref 140–450)
PMV BLD AUTO: 9.9 FL (ref 6–12)
POTASSIUM BLD-SCNC: 4.2 MMOL/L (ref 3.5–5.2)
PROT SERPL-MCNC: 7.2 G/DL (ref 6–8.5)
RBC # BLD AUTO: 4.36 10*6/MM3 (ref 4.14–5.8)
SODIUM BLD-SCNC: 142 MMOL/L (ref 136–145)
WBC NRBC COR # BLD: 5.57 10*3/MM3 (ref 3.4–10.8)

## 2019-09-27 PROCEDURE — 80053 COMPREHEN METABOLIC PANEL: CPT

## 2019-09-27 PROCEDURE — 82378 CARCINOEMBRYONIC ANTIGEN: CPT

## 2019-09-27 PROCEDURE — 36415 COLL VENOUS BLD VENIPUNCTURE: CPT

## 2019-09-27 PROCEDURE — 71250 CT THORAX DX C-: CPT

## 2019-09-27 PROCEDURE — 74176 CT ABD & PELVIS W/O CONTRAST: CPT

## 2019-09-27 PROCEDURE — 85025 COMPLETE CBC W/AUTO DIFF WBC: CPT

## 2019-09-30 ENCOUNTER — OFFICE VISIT (OUTPATIENT)
Dept: ONCOLOGY | Facility: CLINIC | Age: 84
End: 2019-09-30

## 2019-09-30 ENCOUNTER — APPOINTMENT (OUTPATIENT)
Dept: LAB | Facility: HOSPITAL | Age: 84
End: 2019-09-30

## 2019-09-30 VITALS
SYSTOLIC BLOOD PRESSURE: 144 MMHG | DIASTOLIC BLOOD PRESSURE: 83 MMHG | TEMPERATURE: 98.8 F | WEIGHT: 163.5 LBS | HEIGHT: 69 IN | OXYGEN SATURATION: 95 % | BODY MASS INDEX: 24.22 KG/M2 | HEART RATE: 67 BPM | RESPIRATION RATE: 12 BRPM

## 2019-09-30 DIAGNOSIS — C20 RECTAL CANCER (HCC): Primary | ICD-10-CM

## 2019-09-30 PROCEDURE — 99213 OFFICE O/P EST LOW 20 MIN: CPT | Performed by: INTERNAL MEDICINE

## 2019-09-30 PROCEDURE — G0463 HOSPITAL OUTPT CLINIC VISIT: HCPCS | Performed by: INTERNAL MEDICINE

## 2019-09-30 RX ORDER — PANTOPRAZOLE SODIUM 40 MG/1
40 TABLET, DELAYED RELEASE ORAL DAILY
COMMUNITY
End: 2022-05-18

## 2019-09-30 RX ORDER — INSULIN GLARGINE 100 [IU]/ML
34 INJECTION, SOLUTION SUBCUTANEOUS DAILY
COMMUNITY
End: 2022-05-18

## 2019-09-30 RX ORDER — ACETAMINOPHEN 325 MG/1
650 TABLET ORAL 3 TIMES DAILY PRN
COMMUNITY
End: 2019-11-20 | Stop reason: HOSPADM

## 2019-09-30 NOTE — PROGRESS NOTES
REASONS FOR FOLLOW-UP:  1. Clinical T3N1M0 adenocarcinoma of the rectum, status post neoadjuvant chemoradiation with infusional 5-FU followed by low anterior resection by Dr. Armin Hurtado on 11/14/2014; pathology at surgery showed microscopic foci of residual adenocarcinoma in ulceration with the largest focus 2.5 mm.  He had a single tumor deposit but 14 benign lymph nodes.  Final pathologic stage [id number].   2. Completed 4 months of adjuvant Xeloda post-surgery.     3. Tiny right upper lobe pulmonary nodule being followed radiographically and stable      History of Present Illness    Mr. Stephen returns today for follow-up of his rectal cancer currently under observation.  Patient is doing well.  He denies increasing shortness of breath, cough, abdominal pain, blood in the stool.    Past Medical History    1. Diabetes.   2. Hypertension.  3. Hypercholesterolemia.  4. 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 CKD    Social History:  Single.  Nondrinker.  Former smoker, but this is remote (35+ years ago). Assisted by his niece.    Family History have been reviewed and are without significant changes except as mentioned 11/16/15.    Review of Systems   Constitutional: Negative for activity change, fatigue and unexpected weight change.   HENT: Positive for hearing loss.    Respiratory: Negative for cough and shortness of breath.    Cardiovascular: Negative for chest pain.   Gastrointestinal: Negative for abdominal distention, abdominal pain, diarrhea and nausea.   Musculoskeletal: Positive for gait problem.   Neurological: Negative for dizziness and weakness.   Hematological: Negative for adenopathy.   ROS unchanged- 9/30/2019      Medications:  The current medication list was reviewed in the EMR    ALLERGIES:  No Known Allergies    Objective      Vitals:    09/30/19 1326   BP: 144/83   Pulse: 67   Resp: 12   Temp: 98.8 °F (37.1 °C)   TempSrc:  "Oral   SpO2: 95%   Weight: 74.2 kg (163 lb 8 oz)   Height: 175.3 cm (69.02\")   PainSc: 0-No pain     Current Status 9/30/2019   ECOG score 0       Physical Exam   Constitutional: He appears well-developed and well-nourished.   HENT:   Hard of hearing   Eyes: No scleral icterus.   Cardiovascular: Normal rate and regular rhythm.   Pulmonary/Chest: Effort normal and breath sounds normal. He has no wheezes.   Abdominal: Soft. Bowel sounds are normal. He exhibits no mass.   Ostomy present   Musculoskeletal: Normal range of motion. He exhibits no edema.   Skin: Skin is warm. No erythema.   Psychiatric: He has a normal mood and affect.        Exam unchanged 9/30/2019    RECENT LABS:  Hematology WBC   Date Value Ref Range Status   09/27/2019 5.57 3.40 - 10.80 10*3/mm3 Final     RBC   Date Value Ref Range Status   09/27/2019 4.36 4.14 - 5.80 10*6/mm3 Final     Hemoglobin   Date Value Ref Range Status   09/27/2019 13.0 13.0 - 17.7 g/dL Final     Hematocrit   Date Value Ref Range Status   09/27/2019 38.9 37.5 - 51.0 % Final     MCV   Date Value Ref Range Status   09/27/2019 89.2 79.0 - 97.0 fL Final     MCH   Date Value Ref Range Status   09/27/2019 29.8 26.6 - 33.0 pg Final     MCHC   Date Value Ref Range Status   09/27/2019 33.4 31.5 - 35.7 g/dL Final     RDW   Date Value Ref Range Status   09/27/2019 13.1 12.3 - 15.4 % Final     RDW-SD   Date Value Ref Range Status   09/27/2019 42.8 37.0 - 54.0 fl Final     MPV   Date Value Ref Range Status   09/27/2019 9.9 6.0 - 12.0 fL Final     Platelets   Date Value Ref Range Status   09/27/2019 203 140 - 450 10*3/mm3 Final     Neutrophil %   Date Value Ref Range Status   09/27/2019 59.5 42.7 - 76.0 % Final     Lymphocyte %   Date Value Ref Range Status   09/27/2019 23.3 19.6 - 45.3 % Final     Monocyte %   Date Value Ref Range Status   09/27/2019 11.3 5.0 - 12.0 % Final     Eosinophil %   Date Value Ref Range Status   09/27/2019 4.3 0.3 - 6.2 % Final     Basophil %   Date Value Ref " Range Status   09/27/2019 1.1 0.0 - 1.5 % Final     Immature Grans %   Date Value Ref Range Status   09/27/2019 0.5 0.0 - 0.5 % Final     Neutrophils, Absolute   Date Value Ref Range Status   09/27/2019 3.31 1.70 - 7.00 10*3/mm3 Final     Lymphocytes, Absolute   Date Value Ref Range Status   09/27/2019 1.30 0.70 - 3.10 10*3/mm3 Final     Monocytes, Absolute   Date Value Ref Range Status   09/27/2019 0.63 0.10 - 0.90 10*3/mm3 Final     Eosinophils, Absolute   Date Value Ref Range Status   09/27/2019 0.24 0.00 - 0.40 10*3/mm3 Final     Basophils, Absolute   Date Value Ref Range Status   09/27/2019 0.06 0.00 - 0.20 10*3/mm3 Final     Immature Grans, Absolute   Date Value Ref Range Status   09/27/2019 0.03 0.00 - 0.05 10*3/mm3 Final     nRBC   Date Value Ref Range Status   09/27/2019 0.0 0.0 - 0.2 /100 WBC Final       Glucose   Date Value Ref Range Status   09/27/2019 95 65 - 99 mg/dL Final     Sodium   Date Value Ref Range Status   09/27/2019 142 136 - 145 mmol/L Final     Potassium   Date Value Ref Range Status   09/27/2019 4.2 3.5 - 5.2 mmol/L Final     CO2   Date Value Ref Range Status   09/27/2019 22.2 22.0 - 29.0 mmol/L Final     Chloride   Date Value Ref Range Status   09/27/2019 108 (H) 98 - 107 mmol/L Final     Anion Gap   Date Value Ref Range Status   09/27/2019 11.8 5.0 - 15.0 mmol/L Final     Creatinine   Date Value Ref Range Status   09/27/2019 2.14 (H) 0.76 - 1.27 mg/dL Final     BUN   Date Value Ref Range Status   09/27/2019 34 (H) 8 - 23 mg/dL Final     BUN/Creatinine Ratio   Date Value Ref Range Status   09/27/2019 15.9 7.0 - 25.0 Final     Calcium   Date Value Ref Range Status   09/27/2019 10.0 8.6 - 10.5 mg/dL Final     eGFR Non  Amer   Date Value Ref Range Status   09/27/2019 30 (L) >60 mL/min/1.73 Final     Alkaline Phosphatase   Date Value Ref Range Status   09/27/2019 77 39 - 117 U/L Final     Total Protein   Date Value Ref Range Status   09/27/2019 7.2 6.0 - 8.5 g/dL Final     ALT (SGPT)    Date Value Ref Range Status   09/27/2019 31 1 - 41 U/L Final     AST (SGOT)   Date Value Ref Range Status   09/27/2019 29 1 - 40 U/L Final     Total Bilirubin   Date Value Ref Range Status   09/27/2019 0.3 0.1 - 1.2 mg/dL Final     Albumin   Date Value Ref Range Status   09/27/2019 4.60 3.50 - 5.20 g/dL Final     Globulin   Date Value Ref Range Status   09/27/2019 2.6 gm/dL Final     A/G Ratio   Date Value Ref Range Status   04/23/2015 2.1  Final     CEA 7.0     CT chest, abdomen, pelvis reviewed from 9/27/2019:No evidence of metastatic disease.  There is fibrotic change in the lung bases.    Assessment/Plan       1.   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  He completed 4 months of Xeloda adjuvantly May 2015.       The patient's CEA remains mildly elevated but stable at 7.0.  CT scan of the chest, abdomen, and pelvis reviewed today continue to show no clear evidence of recurrent or metastatic disease.  Given his age of 84 years I have not ordered a PET scan.    We will check a CBC, CMP and CEA in 6 months.    2.  Bilateral pulmonary nodules/fibrotic changes: Stable    3.  CKD:  Followed by Dr. Gurvinder Cuba of nephrology              9/30/2019      CC:

## 2019-11-17 ENCOUNTER — APPOINTMENT (OUTPATIENT)
Dept: GENERAL RADIOLOGY | Facility: HOSPITAL | Age: 84
End: 2019-11-17

## 2019-11-17 ENCOUNTER — APPOINTMENT (OUTPATIENT)
Dept: CT IMAGING | Facility: HOSPITAL | Age: 84
End: 2019-11-17

## 2019-11-17 ENCOUNTER — HOSPITAL ENCOUNTER (INPATIENT)
Facility: HOSPITAL | Age: 84
LOS: 3 days | Discharge: HOME HEALTH | End: 2019-11-20
Attending: EMERGENCY MEDICINE | Admitting: HOSPITALIST

## 2019-11-17 DIAGNOSIS — N28.9 RENAL INSUFFICIENCY: ICD-10-CM

## 2019-11-17 DIAGNOSIS — R73.9 HYPERGLYCEMIA: ICD-10-CM

## 2019-11-17 DIAGNOSIS — I63.9 CEREBROVASCULAR ACCIDENT (CVA), UNSPECIFIED MECHANISM (HCC): Primary | ICD-10-CM

## 2019-11-17 LAB
ALBUMIN SERPL-MCNC: 4.5 G/DL (ref 3.5–5.2)
ALBUMIN/GLOB SERPL: 1.9 G/DL
ALP SERPL-CCNC: 84 U/L (ref 39–117)
ALT SERPL W P-5'-P-CCNC: 24 U/L (ref 1–41)
ANION GAP SERPL CALCULATED.3IONS-SCNC: 12.8 MMOL/L (ref 5–15)
AST SERPL-CCNC: 13 U/L (ref 1–40)
BASOPHILS # BLD AUTO: 0.05 10*3/MM3 (ref 0–0.2)
BASOPHILS NFR BLD AUTO: 0.8 % (ref 0–1.5)
BILIRUB SERPL-MCNC: 0.4 MG/DL (ref 0.2–1.2)
BILIRUB UR QL STRIP: NEGATIVE
BUN BLD-MCNC: 37 MG/DL (ref 8–23)
BUN/CREAT SERPL: 14.7 (ref 7–25)
CALCIUM SPEC-SCNC: 9.4 MG/DL (ref 8.6–10.5)
CHLORIDE SERPL-SCNC: 105 MMOL/L (ref 98–107)
CLARITY UR: CLEAR
CO2 SERPL-SCNC: 19.2 MMOL/L (ref 22–29)
COLOR UR: YELLOW
CREAT BLD-MCNC: 2.52 MG/DL (ref 0.76–1.27)
DEPRECATED RDW RBC AUTO: 41.6 FL (ref 37–54)
EOSINOPHIL # BLD AUTO: 0.1 10*3/MM3 (ref 0–0.4)
EOSINOPHIL NFR BLD AUTO: 1.7 % (ref 0.3–6.2)
ERYTHROCYTE [DISTWIDTH] IN BLOOD BY AUTOMATED COUNT: 12.4 % (ref 12.3–15.4)
GFR SERPL CREATININE-BSD FRML MDRD: 25 ML/MIN/1.73
GLOBULIN UR ELPH-MCNC: 2.4 GM/DL
GLUCOSE BLD-MCNC: 422 MG/DL (ref 65–99)
GLUCOSE BLDC GLUCOMTR-MCNC: 307 MG/DL (ref 70–130)
GLUCOSE BLDC GLUCOMTR-MCNC: 371 MG/DL (ref 70–130)
GLUCOSE BLDC GLUCOMTR-MCNC: 375 MG/DL (ref 70–130)
GLUCOSE BLDC GLUCOMTR-MCNC: 414 MG/DL (ref 70–130)
GLUCOSE UR STRIP-MCNC: ABNORMAL MG/DL
HCT VFR BLD AUTO: 36.9 % (ref 37.5–51)
HGB BLD-MCNC: 12.4 G/DL (ref 13–17.7)
HGB UR QL STRIP.AUTO: NEGATIVE
IMM GRANULOCYTES # BLD AUTO: 0.04 10*3/MM3 (ref 0–0.05)
IMM GRANULOCYTES NFR BLD AUTO: 0.7 % (ref 0–0.5)
KETONES UR QL STRIP: NEGATIVE
LEUKOCYTE ESTERASE UR QL STRIP.AUTO: NEGATIVE
LYMPHOCYTES # BLD AUTO: 0.94 10*3/MM3 (ref 0.7–3.1)
LYMPHOCYTES NFR BLD AUTO: 15.7 % (ref 19.6–45.3)
MCH RBC QN AUTO: 30.9 PG (ref 26.6–33)
MCHC RBC AUTO-ENTMCNC: 33.6 G/DL (ref 31.5–35.7)
MCV RBC AUTO: 92 FL (ref 79–97)
MONOCYTES # BLD AUTO: 0.59 10*3/MM3 (ref 0.1–0.9)
MONOCYTES NFR BLD AUTO: 9.8 % (ref 5–12)
NEUTROPHILS # BLD AUTO: 4.28 10*3/MM3 (ref 1.7–7)
NEUTROPHILS NFR BLD AUTO: 71.3 % (ref 42.7–76)
NITRITE UR QL STRIP: NEGATIVE
NRBC BLD AUTO-RTO: 0 /100 WBC (ref 0–0.2)
PH UR STRIP.AUTO: <=5 [PH] (ref 5–8)
PLATELET # BLD AUTO: 195 10*3/MM3 (ref 140–450)
PMV BLD AUTO: 10.2 FL (ref 6–12)
POTASSIUM BLD-SCNC: 4.6 MMOL/L (ref 3.5–5.2)
PROT SERPL-MCNC: 6.9 G/DL (ref 6–8.5)
PROT UR QL STRIP: NEGATIVE
RBC # BLD AUTO: 4.01 10*6/MM3 (ref 4.14–5.8)
SODIUM BLD-SCNC: 137 MMOL/L (ref 136–145)
SP GR UR STRIP: 1.02 (ref 1–1.03)
TROPONIN T SERPL-MCNC: 0.01 NG/ML (ref 0–0.03)
UROBILINOGEN UR QL STRIP: ABNORMAL
WBC NRBC COR # BLD: 6 10*3/MM3 (ref 3.4–10.8)

## 2019-11-17 PROCEDURE — 84484 ASSAY OF TROPONIN QUANT: CPT | Performed by: EMERGENCY MEDICINE

## 2019-11-17 PROCEDURE — 72110 X-RAY EXAM L-2 SPINE 4/>VWS: CPT

## 2019-11-17 PROCEDURE — 85025 COMPLETE CBC W/AUTO DIFF WBC: CPT | Performed by: EMERGENCY MEDICINE

## 2019-11-17 PROCEDURE — 80053 COMPREHEN METABOLIC PANEL: CPT | Performed by: EMERGENCY MEDICINE

## 2019-11-17 PROCEDURE — 82962 GLUCOSE BLOOD TEST: CPT

## 2019-11-17 PROCEDURE — 81003 URINALYSIS AUTO W/O SCOPE: CPT | Performed by: EMERGENCY MEDICINE

## 2019-11-17 PROCEDURE — 99285 EMERGENCY DEPT VISIT HI MDM: CPT

## 2019-11-17 PROCEDURE — 71046 X-RAY EXAM CHEST 2 VIEWS: CPT

## 2019-11-17 PROCEDURE — 70450 CT HEAD/BRAIN W/O DYE: CPT

## 2019-11-17 PROCEDURE — 93005 ELECTROCARDIOGRAM TRACING: CPT | Performed by: EMERGENCY MEDICINE

## 2019-11-17 PROCEDURE — 63710000001 INSULIN LISPRO (HUMAN) PER 5 UNITS: Performed by: HOSPITALIST

## 2019-11-17 PROCEDURE — 73610 X-RAY EXAM OF ANKLE: CPT

## 2019-11-17 PROCEDURE — 93010 ELECTROCARDIOGRAM REPORT: CPT | Performed by: INTERNAL MEDICINE

## 2019-11-17 RX ORDER — ATORVASTATIN CALCIUM 80 MG/1
80 TABLET, FILM COATED ORAL NIGHTLY
Status: DISCONTINUED | OUTPATIENT
Start: 2019-11-17 | End: 2019-11-19

## 2019-11-17 RX ORDER — SODIUM CHLORIDE 9 MG/ML
75 INJECTION, SOLUTION INTRAVENOUS CONTINUOUS
Status: DISCONTINUED | OUTPATIENT
Start: 2019-11-17 | End: 2019-11-19

## 2019-11-17 RX ORDER — ASPIRIN 325 MG
325 TABLET ORAL DAILY
Status: DISCONTINUED | OUTPATIENT
Start: 2019-11-18 | End: 2019-11-18

## 2019-11-17 RX ORDER — NICOTINE POLACRILEX 4 MG
15 LOZENGE BUCCAL
Status: DISCONTINUED | OUTPATIENT
Start: 2019-11-17 | End: 2019-11-18

## 2019-11-17 RX ORDER — OMEGA-3S/DHA/EPA/FISH OIL/D3 300MG-1000
800 CAPSULE ORAL DAILY
COMMUNITY
End: 2022-05-18

## 2019-11-17 RX ORDER — PANTOPRAZOLE SODIUM 40 MG/1
40 TABLET, DELAYED RELEASE ORAL
Status: DISCONTINUED | OUTPATIENT
Start: 2019-11-18 | End: 2019-11-18

## 2019-11-17 RX ORDER — PHENOL 1.4 %
600 AEROSOL, SPRAY (ML) MUCOUS MEMBRANE DAILY
COMMUNITY
End: 2022-05-18

## 2019-11-17 RX ORDER — ALOGLIPTIN 6.25 MG/1
TABLET, FILM COATED ORAL
COMMUNITY
End: 2022-05-18

## 2019-11-17 RX ORDER — ASPIRIN 300 MG/1
300 SUPPOSITORY RECTAL ONCE
Status: COMPLETED | OUTPATIENT
Start: 2019-11-17 | End: 2019-11-17

## 2019-11-17 RX ORDER — DEXTROSE MONOHYDRATE 25 G/50ML
25 INJECTION, SOLUTION INTRAVENOUS
Status: DISCONTINUED | OUTPATIENT
Start: 2019-11-17 | End: 2019-11-18

## 2019-11-17 RX ADMIN — INSULIN LISPRO 5 UNITS: 100 INJECTION, SOLUTION INTRAVENOUS; SUBCUTANEOUS at 21:37

## 2019-11-17 RX ADMIN — ASPIRIN 300 MG: 300 SUPPOSITORY RECTAL at 16:36

## 2019-11-17 RX ADMIN — SODIUM CHLORIDE 75 ML/HR: 9 INJECTION, SOLUTION INTRAVENOUS at 20:02

## 2019-11-17 NOTE — ED TRIAGE NOTES
Pt has left sided facial droop and slurred speech, niece went to check on him today states faucet was running and patient could not button his coat. Pt last seen normal Tuesday. Pt told niece he fell on Tuesday.
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
## 2019-11-18 ENCOUNTER — APPOINTMENT (OUTPATIENT)
Dept: CARDIOLOGY | Facility: HOSPITAL | Age: 84
End: 2019-11-18

## 2019-11-18 ENCOUNTER — APPOINTMENT (OUTPATIENT)
Dept: MRI IMAGING | Facility: HOSPITAL | Age: 84
End: 2019-11-18

## 2019-11-18 LAB
ANION GAP SERPL CALCULATED.3IONS-SCNC: 11.4 MMOL/L (ref 5–15)
BASOPHILS # BLD AUTO: 0.07 10*3/MM3 (ref 0–0.2)
BASOPHILS NFR BLD AUTO: 1.1 % (ref 0–1.5)
BH CV XLRA MEAS LEFT CCA RATIO VEL: -72.1 CM/SEC
BH CV XLRA MEAS LEFT DIST CCA EDV: -17.6 CM/SEC
BH CV XLRA MEAS LEFT DIST CCA PSV: -72.1 CM/SEC
BH CV XLRA MEAS LEFT DIST ICA EDV: -8.5 CM/SEC
BH CV XLRA MEAS LEFT DIST ICA PSV: -36.1 CM/SEC
BH CV XLRA MEAS LEFT ICA RATIO VEL: 55.8 CM/SEC
BH CV XLRA MEAS LEFT ICA/CCA RATIO: -0.77
BH CV XLRA MEAS LEFT MID ICA EDV: 14.1 CM/SEC
BH CV XLRA MEAS LEFT MID ICA PSV: 55.8 CM/SEC
BH CV XLRA MEAS LEFT PROX CCA EDV: 19.6 CM/SEC
BH CV XLRA MEAS LEFT PROX CCA PSV: 97.4 CM/SEC
BH CV XLRA MEAS LEFT PROX ECA EDV: -11.7 CM/SEC
BH CV XLRA MEAS LEFT PROX ECA PSV: -85 CM/SEC
BH CV XLRA MEAS LEFT PROX ICA EDV: -15.3 CM/SEC
BH CV XLRA MEAS LEFT PROX ICA PSV: -45.2 CM/SEC
BH CV XLRA MEAS LEFT PROX SCLA PSV: 70.9 CM/SEC
BH CV XLRA MEAS LEFT VERTEBRAL A EDV: -9.8 CM/SEC
BH CV XLRA MEAS LEFT VERTEBRAL A PSV: -44 CM/SEC
BH CV XLRA MEAS RIGHT CCA RATIO VEL: -51.9 CM/SEC
BH CV XLRA MEAS RIGHT DIST CCA EDV: -9.4 CM/SEC
BH CV XLRA MEAS RIGHT DIST CCA PSV: -51.9 CM/SEC
BH CV XLRA MEAS RIGHT DIST ICA EDV: -25.1 CM/SEC
BH CV XLRA MEAS RIGHT DIST ICA PSV: -75.8 CM/SEC
BH CV XLRA MEAS RIGHT ICA RATIO VEL: -75.8 CM/SEC
BH CV XLRA MEAS RIGHT ICA/CCA RATIO: 1.5
BH CV XLRA MEAS RIGHT MID ICA EDV: -22.4 CM/SEC
BH CV XLRA MEAS RIGHT MID ICA PSV: -63.6 CM/SEC
BH CV XLRA MEAS RIGHT PROX CCA EDV: -21.7 CM/SEC
BH CV XLRA MEAS RIGHT PROX CCA PSV: -86.2 CM/SEC
BH CV XLRA MEAS RIGHT PROX ECA EDV: -11 CM/SEC
BH CV XLRA MEAS RIGHT PROX ECA PSV: -99 CM/SEC
BH CV XLRA MEAS RIGHT PROX ICA EDV: -16.5 CM/SEC
BH CV XLRA MEAS RIGHT PROX ICA PSV: -52.6 CM/SEC
BH CV XLRA MEAS RIGHT PROX SCLA PSV: 302 CM/SEC
BH CV XLRA MEAS RIGHT VERTEBRAL A EDV: -14 CM/SEC
BH CV XLRA MEAS RIGHT VERTEBRAL A PSV: -39.3 CM/SEC
BUN BLD-MCNC: 35 MG/DL (ref 8–23)
BUN/CREAT SERPL: 15.7 (ref 7–25)
CALCIUM SPEC-SCNC: 9.1 MG/DL (ref 8.6–10.5)
CHLORIDE SERPL-SCNC: 111 MMOL/L (ref 98–107)
CO2 SERPL-SCNC: 19.6 MMOL/L (ref 22–29)
CREAT BLD-MCNC: 2.23 MG/DL (ref 0.76–1.27)
DEPRECATED RDW RBC AUTO: 42.5 FL (ref 37–54)
EOSINOPHIL # BLD AUTO: 0.24 10*3/MM3 (ref 0–0.4)
EOSINOPHIL NFR BLD AUTO: 3.8 % (ref 0.3–6.2)
ERYTHROCYTE [DISTWIDTH] IN BLOOD BY AUTOMATED COUNT: 12.9 % (ref 12.3–15.4)
FOLATE SERPL-MCNC: >20 NG/ML (ref 4.78–24.2)
GFR SERPL CREATININE-BSD FRML MDRD: 28 ML/MIN/1.73
GLUCOSE BLD-MCNC: 154 MG/DL (ref 65–99)
GLUCOSE BLDC GLUCOMTR-MCNC: 150 MG/DL (ref 70–130)
GLUCOSE BLDC GLUCOMTR-MCNC: 283 MG/DL (ref 70–130)
GLUCOSE BLDC GLUCOMTR-MCNC: 297 MG/DL (ref 70–130)
GLUCOSE BLDC GLUCOMTR-MCNC: 99 MG/DL (ref 70–130)
HCT VFR BLD AUTO: 36.6 % (ref 37.5–51)
HGB BLD-MCNC: 11.9 G/DL (ref 13–17.7)
IMM GRANULOCYTES # BLD AUTO: 0.02 10*3/MM3 (ref 0–0.05)
IMM GRANULOCYTES NFR BLD AUTO: 0.3 % (ref 0–0.5)
LEFT ARM BP: NORMAL MMHG
LYMPHOCYTES # BLD AUTO: 1.52 10*3/MM3 (ref 0.7–3.1)
LYMPHOCYTES NFR BLD AUTO: 23.8 % (ref 19.6–45.3)
MCH RBC QN AUTO: 29.2 PG (ref 26.6–33)
MCHC RBC AUTO-ENTMCNC: 32.5 G/DL (ref 31.5–35.7)
MCV RBC AUTO: 89.9 FL (ref 79–97)
MONOCYTES # BLD AUTO: 0.66 10*3/MM3 (ref 0.1–0.9)
MONOCYTES NFR BLD AUTO: 10.3 % (ref 5–12)
NEUTROPHILS # BLD AUTO: 3.89 10*3/MM3 (ref 1.7–7)
NEUTROPHILS NFR BLD AUTO: 60.7 % (ref 42.7–76)
NRBC BLD AUTO-RTO: 0 /100 WBC (ref 0–0.2)
PLATELET # BLD AUTO: 199 10*3/MM3 (ref 140–450)
PMV BLD AUTO: 10.6 FL (ref 6–12)
POTASSIUM BLD-SCNC: 4.4 MMOL/L (ref 3.5–5.2)
RBC # BLD AUTO: 4.07 10*6/MM3 (ref 4.14–5.8)
RIGHT ARM BP: NORMAL MMHG
SODIUM BLD-SCNC: 142 MMOL/L (ref 136–145)
VIT B12 BLD-MCNC: 909 PG/ML (ref 211–946)
WBC NRBC COR # BLD: 6.4 10*3/MM3 (ref 3.4–10.8)

## 2019-11-18 PROCEDURE — 70551 MRI BRAIN STEM W/O DYE: CPT

## 2019-11-18 PROCEDURE — 82962 GLUCOSE BLOOD TEST: CPT

## 2019-11-18 PROCEDURE — 92610 EVALUATE SWALLOWING FUNCTION: CPT

## 2019-11-18 PROCEDURE — 82746 ASSAY OF FOLIC ACID SERUM: CPT | Performed by: PSYCHIATRY & NEUROLOGY

## 2019-11-18 PROCEDURE — 63710000001 INSULIN LISPRO (HUMAN) PER 5 UNITS: Performed by: HOSPITALIST

## 2019-11-18 PROCEDURE — 99222 1ST HOSP IP/OBS MODERATE 55: CPT | Performed by: PSYCHIATRY & NEUROLOGY

## 2019-11-18 PROCEDURE — 97110 THERAPEUTIC EXERCISES: CPT

## 2019-11-18 PROCEDURE — 63710000001 INSULIN GLARGINE PER 5 UNITS: Performed by: HOSPITALIST

## 2019-11-18 PROCEDURE — 93880 EXTRACRANIAL BILAT STUDY: CPT

## 2019-11-18 PROCEDURE — 80048 BASIC METABOLIC PNL TOTAL CA: CPT | Performed by: HOSPITALIST

## 2019-11-18 PROCEDURE — 85025 COMPLETE CBC W/AUTO DIFF WBC: CPT | Performed by: HOSPITALIST

## 2019-11-18 PROCEDURE — 97535 SELF CARE MNGMENT TRAINING: CPT

## 2019-11-18 PROCEDURE — 97165 OT EVAL LOW COMPLEX 30 MIN: CPT

## 2019-11-18 PROCEDURE — 94799 UNLISTED PULMONARY SVC/PX: CPT

## 2019-11-18 PROCEDURE — 97162 PT EVAL MOD COMPLEX 30 MIN: CPT

## 2019-11-18 PROCEDURE — 82607 VITAMIN B-12: CPT | Performed by: PSYCHIATRY & NEUROLOGY

## 2019-11-18 RX ORDER — LEVOTHYROXINE SODIUM 0.05 MG/1
50 TABLET ORAL
Status: DISCONTINUED | OUTPATIENT
Start: 2019-11-19 | End: 2019-11-20 | Stop reason: HOSPADM

## 2019-11-18 RX ORDER — PANTOPRAZOLE SODIUM 40 MG/1
40 TABLET, DELAYED RELEASE ORAL DAILY
Status: DISCONTINUED | OUTPATIENT
Start: 2019-11-18 | End: 2019-11-18

## 2019-11-18 RX ORDER — ASPIRIN 300 MG/1
300 SUPPOSITORY RECTAL ONCE
Status: DISCONTINUED | OUTPATIENT
Start: 2019-11-18 | End: 2019-11-18

## 2019-11-18 RX ORDER — CLOPIDOGREL BISULFATE 75 MG/1
75 TABLET ORAL DAILY
Status: DISCONTINUED | OUTPATIENT
Start: 2019-11-18 | End: 2019-11-20 | Stop reason: HOSPADM

## 2019-11-18 RX ORDER — OMEGA-3S/DHA/EPA/FISH OIL/D3 300MG-1000
800 CAPSULE ORAL DAILY
Status: DISCONTINUED | OUTPATIENT
Start: 2019-11-18 | End: 2019-11-18

## 2019-11-18 RX ORDER — ASPIRIN 325 MG
325 TABLET ORAL DAILY
Status: DISCONTINUED | OUTPATIENT
Start: 2019-11-18 | End: 2019-11-19

## 2019-11-18 RX ORDER — ASPIRIN 300 MG/1
300 SUPPOSITORY RECTAL DAILY
Status: DISCONTINUED | OUTPATIENT
Start: 2019-11-18 | End: 2019-11-18

## 2019-11-18 RX ORDER — INSULIN GLARGINE 100 [IU]/ML
20 INJECTION, SOLUTION SUBCUTANEOUS DAILY
Status: DISCONTINUED | OUTPATIENT
Start: 2019-11-18 | End: 2019-11-20

## 2019-11-18 RX ORDER — FAMOTIDINE 20 MG/1
20 TABLET, FILM COATED ORAL 2 TIMES DAILY
Status: DISCONTINUED | OUTPATIENT
Start: 2019-11-18 | End: 2019-11-20 | Stop reason: DRUGHIGH

## 2019-11-18 RX ORDER — LISINOPRIL 20 MG/1
20 TABLET ORAL DAILY
Status: DISCONTINUED | OUTPATIENT
Start: 2019-11-18 | End: 2019-11-18

## 2019-11-18 RX ORDER — AMLODIPINE BESYLATE 10 MG/1
10 TABLET ORAL
Status: DISCONTINUED | OUTPATIENT
Start: 2019-11-18 | End: 2019-11-20 | Stop reason: HOSPADM

## 2019-11-18 RX ORDER — BUDESONIDE 0.5 MG/2ML
0.5 INHALANT ORAL
Status: DISCONTINUED | OUTPATIENT
Start: 2019-11-18 | End: 2019-11-20 | Stop reason: HOSPADM

## 2019-11-18 RX ORDER — PANTOPRAZOLE SODIUM 40 MG/10ML
40 INJECTION, POWDER, LYOPHILIZED, FOR SOLUTION INTRAVENOUS
Status: DISCONTINUED | OUTPATIENT
Start: 2019-11-18 | End: 2019-11-18

## 2019-11-18 RX ADMIN — INSULIN LISPRO 7 UNITS: 100 INJECTION, SOLUTION INTRAVENOUS; SUBCUTANEOUS at 16:42

## 2019-11-18 RX ADMIN — CLOPIDOGREL 75 MG: 75 TABLET, FILM COATED ORAL at 22:35

## 2019-11-18 RX ADMIN — ATORVASTATIN CALCIUM 80 MG: 80 TABLET, FILM COATED ORAL at 22:35

## 2019-11-18 RX ADMIN — BUDESONIDE 0.5 MG: 0.5 INHALANT RESPIRATORY (INHALATION) at 12:30

## 2019-11-18 RX ADMIN — ASPIRIN 325 MG: 325 TABLET ORAL at 11:21

## 2019-11-18 RX ADMIN — INSULIN LISPRO 4 UNITS: 100 INJECTION, SOLUTION INTRAVENOUS; SUBCUTANEOUS at 11:21

## 2019-11-18 RX ADMIN — BUDESONIDE 0.5 MG: 0.5 INHALANT RESPIRATORY (INHALATION) at 20:37

## 2019-11-18 RX ADMIN — INSULIN GLARGINE 20 UNITS: 100 INJECTION, SOLUTION SUBCUTANEOUS at 16:42

## 2019-11-18 RX ADMIN — INSULIN LISPRO 4 UNITS: 100 INJECTION, SOLUTION INTRAVENOUS; SUBCUTANEOUS at 16:40

## 2019-11-18 RX ADMIN — FAMOTIDINE 20 MG: 20 TABLET, FILM COATED ORAL at 22:35

## 2019-11-18 NOTE — NURSING NOTE
11/18/19 0954   Colostomy LLQ   Placement Date/Time: 01/01/14 0000   Location: LLQ   Stomal Appliance 1 piece;Changed   Stoma Appearance round;moist;pink   Peristomal Assessment Clean;Intact   Accessories/Skin Care appliance belt;cleansed with water;skin barrier ring;skin barrier strip   Stoma Function flatus;stool   Stool Color brown   Stool Consistency soft;formed   Treatment Bag change   Output (mL) (formed stool)   CWOCN For pouch change.  Patient with hernia belt in place.  Supplies in room.

## 2019-11-18 NOTE — H&P
History and physical    Primary care physician  Dr. HYDE    Chief complaint   Slurred speech    History of present illness  54-year-old -American male with history of hypertension hyperlipidemia diabetes mellitus TIA and chronic kidney disease stage III presented to [facility]'s emergency room with slurred speech started yesterday.  Patient is a poor historian and according to family that he was doing okay 5 days ago and then he developed this confusion followed by slurred speech.  Patient denies any focal weakness.  Patient evaluated in ER initial work-up was negative but according to family he has left facial droop.  Patient admitted for further management.  At the time of interview he wants to eat and onset all questions appropriately and again no headache chest pain shortness of breath or focal deficit    PAST MEDICAL HISTORY  • Anemia     • Arthritis     • Chronic kidney disease     • Clostridium difficile infection 10/2014   • Diabetes mellitus (CMS/[facility])     • Disease of thyroid gland     • Duodenal ulcer 08/2014   • DVT (deep venous thrombosis) (CMS/[facility]) 01/2015   • Electrocution 1971   • GERD (gastroesophageal reflux disease)     • Hearing loss     • Hemorrhagic shock (CMS/[facility]) 2015   • Histiocytoma (CMS/[facility])     • History of blood transfusion 2015   • History of chemotherapy     • History of radiation therapy 2014   • Hypercholesterolemia     • Hyperkalemia     • Hypertension     • Hypoglycemia 01/04/2018   • Mixed hyperlipidemia     • Multiple lung nodules     • Neuropathy     • OA (osteoarthritis)     • ANNE MARIE (obstructive sleep apnea)     • Peptic ulceration 2014   • Pneumonia 03/29/2013   • Rectal cancer (CMS/[facility]) 06/2014   • Stroke (CMS/[facility]) 11/28/2017   • Vitamin D deficiency        PAST SURGICAL HISTORY  Surgical History         Past Surgical History:   Procedure Laterality Date   • AMPUTATION FOOT Left 1971     PARTIAL DUE TO AN ELECTRICAL SHOCK INJURY   • ARM TENDON REPAIR Right        DONE BY DR. KUTZ AND KLEINERT   • COLON RESECTION N/A 11/14/2014     LOW ANTERIOR RESECTION WITH CREATION OF COLOSTOMY, DR. TRACE HERRING AT [facility]   • COLONOSCOPY W/ BIOPSIES N/A 06/26/2014     ULCERATED 5CM MASS IN RECTUM, PATH: MODERATELY DIFFERENTIATED ADENOCARCINOMA, MULTIPLE DIVERTICULA IN SIGMOID, DR. LAUREN JAMES AT [facility]   • COLONOSCOPY W/ BIOPSIES N/A 07/09/2014     RECTAL MASS: INVASIVE MODERATLEY DIFFERIENTIATED ADENOCARCINOMA, AREA TATTOOED, DR. TRACE HERRING AT [facility]   • ENDOSCOPY   02/09/2015   • ENDOSCOPY N/A 02/09/2015     RESOLVED DUODENAL ULCER, EGD WNL, DR. FREDDIE MALCOLM AT [facility]   • FLEXIBLE SIGMOIDOSCOPY N/A 10/06/2014     MALIGNANT PARTIALLY OBSTRUCTING TUMOR IN MID RECTUM, DR. DELTA HERRING AT [facility]   • PORTACATH PLACEMENT Left 07/24/2014     LEFT SUBCLAVIAN, DR. KWAKU TAY AT [facility]   • RECTAL ULTRASOUND N/A 07/09/2014     ENDORECTAL US FOR CANCER STAGING, T3N1 RECTAL CANCER AT 7 CM, DR. TRACE HERRING AT [facility]   • TRACHEAL SURGERY N/A 08/31/2014     ENDOTRACHEAL INTUBATION, DR. ALMA ROSA HAGAN AT [facility]   • VENOUS ACCESS DEVICE (PORT) REMOVAL Left 03/12/2015     LEFT SUBCLAVIAN, DR. KWAKU TAY AT [facility]         FAMILY HISTORY        Family History   Problem Relation Age of Onset   • No Known Problems Mother     • No Known Problems Father     • No Known Problems Sister     • Cancer Brother           Lung cancer   • Lung cancer Brother     • No Known Problems Maternal Grandmother     • No Known Problems Maternal Grandfather     • No Known Problems Paternal Grandmother     • No Known Problems Paternal Grandfather     • Diabetes Other        SOCIAL HISTORY  Social History   Social History            Socioeconomic History   • Marital status: Single       Spouse name: Not on file   • Number of children: 0   • Years of education: High school   • Highest education level: Not on file   Occupational History   • Occupation:        Employer: RETIRED   Tobacco Use   • Smoking status: Former  "Smoker       Packs/day: 2.00       Years: 3.00       Pack years: 6.00       Types: Cigarettes       Last attempt to quit: 1980       Years since quittin.9   • Smokeless tobacco: Never Used   Substance and Sexual Activity   • Alcohol use: No   • Drug use: No   • Sexual activity: Defer         ALLERGIES  Patient has no known allergies.  Home medications reviewed    REVIEW OF SYSTEMS  Unable to perform ROS: Other (AMS)      PHYSICAL EXAM  Blood pressure 125/81, pulse 57, temperature 97.7 °F (36.5 °C), temperature source Oral, resp. rate 18, height 175.3 cm (69\"), weight 74.5 kg (164 lb 3.9 oz), SpO2 93 %.    Constitutional: He is oriented to person, place, and time. No distress.   Head: Normocephalic and atraumatic.   Eyes: EOM are normal. Pupils are equal, round, and reactive to light.   Neck: Normal range of motion. Neck supple.   Cardiovascular: Normal rate, regular rhythm and normal heart sounds.   Pulmonary/Chest: Effort normal and breath sounds normal. No respiratory distress.   Abdominal: Soft. There is no tenderness. There is no rebound and no guarding.   Musculoskeletal: Normal range of motion. He exhibits no edema.   Neurological: He is alert and oriented to person, place, and time. He has normal sensation and normal strength. Left facial droopSlurred speechAbnormal coordination to the left hand   Skin: Skin is warm and dry.   Psychiatric: Mood and affect normal.     LAB RESULTS  Lab Results (last 24 hours)     Procedure Component Value Units Date/Time    POC Glucose Once [[id number]]  (Abnormal) Collected:  19 1101    Specimen:  Blood Updated:  19 1102     Glucose 283 mg/dL     POC Glucose Once [[id number]]  (Abnormal) Collected:  19 0650    Specimen:  Blood Updated:  19 0652     Glucose 150 mg/dL     Basic Metabolic Panel [[id number]]  (Abnormal) Collected:  19 0508    Specimen:  Blood Updated:  19 0604     Glucose 154 mg/dL      BUN 35 mg/dL      Creatinine 2.23 mg/dL  "     Sodium 142 mmol/L      Potassium 4.4 mmol/L      Chloride 111 mmol/L      CO2 19.6 mmol/L      Calcium 9.1 mg/dL      eGFR Non African Amer 28 mL/min/1.73      BUN/Creatinine Ratio 15.7     Anion Gap 11.4 mmol/L     Narrative:       GFR Normal >60  Chronic Kidney Disease <60  Kidney Failure <15    CBC & Differential [[id number]] Collected:  11/18/19 0508    Specimen:  Blood Updated:  11/18/19 0554    Narrative:       The following orders were created for panel order CBC & Differential.  Procedure                               Abnormality         Status                     ---------                               -----------         ------                     CBC Auto Differential[[id number]]        Abnormal            Final result                 Please view results for these tests on the individual orders.    CBC Auto Differential [[id number]]  (Abnormal) Collected:  11/18/19 0508    Specimen:  Blood Updated:  11/18/19 0554     WBC 6.40 10*3/mm3      RBC 4.07 10*6/mm3      Hemoglobin 11.9 g/dL      Hematocrit 36.6 %      MCV 89.9 fL      MCH 29.2 pg      MCHC 32.5 g/dL      RDW 12.9 %      RDW-SD 42.5 fl      MPV 10.6 fL      Platelets 199 10*3/mm3      Neutrophil % 60.7 %      Lymphocyte % 23.8 %      Monocyte % 10.3 %      Eosinophil % 3.8 %      Basophil % 1.1 %      Immature Grans % 0.3 %      Neutrophils, Absolute 3.89 10*3/mm3      Lymphocytes, Absolute 1.52 10*3/mm3      Monocytes, Absolute 0.66 10*3/mm3      Eosinophils, Absolute 0.24 10*3/mm3      Basophils, Absolute 0.07 10*3/mm3      Immature Grans, Absolute 0.02 10*3/mm3      nRBC 0.0 /100 WBC     POC Glucose Once [[id number]]  (Abnormal) Collected:  11/17/19 2113    Specimen:  Blood Updated:  11/17/19 2115     Glucose 307 mg/dL     POC Glucose Once [[id number]]  (Abnormal) Collected:  11/17/19 1812    Specimen:  Blood Updated:  11/17/19 1814     Glucose 375 mg/dL     Comprehensive Metabolic Panel [[id number]]  (Abnormal) Collected:  11/17/19 1515     Specimen:  Blood Updated:  11/17/19 1559     Glucose 422 mg/dL      BUN 37 mg/dL      Creatinine 2.52 mg/dL      Sodium 137 mmol/L      Potassium 4.6 mmol/L      Chloride 105 mmol/L      CO2 19.2 mmol/L      Calcium 9.4 mg/dL      Total Protein 6.9 g/dL      Albumin 4.50 g/dL      ALT (SGPT) 24 U/L      AST (SGOT) 13 U/L      Alkaline Phosphatase 84 U/L      Total Bilirubin 0.4 mg/dL      eGFR Non African Amer 25 mL/min/1.73      Globulin 2.4 gm/dL      A/G Ratio 1.9 g/dL      BUN/Creatinine Ratio 14.7     Anion Gap 12.8 mmol/L     Narrative:       GFR Normal >60  Chronic Kidney Disease <60  Kidney Failure <15    Troponin [[id number]]  (Normal) Collected:  11/17/19 1515    Specimen:  Blood Updated:  11/17/19 1546     Troponin T 0.011 ng/mL     Narrative:       Troponin T Reference Range:  <= 0.03 ng/mL-   Negative for AMI  >0.03 ng/mL-     Abnormal for myocardial necrosis.  Clinicians would have to utilize clinical acumen, EKG, Troponin and serial changes to determine if it is an Acute Myocardial Infarction or myocardial injury due to an underlying chronic condition.     Urinalysis With Microscopic If Indicated (No Culture) - Urine, Clean Catch [[id number]]  (Abnormal) Collected:  11/17/19 1530    Specimen:  Urine, Clean Catch Updated:  11/17/19 1539     Color, UA Yellow     Appearance, UA Clear     pH, UA <=5.0     Specific Gravity, UA 1.022     Glucose, UA >=1000 mg/dL (3+)     Ketones, UA Negative     Bilirubin, UA Negative     Blood, UA Negative     Protein, UA Negative     Leuk Esterase, UA Negative     Nitrite, UA Negative     Urobilinogen, UA 0.2 E.U./dL    Narrative:       Urine microscopic not indicated.    CBC & Differential [[id number]] Collected:  11/17/19 1515    Specimen:  Blood Updated:  11/17/19 1523    Narrative:       The following orders were created for panel order CBC & Differential.  Procedure                               Abnormality         Status                     ---------                                -----------         ------                     CBC Auto Differential[[id number]]        Abnormal            Final result                 Please view results for these tests on the individual orders.    CBC Auto Differential [[id number]]  (Abnormal) Collected:  11/17/19 1515    Specimen:  Blood Updated:  11/17/19 1523     WBC 6.00 10*3/mm3      RBC 4.01 10*6/mm3      Hemoglobin 12.4 g/dL      Hematocrit 36.9 %      MCV 92.0 fL      MCH 30.9 pg      MCHC 33.6 g/dL      RDW 12.4 %      RDW-SD 41.6 fl      MPV 10.2 fL      Platelets 195 10*3/mm3      Neutrophil % 71.3 %      Lymphocyte % 15.7 %      Monocyte % 9.8 %      Eosinophil % 1.7 %      Basophil % 0.8 %      Immature Grans % 0.7 %      Neutrophils, Absolute 4.28 10*3/mm3      Lymphocytes, Absolute 0.94 10*3/mm3      Monocytes, Absolute 0.59 10*3/mm3      Eosinophils, Absolute 0.10 10*3/mm3      Basophils, Absolute 0.05 10*3/mm3      Immature Grans, Absolute 0.04 10*3/mm3      nRBC 0.0 /100 WBC     POC Glucose Once [[id number]]  (Abnormal) Collected:  11/17/19 1501    Specimen:  Blood Updated:  11/17/19 1503     Glucose 371 mg/dL     POC Glucose Once [[id number]]  (Abnormal) Collected:  11/17/19 1500    Specimen:  Blood Updated:  11/17/19 1503     Glucose 414 mg/dL         Imaging Results (Last 24 Hours)     Procedure Component Value Units Date/Time    CT Head Without Contrast [[id number]] Collected:  11/17/19 1624     Updated:  11/17/19 1845    Narrative:       CT SCAN OF THE BRAIN WITHOUT CONTRAST     HISTORY: Left facial droop and slurred speech. Fell several days ago.     The CT scan was performed as an emergency procedure through the brain  without contrast. There is prominent diffuse atrophy and chronic small  vessel ischemic change similar to the MRI scan dated 11/29/2017. There  is no evidence of acute intracranial hemorrhage are mass effect and the  visualized sinuses are clear.                 Radiation dose reduction techniques were utilized,  including automated  exposure control and exposure modulation based on body size.     This report was finalized on 11/17/2019 4:44 PM by Dr. Eloy Parmar M.D.       XR Chest 2 View [[id number]] Collected:  11/17/19 1642     Updated:  11/17/19 1646    Narrative:       TWO-VIEW CHEST     HISTORY: Shortness of breath. Rectal cancer.     FINDINGS: The lungs are moderately expanded and clear except for some  minimal vascular crowding and atelectasis at the bases. The heart is  borderline enlarged and no acute abnormality is seen.     FIVE-VIEW LUMBAR SPINE     HISTORY: Fell. Back pain.     FINDINGS: There is mild disc space narrowing and spurring at L5-S1.  There is also some mild facet spurring. The remainder of the lumbar  spine is unremarkable. There is no evidence of acute compression  fracture.     THREE-VIEW LEFT ANKLE     HISTORY: Fell. Pain.     FINDINGS: There is no fracture.     This report was finalized on 11/17/2019 4:43 PM by Dr. Eloy Parmar M.D.       XR Spine Lumbar Complete 4+VW [[id number]] Collected:  11/17/19 1642     Updated:  11/17/19 1646    Narrative:       TWO-VIEW CHEST     HISTORY: Shortness of breath. Rectal cancer.     FINDINGS: The lungs are moderately expanded and clear except for some  minimal vascular crowding and atelectasis at the bases. The heart is  borderline enlarged and no acute abnormality is seen.     FIVE-VIEW LUMBAR SPINE     HISTORY: Fell. Back pain.     FINDINGS: There is mild disc space narrowing and spurring at L5-S1.  There is also some mild facet spurring. The remainder of the lumbar  spine is unremarkable. There is no evidence of acute compression  fracture.     THREE-VIEW LEFT ANKLE     HISTORY: Fell. Pain.     FINDINGS: There is no fracture.     This report was finalized on 11/17/2019 4:43 PM by Dr. Eloy Parmar M.D.       XR Ankle 3+ View Left [[id number]] Collected:  11/17/19 1642     Updated:  11/17/19 1646    Narrative:       TWO-VIEW CHEST     HISTORY:  Shortness of breath. Rectal cancer.     FINDINGS: The lungs are moderately expanded and clear except for some  minimal vascular crowding and atelectasis at the bases. The heart is  borderline enlarged and no acute abnormality is seen.     FIVE-VIEW LUMBAR SPINE     HISTORY: Fell. Back pain.     FINDINGS: There is mild disc space narrowing and spurring at L5-S1.  There is also some mild facet spurring. The remainder of the lumbar  spine is unremarkable. There is no evidence of acute compression  fracture.     THREE-VIEW LEFT ANKLE     HISTORY: Fell. Pain.     FINDINGS: There is no fracture.     This report was finalized on 11/17/2019 4:43 PM by Dr. Eloy Parmar M.D.           ECG 12 Lead        HEART RATE= 75  bpm  RR Interval= 805  ms  VT Interval= 181  ms  P Horizontal Axis= 6  deg  P Front Axis= 44  deg  QRSD Interval= 120  ms  QT Interval= 394  ms  QRS Axis= -26  deg  T Wave Axis= -8  deg  - ABNORMAL ECG -  Sinus rhythm  Supraventricular bigeminy  Incomplete left bundle branch block  Probable left ventricular hypertrophy  NO SIGNIFICANT CHANGE FROM PREVIOUS ECG             Current Facility-Administered Medications:   •  amLODIPine (NORVASC) tablet 10 mg, 10 mg, Oral, Q24H, Jose Llanos MD  •  [DISCONTINUED] aspirin suppository 300 mg, 300 mg, Rectal, Daily **OR** aspirin tablet 325 mg, 325 mg, Oral, Daily, Jose Llanos MD, 325 mg at 11/18/19 1121  •  atorvastatin (LIPITOR) tablet 80 mg, 80 mg, Oral, Nightly, Jose Llanos MD, Stopped at 11/17/19 2100  •  budesonide (PULMICORT) nebulizer solution 0.5 mg, 0.5 mg, Nebulization, BID - RT, Jose Llanos MD  •  calcium-vitamin D 500-200 MG-UNIT tablet 1,000 mg, 1,000 mg, Oral, Daily, Jose Llanos MD  •  famotidine (PEPCID) tablet 20 mg, 20 mg, Oral, BID, Jose Llanos MD  •  insulin glargine (LANTUS) injection 20 Units, 20 Units, Subcutaneous, Daily, Jose Llanos MD  •  insulin lispro (humaLOG) injection 0-7 Units, 0-7 Units, Subcutaneous, 4x Daily With Meals &  Nightly, Rylee Llanos MD, 4 Units at 11/18/19 1121  •  [START ON 11/19/2019] levothyroxine (SYNTHROID, LEVOTHROID) tablet 50 mcg, 50 mcg, Oral, Q AM, Rylee Llanos MD  •  lisinopril (PRINIVIL,ZESTRIL) tablet 20 mg, 20 mg, Oral, Daily, Rylee Llanos MD  •  sodium chloride 0.9 % infusion, 75 mL/hr, Intravenous, Continuous, Rylee Llanos MD, Last Rate: 75 mL/hr at 11/17/19 2002, 75 mL/hr at 11/17/19 2002     ASSESSMENT  Speech with left facial droop and history of TIA rule out new CVA versus TIA  Poorly controlled diabetes mellitus  Hypertension  Hyperlipidemia  Chronic kidney disease stage III  Gastroesophageal reflux disease    PLAN  Admit  Aspirin Lipitor  IV fluid  Continue home medications  Accu-Chek with sliding scale insulin  Stress ulcer DVT prophylaxis  Neurology and nephrology consult  MRI of the brain 2D echo and carotid Doppler  PT OT and speech therapy eval and treatment  Supportive care  Patient is full code  Follow closely further recommendation according to hospital course    RYLEE LLANOS MD
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
## 2019-11-18 NOTE — PLAN OF CARE
Problem: Patient Care Overview  Goal: Plan of Care Review  Outcome: Ongoing (interventions implemented as appropriate)   11/17/19 5593   Coping/Psychosocial   Plan of Care Reviewed With patient   Plan of Care Review   Progress no change   OTHER   Outcome Summary Admitted from ER to floor at 1800. Blood glucose 375, other VSS. Dr. Llanos paged for admission orders. SCDs on. Urinal at bedside. NIHSS of 3.

## 2019-11-18 NOTE — PLAN OF CARE
Problem: Patient Care Overview  Goal: Plan of Care Review  Outcome: Ongoing (interventions implemented as appropriate)   11/18/19 5576   Coping/Psychosocial   Plan of Care Reviewed With patient   OTHER   Outcome Summary Pt presents to OT with decreased safety for adl tasks. Pt states lives alone . Pt will continue to benefit from OT for increasing safety and independence for adls

## 2019-11-18 NOTE — PLAN OF CARE
Problem: Patient Care Overview  Goal: Plan of Care Review   11/18/19 1932   Coping/Psychosocial   Plan of Care Reviewed With patient   OTHER   Outcome Summary Patient is a pleasant 84 y.o. male admitted to [facility] for slurred speech and L facial droop on 11/17/2019- CVA workup with CT negative but Neuro consult still pending. Patient is ambulatory and independent at baseline and lives at home alone- no prior use of AD. Today, patient performed bed mobility with SV, required SV-SBA for transfers, and ambulated a total of 150' SBA-CGA without an AD. Strength appears WFL and symmetrical. Mild balance deficits noted. Although patient appears near his baseline, patient may benefit from skilled PT services acutely to address functional deficits as well as improve level of independence prior to discharge. RN did report family is concerned with patient living at home alone- currently patient does not appear to be appropriate for SNF- may recommend home with  PT or hired assist at home if patient is still voicing concern.

## 2019-11-18 NOTE — PROGRESS NOTES
Discharge Planning Assessment  [facility]     Patient Name: Reece Stephen  MRN: [id number]  Today's Date: 11/18/2019    Admit Date: 11/17/2019    Discharge Needs Assessment     Row Name 11/18/19 1800       Living Environment    Lives With  alone    Current Living Arrangements  home/apartment/condo    Primary Care Provided by  self    Provides Primary Care For  no one    Family Caregiver if Needed  other relative(s)    Family Caregiver Names  niece, Geetha Stephen, [phone]    Quality of Family Relationships  helpful;involved;supportive    Able to Return to Prior Arrangements  yes       Resource/Environmental Concerns    Resource/Environmental Concerns  none       Transition Planning    Patient/Family Anticipates Transition to  home    Patient/Family Anticipated Services at Transition  none    Transportation Anticipated  family or friend will provide       Discharge Needs Assessment    Concerns to be Addressed  no discharge needs identified;denies needs/concerns at this time    Equipment Currently Used at Home  none    Discharge Coordination/Progress  Home        Discharge Plan     Row Name 11/18/19 1801       Plan    Plan  Home, denies needs    Patient/Family in Agreement with Plan  yes    Plan Comments  CCP met with pt to discuss d/c planning. IMM letter given. Facesheet verified. CCP role explained. Pt resides alone in a single level home, denies DME use, and reports past home health via [facility] [place] and past sub-acute rehab at [facility]. [facility], but opts to fill any d/c prescriptions via [facility] pharmacy (Meds to Beds enrollment updated in EPIC). Pt denies known d/c needs at this time and declines home health referral/need. CCP to follow to assist should d/c needs arise. Julianne Pugh LCSW        Destination      No service coordination in this encounter.      Durable Medical Equipment      No service coordination in this encounter.      Dialysis/Infusion      No  service coordination in this encounter.      Home Medical Care      No service coordination in this encounter.      Therapy      No service coordination in this encounter.      Community Resources      No service coordination in this encounter.          Demographic Summary     Row Name 11/18/19 1800       General Information    Admission Type  inpatient    Arrived From  home    Required Notices Provided  Important Message from Medicare    Referral Source  admission list    Reason for Consult  discharge planning    Preferred Language  English        Functional Status     Row Name 11/18/19 1800       Functional Status    Usual Activity Tolerance  good    Current Activity Tolerance  moderate       Functional Status, IADL    Medications  independent    Meal Preparation  independent    Housekeeping  independent    Laundry  independent    Shopping  independent       Mental Status Summary    Recent Changes in Mental Status/Cognitive Functioning  no changes        Psychosocial    No documentation.       Abuse/Neglect    No documentation.       Legal    No documentation.       Substance Abuse    No documentation.       Patient Forms    No documentation.           Maxine Pugh LCSW

## 2019-11-18 NOTE — PLAN OF CARE
Problem: Patient Care Overview  Goal: Plan of Care Review  Outcome: Ongoing (interventions implemented as appropriate)   11/18/19 0421   Coping/Psychosocial   Plan of Care Reviewed With patient   Plan of Care Review   Progress improving   OTHER   Outcome Summary Pt. NIH improved to a 2. Up with PT, pt recommends home with home health, MRI head pending VSS     Goal: Individualization and Mutuality  Outcome: Ongoing (interventions implemented as appropriate)    Goal: Discharge Needs Assessment  Outcome: Ongoing (interventions implemented as appropriate)    Goal: Interprofessional Rounds/Family Conf  Outcome: Ongoing (interventions implemented as appropriate)      Problem: Stroke (Ischemic) (Adult)  Goal: Signs and Symptoms of Listed Potential Problems Will be Absent, Minimized or Managed (Stroke)  Outcome: Ongoing (interventions implemented as appropriate)      Problem: Diabetes, Type 2 (Adult)  Goal: Signs and Symptoms of Listed Potential Problems Will be Absent, Minimized or Managed (Diabetes, Type 2)  Outcome: Ongoing (interventions implemented as appropriate)      Problem: Fall Risk (Adult)  Goal: Identify Related Risk Factors and Signs and Symptoms  Outcome: Outcome(s) achieved Date Met: 11/18/19    Goal: Absence of Fall  Outcome: Ongoing (interventions implemented as appropriate)

## 2019-11-18 NOTE — THERAPY EVALUATION
Acute Care - Occupational Therapy Initial Evaluation  [facility]     Patient Name: Reece Stephen  : 1935  MRN: [id number]  Today's Date: 2019             Admit Date: 2019       ICD-10-CM ICD-9-CM   1. Cerebrovascular accident (CVA), unspecified mechanism (CMS/HCC) I63.9 434.91   2. Hyperglycemia R73.9 790.29   3. Renal insufficiency N28.9 593.9     Patient Active Problem List   Diagnosis   • Rectal cancer (CMS/HCC)   • Lung nodule, multiple   • History of DVT (deep vein thrombosis)   • Transient cerebral ischemia   • Hyperkalemia   • Hyperosmolar non-ketotic state in patient with type 2 diabetes mellitus (CMS/HCC)   • DM (diabetes mellitus), secondary, uncontrolled, with hyperosmolarity (CMS/HCC)   • Hyponatremia with extracellular fluid depletion   • Hypothyroidism   • Hyperlipidemia LDL goal <70   • Diabetic polyneuropathy associated with type 2 diabetes mellitus (CMS/HCC)   • Stage 3 chronic kidney disease (CMS/HCC)   • Non compliance w medication regimen   • Essential hypertension   • Upper gastrointestinal hemorrhage   • Osteoarthritis   • Gastroesophageal reflux disease   • Deep vein thrombosis (CMS/HCC)   • Colitis due to Clostridium difficile   • Burn injury   • Anemia   • Cerebrovascular accident (CVA) (CMS/HCC)     Past Medical History:   Diagnosis Date   • Anemia    • Arthritis    • Chronic kidney disease     STAGE III, FOLLOWED BY DR. FREDDIE GONZALES   • Clostridium difficile infection 10/2014   • Diabetes mellitus (CMS/HCC)     TYPE 2, IDDM   • Disease of thyroid gland     ACQUIRED HYPOTHYROIDISM   • Duodenal ulcer 2014   • DVT (deep venous thrombosis) (CMS/HCC) 2015    PORT ASSOCIATED OF LEFT UPPER EXTREMITY   • Electrocution     HAD TO HAVE PARTIAL AMPUTATION OF LEFT FOOT   • GERD (gastroesophageal reflux disease)    • Hearing loss    • Hemorrhagic shock (CMS/HCC)     SECONDARY TO BLEEDING DUODENAL ULCER, ADMITTED TO [facility]   • Histiocytoma (CMS/HCC)    • History of  blood transfusion 2015    D/T BLEEDING DUODENAL ULCER   • History of chemotherapy     FOLLOWED BY DR. NIGEL NOBLE   • History of radiation therapy 2014    FOLLOWED BY DR. RJ HUI   • Hypercholesterolemia    • Hyperkalemia    • Hypertension    • Hypoglycemia 01/04/2018    [facility]   • Mixed hyperlipidemia    • Multiple lung nodules    • Neuropathy    • OA (osteoarthritis)    • ANNE MARIE (obstructive sleep apnea)    • Peptic ulceration 2014    Duodenal ulcer   • Pneumonia 03/29/2013    [facility]   • Rectal cancer (CMS/HCC) 06/2014    T3N1M0, S/P CHEMO, XRT, AND RESECTION, FOLLOWED BY DR. TRACE HERRING   • Stroke (CMS/HCC) 11/28/2017    TIA, [facility]   • Vitamin D deficiency      Past Surgical History:   Procedure Laterality Date   • AMPUTATION FOOT Left 1971    PARTIAL DUE TO AN ELECTRICAL SHOCK INJURY   • ARM TENDON REPAIR Right     DONE BY DR. OLEARY AND KLEINERT   • COLON RESECTION N/A 11/14/2014    LOW ANTERIOR RESECTION WITH CREATION OF COLOSTOMY, DR. TRACE [facility]ERRING [facility]   • COLONOSCOPY W/ BIOPSIES N/A 06/26/2014    ULCERATED 5CM MASS IN RECTUM, PATH: MODERATELY DIFFERENTIATED ADENOCARCINOMA, MULTIPLE DIVERTICULA IN SIGMOID, DR. LAUREN JAMES AT [facility]   • COLONOSCOPY W/ BIOPSIES N/A 07/09/2014    RECTAL MASS: INVASIVE MODERATLEY DIFFERIENTIATED ADENOCARCINOMA, AREA TATTOOED, DR. TRACE HERRING AT [facility]   • ENDOSCOPY  02/09/2015   • ENDOSCOPY N/A 02/09/2015    RESOLVED DUODENAL ULCER, EGD WNL, DR. FREDDIE MALCOLM AT [facility]   • FLEXIBLE SIGMOIDOSCOPY N/A 10/06/2014    MALIGNANT PARTIALLY OBSTRUCTING TUMOR IN MID RECTUM, DR. DELTA HERRING AT [facility]   • PORTACATH PLACEMENT Left 07/24/2014    LEFT SUBCLAVIAN, DR. KWAKU TAY AT [facility]   • RECTAL ULTRASOUND N/A 07/09/2014    ENDORECTAL US FOR CANCER STAGING, T3N1 RECTAL CANCER AT 7 CM, DR. TRACE HERRING AT [facility]   • TRACHEAL SURGERY N/A 08/31/2014    ENDOTRACHEAL INTUBATION, DR. ALMA ROSA HAGAN AT [facility]   • VENOUS ACCESS DEVICE (PORT) REMOVAL Left  03/12/2015    LEFT SUBCLAVIAN, DR. KWAKU TAY AT [facility]          OT ASSESSMENT FLOWSHEET (last 12 hours)      Occupational Therapy Evaluation     Row Name 11/18/19 1527                   OT Evaluation Time/Intention    Subjective Information  no complaints  -SG        Document Type  evaluation  -SG        Mode of Treatment  individual therapy;occupational therapy  -SG        Patient Effort  good  -SG           General Information    Patient Profile Reviewed?  yes  -SG        Patient Observations  alert;cooperative;agree to therapy  -SG        General Observations of Patient  sitting in chair  -SG        Prior Level of Function  independent:;ADL's  -SG           Cognitive Assessment/Intervention- PT/OT    Orientation Status (Cognition)  oriented x 3  -SG        Follows Commands (Cognition)  WFL  -SG           ADL Assessment/Intervention    BADL Assessment/Intervention  upper body dressing;toileting  -SG           Upper Body Dressing Assessment/Training    Upper Body Dressing [place] Level  upper body dressing skills;supervision  -SG        Upper Body Dressing Position  supported sitting  -SG           BADL Safety/Performance    Impairments, BADL Safety/Performance  endurance/activity tolerance  -SG        Skilled BADL Treatment/Intervention  BADL process/adaptation training  -SG           General ROM    GENERAL ROM COMMENTS  BUE's WFL AROM  -SG           Sensory Assessment/Intervention    Sensory General Assessment  no sensation deficits identified BUE's  -SG           Positioning and Restraints    Pre-Treatment Position  sitting in chair/recliner  -SG        Post Treatment Position  chair  -SG        In Chair  reclined;call light within reach;encouraged to call for assist;exit alarm on  -SG           Pain Scale: Numbers Pre/Post-Treatment    Pain Scale: Numbers, Pretreatment  0/10 - no pain  -SG        Pain Scale: Numbers, Post-Treatment  0/10 - no pain  -SG           Clinical Impression (OT)    OT Diagnosis   need for assist with personal care  -        Criteria for Skilled Therapeutic Interventions Met (OT Eval)  yes;treatment indicated  -SG        Therapy Frequency (OT Eval)  5 times/wk  -SG        Care Plan Review (OT)  evaluation/treatment results reviewed  -SG           Planned OT Interventions    Planned Therapy Interventions (OT Eval)  BADL retraining;transfer/mobility retraining  -SG           OT Goals    Transfer Goal Selection (OT)  transfer, OT goal 1  -SG        Dressing Goal Selection (OT)  dressing, OT goal 1  -SG        Toileting Goal Selection (OT)  toileting, OT goal 1  -SG           Transfer Goal 1 (OT)    Activity/Assistive Device (Transfer Goal 1, OT)  toilet  -SG        [place] Level/Cues Needed (Transfer Goal 1, OT)  supervision required  -SG        Time Frame (Transfer Goal 1, OT)  1 week  -SG        Progress/Outcome (Transfer Goal 1, OT)  goal ongoing  -SG           Dressing Goal 1 (OT)    Activity/Assistive Device (Dressing Goal 1, OT)  dressing skills, all  -SG        [place]/Cues Needed (Dressing Goal 1, OT)  supervision required  -SG        Time Frame (Dressing Goal 1, OT)  1 week  -SG        Progress/Outcome (Dressing Goal 1, OT)  goal ongoing  -SG           Toileting Goal 1 (OT)    Activity/Device (Toileting Goal 1, OT)  toileting skills, all  -SG        [place] Level/Cues Needed (Toileting Goal 1, OT)  supervision required  -SG        Time Frame (Toileting Goal 1, OT)  1 week  -SG        Progress/Outcome (Toileting Goal 1, OT)  goal ongoing  -SG          User Key  (r) = Recorded By, (t) = Taken By, (c) = Cosigned By    Initials Name Effective Dates    Elke Garica, OTR 06/08/18 -          Occupational Therapy Education     Title: PT OT SLP Therapies (In Progress)     Topic: Occupational Therapy (In Progress)     Point: ADL training (Done)     Description: Instruct learner(s) on proper safety adaptation and remediation techniques during self care or transfers.    Instruct in proper use of assistive devices.    Learning Progress Summary           Patient Acceptance, E,TB,D, VU by  at 11/18/2019  3:38 PM    Comment:  role of OT and POC, safety for adl                               User Key     Initials Effective Dates Name Provider Type Discipline     06/08/18 -  Elke Gross OTR Occupational Therapist OT                  OT Recommendation and Plan  Planned Therapy Interventions (OT Eval): BADL retraining, transfer/mobility retraining  Therapy Frequency (OT Eval): 5 times/wk  Plan of Care Review  Plan of Care Reviewed With: patient  Plan of Care Reviewed With: patient  Outcome Summary: Pt presents to OT with decreased safety for adl tasks. Pt states lives alone .  Pt will continue to benefit from OT for increasing safety and independence for adls    Outcome Measures     Row Name 11/18/19 4050             How much help from another is currently needed...    Putting on and taking off regular lower body clothing?  3  -SG      Bathing (including washing, rinsing, and drying)  3  -SG      Toileting (which includes using toilet bed pan or urinal)  3  -SG      Putting on and taking off regular upper body clothing  3  -SG      Taking care of personal grooming (such as brushing teeth)  3  -SG      Eating meals  4  -SG      AM-PAC 6 Clicks Score (OT)  19  -SG         Modified [place] Scale    Modified [place] Scale  4 - Moderately severe disability.  Unable to walk without assistance, and unable to attend to own bodily needs without assistance.  -         Functional Assessment    Outcome Measure Options  AM-PAC 6 Clicks Daily Activity (OT);Modified [place]  -SG        User Key  (r) = Recorded By, (t) = Taken By, (c) = Cosigned By    Initials Name Provider Type    SG Elke Gross OTR Occupational Therapist          Time Calculation:   Time Calculation- OT     Row Name 11/18/19 9812             Time Calculation- OT    OT Start Time  1441  -SG      OT Stop Time  1454  -SG       OT Time Calculation (min)  13 min  -      Total Timed Code Minutes- OT  8 minute(s)  -SG      OT Received On  11/18/19  -      OT Goal Re-Cert Due Date  11/25/19  -SG        User Key  (r) = Recorded By, (t) = Taken By, (c) = Cosigned By    Initials Name Provider Type    Elke Garcia OTR Occupational Therapist        Therapy Charges for Today     Code Description Service Date Service Provider Modifiers Qty    [id number]  OT EVAL LOW COMPLEXITY 2 11/18/2019 Elke Gross OTR GO 1    [id number]  OT SELF CARE/MGMT/TRAIN EA 15 MIN 11/18/2019 Elke Gross OTR GO 1               DOMINIK Jones  11/18/2019

## 2019-11-18 NOTE — PLAN OF CARE
Problem: Patient Care Overview  Goal: Plan of Care Review  Outcome: Ongoing (interventions implemented as appropriate)   11/17/19 1858 11/18/19 0016 11/18/19 0600   Coping/Psychosocial   Plan of Care Reviewed With --  patient --    Plan of Care Review   Progress no change --  --    OTHER   Outcome Summary --  --  AOx4, NIH=3, somewhat impulsive but follows commands appropriately. VSS on room air. Neuro consult called. Glucose 301 overnight and treated with sliding scale insulin. Patient remains NPO d/t failed bedside dysphagia screen. Labs, SLP, Neuro, and WOCN consult pending this AM. No s/s distress observed. Will cont to monitor.       Problem: Stroke (Ischemic) (Adult)  Goal: Signs and Symptoms of Listed Potential Problems Will be Absent, Minimized or Managed (Stroke)  Outcome: Ongoing (interventions implemented as appropriate)      Problem: Diabetes, Type 2 (Adult)  Goal: Signs and Symptoms of Listed Potential Problems Will be Absent, Minimized or Managed (Diabetes, Type 2)  Outcome: Ongoing (interventions implemented as appropriate)      Problem: Fall Risk (Adult)  Goal: Identify Related Risk Factors and Signs and Symptoms  Outcome: Ongoing (interventions implemented as appropriate)    Goal: Absence of Fall  Outcome: Ongoing (interventions implemented as appropriate)

## 2019-11-18 NOTE — CONSULTS
Neurology Note    Patient:  Reece Stephen    YOB: 1935    REFERRING PHYSICIAN:  Jose Llanos MD    CHIEF COMPLAINT:    Facial droop    HISTORY OF PRESENT ILLNESS:   The patient is a 84 y.o. male with DM, HTN, HLP, colon CA, GIB, TIA in 2017, supposed to be on low dose aspirin but not taking, seen in the neurology clinic by Randee Watts in February. The patient presented to ED last pm with c/o slurred speech and facial droop, unsteady gait and loss of coordination, left hand clumsiness and confusion noted by his family. He lives alone and his LKN was 5 days ago. He also had a fall on 11/16 with lower back and ankle injury. In ED /66, NSR, T99.2. CT head with chronic small vessel disease, personally reviewed. He has been cleared by dysphagia, reports feeling better today.    Past Medical History:  Past Medical History:   Diagnosis Date   • Anemia    • Arthritis    • Chronic kidney disease     STAGE III, FOLLOWED BY DR. FREDDIE GONZALES   • Clostridium difficile infection 10/2014   • Diabetes mellitus (CMS/[facility])     TYPE 2, IDDM   • Disease of thyroid gland     ACQUIRED HYPOTHYROIDISM   • Duodenal ulcer 08/2014   • DVT (deep venous thrombosis) (CMS/HCC) 01/2015    PORT ASSOCIATED OF LEFT UPPER EXTREMITY   • Electrocution 1971    HAD TO HAVE PARTIAL AMPUTATION OF LEFT FOOT   • GERD (gastroesophageal reflux disease)    • Hearing loss    • Hemorrhagic shock (CMS/HCC) 2015    SECONDARY TO BLEEDING DUODENAL ULCER, ADMITTED TO [facility]   • Histiocytoma (CMS/HCC)    • History of blood transfusion 2015    D/T BLEEDING DUODENAL ULCER   • History of chemotherapy     FOLLOWED BY DR. NIGEL NOBLE   • History of radiation therapy 2014    FOLLOWED BY DR. RJ HUI   • Hypercholesterolemia    • Hyperkalemia    • Hypertension    • Hypoglycemia 01/04/2018    SEEN AT [facility] ER   • Mixed hyperlipidemia    • Multiple lung nodules    • Neuropathy    • OA (osteoarthritis)    • ANNE MARIE (obstructive sleep apnea)    • Peptic  ulceration 2014    Duodenal ulcer   • Pneumonia 03/29/2013    ADMITTED TO [facility]   • Rectal cancer (CMS/[facility]) 06/2014    T3N1M0, S/P CHEMO, XRT, AND RESECTION, FOLLOWED BY DR. TRACE HERRING   • Stroke (CMS/[facility]) 11/28/2017    TIA, ADMITTED TO [facility]   • Vitamin D deficiency        Past Surgical History:  Past Surgical History:   Procedure Laterality Date   • AMPUTATION FOOT Left 1971    PARTIAL DUE TO AN ELECTRICAL SHOCK INJURY   • ARM TENDON REPAIR Right     DONE BY DR. OLEARY AND KLEINERT   • COLON RESECTION N/A 11/14/2014    LOW ANTERIOR RESECTION WITH CREATION OF COLOSTOMY, DR. TRACE HERRING AT [facility]   • COLONOSCOPY W/ BIOPSIES N/A 06/26/2014    ULCERATED 5CM MASS IN RECTUM, PATH: MODERATELY DIFFERENTIATED ADENOCARCINOMA, MULTIPLE DIVERTICULA IN SIGMOID, DR. LAUREN JAMES AT [facility]   • COLONOSCOPY W/ BIOPSIES N/A 07/09/2014    RECTAL MASS: INVASIVE MODERATLEY DIFFERIENTIATED ADENOCARCINOMA, AREA TATTOOED, DR. TRACE HERRING AT [facility]   • ENDOSCOPY  02/09/2015   • ENDOSCOPY N/A 02/09/2015    RESOLVED DUODENAL ULCER, EGD WNL, DR. FREDDIE MALCOLM AT [facility]   • FLEXIBLE SIGMOIDOSCOPY N/A 10/06/2014    MALIGNANT PARTIALLY OBSTRUCTING TUMOR IN MID RECTUM, DR. DELTA HERRING AT [facility]   • PORTACATH PLACEMENT Left 07/24/2014    LEFT VIRGIL, DR. KWAKU TAY AT [facility]   • RECTAL ULTRASOUND N/A 07/09/2014    ENDORECTAL US FOR CANCER STAGING, T3N1 RECTAL CANCER AT 7 CM, DR. TRACE HERRING AT [facility]   • TRACHEAL SURGERY N/A 08/31/2014    ENDOTRACHEAL INTUBATION, DR. ALMA ROSA HAGAN AT [facility]   • VENOUS ACCESS DEVICE (PORT) REMOVAL Left 03/12/2015    LEFT SUBCLAL, DR. KWAKU TAY AT [facility]       Social History:   Social History     Socioeconomic History   • Marital status: Single     Spouse name: Not on file   • Number of children: 0   • Years of education: High school   • Highest education level: Not on file   Occupational History   • Occupation:      Employer: RETIRED   Tobacco Use   • Smoking status: Former Smoker     Packs/day:  2.00     Years: 3.00     Pack years: 6.00     Types: Cigarettes     Last attempt to quit: 1980     Years since quittin.9   • Smokeless tobacco: Never Used   Substance and Sexual Activity   • Alcohol use: No   • Drug use: No   • Sexual activity: Defer        Family History:   Family History   Problem Relation Age of Onset   • No Known Problems Mother    • No Known Problems Father    • No Known Problems Sister    • Cancer Brother         Lung cancer   • Lung cancer Brother    • No Known Problems Maternal Grandmother    • No Known Problems Maternal Grandfather    • No Known Problems Paternal Grandmother    • No Known Problems Paternal Grandfather    • Diabetes Other        Medications Prior to Admission:    Prior to Admission medications    Medication Sig Start Date End Date Taking? Authorizing Provider   acetaminophen (TYLENOL) 325 MG tablet Take 650 mg by mouth 3 (Three) Times a Day As Needed for Mild Pain .   Yes Malcolm Macdonald MD   Alogliptin Benzoate 6.25 MG tablet Take  by mouth.   Yes Malcolm Macdonald MD   amLODIPine (NORVASC) 10 MG tablet  18  Yes Malcolm Macdonald MD   atorvastatin (LIPITOR) 40 MG tablet  18  Yes Malcolm Macdonald MD   calcium carbonate (OS-NABILA) 600 MG tablet Take 600 mg by mouth Daily.   Yes Malcolm Macdonald MD   cholecalciferol (VITAMIN D3) 10 MCG (400 UNIT) tablet Take 800 Units by mouth Daily.   Yes Malcolm Macdonald MD   Fluticasone Furoate 200 MCG/ACT aerosol powder  Inhale.   Yes Malcolm Macdonald MD   glucose blood test strip Contour Next Test Strips   USE TO TEST BLOOD SUGAR 4 TIMES DAILY   Yes Malcolm Macdonald MD   insulin glargine (LANTUS) 100 UNIT/ML injection Inject 20 Units under the skin into the appropriate area as directed Daily.   Yes Malcolm Macdonald MD   levothyroxine (SYNTHROID, LEVOTHROID) 50 MCG tablet  19  Yes Malcolm Macdonald MD   lisinopril (PRINIVIL,ZESTRIL) 20 MG tablet Take 20 mg by mouth Daily.    Yes Provider, MD Malcolm   pantoprazole (PROTONIX) 40 MG EC tablet Take 40 mg by mouth Daily.   Yes Provider, MD Malcolm   glyBURIDE micronized (GLYNASE) 3 MG tablet Take 3 mg by mouth 2 (Two) Times a Day Before Meals.    ProviderMalcolm MD   LEVEMIR 100 UNIT/ML injection  12/4/17   Provider, MD Malcolm       Allergies:  Patient has no known allergies.      Review of system  Review of Systems   HENT: Positive for hearing loss.    Musculoskeletal: Positive for gait problem.   Neurological: Positive for facial asymmetry, speech difficulty and weakness.   Psychiatric/Behavioral: Positive for confusion.   All other systems reviewed and are negative.      Vitals:    11/18/19 1237   BP:    Pulse: 63   Resp: 16   Temp:    SpO2: 98%       Physical exam  Physical Exam   Constitutional: He is oriented to person, place, and time. He appears well-developed and well-nourished.   HENT:   Head: Normocephalic and atraumatic.   [place]   Eyes: EOM are normal. Pupils are equal, round, and reactive to light.   Neck: Carotid bruit is not present.   Cardiovascular: Normal rate and regular rhythm.   Pulmonary/Chest: Effort normal.   Neurological: He is alert and oriented to person, place, and time. He has normal reflexes. He displays normal reflexes. A cranial nerve deficit is present. No sensory deficit. He exhibits normal muscle tone. Coordination abnormal.   Psychiatric: He has a normal mood and affect. His behavior is normal. Thought content normal.         Lab Results   Component Value Date    WBC 6.40 11/18/2019    HGB 11.9 (L) 11/18/2019    HCT 36.6 (L) 11/18/2019    MCV 89.9 11/18/2019     11/18/2019     Lab Results   Component Value Date    GLUCOSE 154 (H) 11/18/2019    BUN 35 (H) 11/18/2019    CREATININE 2.23 (H) 11/18/2019    EGFRIFNONA 28 (L) 11/18/2019    EGFRIFAFRI 56 04/23/2015    BCR 15.7 11/18/2019    CO2 19.6 (L) 11/18/2019    CALCIUM 9.1 11/18/2019    PROTENTOTREF 6.9 04/23/2015    ALBUMIN 4.50  2019    LABIL2 2.1 2015    AST 13 2019    ALT 24 2019     ECG 12 Lead   Order: [id number]   Status:  Final result   Visible to patient:  No (Not Released) Next appt:  2020 at 10:00 AM in Neurology (Randee Watts, [facility])      Narrative     HEART RATE= 75  bpm  RR Interval= 805  ms  MS Interval= 181  ms  P Horizontal Axis= 6  deg  P Front Axis= 44  deg  QRSD Interval= 120  ms  QT Interval= 394  ms  QRS Axis= -26  deg  T Wave Axis= -8  deg  - ABNORMAL ECG -  Sinus rhythm  Supraventricular bigeminy  Incomplete left bundle branch block  Probable left ventricular hypertrophy  NO SIGNIFICANT CHANGE FROM PREVIOUS ECG  Electronically Signed By: Hany Armando ([facility]) 2019 17:37:51  Date and Time of Study: 2019 15:10:00      Specimen Collected: 19 15:10               Radiological Studies:  Duplex scan of extracranial arteries using B-mode, color flow, and/or spectral Doppler; bilateral   Order# [id number]   Reading physician: Jermaine Ray MD Ordering physician: Jose Llanos MD Study date: 19   Patient Information     Patient Name  Reece Stephen MRN  [id number] Sex  Male  (Age)  1935 (84 y.o.)   Clinical Indication     CVA (stroke); Carotid; bilateral   Admission Information     Admission Date/Time Discharge Date/Time Room/Bed   19  1453  S514/1   Interpretation Summary     · Proximal right internal carotid artery is normal.  · Proximal left internal carotid artery is normal.      Patient Hx Of Height, Weight, and Vitals     Height Weight BSA (Calculated - sq m) BMI (kg/m2) Pulse BP       63 125/81   Study Findings     • Right CCA Prox: No plaque visualized.   • Right CCA Dist: No plaque visualized.   • Right ICA Prox: No plaque visualized.   • Right ECA: Plaque present.   • Right Vertebral: Antegrade flow noted.       • Left CCA Prox: No plaque visualized.   • Left CCA Dist: No plaque visualized.   • Left ICA Prox: No plaque visualized.    • Left ECA: No plaque visualized.   • Left Vertebral: Antegrade flow noted.         Xr Chest 2 View    Result Date: 11/17/2019  TWO-VIEW CHEST  HISTORY: Shortness of breath. Rectal cancer.  FINDINGS: The lungs are moderately expanded and clear except for some minimal vascular crowding and atelectasis at the bases. The heart is borderline enlarged and no acute abnormality is seen.  FIVE-VIEW LUMBAR SPINE  HISTORY: Fell. Back pain.  FINDINGS: There is mild disc space narrowing and spurring at L5-S1. There is also some mild facet spurring. The remainder of the lumbar spine is unremarkable. There is no evidence of acute compression fracture.  THREE-VIEW LEFT ANKLE  HISTORY: Fell. Pain.  FINDINGS: There is no fracture.  This report was finalized on 11/17/2019 4:43 PM by Dr. Eloy Parmar M.D.      Xr Ankle 3+ View Left    Result Date: 11/17/2019  TWO-VIEW CHEST  HISTORY: Shortness of breath. Rectal cancer.  FINDINGS: The lungs are moderately expanded and clear except for some minimal vascular crowding and atelectasis at the bases. The heart is borderline enlarged and no acute abnormality is seen.  FIVE-VIEW LUMBAR SPINE  HISTORY: Fell. Back pain.  FINDINGS: There is mild disc space narrowing and spurring at L5-S1. There is also some mild facet spurring. The remainder of the lumbar spine is unremarkable. There is no evidence of acute compression fracture.  THREE-VIEW LEFT ANKLE  HISTORY: Fell. Pain.  FINDINGS: There is no fracture.  This report was finalized on 11/17/2019 4:43 PM by Dr. Eloy Parmar M.D.      Ct Head Without Contrast    Result Date: 11/17/2019  CT SCAN OF THE BRAIN WITHOUT CONTRAST  HISTORY: Left facial droop and slurred speech. Fell several days ago.  The CT scan was performed as an emergency procedure through the brain without contrast. There is prominent diffuse atrophy and chronic small vessel ischemic change similar to the MRI scan dated 11/29/2017. There is no evidence of acute intracranial  hemorrhage are mass effect and the visualized sinuses are clear.      Radiation dose reduction techniques were utilized, including automated exposure control and exposure modulation based on body size.  This report was finalized on 11/17/2019 4:44 PM by Dr. Eloy Parmar M.D.      Xr Spine Lumbar Complete 4+vw    Result Date: 11/17/2019  TWO-VIEW CHEST  HISTORY: Shortness of breath. Rectal cancer.  FINDINGS: The lungs are moderately expanded and clear except for some minimal vascular crowding and atelectasis at the bases. The heart is borderline enlarged and no acute abnormality is seen.  FIVE-VIEW LUMBAR SPINE  HISTORY: Fell. Back pain.  FINDINGS: There is mild disc space narrowing and spurring at L5-S1. There is also some mild facet spurring. The remainder of the lumbar spine is unremarkable. There is no evidence of acute compression fracture.  THREE-VIEW LEFT ANKLE  HISTORY: Fell. Pain.  FINDINGS: There is no fracture.  This report was finalized on 11/17/2019 4:43 PM by Dr. Eloy Parmar M.D.          During this visit the following were done:  Labs Reviewed [x]    Labs Ordered [x]    Radiology Reports Reviewed [x]    Radiology Ordered []    EKG, echo, and/or stress test reviewed [x]    EEG results reviewed  []    EEG reviewed and interpreted per myself   []    Discussed case with neurointerventionalist or neuroradiologist []    Referring Provider Records Reviewed [x]    ER Records Reviewed [x]    Hospital Records Reviewed [x]    History Obtained From Family []    Radiological images view and Interpreted per myself [x]    Case Discussed with referring provider []     Decision to obtain and request outside records  []        Assessment and Plan     Subacute left hemiparesis, suspect lacunar stroke in right hemisphere vs meg. Not a TPA or intervention candidate on time criteria.   - Observation on telemetry.]   - continue aspirin and statin.   - MRI brain.   - consider DAPT for one month followed by aspirin  monotherapy if MRI confirms a stroke.   - TTE.   - lipid panel, A1C, TSH, b12, folate.   - ST, PT, OT.    Thanks,          Electronically signed by Sid Tse MD on 11/18/2019 at 3:28 PM

## 2019-11-18 NOTE — THERAPY EVALUATION
Patient Name: Reece Stephen  : 1935    MRN: [id number]                              Today's Date: 2019       Admit Date: 2019    Visit Dx:     ICD-10-CM ICD-9-CM   1. Cerebrovascular accident (CVA), unspecified mechanism (CMS/HCC) I63.9 434.91   2. Hyperglycemia R73.9 790.29   3. Renal insufficiency N28.9 593.9     Patient Active Problem List   Diagnosis   • Rectal cancer (CMS/HCC)   • Lung nodule, multiple   • History of DVT (deep vein thrombosis)   • Transient cerebral ischemia   • Hyperkalemia   • Hyperosmolar non-ketotic state in patient with type 2 diabetes mellitus (CMS/HCC)   • DM (diabetes mellitus), secondary, uncontrolled, with hyperosmolarity (CMS/HCC)   • Hyponatremia with extracellular fluid depletion   • Hypothyroidism   • Hyperlipidemia LDL goal <70   • Diabetic polyneuropathy associated with type 2 diabetes mellitus (CMS/HCC)   • Stage 3 chronic kidney disease (CMS/HCC)   • Non compliance w medication regimen   • Essential hypertension   • Upper gastrointestinal hemorrhage   • Osteoarthritis   • Gastroesophageal reflux disease   • Deep vein thrombosis (CMS/HCC)   • Colitis due to Clostridium difficile   • Burn injury   • Anemia   • Cerebrovascular accident (CVA) (CMS/HCC)     Past Medical History:   Diagnosis Date   • Anemia    • Arthritis    • Chronic kidney disease     STAGE III, FOLLOWED BY DR. FREDDIE GONZALES   • Clostridium difficile infection 10/2014   • Diabetes mellitus (CMS/HCC)     TYPE 2, IDDM   • Disease of thyroid gland     ACQUIRED HYPOTHYROIDISM   • Duodenal ulcer 2014   • DVT (deep venous thrombosis) (CMS/HCC) 2015    PORT ASSOCIATED OF LEFT UPPER EXTREMITY   • Electrocution     HAD TO HAVE PARTIAL AMPUTATION OF LEFT FOOT   • GERD (gastroesophageal reflux disease)    • Hearing loss    • Hemorrhagic shock (CMS/HCC)     SECONDARY TO BLEEDING DUODENAL ULCER, ADMITTED TO [facility]   • Histiocytoma (CMS/HCC)    • History of blood transfusion 2015    D/T BLEEDING  DUODENAL ULCER   • History of chemotherapy     FOLLOWED BY DR. NIGEL NOBLE   • History of radiation therapy 2014    FOLLOWED BY DR. RJ HUI   • Hypercholesterolemia    • Hyperkalemia    • Hypertension    • Hypoglycemia 01/04/2018    SEEN AT [facility] ER   • Mixed hyperlipidemia    • Multiple lung nodules    • Neuropathy    • OA (osteoarthritis)    • ANNE MARIE (obstructive sleep apnea)    • Peptic ulceration 2014    Duodenal ulcer   • Pneumonia 03/29/2013    ADMITTED TO [facility]   • Rectal cancer (CMS/HCC) 06/2014    T3N1M0, S/P CHEMO, XRT, AND RESECTION, FOLLOWED BY DR. TRACE HERRING   • Stroke (CMS/HCC) 11/28/2017    TIA, ADMITTED TO [facility]   • Vitamin D deficiency      Past Surgical History:   Procedure Laterality Date   • AMPUTATION FOOT Left 1971    PARTIAL DUE TO AN ELECTRICAL SHOCK INJURY   • ARM TENDON REPAIR Right     DONE BY DR. OLEARY AND KLEINERT   • COLON RESECTION N/A 11/14/2014    LOW ANTERIOR RESECTION WITH CREATION OF COLOSTOMY, DR. TRACE HERRING AT [facility]   • COLONOSCOPY W/ BIOPSIES N/A 06/26/2014    ULCERATED 5CM MASS IN RECTUM, PATH: MODERATELY DIFFERENTIATED ADENOCARCINOMA, MULTIPLE DIVERTICULA IN SIGMOID, DR. LAUREN JAMES AT [facility]   • COLONOSCOPY W/ BIOPSIES N/A 07/09/2014    RECTAL MASS: INVASIVE MODERATLEY DIFFERIENTIATED ADENOCARCINOMA, AREA TATTOOED, DR. TRACE HERRING AT [facility]   • ENDOSCOPY  02/09/2015   • ENDOSCOPY N/A 02/09/2015    RESOLVED DUODENAL ULCER, EGD WNL, DR. FREDDEI MALCOLM AT [facility]   • FLEXIBLE SIGMOIDOSCOPY N/A 10/06/2014    MALIGNANT PARTIALLY OBSTRUCTING TUMOR IN MID RECTUM, DR. DELTA HERRING AT [facility]   • PORTACATH PLACEMENT Left 07/24/2014    LEFT VIRGIL, DR. KWAKU TAY AT [facility]   • RECTAL ULTRASOUND N/A 07/09/2014    ENDORECTAL US FOR CANCER STAGING, T3N1 RECTAL CANCER AT 7 CM, DR. TRACE HERRING AT [facility]   • TRACHEAL SURGERY N/A 08/31/2014    ENDOTRACHEAL INTUBATION, DR. ALMA ROSA HAGAN AT [facility]   • VENOUS ACCESS DEVICE (PORT) REMOVAL Left 03/12/2015    LEFT VIRGIL, DR. AMES  JASVIR AT [facility]     General Information     Row Name 11/18/19 1533          PT Evaluation Time/Intention    Document Type  evaluation  -MS     Mode of Treatment  physical therapy  -MS     Row Name 11/18/19 1533          General Information    Patient Profile Reviewed?  yes  -MS     Prior Level of Function  independent:;all household mobility lives alone, hx of falls  -MS     Existing Precautions/Restrictions  fall  -MS     Barriers to Rehab  previous functional deficit  -MS     Row Name 11/18/19 1533          Resource/Environmental Concerns    Current Living Arrangements  home/apartment/condo  -MS     Row Name 11/18/19 1533          Home Main Entrance    Number of Stairs, Main Entrance  three  -MS     Row Name 11/18/19 1533          Cognitive Assessment/Intervention- PT/OT    Orientation Status (Cognition)  oriented x 4  -MS     Row Name 11/18/19 1533          Safety Issues, Functional Mobility    Impairments Affecting Function (Mobility)  balance  -MS       User Key  (r) = Recorded By, (t) = Taken By, (c) = Cosigned By    Initials Name Provider Type    MS Minoo Mccracken, PT Physical Therapist        Mobility     Row Name 11/18/19 1537          Bed Mobility Assessment/Treatment    Bed Mobility Assessment/Treatment  supine-sit;sit-supine  -MS     Supine-Sit [place] (Bed Mobility)  supervision;verbal cues;nonverbal cues (demo/gesture)  -MS     Assistive Device (Bed Mobility)  bed rails;head of bed elevated  -MS     Row Name 11/18/19 1537          Transfer Assessment/Treatment    Comment (Transfers)  Stood unprompted- mild impulsivity  -MS     Row Name 11/18/19 1537          Sit-Stand Transfer    Sit-Stand [place] (Transfers)  supervision;stand by assist;verbal cues;nonverbal cues (demo/gesture)  -MS     Assistive Device (Sit-Stand Transfers)  walker, front-wheeled  -MS     Row Name 11/18/19 1537          Gait/Stairs Assessment/Training    [place] Level (Gait)  stand by assist;contact guard;verbal  cues;nonverbal cues (demo/gesture)  -MS     Assistive Device (Gait)  walker, front-wheeled 30' with RW and rest without AD  -MS     Distance in Feet (Gait)  150  -MS     Deviations/Abnormal Patterns (Gait)  polo decreased;gait speed decreased  -MS     Comment (Gait/Stairs)  Slowed polo, minimally unsteady but no LOB or veering noted. Appears to be near his baseline.  -MS       User Key  (r) = Recorded By, (t) = Taken By, (c) = Cosigned By    Initials Name Provider Type    MS MccrackenMinoo, PT Physical Therapist        Obj/Interventions     Row Name 11/18/19 1539          General ROM    GENERAL ROM COMMENTS  B LE WFL  -MS     Row Name 11/18/19 1539          MMT (Manual Muscle Testing)    General MMT Comments  B LEs grossly 4/5  -MS     Row Name 11/18/19 1539          Static Sitting Balance    Level of [place] (Unsupported Sitting, Static Balance)  supervision  -MS     Sitting Position (Unsupported Sitting, Static Balance)  sitting on edge of bed  -MS     Row Name 11/18/19 1539          Static Standing Balance    Level of [place] (Supported Standing, Static Balance)  standby assist  -MS     Row Name 11/18/19 1539          Sensory Assessment/Intervention    Sensory General Assessment  no sensation deficits identified  -MS       User Key  (r) = Recorded By, (t) = Taken By, (c) = Cosigned By    Initials Name Provider Type    MS MccrackenMinoo, PT Physical Therapist        Goals/Plan     Row Name 11/18/19 1541          Transfer Goal 1 (PT)    Activity/Assistive Device (Transfer Goal 1, PT)  transfers, all  -MS     [place] Level/Cues Needed (Transfer Goal 1, PT)  supervision required  -MS     Time Frame (Transfer Goal 1, PT)  1 week  -MS     Row Name 11/18/19 1541          Gait Training Goal 1 (PT)    Activity/Assistive Device (Gait Training Goal 1, PT)  gait (walking locomotion)  -MS     [place] Level (Gait Training Goal 1, PT)  supervision required  -MS     Distance (Gait Goal 1, PT)   150  -MS     Time Frame (Gait Training Goal 1, PT)  1 week  -MS     Row Name 11/18/19 1541          Stairs Goal 1 (PT)    Activity/Assistive Device (Stairs Goal 1, PT)  stairs, all skills  -MS     [place] Level/Cues Needed (Stairs Goal 1, PT)  contact guard assist  -MS     Number of Stairs (Stairs Goal 1, PT)  3  -MS     Time Frame (Stairs Goal 1, PT)  1 week  -MS       User Key  (r) = Recorded By, (t) = Taken By, (c) = Cosigned By    Initials Name Provider Type    Minoo Chavarria, PT Physical Therapist        Clinical Impression     Row Name 11/18/19 1539          Pain Scale: Numbers Pre/Post-Treatment    Pain Scale: Numbers, Pretreatment  0/10 - no pain  -MS     Pain Scale: Numbers, Post-Treatment  0/10 - no pain  -MS     Row Name 11/18/19 1539          Plan of Care Review    Plan of Care Reviewed With  patient  -MS     Row Name 11/18/19 1534          Physical Therapy Clinical Impression    Patient/Family Goals Statement (PT Clinical Impression)  Admission from home for L facial droop and slurred speech. CVA workup and MRI pending- CT is negative. Lives at home alone and is ind. RN reported family voiced concerns regarding patient being at home alone.  -MS     Criteria for Skilled Interventions Met (PT Clinical Impression)  yes;treatment indicated  -MS     Rehab Potential (PT Clinical Summary)  fair, will monitor progress closely  -MS     Row Name 11/18/19 1539          Vital Signs    O2 Delivery Pre Treatment  room air  -MS     Row Name 11/18/19 1539          Positioning and Restraints    Pre-Treatment Position  in bed  -MS     Post Treatment Position  chair  -MS     In Chair  sitting;call light within reach;encouraged to call for assist;exit alarm on;legs elevated  -MS       User Key  (r) = Recorded By, (t) = Taken By, (c) = Cosigned By    Initials Name Provider Type    Minoo Chavarria, PT Physical Therapist        Outcome Measures     Row Name 11/18/19 1541          How much help from another  person do you currently need...    Turning from your back to your side while in flat bed without using bedrails?  4  -MS     Moving from lying on back to sitting on the side of a flat bed without bedrails?  4  -MS     Moving to and from a bed to a chair (including a wheelchair)?  3  -MS     Standing up from a chair using your arms (e.g., wheelchair, bedside chair)?  3  -MS     Climbing 3-5 steps with a railing?  2  -MS     To walk in hospital room?  3  -MS     AM-PAC 6 Clicks Score (PT)  19  -MS     Row Name 11/18/19 1541          Modified [place] Scale    Modified [place] Scale  3 - Moderate disability.  Requiring some help, but able to walk without assistance.  -MS     Row Name 11/18/19 1541          Functional Assessment    Outcome Measure Options  AM-PAC 6 Clicks Basic Mobility (PT);Modified [place]  -MS       User Key  (r) = Recorded By, (t) = Taken By, (c) = Cosigned By    Initials Name Provider Type    MS LovettsMinoo, PT Physical Therapist          PT Recommendation and Plan  Planned Therapy Interventions (PT Eval): balance training, bed mobility training, gait training, home exercise program, postural re-education, stair training, strengthening, transfer training  Outcome Summary/Treatment Plan (PT)  Anticipated Discharge Disposition (PT): home with assist, home with home health  Plan of Care Reviewed With: patient  Outcome Summary: Patient is a pleasant 84 y.o. male admitted to [facility] for slurred speech and L facial droop on 11/17/2019- CVA workup with CT negative but Neuro consult still pending. Patient is ambulatory and independent at baseline and lives at home alone- no prior use of AD. Today, patient performed bed mobility with SV, required SV-SBA for transfers, and ambulated a total of 150' SBA-CGA without an AD. Strength appears WFL and symmetrical. Mild balance deficits noted. Although patient appears near his baseline, patient may benefit from skilled PT services acutely to address functional  deficits as well as improve level of independence prior to discharge. RN did report family is concerned with patient living at home alone- currently patient does not appear to be appropriate for SNF- may recommend home with HH PT or hired assist at home if patient is still voicing concern.     Time Calculation:   PT Charges     Row Name 11/18/19 1530 11/18/19 1325          Time Calculation    Start Time  1443  -MS  --     Stop Time  1458  -MS  --     Time Calculation (min)  15 min  -MS  --     PT Received On  11/18/19  -MS  --     PT - Next Appointment  11/20/19  -MS  11/19/19  -MS     PT Goal Re-Cert Due Date  11/25/19  -MS  --       User Key  (r) = Recorded By, (t) = Taken By, (c) = Cosigned By    Initials Name Provider Type    Minoo Chavarria, PT Physical Therapist        Therapy Charges for Today     Code Description Service Date Service Provider Modifiers Qty    55218818935 HC PT EVAL MOD COMPLEXITY 2 11/18/2019 Minoo Mccracken, PT GP 1    65238898718 HC PT THER PROC EA 15 MIN 11/18/2019 Minoo Mccracken, PT GP 1    25883342346 HC PT THER SUPP EA 15 MIN 11/18/2019 Minoo Mccracken, PT GP 1          PT G-Codes  Outcome Measure Options: AM-PAC 6 Clicks Basic Mobility (PT), Modified [place]  AM-PAC 6 Clicks Score (PT): 19  AM-PAC 6 Clicks Score (OT): 19  Modified [place] Scale: 3 - Moderate disability.  Requiring some help, but able to walk without assistance.    Minoo Mccracken PT  11/18/2019
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
## 2019-11-19 ENCOUNTER — APPOINTMENT (OUTPATIENT)
Dept: GENERAL RADIOLOGY | Facility: HOSPITAL | Age: 84
End: 2019-11-19

## 2019-11-19 ENCOUNTER — APPOINTMENT (OUTPATIENT)
Dept: CARDIOLOGY | Facility: HOSPITAL | Age: 84
End: 2019-11-19

## 2019-11-19 LAB
ALBUMIN SERPL-MCNC: 3.5 G/DL (ref 3.5–5.2)
ALBUMIN/GLOB SERPL: 1.3 G/DL
ALP SERPL-CCNC: 69 U/L (ref 39–117)
ALT SERPL W P-5'-P-CCNC: 18 U/L (ref 1–41)
ANION GAP SERPL CALCULATED.3IONS-SCNC: 10.8 MMOL/L (ref 5–15)
AST SERPL-CCNC: 16 U/L (ref 1–40)
BASOPHILS # BLD AUTO: 0.04 10*3/MM3 (ref 0–0.2)
BASOPHILS NFR BLD AUTO: 0.5 % (ref 0–1.5)
BH CV ECHO MEAS - % IVS THICK: 277.8 %
BH CV ECHO MEAS - ACS: 1.7 CM
BH CV ECHO MEAS - AO MAX PG: 28 MMHG
BH CV ECHO MEAS - AO MEAN PG (FULL): 1 MMHG
BH CV ECHO MEAS - AO MEAN PG: 3 MMHG
BH CV ECHO MEAS - AO ROOT AREA (BSA CORRECTED): 1.5
BH CV ECHO MEAS - AO ROOT AREA: 6.6 CM^2
BH CV ECHO MEAS - AO ROOT DIAM: 2.9 CM
BH CV ECHO MEAS - AO V2 MAX: 125 CM/SEC
BH CV ECHO MEAS - AO V2 MEAN: 84.7 CM/SEC
BH CV ECHO MEAS - AO V2 VTI: 27.7 CM
BH CV ECHO MEAS - AVA(I,A): 2.1 CM^2
BH CV ECHO MEAS - AVA(I,D): 2.1 CM^2
BH CV ECHO MEAS - BSA(HAYCOCK): 1.9 M^2
BH CV ECHO MEAS - BSA: 1.9 M^2
BH CV ECHO MEAS - BZI_BMI: 24.2 KILOGRAMS/M^2
BH CV ECHO MEAS - BZI_METRIC_HEIGHT: 175.3 CM
BH CV ECHO MEAS - BZI_METRIC_WEIGHT: 74.4 KG
BH CV ECHO MEAS - EDV(MOD-SP2): 65 ML
BH CV ECHO MEAS - EDV(MOD-SP4): 59 ML
BH CV ECHO MEAS - EDV(TEICH): 139.8 ML
BH CV ECHO MEAS - EF(CUBED): 74.5 %
BH CV ECHO MEAS - EF(MOD-BP): 68 %
BH CV ECHO MEAS - EF(MOD-SP2): 52.3 %
BH CV ECHO MEAS - EF(MOD-SP4): 67.8 %
BH CV ECHO MEAS - EF(TEICH): 65.8 %
BH CV ECHO MEAS - ESV(MOD-SP2): 31 ML
BH CV ECHO MEAS - ESV(MOD-SP4): 19 ML
BH CV ECHO MEAS - ESV(TEICH): 47.8 ML
BH CV ECHO MEAS - FS: 36.6 %
BH CV ECHO MEAS - IVS/LVPW: 1.1
BH CV ECHO MEAS - IVSD: 0.97 CM
BH CV ECHO MEAS - IVSS: 3.7 CM
BH CV ECHO MEAS - LAT PEAK E' VEL: 6 CM/SEC
BH CV ECHO MEAS - LV DIASTOLIC VOL/BSA (35-75): 31.1 ML/M^2
BH CV ECHO MEAS - LV MASS(C)D: 185.4 GRAMS
BH CV ECHO MEAS - LV MASS(C)DI: 97.6 GRAMS/M^2
BH CV ECHO MEAS - LV MEAN PG: 2 MMHG
BH CV ECHO MEAS - LV SYSTOLIC VOL/BSA (12-30): 10 ML/M^2
BH CV ECHO MEAS - LV V1 MAX: 85 CM/SEC
BH CV ECHO MEAS - LV V1 MEAN: 57.7 CM/SEC
BH CV ECHO MEAS - LV V1 VTI: 18.5 CM
BH CV ECHO MEAS - LVIDD: 5.4 CM
BH CV ECHO MEAS - LVIDS: 3.4 CM
BH CV ECHO MEAS - LVLD AP2: 7.3 CM
BH CV ECHO MEAS - LVLD AP4: 7.3 CM
BH CV ECHO MEAS - LVLS AP2: 6.4 CM
BH CV ECHO MEAS - LVLS AP4: 6.1 CM
BH CV ECHO MEAS - LVOT AREA (M): 3.1 CM^2
BH CV ECHO MEAS - LVOT AREA: 3.1 CM^2
BH CV ECHO MEAS - LVOT DIAM: 2 CM
BH CV ECHO MEAS - LVPWD: 0.88 CM
BH CV ECHO MEAS - MED PEAK E' VEL: 3 CM/SEC
BH CV ECHO MEAS - MR MAX PG: 73.3 MMHG
BH CV ECHO MEAS - MR MAX VEL: 428 CM/SEC
BH CV ECHO MEAS - MV A DUR: 0.15 SEC
BH CV ECHO MEAS - MV A MAX VEL: 95.3 CM/SEC
BH CV ECHO MEAS - MV DEC SLOPE: 156 CM/SEC^2
BH CV ECHO MEAS - MV DEC TIME: 0.35 SEC
BH CV ECHO MEAS - MV E MAX VEL: 52.8 CM/SEC
BH CV ECHO MEAS - MV E/A: 0.55
BH CV ECHO MEAS - MV MEAN PG: 1 MMHG
BH CV ECHO MEAS - MV P1/2T MAX VEL: 55.3 CM/SEC
BH CV ECHO MEAS - MV P1/2T: 103.8 MSEC
BH CV ECHO MEAS - MV V2 MEAN: 51.6 CM/SEC
BH CV ECHO MEAS - MV V2 VTI: 31.9 CM
BH CV ECHO MEAS - MVA P1/2T LCG: 4 CM^2
BH CV ECHO MEAS - MVA(P1/2T): 2.1 CM^2
BH CV ECHO MEAS - MVA(VTI): 1.8 CM^2
BH CV ECHO MEAS - PA MAX PG: 5.6 MMHG
BH CV ECHO MEAS - PA V2 MAX: 118 CM/SEC
BH CV ECHO MEAS - QP/QS: 0.65
BH CV ECHO MEAS - RAP SYSTOLE: 3 MMHG
BH CV ECHO MEAS - RV MEAN PG: 1 MMHG
BH CV ECHO MEAS - RV V1 MEAN: 35.7 CM/SEC
BH CV ECHO MEAS - RV V1 VTI: 10.9 CM
BH CV ECHO MEAS - RVOT AREA: 3.5 CM^2
BH CV ECHO MEAS - RVOT DIAM: 2.1 CM
BH CV ECHO MEAS - RVSP: 19 MMHG
BH CV ECHO MEAS - SI(AO): 96.4 ML/M^2
BH CV ECHO MEAS - SI(CUBED): 60.9 ML/M^2
BH CV ECHO MEAS - SI(LVOT): 30.6 ML/M^2
BH CV ECHO MEAS - SI(MOD-SP2): 17.9 ML/M^2
BH CV ECHO MEAS - SI(MOD-SP4): 21.1 ML/M^2
BH CV ECHO MEAS - SI(TEICH): 48.5 ML/M^2
BH CV ECHO MEAS - SV(AO): 183 ML
BH CV ECHO MEAS - SV(CUBED): 115.6 ML
BH CV ECHO MEAS - SV(LVOT): 58.1 ML
BH CV ECHO MEAS - SV(MOD-SP2): 34 ML
BH CV ECHO MEAS - SV(MOD-SP4): 40 ML
BH CV ECHO MEAS - SV(RVOT): 37.8 ML
BH CV ECHO MEAS - SV(TEICH): 92 ML
BH CV ECHO MEAS - TAPSE (>1.6): 2.2 CM2
BH CV ECHO MEAS - TR MAX VEL: 195 CM/SEC
BH CV ECHO MEASUREMENTS AVERAGE E/E' RATIO: 11.73
BH CV XLRA - RV BASE: 2.7 CM
BH CV XLRA - TDI S': 10 CM/SEC
BILIRUB SERPL-MCNC: 0.4 MG/DL (ref 0.2–1.2)
BUN BLD-MCNC: 28 MG/DL (ref 8–23)
BUN/CREAT SERPL: 15.6 (ref 7–25)
CALCIUM SPEC-SCNC: 8.5 MG/DL (ref 8.6–10.5)
CHLORIDE SERPL-SCNC: 112 MMOL/L (ref 98–107)
CHOLEST SERPL-MCNC: 102 MG/DL (ref 0–200)
CO2 SERPL-SCNC: 20.2 MMOL/L (ref 22–29)
CREAT BLD-MCNC: 1.79 MG/DL (ref 0.76–1.27)
DEPRECATED RDW RBC AUTO: 42 FL (ref 37–54)
EOSINOPHIL # BLD AUTO: 0.27 10*3/MM3 (ref 0–0.4)
EOSINOPHIL NFR BLD AUTO: 3.7 % (ref 0.3–6.2)
ERYTHROCYTE [DISTWIDTH] IN BLOOD BY AUTOMATED COUNT: 12.9 % (ref 12.3–15.4)
GFR SERPL CREATININE-BSD FRML MDRD: 36 ML/MIN/1.73
GLOBULIN UR ELPH-MCNC: 2.8 GM/DL
GLUCOSE BLD-MCNC: 120 MG/DL (ref 65–99)
GLUCOSE BLDC GLUCOMTR-MCNC: 109 MG/DL (ref 70–130)
GLUCOSE BLDC GLUCOMTR-MCNC: 115 MG/DL (ref 70–130)
GLUCOSE BLDC GLUCOMTR-MCNC: 158 MG/DL (ref 70–130)
GLUCOSE BLDC GLUCOMTR-MCNC: 187 MG/DL (ref 70–130)
HBA1C MFR BLD: 10.05 % (ref 4.8–5.6)
HCT VFR BLD AUTO: 35.1 % (ref 37.5–51)
HDLC SERPL-MCNC: 32 MG/DL (ref 40–60)
HGB BLD-MCNC: 11.8 G/DL (ref 13–17.7)
IMM GRANULOCYTES # BLD AUTO: 0.04 10*3/MM3 (ref 0–0.05)
IMM GRANULOCYTES NFR BLD AUTO: 0.5 % (ref 0–0.5)
LDLC SERPL CALC-MCNC: 52 MG/DL (ref 0–100)
LDLC/HDLC SERPL: 1.61 {RATIO}
LEFT ATRIUM VOLUME INDEX: 28 ML/M2
LYMPHOCYTES # BLD AUTO: 1.26 10*3/MM3 (ref 0.7–3.1)
LYMPHOCYTES NFR BLD AUTO: 17.1 % (ref 19.6–45.3)
MAXIMAL PREDICTED HEART RATE: 136 BPM
MCH RBC QN AUTO: 30.3 PG (ref 26.6–33)
MCHC RBC AUTO-ENTMCNC: 33.6 G/DL (ref 31.5–35.7)
MCV RBC AUTO: 90.2 FL (ref 79–97)
MONOCYTES # BLD AUTO: 0.64 10*3/MM3 (ref 0.1–0.9)
MONOCYTES NFR BLD AUTO: 8.7 % (ref 5–12)
NEUTROPHILS # BLD AUTO: 5.14 10*3/MM3 (ref 1.7–7)
NEUTROPHILS NFR BLD AUTO: 69.5 % (ref 42.7–76)
NRBC BLD AUTO-RTO: 0 /100 WBC (ref 0–0.2)
NT-PROBNP SERPL-MCNC: 163.6 PG/ML (ref 5–1800)
PLATELET # BLD AUTO: 189 10*3/MM3 (ref 140–450)
PMV BLD AUTO: 10.5 FL (ref 6–12)
POTASSIUM BLD-SCNC: 4.1 MMOL/L (ref 3.5–5.2)
PROT SERPL-MCNC: 6.3 G/DL (ref 6–8.5)
RBC # BLD AUTO: 3.89 10*6/MM3 (ref 4.14–5.8)
SODIUM BLD-SCNC: 143 MMOL/L (ref 136–145)
STRESS TARGET HR: 116 BPM
TRIGL SERPL-MCNC: 92 MG/DL (ref 0–150)
TSH SERPL DL<=0.05 MIU/L-ACNC: 5.67 UIU/ML (ref 0.27–4.2)
VLDLC SERPL-MCNC: 18.4 MG/DL (ref 5–40)
WBC NRBC COR # BLD: 7.39 10*3/MM3 (ref 3.4–10.8)

## 2019-11-19 PROCEDURE — 80053 COMPREHEN METABOLIC PANEL: CPT | Performed by: HOSPITALIST

## 2019-11-19 PROCEDURE — 63710000001 INSULIN GLARGINE PER 5 UNITS: Performed by: HOSPITALIST

## 2019-11-19 PROCEDURE — 99233 SBSQ HOSP IP/OBS HIGH 50: CPT | Performed by: NURSE PRACTITIONER

## 2019-11-19 PROCEDURE — 93306 TTE W/DOPPLER COMPLETE: CPT | Performed by: INTERNAL MEDICINE

## 2019-11-19 PROCEDURE — 85025 COMPLETE CBC W/AUTO DIFF WBC: CPT | Performed by: HOSPITALIST

## 2019-11-19 PROCEDURE — 93306 TTE W/DOPPLER COMPLETE: CPT

## 2019-11-19 PROCEDURE — 94640 AIRWAY INHALATION TREATMENT: CPT

## 2019-11-19 PROCEDURE — 74230 X-RAY XM SWLNG FUNCJ C+: CPT

## 2019-11-19 PROCEDURE — 92611 MOTION FLUOROSCOPY/SWALLOW: CPT

## 2019-11-19 PROCEDURE — 84443 ASSAY THYROID STIM HORMONE: CPT | Performed by: HOSPITALIST

## 2019-11-19 PROCEDURE — 63710000001 INSULIN LISPRO (HUMAN) PER 5 UNITS: Performed by: HOSPITALIST

## 2019-11-19 PROCEDURE — 82962 GLUCOSE BLOOD TEST: CPT

## 2019-11-19 PROCEDURE — 83036 HEMOGLOBIN GLYCOSYLATED A1C: CPT | Performed by: HOSPITALIST

## 2019-11-19 PROCEDURE — 94799 UNLISTED PULMONARY SVC/PX: CPT

## 2019-11-19 PROCEDURE — 80061 LIPID PANEL: CPT | Performed by: HOSPITALIST

## 2019-11-19 PROCEDURE — 83880 ASSAY OF NATRIURETIC PEPTIDE: CPT | Performed by: HOSPITALIST

## 2019-11-19 RX ORDER — ATORVASTATIN CALCIUM 20 MG/1
40 TABLET, FILM COATED ORAL NIGHTLY
Status: DISCONTINUED | OUTPATIENT
Start: 2019-11-19 | End: 2019-11-20 | Stop reason: HOSPADM

## 2019-11-19 RX ORDER — SODIUM CHLORIDE 0.9 % (FLUSH) 0.9 %
10 SYRINGE (ML) INJECTION EVERY 12 HOURS SCHEDULED
Status: DISCONTINUED | OUTPATIENT
Start: 2019-11-19 | End: 2019-11-19

## 2019-11-19 RX ORDER — ASPIRIN 81 MG/1
81 TABLET, CHEWABLE ORAL DAILY
Status: DISCONTINUED | OUTPATIENT
Start: 2019-11-20 | End: 2019-11-20 | Stop reason: HOSPADM

## 2019-11-19 RX ORDER — SODIUM CHLORIDE 0.9 % (FLUSH) 0.9 %
10 SYRINGE (ML) INJECTION AS NEEDED
Status: DISCONTINUED | OUTPATIENT
Start: 2019-11-19 | End: 2019-11-20

## 2019-11-19 RX ADMIN — BARIUM SULFATE 55 ML: 0.81 POWDER, FOR SUSPENSION ORAL at 14:44

## 2019-11-19 RX ADMIN — BUDESONIDE 0.5 MG: 0.5 INHALANT RESPIRATORY (INHALATION) at 19:44

## 2019-11-19 RX ADMIN — BARIUM SULFATE 4 ML: 980 POWDER, FOR SUSPENSION ORAL at 14:45

## 2019-11-19 RX ADMIN — BARIUM SULFATE 50 ML: 400 SUSPENSION ORAL at 14:45

## 2019-11-19 RX ADMIN — LEVOTHYROXINE SODIUM 50 MCG: 50 TABLET ORAL at 06:06

## 2019-11-19 RX ADMIN — ATORVASTATIN CALCIUM 40 MG: 20 TABLET, FILM COATED ORAL at 20:57

## 2019-11-19 RX ADMIN — INSULIN LISPRO 7 UNITS: 100 INJECTION, SOLUTION INTRAVENOUS; SUBCUTANEOUS at 09:01

## 2019-11-19 RX ADMIN — INSULIN LISPRO 7 UNITS: 100 INJECTION, SOLUTION INTRAVENOUS; SUBCUTANEOUS at 11:55

## 2019-11-19 RX ADMIN — SODIUM CHLORIDE 75 ML/HR: 9 INJECTION, SOLUTION INTRAVENOUS at 06:06

## 2019-11-19 RX ADMIN — ASPIRIN 325 MG: 325 TABLET ORAL at 09:01

## 2019-11-19 RX ADMIN — FAMOTIDINE 20 MG: 20 TABLET, FILM COATED ORAL at 09:01

## 2019-11-19 RX ADMIN — FAMOTIDINE 20 MG: 20 TABLET, FILM COATED ORAL at 20:57

## 2019-11-19 RX ADMIN — INSULIN LISPRO 2 UNITS: 100 INJECTION, SOLUTION INTRAVENOUS; SUBCUTANEOUS at 11:55

## 2019-11-19 RX ADMIN — INSULIN GLARGINE 20 UNITS: 100 INJECTION, SOLUTION SUBCUTANEOUS at 09:01

## 2019-11-19 RX ADMIN — BUDESONIDE 0.5 MG: 0.5 INHALANT RESPIRATORY (INHALATION) at 11:57

## 2019-11-19 RX ADMIN — INSULIN LISPRO 7 UNITS: 100 INJECTION, SOLUTION INTRAVENOUS; SUBCUTANEOUS at 17:34

## 2019-11-19 RX ADMIN — INSULIN LISPRO 2 UNITS: 100 INJECTION, SOLUTION INTRAVENOUS; SUBCUTANEOUS at 20:57

## 2019-11-19 RX ADMIN — AMLODIPINE BESYLATE 10 MG: 10 TABLET ORAL at 09:01

## 2019-11-19 RX ADMIN — CALCIUM CARBONATE-VITAMIN D TAB 500 MG-200 UNIT 1000 MG: 500-200 TAB at 09:01

## 2019-11-19 NOTE — DISCHARGE PLACEMENT REQUEST
"Reece Stephen (84 y.o. Male)     Date of Birth Social Security Number Address Home Phone MRN    [id number]  [id number] [phone] [id number]    Orthodoxy Marital Status          [facility] Single       Admission Date Admission Type Admitting Provider Attending Provider Department, Room/Bed    11/17/19 Emergency Jose Llanos MD Ahmed, Aftab, MD [address], [address]    Discharge Date Discharge Disposition Discharge Destination                       Attending Provider:  Jose Llanos MD    Allergies:  No Known Allergies    Isolation:  None   Infection:  None   Code Status:  CPR    Ht:  175.3 cm (69\")   Wt:  74.4 kg (164 lb)    Admission Cmt:  None   Principal Problem:  None                Active Insurance as of 11/17/2019     Primary Coverage     Payor Plan Insurance Group Employer/Plan Group    MEDICARE MEDICARE A & B      Payor Plan Address Payor Plan Phone Number Payor Plan Fax Number Effective Dates    [address] [phone]  8/1/2000 - None Entered    [facility] [id number]       Subscriber Name Subscriber Birth Date Member ID       REECE STEPHEN 1935 [id number]           Secondary Coverage     Payor Plan Insurance Group Employer/Plan Group    [facility] SUPP [id number]     Payor Plan Address Payor Plan Phone Number Payor Plan Fax Number Effective Dates    [address]   12/1/2016 - None Entered    [facility] [id number]       Subscriber Name Subscriber Birth Date Member ID       REECE STEPHEN 1935 [id number]                 Emergency Contacts      (Rel.) Home Phone Work Phone Mobile Phone    [employer] -- -- [phone]    HaileeGeetha (Relative) [phone] -- [phone]    KATRINEVERCLIVE (Sister) [phone] -- --              "

## 2019-11-19 NOTE — PLAN OF CARE
Problem: Patient Care Overview  Goal: Plan of Care Review  Outcome: Ongoing (interventions implemented as appropriate)   11/19/19 5671   Coping/Psychosocial   Plan of Care Reviewed With patient   Plan of Care Review   Progress no change   OTHER   Outcome Summary VFSS completed. Recommend mech soft no mixed, chopped meat with thin liquids; meds whole in pureed; upright for meals and 30 min after; slow rate; small bites/sips; no straws; double swallow. Monitor for s/s due to concern for fatigue. ST to follow.

## 2019-11-19 NOTE — PLAN OF CARE
Problem: Patient Care Overview  Goal: Plan of Care Review  Outcome: Ongoing (interventions implemented as appropriate)   11/19/19 0517   Coping/Psychosocial   Plan of Care Reviewed With patient   Plan of Care Review   Progress no change   OTHER   Outcome Summary MRI completed, results called to Dr Tse, order received to start on Plavix, NIH 2, up with assist, niece concerned about patient going home without help, would like case management to reassess, slept most of the night, IVF infusing, VSS, will continue to monitor.     Goal: Individualization and Mutuality  Outcome: Ongoing (interventions implemented as appropriate)    Goal: Discharge Needs Assessment  Outcome: Ongoing (interventions implemented as appropriate)      Problem: Stroke (Ischemic) (Adult)  Goal: Signs and Symptoms of Listed Potential Problems Will be Absent, Minimized or Managed (Stroke)  Outcome: Ongoing (interventions implemented as appropriate)      Problem: Fall Risk (Adult)  Goal: Absence of Fall  Outcome: Ongoing (interventions implemented as appropriate)

## 2019-11-19 NOTE — PROGRESS NOTES
"Daily progress note    Chief complaint   Doing little better  No new complaints    History of present illness  54-year-old -American male with history of hypertension hyperlipidemia diabetes mellitus TIA and chronic kidney disease stage III presented to [facility]'s emergency room with slurred speech started yesterday.  Patient is a poor historian and according to family that he was doing okay 5 days ago and then he developed this confusion followed by slurred speech.  Patient denies any focal weakness.  Patient evaluated in ER initial work-up was negative but according to family he has left facial droop.  Patient admitted for further management.  At the time of interview he wants to eat and onset all questions appropriately and again no headache chest pain shortness of breath or focal deficit    REVIEW OF SYSTEMS  Unable to perform ROS: Other (AMS)      PHYSICAL EXAM  Blood pressure 126/68, pulse 66, temperature 97.3 °F (36.3 °C), temperature source Oral, resp. rate 16, height 175.3 cm (69\"), weight 74.4 kg (164 lb), SpO2 95 %.    Constitutional: He is oriented to person, place, and time. No distress.   Head: Normocephalic and atraumatic.   Eyes: EOM are normal. Pupils are equal, round, and reactive to light.   Neck: Normal range of motion. Neck supple.   Cardiovascular: Normal rate, regular rhythm and normal heart sounds.   Pulmonary/Chest: Effort normal and breath sounds normal. No respiratory distress.   Abdominal: Soft. There is no tenderness. There is no rebound and no guarding.   Musculoskeletal: Normal range of motion. He exhibits no edema.   Neurological: He is alert and oriented to person, place, and time. He has normal sensation and normal strength. Left facial droopSlurred speechAbnormal coordination to the left hand   Skin: Skin is warm and dry.   Psychiatric: Mood and affect normal.     LAB RESULTS  Lab Results (last 24 hours)     Procedure Component Value Units Date/Time    POC Glucose " Once [[id number]]  (Abnormal) Collected:  11/19/19 1123    Specimen:  Blood Updated:  11/19/19 1129     Glucose 158 mg/dL     TSH [[id number]]  (Abnormal) Collected:  11/19/19 0506    Specimen:  Blood from Arm, Right Updated:  11/19/19 0604     TSH 5.670 uIU/mL     BNP [[id number]]  (Normal) Collected:  11/19/19 0506    Specimen:  Blood from Arm, Right Updated:  11/19/19 0604     proBNP 163.6 pg/mL     Narrative:       Among patients with dyspnea, NT-proBNP is highly sensitive for the detection of acute congestive heart failure. In addition NT-proBNP of <300 pg/ml effectively rules out acute congestive heart failure with 99% negative predictive value.    POC Glucose Once [[id number]]  (Normal) Collected:  11/19/19 0601    Specimen:  Blood Updated:  11/19/19 0603     Glucose 109 mg/dL     Comprehensive Metabolic Panel [[id number]]  (Abnormal) Collected:  11/19/19 0506    Specimen:  Blood from Arm, Right Updated:  11/19/19 0553     Glucose 120 mg/dL      BUN 28 mg/dL      Creatinine 1.79 mg/dL      Sodium 143 mmol/L      Potassium 4.1 mmol/L      Chloride 112 mmol/L      CO2 20.2 mmol/L      Calcium 8.5 mg/dL      Total Protein 6.3 g/dL      Albumin 3.50 g/dL      ALT (SGPT) 18 U/L      AST (SGOT) 16 U/L      Alkaline Phosphatase 69 U/L      Total Bilirubin 0.4 mg/dL      eGFR Non African Amer 36 mL/min/1.73      Globulin 2.8 gm/dL      A/G Ratio 1.3 g/dL      BUN/Creatinine Ratio 15.6     Anion Gap 10.8 mmol/L     Narrative:       GFR Normal >60  Chronic Kidney Disease <60  Kidney Failure <15    Lipid Panel [[id number]]  (Abnormal) Collected:  11/19/19 0506    Specimen:  Blood from Arm, Right Updated:  11/19/19 0553     Total Cholesterol 102 mg/dL      Triglycerides 92 mg/dL      HDL Cholesterol 32 mg/dL      LDL Cholesterol  52 mg/dL      VLDL Cholesterol 18.4 mg/dL      LDL/HDL Ratio 1.61    Narrative:       Cholesterol Reference Ranges  (U.S. Department of Health and Human Services ATP III Classifications)    Desirable           <200 mg/dL  Borderline High    200-239 mg/dL  High Risk          >240 mg/dL      Triglyceride Reference Ranges  (U.S. Department of Health and Human Services ATP III Classifications)    Normal           <150 mg/dL  Borderline High  150-199 mg/dL  High             200-499 mg/dL  Very High        >500 mg/dL    HDL Reference Ranges  (U.S. Department of Health and Human Services ATP III Classifcations)    Low     <40 mg/dl (major risk factor for CHD)  High    >60 mg/dl ('negative' risk factor for CHD)        LDL Reference Ranges  (U.S. Department of Health and Human Services ATP III Classifcations)    Optimal          <100 mg/dL  Near Optimal     100-129 mg/dL  Borderline High  130-159 mg/dL  High             160-189 mg/dL  Very High        >189 mg/dL    CBC & Differential [[id number]] Collected:  11/19/19 0506    Specimen:  Blood Updated:  11/19/19 0535    Narrative:       The following orders were created for panel order CBC & Differential.  Procedure                               Abnormality         Status                     ---------                               -----------         ------                     CBC Auto Differential[[id number]]        Abnormal            Final result                 Please view results for these tests on the individual orders.    CBC Auto Differential [[id number]]  (Abnormal) Collected:  11/19/19 0506    Specimen:  Blood from Arm, Right Updated:  11/19/19 0535     WBC 7.39 10*3/mm3      RBC 3.89 10*6/mm3      Hemoglobin 11.8 g/dL      Hematocrit 35.1 %      MCV 90.2 fL      MCH 30.3 pg      MCHC 33.6 g/dL      RDW 12.9 %      RDW-SD 42.0 fl      MPV 10.5 fL      Platelets 189 10*3/mm3      Neutrophil % 69.5 %      Lymphocyte % 17.1 %      Monocyte % 8.7 %      Eosinophil % 3.7 %      Basophil % 0.5 %      Immature Grans % 0.5 %      Neutrophils, Absolute 5.14 10*3/mm3      Lymphocytes, Absolute 1.26 10*3/mm3      Monocytes, Absolute 0.64 10*3/mm3      Eosinophils, Absolute 0.27  10*3/mm3      Basophils, Absolute 0.04 10*3/mm3      Immature Grans, Absolute 0.04 10*3/mm3      nRBC 0.0 /100 WBC     Hemoglobin A1c [[id number]]  (Abnormal) Collected:  11/19/19 0506    Specimen:  Blood from Arm, Right Updated:  11/19/19 0534     Hemoglobin A1C 10.05 %     Narrative:       Hemoglobin A1C Ranges:    Increased Risk for Diabetes  5.7% to 6.4%  Diabetes                     >= 6.5%  Diabetic Goal                < 7.0%    POC Glucose Once [[id number]]  (Normal) Collected:  11/18/19 2028    Specimen:  Blood Updated:  11/18/19 2030     Glucose 99 mg/dL     Vitamin B12 [[id number]]  (Normal) Collected:  11/18/19 1640    Specimen:  Blood Updated:  11/18/19 1730     Vitamin B-12 909 pg/mL     Folate [[id number]]  (Normal) Collected:  11/18/19 1640    Specimen:  Blood Updated:  11/18/19 1730     Folate >20.00 ng/mL     POC Glucose Once [[id number]]  (Abnormal) Collected:  11/18/19 1617    Specimen:  Blood Updated:  11/18/19 1619     Glucose 297 mg/dL         Imaging Results (Last 24 Hours)     Procedure Component Value Units Date/Time    MRI Brain Without Contrast [[id number]] Collected:  11/18/19 2017     Updated:  11/18/19 2025    Narrative:       MRI OF THE BRAIN WITHOUT CONTRAST     CLINICAL HISTORY: Difficulty with speech.     MRI of the brain was obtained with sagittal T1, axial T1, axial FLAIR,  axial T2, axial diffusion, and axial susceptibility weighted images.     Comparison is made to previous MRI of the brain dated 11/29/2017.     FINDINGS:     There are findings consistent with an acute infarct involving the  junction of the right globus pallidus and the posterior limb of the  right internal capsule measuring up to 13 x 6 mm in greatest axial  dimensions.     There are moderate changes of chronic small vessel ischemic phenomena.  Old lacunar disease is seen within the globus pallidi and the left  caudate head. Similar findings were noted on the prior exam. The  ventricles, sulci, and cisterns are age  appropriate. The major  intracranial flow related signal voids are unremarkable. No abnormal  areas of susceptibility artifact are noted. The midline intracranial  anatomy is within normal limits.        Impression:          There are findings consistent with an acute infarct involving the  junction of the right globus pallidus and the posterior limb of the  right internal capsule. This infarct is likely lacunar in etiology.   These findings were discussed with Shona Gonzalez RN, the nurse caring for  this patient, on 11/18/2019 at approximately 7:30 PM. She was instructed  to notify the physician caring for this patient with this result.     Incidental note is made of findings compatible with old lacunar disease  involving the left caudate and bilateral globus pallidi as well as  moderate changes of chronic small vessel ischemic phenomena.     This report was finalized on 11/18/2019 8:22 PM by Dr. Lamberto Grier M.D.           ECG 12 Lead        HEART RATE= 75  bpm  RR Interval= 805  ms  MN Interval= 181  ms  P Horizontal Axis= 6  deg  P Front Axis= 44  deg  QRSD Interval= 120  ms  QT Interval= 394  ms  QRS Axis= -26  deg  T Wave Axis= -8  deg  - ABNORMAL ECG -  Sinus rhythm  Supraventricular bigeminy  Incomplete left bundle branch block  Probable left ventricular hypertrophy  NO SIGNIFICANT CHANGE FROM PREVIOUS ECG             Current Facility-Administered Medications:   •  amLODIPine (NORVASC) tablet 10 mg, 10 mg, Oral, Q24H, Jose Llanos MD, 10 mg at 11/19/19 0901  •  [START ON 11/20/2019] aspirin chewable tablet 81 mg, 81 mg, Oral, Daily, Brianna Monroe, CARITO  •  atorvastatin (LIPITOR) tablet 40 mg, 40 mg, Oral, Nightly, Brianna Monroe APRN  •  budesonide (PULMICORT) nebulizer solution 0.5 mg, 0.5 mg, Nebulization, BID - RT, Jose Llanos MD, 0.5 mg at 11/19/19 1157  •  calcium-vitamin D 500-200 MG-UNIT tablet 1,000 mg, 1,000 mg, Oral, Daily, Jose Llanos MD, 1,000 mg at 11/19/19 0901  •  clopidogrel (PLAVIX)  tablet 75 mg, 75 mg, Oral, Daily, Sid Tse MD, 75 mg at 11/18/19 2235  •  famotidine (PEPCID) tablet 20 mg, 20 mg, Oral, BID, Rylee Llanos MD, 20 mg at 11/19/19 0901  •  insulin glargine (LANTUS) injection 20 Units, 20 Units, Subcutaneous, Daily, Rylee Llanos MD, 20 Units at 11/19/19 0901  •  insulin lispro (humaLOG) injection 0-7 Units, 0-7 Units, Subcutaneous, 4x Daily With Meals & Nightly, Rylee Llanos MD, 2 Units at 11/19/19 1155  •  insulin lispro (humaLOG) injection 7 Units, 7 Units, Subcutaneous, TID With Meals, Rylee Llanos MD, 7 Units at 11/19/19 1155  •  levothyroxine (SYNTHROID, LEVOTHROID) tablet 50 mcg, 50 mcg, Oral, Q AM, Rylee Llanos MD, 50 mcg at 11/19/19 0606  •  sodium chloride 0.9 % flush 10 mL, 10 mL, Intravenous, Q12H, Brianna Monroe APRN  •  sodium chloride 0.9 % flush 10 mL, 10 mL, Intravenous, PRN, Brianna Monroe APRN  •  sodium chloride 0.9 % infusion, 75 mL/hr, Intravenous, Continuous, Rylee Llanos MD, Last Rate: 75 mL/hr at 11/19/19 0905, 75 mL/hr at 11/19/19 0905     ASSESSMENT  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

## 2019-11-19 NOTE — PROGRESS NOTES
LOS: 2 days     Chief Complaint/ Reason for encounter: Chronic kidney disease/acute renal failure  Chief Complaint   Patient presents with   • Neuro Deficit(s)         Subjective   No new complaints, slightly confused but denies any fevers chills  Good appetite with no nausea or vomiting  No shortness of breath or chest pain      Medical history reviewed:  History of Present Illness    Subjective    History taken from: Patient and chart    Vital Signs  Temp:  [97.3 °F (36.3 °C)-98.4 °F (36.9 °C)] 97.3 °F (36.3 °C)  Heart Rate:  [59-70] 66  Resp:  [14-18] 16  BP: (126-156)/() 126/68       Wt Readings from Last 1 Encounters:   11/19/19 0825 74.4 kg (164 lb)   11/17/19 1807 74.5 kg (164 lb 3.9 oz)   11/17/19 1505 73 kg (161 lb)       Objective    Objective:  General Appearance:  Comfortable, chronically ill-appearing, in no acute distress and not in pain.  Output: Producing urine.    HEENT: Normal HEENT exam.    Lungs:  Normal effort and normal respiratory rate.  Breath sounds clear to auscultation.  No  respiratory distress.  No rales, decreased breath sounds or rhonchi.    Heart: Normal rate.  Regular rhythm.  S1 normal.  No murmur.   Abdomen: Abdomen is soft.  Bowel sounds are normal.   There is no abdominal tenderness.  There is no epigastric area or suprapubic area tenderness.  There is no rebound tenderness.     Extremities: Normal range of motion.  Trace bilateral edema  Pulses: Distal pulses are intact.    Neurological: Patient is alert and oriented to person, place and time.    Pupils:  Pupils are equal, round, and reactive to light.    Skin:  Warm and dry.  No rash or cyanosis.       Results Review:    Intake/Output:     Intake/Output Summary (Last 24 hours) at 11/19/2019 1816  Last data filed at 11/19/2019 1300  Gross per 24 hour   Intake 1110 ml   Output 1450 ml   Net -340 ml         DATA:  Radiology and Labs:  The following labs independently reviewed by me. Additional labs ordered for tomorrow  a.m.  Interval notes, chart personally reviewed by me.   Old records independently reviewed showing chronic kidney disease, stage IV previous [facility]  The following radiologic studies independently viewed by me, findings MRI brain consistent with an acute infarct involving the right globus pallidus and right internal capsule lacunar  New problems include new CVA  Discussed with patient himself at bedside    Risk/ complexity of medical care/ medical decision making moderate risk with new CVA    Labs:   Recent Results (from the past 24 hour(s))   POC Glucose Once    Collection Time: 11/18/19  8:28 PM   Result Value Ref Range    Glucose 99 70 - 130 mg/dL   Comprehensive Metabolic Panel    Collection Time: 11/19/19  5:06 AM   Result Value Ref Range    Glucose 120 (H) 65 - 99 mg/dL    BUN 28 (H) 8 - 23 mg/dL    Creatinine 1.79 (H) 0.76 - 1.27 mg/dL    Sodium 143 136 - 145 mmol/L    Potassium 4.1 3.5 - 5.2 mmol/L    Chloride 112 (H) 98 - 107 mmol/L    CO2 20.2 (L) 22.0 - 29.0 mmol/L    Calcium 8.5 (L) 8.6 - 10.5 mg/dL    Total Protein 6.3 6.0 - 8.5 g/dL    Albumin 3.50 3.50 - 5.20 g/dL    ALT (SGPT) 18 1 - 41 U/L    AST (SGOT) 16 1 - 40 U/L    Alkaline Phosphatase 69 39 - 117 U/L    Total Bilirubin 0.4 0.2 - 1.2 mg/dL    eGFR Non African Amer 36 (L) >60 mL/min/1.73    Globulin 2.8 gm/dL    A/G Ratio 1.3 g/dL    BUN/Creatinine Ratio 15.6 7.0 - 25.0    Anion Gap 10.8 5.0 - 15.0 mmol/L   BNP    Collection Time: 11/19/19  5:06 AM   Result Value Ref Range    proBNP 163.6 5.0-1,800.0 pg/mL   TSH    Collection Time: 11/19/19  5:06 AM   Result Value Ref Range    TSH 5.670 (H) 0.270 - 4.200 uIU/mL   Lipid Panel    Collection Time: 11/19/19  5:06 AM   Result Value Ref Range    Total Cholesterol 102 0 - 200 mg/dL    Triglycerides 92 0 - 150 mg/dL    HDL Cholesterol 32 (L) 40 - 60 mg/dL    LDL Cholesterol  52 0 - 100 mg/dL    VLDL Cholesterol 18.4 5 - 40 mg/dL    LDL/HDL Ratio 1.61    Hemoglobin A1c    Collection Time: 11/19/19  5:06 AM    Result Value Ref Range    Hemoglobin A1C 10.05 (H) 4.80 - 5.60 %   CBC Auto Differential    Collection Time: 11/19/19  5:06 AM   Result Value Ref Range    WBC 7.39 3.40 - 10.80 10*3/mm3    RBC 3.89 (L) 4.14 - 5.80 10*6/mm3    Hemoglobin 11.8 (L) 13.0 - 17.7 g/dL    Hematocrit 35.1 (L) 37.5 - 51.0 %    MCV 90.2 79.0 - 97.0 fL    MCH 30.3 26.6 - 33.0 pg    MCHC 33.6 31.5 - 35.7 g/dL    RDW 12.9 12.3 - 15.4 %    RDW-SD 42.0 37.0 - 54.0 fl    MPV 10.5 6.0 - 12.0 fL    Platelets 189 140 - 450 10*3/mm3    Neutrophil % 69.5 42.7 - 76.0 %    Lymphocyte % 17.1 (L) 19.6 - 45.3 %    Monocyte % 8.7 5.0 - 12.0 %    Eosinophil % 3.7 0.3 - 6.2 %    Basophil % 0.5 0.0 - 1.5 %    Immature Grans % 0.5 0.0 - 0.5 %    Neutrophils, Absolute 5.14 1.70 - 7.00 10*3/mm3    Lymphocytes, Absolute 1.26 0.70 - 3.10 10*3/mm3    Monocytes, Absolute 0.64 0.10 - 0.90 10*3/mm3    Eosinophils, Absolute 0.27 0.00 - 0.40 10*3/mm3    Basophils, Absolute 0.04 0.00 - 0.20 10*3/mm3    Immature Grans, Absolute 0.04 0.00 - 0.05 10*3/mm3    nRBC 0.0 0.0 - 0.2 /100 WBC   POC Glucose Once    Collection Time: 11/19/19  6:01 AM   Result Value Ref Range    Glucose 109 70 - 130 mg/dL   Adult Transthoracic Echo Complete W/ Cont if Necessary Per Protocol    Collection Time: 11/19/19  8:29 AM   Result Value Ref Range    BSA 1.9 m^2    IVSd 0.97 cm    IVSs 3.7 cm    LVIDd 5.4 cm    LVIDs 3.4 cm    LVPWd 0.88 cm    IVS/LVPW 1.1     FS 36.6 %    EDV(Teich) 139.8 ml    ESV(Teich) 47.8 ml    EF(Teich) 65.8 %    EF(cubed) 74.5 %    % IVS thick 277.8 %    LV mass(C)d 185.4 grams    LV mass(C)dI 97.6 grams/m^2    SV(Teich) 92.0 ml    SI(Teich) 48.5 ml/m^2    SV(cubed) 115.6 ml    SI(cubed) 60.9 ml/m^2    Ao root diam 2.9 cm    Ao root area 6.6 cm^2    ACS 1.7 cm    LVOT diam 2.0 cm    LVOT area 3.1 cm^2    LVOT area(traced) 3.1 cm^2    RVOT diam 2.1 cm    RVOT area 3.5 cm^2    LVLd ap4 7.3 cm    EDV(MOD-sp4) 59.0 ml    LVLs ap4 6.1 cm    ESV(MOD-sp4) 19.0 ml    EF(MOD-sp4)  67.8 %    LVLd ap2 7.3 cm    EDV(MOD-sp2) 65.0 ml    LVLs ap2 6.4 cm    ESV(MOD-sp2) 31.0 ml    EF(MOD-sp2) 52.3 %    SV(MOD-sp4) 40.0 ml    SI(MOD-sp4) 21.1 ml/m^2    SV(MOD-sp2) 34.0 ml    SI(MOD-sp2) 17.9 ml/m^2    Ao root area (BSA corrected) 1.5     LV Kimbrough Vol (BSA corrected) 31.1 ml/m^2    LV Sys Vol (BSA corrected) 10.0 ml/m^2    MV A dur 0.15 sec    MV E max roosevelt 52.8 cm/sec    MV A max roosevelt 95.3 cm/sec    MV E/A 0.55     MV V2 mean 51.6 cm/sec    MV mean PG 1.0 mmHg    MV V2 VTI 31.9 cm    MVA(VTI) 1.8 cm^2    MV P1/2t max roosevelt 55.3 cm/sec    MV P1/2t 103.8 msec    MVA(P1/2t) 2.1 cm^2    MV dec slope 156.0 cm/sec^2    MV dec time 0.35 sec    Ao V2 mean 84.7 cm/sec    Ao mean PG 3.0 mmHg    Ao mean PG (full) 1.0 mmHg    Ao V2 VTI 27.7 cm    ARA(I,A) 2.1 cm^2    ARA(I,D) 2.1 cm^2    LV V1 mean PG 2.0 mmHg    LV V1 mean 57.7 cm/sec    LV V1 VTI 18.5 cm    MR max roosevelt 428.0 cm/sec    MR max PG 73.3 mmHg    SV(Ao) 183.0 ml    SI(Ao) 96.4 ml/m^2    SV(LVOT) 58.1 ml    SV(RVOT) 37.8 ml    SI(LVOT) 30.6 ml/m^2    PA V2 max 118.0 cm/sec    PA max PG 5.6 mmHg    RV V1 mean PG 1.0 mmHg    RV V1 mean 35.7 cm/sec    RV V1 VTI 10.9 cm    TR max roosevelt 195.0 cm/sec    Qp/Qs 0.65     MVA P1/2T LCG 4.0 cm^2     CV ECHO [place] - BZI_BMI 24.2 kilograms/m^2     CV ECHO [place] - BSA([facility]) 1.9 m^2     CV ECHO [place] - BZI_METRIC_WEIGHT 74.4 kg     CV ECHO [place] - BZI_METRIC_HEIGHT 175.3 cm    Target HR (85%) 116 bpm    Max. Pred. HR (100%) 136 bpm    TDI S' 10.00 cm/sec    RV Base 2.70 cm    LA Volume Index 28.0 mL/m2    Avg E/e' ratio 11.73     Ao pk roosevelt 125.0 cm/sec    EF(MOD-bp) 68 %    Lat Peak E' Roosevelt 6.0 cm/sec    LV V1 max 85.0 cm/sec    Med Peak E' Roosevelt 3.00 cm/sec    RAP systole 3 mmHg    RVSP(TR) 19 mmHg    Ao max PG 28.0 mmHg    TAPSE (>1.6) 2.20 cm2   POC Glucose Once    Collection Time: 11/19/19 11:23 AM   Result Value Ref Range    Glucose 158 (H) 70 - 130 mg/dL   POC Glucose Once    Collection Time: 11/19/19  4:12 PM    Result Value Ref Range    Glucose 115 70 - 130 mg/dL       Radiology:  Imaging Results (Last 24 Hours)     Procedure Component Value Units Date/Time    FL Video Swallow With Speech [[id number]] Updated:  11/19/19 1446    MRI Brain Without Contrast [[id number]] Collected:  11/18/19 2017     Updated:  11/18/19 2025    Narrative:       MRI OF THE BRAIN WITHOUT CONTRAST     CLINICAL HISTORY: Difficulty with speech.     MRI of the brain was obtained with sagittal T1, axial T1, axial FLAIR,  axial T2, axial diffusion, and axial susceptibility weighted images.     Comparison is made to previous MRI of the brain dated 11/29/2017.     FINDINGS:     There are findings consistent with an acute infarct involving the  junction of the right globus pallidus and the posterior limb of the  right internal capsule measuring up to 13 x 6 mm in greatest axial  dimensions.     There are moderate changes of chronic small vessel ischemic phenomena.  Old lacunar disease is seen within the globus pallidi and the left  caudate head. Similar findings were noted on the prior exam. The  ventricles, sulci, and cisterns are age appropriate. The major  intracranial flow related signal voids are unremarkable. No abnormal  areas of susceptibility artifact are noted. The midline intracranial  anatomy is within normal limits.        Impression:          There are findings consistent with an acute infarct involving the  junction of the right globus pallidus and the posterior limb of the  right internal capsule. This infarct is likely lacunar in etiology.   These findings were discussed with Shona Gonzalez RN, the nurse caring for  this patient, on 11/18/2019 at approximately 7:30 PM. She was instructed  to notify the physician caring for this patient with this result.     Incidental note is made of findings compatible with old lacunar disease  involving the left caudate and bilateral globus pallidi as well as  moderate changes of chronic small vessel  ischemic phenomena.     This report was finalized on 11/18/2019 8:22 PM by Dr. Lamberto Grier M.D.                Medications have been reviewed:  Current Facility-Administered Medications   Medication Dose Route Frequency Provider Last Rate Last Dose   • amLODIPine (NORVASC) tablet 10 mg  10 mg Oral Q24H Jose Llanos MD   10 mg at 11/19/19 0901   • [START ON 11/20/2019] aspirin chewable tablet 81 mg  81 mg Oral Daily Brianna Monroe APRN       • atorvastatin (LIPITOR) tablet 40 mg  40 mg Oral Nightly Brianna Monroe APRN       • budesonide (PULMICORT) nebulizer solution 0.5 mg  0.5 mg Nebulization BID - RT Jose Llanos MD   0.5 mg at 11/19/19 1157   • calcium-vitamin D 500-200 MG-UNIT tablet 1,000 mg  1,000 mg Oral Daily Jose Llanos MD   1,000 mg at 11/19/19 0901   • clopidogrel (PLAVIX) tablet 75 mg  75 mg Oral Daily Sid Tse MD   75 mg at 11/18/19 2235   • famotidine (PEPCID) tablet 20 mg  20 mg Oral BID Jose Llanos MD   20 mg at 11/19/19 0901   • insulin glargine (LANTUS) injection 20 Units  20 Units Subcutaneous Daily Jose Llanos MD   20 Units at 11/19/19 0901   • insulin lispro (humaLOG) injection 0-7 Units  0-7 Units Subcutaneous 4x Daily With Meals & Nightly Jose Llanos MD   2 Units at 11/19/19 1155   • insulin lispro (humaLOG) injection 7 Units  7 Units Subcutaneous TID With Meals Jose Llanos MD   7 Units at 11/19/19 1734   • levothyroxine (SYNTHROID, LEVOTHROID) tablet 50 mcg  50 mcg Oral Q AM Jose Llanos MD   50 mcg at 11/19/19 0606   • sodium chloride 0.9 % flush 10 mL  10 mL Intravenous PRN Brianna Monroe APRN       • sodium chloride 0.9 % infusion  75 mL/hr Intravenous Continuous Jose Llanso MD 75 mL/hr at 11/19/19 0905 75 mL/hr at 11/19/19 0905       ASSESSMENT:  New  CVA, per MRI  Chronic kidney disease stage IV, stable, creatinine below usual baseline  Hypertension, well controlled  Diabetic kidney disease but absence of proteinuria  Hyperlipidemia  Diabetes mellitus type  2  GERD      Plan:  Renal function improved, below usual baseline  Discontinue IV fluids  Continue current antihypertensive regimen  Antiplatelet therapy per neurology  Diabetic management per medicine     Continue to monitor electrolytes and volume closely  Please call with any questions or concerns.    Gurvinder Cuba MD   [facility]   Office phone number: [phone]  Answering service phone number: [phone]    11/19/19  6:16 PM    Dictation performed using Dragon dictation software

## 2019-11-19 NOTE — MBS/VFSS/FEES
Acute Care - Speech Language Pathology   Swallow Initial Evaluation [facility]     Patient Name: Reece Stephen  : 1935  MRN: [id number]  Today's Date: 2019               Admit Date: 2019    Visit Dx:     ICD-10-CM ICD-9-CM   1. Cerebrovascular accident (CVA), unspecified mechanism (CMS/HCC) I63.9 434.91   2. Hyperglycemia R73.9 790.29   3. Renal insufficiency N28.9 593.9     Patient Active Problem List   Diagnosis   • Rectal cancer (CMS/HCC)   • Lung nodule, multiple   • History of DVT (deep vein thrombosis)   • Transient cerebral ischemia   • Hyperkalemia   • Hyperosmolar non-ketotic state in patient with type 2 diabetes mellitus (CMS/HCC)   • DM (diabetes mellitus), secondary, uncontrolled, with hyperosmolarity (CMS/HCC)   • Hyponatremia with extracellular fluid depletion   • Hypothyroidism   • Hyperlipidemia LDL goal <70   • Diabetic polyneuropathy associated with type 2 diabetes mellitus (CMS/HCC)   • Stage 3 chronic kidney disease (CMS/[facility])   • Non compliance w medication regimen   • Essential hypertension   • Upper gastrointestinal hemorrhage   • Osteoarthritis   • Gastroesophageal reflux disease   • Deep vein thrombosis (CMS/[facility])   • Colitis due to Clostridium difficile   • Burn injury   • Anemia   • Cerebrovascular accident (CVA) (CMS/[facility])     Past Medical History:   Diagnosis Date   • Anemia    • Arthritis    • Chronic kidney disease     STAGE III, FOLLOWED BY DR. FREDDIE GONZALES   • Clostridium difficile infection 10/2014   • Diabetes mellitus (CMS/[facility])     TYPE 2, IDDM   • Disease of thyroid gland     ACQUIRED HYPOTHYROIDISM   • Duodenal ulcer 2014   • DVT (deep venous thrombosis) (CMS/HCC) 2015    PORT ASSOCIATED OF LEFT UPPER EXTREMITY   • Electrocution     HAD TO HAVE PARTIAL AMPUTATION OF LEFT FOOT   • GERD (gastroesophageal reflux disease)    • Hearing loss    • Hemorrhagic shock (CMS/HCC)     SECONDARY TO BLEEDING DUODENAL ULCER, ADMITTED TO [facility]   • Histiocytoma  (CMS/HCC)    • History of blood transfusion 2015    D/T BLEEDING DUODENAL ULCER   • History of chemotherapy     FOLLOWED BY DR. NIGEL NOBLE   • History of radiation therapy 2014    FOLLOWED BY DR. RJ HUI   • Hypercholesterolemia    • Hyperkalemia    • Hypertension    • Hypoglycemia 01/04/2018    SEEN AT [facility] ER   • Mixed hyperlipidemia    • Multiple lung nodules    • Neuropathy    • OA (osteoarthritis)    • ANNE MARIE (obstructive sleep apnea)    • Peptic ulceration 2014    Duodenal ulcer   • Pneumonia 03/29/2013    ADMITTED TO [facility]   • Rectal cancer (CMS/[facility]) 06/2014    T3N1M0, S/P CHEMO, XRT, AND RESECTION, FOLLOWED BY DR. TRACE HERRING   • Stroke (CMS/HCC) 11/28/2017    TIA, ADMITTED TO [facility]   • Vitamin D deficiency      Past Surgical History:   Procedure Laterality Date   • AMPUTATION FOOT Left 1971    PARTIAL DUE TO AN ELECTRICAL SHOCK INJURY   • ARM TENDON REPAIR Right     DONE BY DR. OLEARY AND KLEINERT   • COLON RESECTION N/A 11/14/2014    LOW ANTERIOR RESECTION WITH CREATION OF COLOSTOMY, DR. TRACE HERRING AT [facility]   • COLONOSCOPY W/ BIOPSIES N/A 06/26/2014    ULCERATED 5CM MASS IN RECTUM, PATH: MODERATELY DIFFERENTIATED ADENOCARCINOMA, MULTIPLE DIVERTICULA IN SIGMOID, DR. LAUREN JAMES AT [facility]   • COLONOSCOPY W/ BIOPSIES N/A 07/09/2014    RECTAL MASS: INVASIVE MODERATLEY DIFFERIENTIATED ADENOCARCINOMA, AREA TATTOOED, DR. TRACE HERRING AT [facility]   • ENDOSCOPY  02/09/2015   • ENDOSCOPY N/A 02/09/2015    RESOLVED DUODENAL ULCER, EGD WNL, DR. FREDDIE MALCOLM AT [facility]   • FLEXIBLE SIGMOIDOSCOPY N/A 10/06/2014    MALIGNANT PARTIALLY OBSTRUCTING TUMOR IN MID RECTUM, DR. DELTA HERRING AT [facility]   • PORTACATH PLACEMENT Left 07/24/2014    LEFT SUBCLAVIAN, DR. KWAKU TAY AT [facility]   • RECTAL ULTRASOUND N/A 07/09/2014    ENDORECTAL US FOR CANCER STAGING, T3N1 RECTAL CANCER AT 7 CM, DR. TRACE HERRING AT [facility]   • TRACHEAL SURGERY N/A 08/31/2014    ENDOTRACHEAL INTUBATION, DR. ALMA ROSA HAGAN AT [facility]   • VENOUS ACCESS DEVICE  (PORT) REMOVAL Left 03/12/2015    LEFT SUBCLAVIAN, DR. KWAKU TAY AT [facility]        SWALLOW EVALUATION (last 72 hours)      SLP Adult Swallow Evaluation     Row Name 11/19/19 1600 11/18/19 1000                Rehab Evaluation    Document Type  evaluation  -AW  evaluation  -AW       Subjective Information  --  no complaints  -AW       Patient Observations  alert;cooperative;agree to therapy  -AW  alert;cooperative;agree to therapy  -AW       Patient/Family Observations  --  Pt oriented, no family present.  -AW       Patient Effort  good  -AW  good  -AW       Symptoms Noted During/After Treatment  none  -AW  none  -AW          General Information    Patient Profile Reviewed  yes  -AW  yes  -AW       Pertinent History Of Current Problem  Pt admitted with fall, AMS, slurred speech, and L facial droop. MRI showed acute infarct R internal capsule.  -AW  Pt admitted with fall, AMS, slurred speech, and L facial droop. CT negative.  -AW       Current Method of Nutrition  mechanical soft, no mixed consistencies;nectar/syrup-thick liquids  -AW  NPO  -AW       Precautions/Limitations, Vision  WFL;for purposes of eval  -AW  WFL;for purposes of eval  -AW       Precautions/Limitations, Hearing  WFL  -AW  hearing impairment, bilaterally  -AW       Prior Level of Function-Communication  WFL  -AW  WFL  -AW       Prior Level of Function-Swallowing  no diet consistency restrictions  -AW  no diet consistency restrictions  -AW       Plans/Goals Discussed with  patient;agreed upon  -AW  patient;agreed upon  -AW       Barriers to Rehab  none identified  -AW  none identified  -AW       Patient's Goals for Discharge  return to regular diet  -AW  return to PO diet  -AW          Pain Assessment    Additional Documentation  Pain Scale: Numbers Pre/Post-Treatment (Group)  -AW  Pain Scale: Numbers Pre/Post-Treatment (Group)  -AW          Pain Scale: Numbers Pre/Post-Treatment    Pain Scale: Numbers, Pretreatment  0/10 - no pain  -AW  0/10 - no pain   -AW       Pain Scale: Numbers, Post-Treatment  0/10 - no pain  -AW  0/10 - no pain  -AW          Oral Motor and Function    Dentition Assessment  upper dentures/partial in place;lower dentures/partial in place  -AW  upper dentures/partial in place;lower dentures/partial in place  -AW       Secretion Management  WNL/WFL  -AW  WNL/WFL  -AW       Mucosal Quality  moist, healthy  -AW  moist, healthy  -AW       Volitional Swallow  --  WFL  -AW       Volitional Cough  --  L  -AW          Oral Musculature and Cranial Nerve Assessment    Oral Labial or Buccal Impairment, Detail, Cranial Nerve VII (Facial):  --  left labial droop  -AW       Vocal Impairment, Detail. Cranial Nerve X (Vagus)  --  other (see comments) Speech slightly dysarthric, intelligible.  -AW          General Eating/Swallowing Observations    Respiratory Support Currently in Use  room air  -AW  room air  -AW       Eating/Swallowing Skills  self-fed;fed by SLP  -AW  self-fed;fed by SLP  -AW       Positioning During Eating  upright in bed  -AW  upright in bed  -AW       Utensils Used  --  spoon;cup  -AW       Consistencies Trialed  --  regular textures;soft textures;pureed;thin liquids;nectar/syrup-thick liquids mixed  -AW          Clinical Swallow Eval    Oral Prep Phase  --  impaired  -AW       Oral Transit  --  WF  -AW       Oral Residue  --  impaired  -AW       Pharyngeal Phase  --  suspected pharyngeal impairment  -AW       Clinical Swallow Evaluation Summary  --  Pt had wet vocal quality and throat clearing with trials of thin. No s/s with nectar thick liquids or pureed. Mastication slow with soft and regular solids with some residue in L cheek which pt cleared with extra time. Laryngeal elevation felt slightly reduced at times and sluggish with palpation.    -AW          MBS/VFSS    Utensils Used  spoon;cup;straw  -AW  --       Consistencies Trialed  regular textures;soft textures;pureed;thin liquids;nectar/syrup-thick liquids;honey-thick liquids  mixed  -AW  --          MBS/VFSS Interpretation    Oral Prep Phase  WFL  -AW  --       Oral Transit Phase  impaired  -AW  --       Oral Residue  WFL  -AW  --       VFSS Summary  Pt presented with mild oropharyngeal dysphagia characterized by decreased base of tongue strength and decreased hyolaryngeal excursion. Pt tolerated thins via cup with no penetration or aspiration initially. At end of study, trace penetration was noted with thins from pharyngeal residuals that pt had not cleared. With straw sips, transient posterior penetration was noted. No penetration or aspiration was noted with nectar thick or honey thick liquids. Pt had slow mastication and propulsion of pureed, soft, mixed, and regular. Mixed consistency spilled to the pyriforms increasing the risk of aspiration. With all consistencies, pt had mild pharyngeal residuals (tongue base, valleculae, and pyriforms) which he sometimes needed a cue to clear. Recommend diet advancement to soft chopped meat, no mixed and thin, no straws. Monitor pt for s/s due to concern of fatigue.  -AW  --          Initiation of Pharyngeal Swallow    Pharyngeal Phase  impaired pharyngeal phase of swallowing  -AW  --          SLP Communication to Radiology    Summary Statement  Pt presented with mild oropharyngeal dysphagia characterized by decreased base of tongue strength and decreased hyolaryngeal excursion. Pt tolerated thins via cup with no penetration or aspiration initially. At end of study, trace penetration was noted with thins from pharyngeal residuals that pt had not cleared. With straw sips, transient posterior penetration was noted. No penetration or aspiration was noted with nectar thick or honey thick liquids. Pt had slow mastication and propulsion of pureed, soft, mixed, and regular. Mixed consistency spilled to the pyriforms increasing the risk of aspiration. With all consistencies, pt had mild pharyngeal residuals (tongue base, valleculae, and pyriforms) which  he sometimes needed a cue to clear.   -AW  --          Clinical Impression    SLP Swallowing Diagnosis  mild;oral dysfunction;pharyngeal dysfunction  -AW  oral dysfunction;suspected pharyngeal dysfunction  -AW       Functional Impact  risk of aspiration/pneumonia  -AW  risk of aspiration/pneumonia  -AW       Rehab Potential/Prognosis, Swallowing  good, to achieve stated therapy goals  -AW  good, to achieve stated therapy goals  -AW       Swallow Criteria for Skilled Therapeutic Interventions Met  demonstrates skilled criteria  -AW  demonstrates skilled criteria  -AW          Recommendations    Therapy Frequency (Swallow)  PRN  -AW  PRN  -AW       Predicted Duration Therapy Intervention (Days)  until discharge  -AW  until discharge  -AW       SLP Diet Recommendation  mechanical soft with no mixed consistencies;chopped;thin liquids  -AW  mechanical soft with no mixed consistencies;nectar thick liquids;ice chips between meals after oral care, with supervision;water between meals after oral care, with supervision  -AW       Recommended Diagnostics  --  VFSS (MBS)  -AW       Recommended Precautions and Strategies  upright posture during/after eating;small bites of food and sips of liquid;no straw;multiple swallows per bite of food;multiple swallows per sip of liquid  -AW  upright posture during/after eating;small bites of food and sips of liquid;no straw;check mouth frequently for oral residue/pocketing  -AW       SLP Rec. for Method of Medication Administration  meds whole;with pudding or applesauce  -AW  meds whole;with thick liquids;with pudding or applesauce;as tolerated  -AW       Monitor for Signs of Aspiration  yes;notify SLP if any concerns  -AW  yes;notify SLP if any concerns  -AW       Anticipated Dischage Disposition  unknown  -AW  unknown  -AW          Swallow Goals (SLP)    Oral Nutrition/Hydration Goal Selection (SLP)  --  oral nutrition/hydration, SLP goal 1  -AW          Oral Nutrition/Hydration Goal 1  (SLP)    Oral Nutrition/Hydration Goal 1, SLP  --  Pt will safely tolerate the least restrictive diet with no s/s of aspiration.  -AW       Time Frame (Oral Nutrition/Hydration Goal 1, SLP)  --  by discharge  -AW         User Key  (r) = Recorded By, (t) = Taken By, (c) = Cosigned By    Initials Name Effective Dates    BROOKLYNN Ioana Schmidt, MS CCC-SLP 06/08/18 -           EDUCATION  The patient has been educated in the following areas:   Dysphagia (Swallowing Impairment) Oral Care/Hydration Modified Diet Instruction.    SLP Recommendation and Plan  SLP Swallowing Diagnosis: mild, oral dysfunction, pharyngeal dysfunction  SLP Diet Recommendation: mechanical soft with no mixed consistencies, chopped, thin liquids  Recommended Precautions and Strategies: upright posture during/after eating, small bites of food and sips of liquid, no straw, multiple swallows per bite of food, multiple swallows per sip of liquid  SLP Rec. for Method of Medication Administration: meds whole, with pudding or applesauce     Monitor for Signs of Aspiration: yes, notify SLP if any concerns     Swallow Criteria for Skilled Therapeutic Interventions Met: demonstrates skilled criteria  Anticipated Dischage Disposition: unknown  Rehab Potential/Prognosis, Swallowing: good, to achieve stated therapy goals  Therapy Frequency (Swallow): PRN  Predicted Duration Therapy Intervention (Days): until discharge       Plan of Care Reviewed With: patient  Progress: no change  Outcome Summary: VFSS completed. Recommend mech soft no mixed, chopped meat with thin liquids; meds whole in pureed; upright for meals and 30 min after; slow rate; small bites/sips; no straws; double swallow. Monitor for s/s due to concern for fatigue. ST to follow.    SLP GOALS     Row Name 11/18/19 1000             Oral Nutrition/Hydration Goal 1 (SLP)    Oral Nutrition/Hydration Goal 1, SLP  Pt will safely tolerate the least restrictive diet with no s/s of aspiration.  -AW      Time Frame  (Oral Nutrition/Hydration Goal 1, SLP)  by discharge  -AW        User Key  (r) = Recorded By, (t) = Taken By, (c) = Cosigned By    Initials Name Provider Type    Ioana Carter MS CCC-SLP Speech and Language Pathologist           SLP Outcome Measures (last 72 hours)      SLP Outcome Measures     Row Name 11/19/19 1700 11/18/19 1000          SLP Outcome Measures    Outcome Measure Used?  Adult NOMS  -AW  Adult NOMS  -AW        Adult FCM Scores    FCM Chosen  Swallowing  -AW  Swallowing  -AW     Swallowing FCM Score  5  -AW  4  -AW       User Key  (r) = Recorded By, (t) = Taken By, (c) = Cosigned By    Initials Name Effective Dates    Ioana Carter MS CCC-SLP 06/08/18 -            Time Calculation:   Time Calculation- SLP     Row Name 11/19/19 1716             Time Calculation- SLP    SLP Start Time  1400  -AW      SLP Received On  11/19/19  -AW        User Key  (r) = Recorded By, (t) = Taken By, (c) = Cosigned By    Initials Name Provider Type    Ioana Carter MS CCC-SLP Speech and Language Pathologist          Therapy Charges for Today     Code Description Service Date Service Provider Modifiers Qty    62069473866 HC ST EVAL ORAL PHARYNG SWALLOW 4 11/18/2019 Ioana Schmidt MS CCC-SLP GN 1    [id number] HC ST MOTION FLUORO EVAL SWALLOW 5 11/19/2019 Ioana Schmidt MS CCC-SLP GN 1               Ioana Schmidt MS CCC-SLP  11/19/2019

## 2019-11-19 NOTE — PROGRESS NOTES
Continued Stay Note  [facility]     Patient Name: Reece Stephen  MRN: [id number]  Today's Date: 11/19/2019    Admit Date: 11/17/2019    Discharge Plan     Row Name 11/19/19 1012       Plan    Plan  Plan is home alone with [facility].     Plan Comments  Spoke with pt's niece, Nishi by phone ([phone]).  She voiced concerns about home environment and that he is having difficulty caring for his colostomy and managing his DM.  CCP spoke with pt and recommended SNF due to OT, ST deficits.  Pt refused rehab, but is in agreement with HH.  He has had [facility] in the past and is in agreement with using them again.  Referral called to [facility] for nursing, OT, ST, .  Call to [facility]/ [facility] who states there is not an open case.  Per Nishi's request, attempted to contact her sister, Geetha, but not able to reach.          Discharge Codes    No documentation.             Elke De La Rosa RN

## 2019-11-19 NOTE — PROGRESS NOTES
"DOS: 2019  NAME: Reece Stephen   : 1935  PCP: Ana María Atkinson MD    Chief Complaint   Patient presents with   • Neuro Deficit(s)        Stroke    Subjective: Pt seen in follow up, however the problem is new to me.  Doing well lying in bed states he was napping.  Denies any new weakness, numbness, speech or visual disturbances, or headaches.  States all of his symptoms have resolved.  He is very hard of hearing this is a chronic issue.  No family at bedside.    Objective:  Vital signs:      Vitals:    19 0700 19 0825 19 0846 19 09   BP: 140/85 144/83 (!) 155/112 156/91   BP Location: Right arm  Right arm    Patient Position: Lying  Lying    Pulse: 64 64 62 64   Resp: 18  18    Temp: 97.8 °F (36.6 °C)  98 °F (36.7 °C)    TempSrc: Oral  Oral    SpO2: 93% 93% 90%    Weight:  74.4 kg (164 lb)     Height:  175.3 cm (69\")         Current Facility-Administered Medications:   •  amLODIPine (NORVASC) tablet 10 mg, 10 mg, Oral, Q24H, Jose Llanos MD, 10 mg at 19  •  [DISCONTINUED] aspirin suppository 300 mg, 300 mg, Rectal, Daily **OR** aspirin tablet 325 mg, 325 mg, Oral, Daily, Jose Llanos MD, 325 mg at 19  •  atorvastatin (LIPITOR) tablet 80 mg, 80 mg, Oral, Nightly, Jose Llanos MD, 80 mg at 19  •  budesonide (PULMICORT) nebulizer solution 0.5 mg, 0.5 mg, Nebulization, BID - RT, Jose Llanos MD, 0.5 mg at 19  •  calcium-vitamin D 500-200 MG-UNIT tablet 1,000 mg, 1,000 mg, Oral, Daily, Jose Llanos MD, 1,000 mg at 19  •  clopidogrel (PLAVIX) tablet 75 mg, 75 mg, Oral, Daily, Sid Tse MD, 75 mg at 19 2237  •  famotidine (PEPCID) tablet 20 mg, 20 mg, Oral, BID, Jose Llanos MD, 20 mg at 19 09  •  insulin glargine (LANTUS) injection 20 Units, 20 Units, Subcutaneous, Daily, Jose Llanos MD, 20 Units at 19  •  insulin lispro (humaLOG) injection 0-7 Units, 0-7 Units, Subcutaneous, 4x Daily " With Meals & Nightly, Jose Llanos MD, 4 Units at 11/18/19 1640  •  insulin lispro (humaLOG) injection 7 Units, 7 Units, Subcutaneous, TID With Meals, Jose Llanos MD, 7 Units at 11/19/19 0901  •  levothyroxine (SYNTHROID, LEVOTHROID) tablet 50 mcg, 50 mcg, Oral, Q AM, Jose Llanos MD, 50 mcg at 11/19/19 0606  •  sodium chloride 0.9 % infusion, 75 mL/hr, Intravenous, Continuous, Jose Llanos MD, Last Rate: 75 mL/hr at 11/19/19 0905, 75 mL/hr at 11/19/19 0905    PRN meds      No current facility-administered medications on file prior to encounter.      Current Outpatient Medications on File Prior to Encounter   Medication Sig   • acetaminophen (TYLENOL) 325 MG tablet Take 650 mg by mouth 3 (Three) Times a Day As Needed for Mild Pain .   • Alogliptin Benzoate 6.25 MG tablet Take  by mouth.   • amLODIPine (NORVASC) 10 MG tablet    • atorvastatin (LIPITOR) 40 MG tablet    • calcium carbonate (OS-NABILA) 600 MG tablet Take 600 mg by mouth Daily.   • cholecalciferol (VITAMIN D3) 10 MCG (400 UNIT) tablet Take 800 Units by mouth Daily.   • Fluticasone Furoate 200 MCG/ACT aerosol powder  Inhale.   • glucose blood test strip Contour Next Test Strips   USE TO TEST BLOOD SUGAR 4 TIMES DAILY   • insulin glargine (LANTUS) 100 UNIT/ML injection Inject 20 Units under the skin into the appropriate area as directed Daily.   • levothyroxine (SYNTHROID, LEVOTHROID) 50 MCG tablet    • lisinopril (PRINIVIL,ZESTRIL) 20 MG tablet Take 20 mg by mouth Daily.   • pantoprazole (PROTONIX) 40 MG EC tablet Take 40 mg by mouth Daily.   • glyBURIDE micronized (GLYNASE) 3 MG tablet Take 3 mg by mouth 2 (Two) Times a Day Before Meals.   • LEVEMIR 100 UNIT/ML injection        General appearance: NAD, alert and cooperative, well groomed but poor dentition  HEENT: Normocephalic, atraumatic, PERRL, no masses or tenderness  COR: RRR  Resp: Even and unlabored  Extremities: Equal pulses  Skin: warm, dry    Neurological:   MS: oriented x3, recent/remote  memory intact, normal attention/concentration, language intact, no neglect  CN: visual acuity grossly normal, visual fields full, PERRL, EOMI, facial sensation equal, slightly mild left facial droop, hearing symmetric, palate elevates symmetrically, shoulder shrug equal, tongue midline  Motor: 5/5 in all 4 ext.  Reflexes: 1+ in all 4 ext.  Sensory: light touch sensation intact in all 4 ext.  Coordination: Normal finger to nose test  Gait and station: no ataxia  Rapid alternating movements: normal finger to thumb tap    Laboratory results:  Lab Results   Component Value Date    TSH 5.670 (H) 11/19/2019     Lab Results   Component Value Date    HGBA1C 10.05 (H) 11/19/2019     Lab Results   Component Value Date    FFJBNJJU50 909 11/18/2019     Lab Results   Component Value Date    CHOL 102 11/19/2019    CHOL 136 07/09/2018    CHOL 193 04/25/2018    CHLPL 108 04/23/2015     Lab Results   Component Value Date    TRIG 92 11/19/2019    TRIG 97 07/09/2018    TRIG 166 (H) 04/25/2018     Lab Results   Component Value Date    HDL 32 (L) 11/19/2019    HDL 40 07/09/2018    HDL 35 (L) 04/25/2018     Lab Results   Component Value Date    LDL 52 11/19/2019    LDL 77 07/09/2018     (H) 04/25/2018     Lab Results   Component Value Date    WBC 7.39 11/19/2019    HGB 11.8 (L) 11/19/2019    HCT 35.1 (L) 11/19/2019    MCV 90.2 11/19/2019     11/19/2019     Lab Results   Component Value Date    GLUCOSE 120 (H) 11/19/2019    BUN 28 (H) 11/19/2019    CREATININE 1.79 (H) 11/19/2019    EGFRIFNONA 36 (L) 11/19/2019    EGFRIFAFRI 56 04/23/2015    BCR 15.6 11/19/2019    K 4.1 11/19/2019    CO2 20.2 (L) 11/19/2019    CALCIUM 8.5 (L) 11/19/2019    PROTENTOTREF 6.9 04/23/2015    ALBUMIN 3.50 11/19/2019    LABIL2 2.1 04/23/2015    AST 16 11/19/2019    ALT 18 11/19/2019     Lab Results   Component Value Date    PTT 25.3 (L) 11/15/2014     Lab Results   Component Value Date    INR 1.0 11/15/2014    INR 1.1 08/31/2014    PROTIME 11.5  11/15/2014    PROTIME 11.9 08/31/2014     Brief Urine Lab Results  (Last result in the past 365 days)      Color   Clarity   Blood   Leuk Est   Nitrite   Protein   CREAT   Urine HCG        11/17/19 1530 Yellow Clear Negative Negative Negative Negative             Folate > 20.00    Review and interpretation of imaging:  Mri Brain Without Contrast    Result Date: 11/18/2019   There are findings consistent with an acute infarct involving the junction of the right globus pallidus and the posterior limb of the right internal capsule. This infarct is likely lacunar in etiology. These findings were discussed with Shona Gonzalez RN, the nurse caring for this patient, on 11/18/2019 at approximately 7:30 PM. She was instructed to notify the physician caring for this patient with this result.  Incidental note is made of findings compatible with old lacunar disease involving the left caudate and bilateral globus pallidi as well as moderate changes of chronic small vessel ischemic phenomena.  This report was finalized on 11/18/2019 8:22 PM by Dr. Lamberto Grier M.D.      Results for orders placed during the hospital encounter of 11/28/17   Adult Transthoracic Echo Complete W/ Cont if Necessary Per Protocol    Narrative · Left ventricular systolic function is normal. Calculated EF = 62.8%.   Estimated EF was in agreement with the calculated EF. Estimated EF = 63%.   Normal left ventricular cavity size noted. All left ventricular wall   segments contract normally. 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 Left ventricular wall thickness is consistent   with moderate concentric hypertrophy. Left ventricular diastolic   dysfunction is noted (grade I a w/high LAP) consistent with impaired   relaxation.  · Normal left atrial volume noted. Saline test results are negative.  · The aortic valve is abnormal in structure. Mild nodular sclerosis of the   right coronary cusp. Trace aortic valve regurgitation is  present. No   aortic valve stenosis is present.  · Mild mitral valve regurgitation is present.  · Mild tricuspid valve regurgitation is present. Estimated right   ventricular systolic pressure from tricuspid regurgitation is normal (<35   mmHg).            11/18/2019 bilateral carotid duplex Interpretation Summary     · Proximal right internal carotid artery is normal.  · Proximal left internal carotid artery is normal.         Impression/Assessment:  This is a 84-year-old male with a significant past medical history including diabetes, hypertension, hyperlipidemia, colon cancer, ANNE MARIE, chronic kidney disease who presented to the hospital on 11/17/2019 with complaints of slurred speech and facial droop with associated unsteady gait, loss of coordination, confusion, and left hand clumsiness noted by his family.  The patient reportedly lives alone and his last known normal was 5 days ago.  He had also suffered a fall on 11/16/2019 hitting his lower back and injuring his ankle.  Blood pressure on arrival 123/66 with a heart rate of 85.  EKG revealed a predominantly normal sinus rhythm with an incomplete left BBB.   on arrival. Cr. 2.52. U/A with elevated glucose.     Work-up to date:  11/17/2019 CT head: No acute infarct, prominent diffuse atrophy and chronic small vessel disease  11/18/2019 MRI brain: revealed 2 small acute infarcts within the right hemisphere (right globus pallidus and posterior limb of the right internal capsule) that appear to be lacunar and a pretty severe small vessel disease.    11/18/2019 bilateral carotid duplex: normal.   11/19/2019 2D echo: completed but report pending, prelim showing an LA volume index of 28.   Labs as documented above.    MRI revealing 2 right hemispheric acute lacunar infarcts.  He reports that he has not been taking his aspirin and he does have a history of being noncompliant as per Randee Watts's note I reviewed back in February 2019 where she did discuss with  him the importance of medication compliance.  I again discussed this with patient and will plan on discharging him home with aspirin 81 mg and Plavix 75 mg x 1 month and then he is to resume aspirin monotherapy. Not sure if this is a compliance issue or if he is forgetting to take his medications, it appears he is not compliant with his DM management either. May need to consider long term placement at SNF if he continues as this is a safety issue.  Continue Lipitor, okay with home dose 40mg. Follow-up with endocrinologist for further management of his diabetes.  Would like for diabetic educator to see for elevated A1c and significantly elevated BS on admission.  TSH is elevated, patient is on Synthroid being managed by primary.  He should follow up with Randee Watts in 3 months for stroke follow up. Other plans as stated below. Therapies as written. CCP for discharge planning. Call RRT for any acute neurological changes. We will sign off, will see again per request.    Plan:  Diabetic educator to see for elevated A1C and uncontrolled BS   DAPT ASA 81MG and Plavix 75mg x1 month and then ASA alone  Lipitor 40mg, LDL 52  Neurochecks per stroke protocol  Normalize BP  Stroke Education  SKYLAR/SCDs  PT/OT/ST  Follow up with Randee ARZATE in 3 months  We will sign off, will see again per request.     Case discussed with patient and Dr. Tse, and he agrees with plan above.   CARITO Phipps

## 2019-11-20 VITALS
OXYGEN SATURATION: 95 % | DIASTOLIC BLOOD PRESSURE: 78 MMHG | TEMPERATURE: 98.9 F | HEART RATE: 66 BPM | RESPIRATION RATE: 16 BRPM | SYSTOLIC BLOOD PRESSURE: 131 MMHG | BODY MASS INDEX: 24.98 KG/M2 | HEIGHT: 69 IN | WEIGHT: 168.65 LBS

## 2019-11-20 LAB
ANION GAP SERPL CALCULATED.3IONS-SCNC: 13.2 MMOL/L (ref 5–15)
BASOPHILS # BLD AUTO: 0.03 10*3/MM3 (ref 0–0.2)
BASOPHILS NFR BLD AUTO: 0.5 % (ref 0–1.5)
BUN BLD-MCNC: 28 MG/DL (ref 8–23)
BUN/CREAT SERPL: 13.1 (ref 7–25)
CALCIUM SPEC-SCNC: 9.1 MG/DL (ref 8.6–10.5)
CHLORIDE SERPL-SCNC: 107 MMOL/L (ref 98–107)
CO2 SERPL-SCNC: 19.8 MMOL/L (ref 22–29)
CREAT BLD-MCNC: 2.13 MG/DL (ref 0.76–1.27)
DEPRECATED RDW RBC AUTO: 40.8 FL (ref 37–54)
EOSINOPHIL # BLD AUTO: 0.23 10*3/MM3 (ref 0–0.4)
EOSINOPHIL NFR BLD AUTO: 3.9 % (ref 0.3–6.2)
ERYTHROCYTE [DISTWIDTH] IN BLOOD BY AUTOMATED COUNT: 12.3 % (ref 12.3–15.4)
GFR SERPL CREATININE-BSD FRML MDRD: 30 ML/MIN/1.73
GLUCOSE BLD-MCNC: 288 MG/DL (ref 65–99)
GLUCOSE BLDC GLUCOMTR-MCNC: 124 MG/DL (ref 70–130)
GLUCOSE BLDC GLUCOMTR-MCNC: 246 MG/DL (ref 70–130)
GLUCOSE BLDC GLUCOMTR-MCNC: 253 MG/DL (ref 70–130)
HCT VFR BLD AUTO: 35.6 % (ref 37.5–51)
HGB BLD-MCNC: 12 G/DL (ref 13–17.7)
IMM GRANULOCYTES # BLD AUTO: 0.04 10*3/MM3 (ref 0–0.05)
IMM GRANULOCYTES NFR BLD AUTO: 0.7 % (ref 0–0.5)
LYMPHOCYTES # BLD AUTO: 1.14 10*3/MM3 (ref 0.7–3.1)
LYMPHOCYTES NFR BLD AUTO: 19.5 % (ref 19.6–45.3)
MCH RBC QN AUTO: 30.5 PG (ref 26.6–33)
MCHC RBC AUTO-ENTMCNC: 33.7 G/DL (ref 31.5–35.7)
MCV RBC AUTO: 90.6 FL (ref 79–97)
MONOCYTES # BLD AUTO: 0.54 10*3/MM3 (ref 0.1–0.9)
MONOCYTES NFR BLD AUTO: 9.2 % (ref 5–12)
NEUTROPHILS # BLD AUTO: 3.86 10*3/MM3 (ref 1.7–7)
NEUTROPHILS NFR BLD AUTO: 66.2 % (ref 42.7–76)
NRBC BLD AUTO-RTO: 0 /100 WBC (ref 0–0.2)
PLATELET # BLD AUTO: 180 10*3/MM3 (ref 140–450)
PMV BLD AUTO: 10.4 FL (ref 6–12)
POTASSIUM BLD-SCNC: 4.5 MMOL/L (ref 3.5–5.2)
RBC # BLD AUTO: 3.93 10*6/MM3 (ref 4.14–5.8)
SODIUM BLD-SCNC: 140 MMOL/L (ref 136–145)
WBC NRBC COR # BLD: 5.84 10*3/MM3 (ref 3.4–10.8)

## 2019-11-20 PROCEDURE — 92526 ORAL FUNCTION THERAPY: CPT

## 2019-11-20 PROCEDURE — 63710000001 INSULIN LISPRO (HUMAN) PER 5 UNITS: Performed by: HOSPITALIST

## 2019-11-20 PROCEDURE — 82962 GLUCOSE BLOOD TEST: CPT

## 2019-11-20 PROCEDURE — 85025 COMPLETE CBC W/AUTO DIFF WBC: CPT | Performed by: HOSPITALIST

## 2019-11-20 PROCEDURE — 80048 BASIC METABOLIC PNL TOTAL CA: CPT | Performed by: HOSPITALIST

## 2019-11-20 PROCEDURE — 97110 THERAPEUTIC EXERCISES: CPT

## 2019-11-20 PROCEDURE — 63710000001 INSULIN GLARGINE PER 5 UNITS: Performed by: HOSPITALIST

## 2019-11-20 PROCEDURE — 94799 UNLISTED PULMONARY SVC/PX: CPT

## 2019-11-20 PROCEDURE — 97535 SELF CARE MNGMENT TRAINING: CPT

## 2019-11-20 RX ORDER — ASPIRIN 81 MG/1
81 TABLET, CHEWABLE ORAL DAILY
Qty: 30 TABLET | Refills: 0 | Status: SHIPPED | OUTPATIENT
Start: 2019-11-21 | End: 2019-12-21

## 2019-11-20 RX ORDER — INSULIN GLARGINE 100 [IU]/ML
25 INJECTION, SOLUTION SUBCUTANEOUS DAILY
Status: DISCONTINUED | OUTPATIENT
Start: 2019-11-21 | End: 2019-11-20 | Stop reason: HOSPADM

## 2019-11-20 RX ORDER — CLOPIDOGREL BISULFATE 75 MG/1
75 TABLET ORAL DAILY
Qty: 30 TABLET | Refills: 0 | Status: SHIPPED | OUTPATIENT
Start: 2019-11-21 | End: 2019-12-21

## 2019-11-20 RX ORDER — FAMOTIDINE 20 MG/1
20 TABLET, FILM COATED ORAL DAILY
Status: DISCONTINUED | OUTPATIENT
Start: 2019-11-21 | End: 2019-11-20 | Stop reason: HOSPADM

## 2019-11-20 RX ORDER — SODIUM CHLORIDE 9 MG/ML
75 INJECTION, SOLUTION INTRAVENOUS CONTINUOUS
Status: DISCONTINUED | OUTPATIENT
Start: 2019-11-20 | End: 2019-11-20

## 2019-11-20 RX ADMIN — INSULIN LISPRO 7 UNITS: 100 INJECTION, SOLUTION INTRAVENOUS; SUBCUTANEOUS at 08:37

## 2019-11-20 RX ADMIN — AMLODIPINE BESYLATE 10 MG: 10 TABLET ORAL at 08:37

## 2019-11-20 RX ADMIN — FAMOTIDINE 20 MG: 20 TABLET, FILM COATED ORAL at 08:37

## 2019-11-20 RX ADMIN — INSULIN LISPRO 4 UNITS: 100 INJECTION, SOLUTION INTRAVENOUS; SUBCUTANEOUS at 08:38

## 2019-11-20 RX ADMIN — INSULIN GLARGINE 20 UNITS: 100 INJECTION, SOLUTION SUBCUTANEOUS at 08:38

## 2019-11-20 RX ADMIN — ASPIRIN 81 MG: 81 TABLET, CHEWABLE ORAL at 08:37

## 2019-11-20 RX ADMIN — INSULIN LISPRO 7 UNITS: 100 INJECTION, SOLUTION INTRAVENOUS; SUBCUTANEOUS at 12:54

## 2019-11-20 RX ADMIN — CALCIUM CARBONATE-VITAMIN D TAB 500 MG-200 UNIT 1000 MG: 500-200 TAB at 08:37

## 2019-11-20 RX ADMIN — BUDESONIDE 0.5 MG: 0.5 INHALANT RESPIRATORY (INHALATION) at 06:53

## 2019-11-20 RX ADMIN — INSULIN LISPRO 3 UNITS: 100 INJECTION, SOLUTION INTRAVENOUS; SUBCUTANEOUS at 12:54

## 2019-11-20 RX ADMIN — LEVOTHYROXINE SODIUM 50 MCG: 50 TABLET ORAL at 06:30

## 2019-11-20 RX ADMIN — CLOPIDOGREL 75 MG: 75 TABLET, FILM COATED ORAL at 08:37

## 2019-11-20 NOTE — NURSING NOTE
CWOCN- changed colostomy appliance for patient. Patient continues to take care of it at home and walked me through the steps. He seems to be able to do it and has for many years. I think it is difficult related to the larger hernia he has. He places the hernia belt on his own and understands to get it tight.  Discussed that every 6 months he should be eligible for another belt. I think home health would be helpful to monitor how he does with the colostomy.     11/20/19 1628   Colostomy LLQ   Placement Date/Time: 01/01/14 0000   Location: LLQ   Stomal Appliance 1 piece;Changed;Leaking;Drainable   Stoma Appearance rosebud appearance;moist;red   Peristomal Assessment Intact   Accessories/Skin Care appliance belt;cleansed with water;skin barrier ring;skin barrier strip   Stoma Function flatus;stool   Stool Color brown   Stool Consistency soft   Treatment Bag change   Output (mL) 100 mL
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
## 2019-11-20 NOTE — THERAPY TREATMENT NOTE
Patient Name: Reece Stephen  : 1935    MRN: [id number]                              Today's Date: 2019       Admit Date: 2019    Visit Dx:     ICD-10-CM ICD-9-CM   1. Cerebrovascular accident (CVA), unspecified mechanism (CMS/HCC) I63.9 434.91   2. Hyperglycemia R73.9 790.29   3. Renal insufficiency N28.9 593.9     Patient Active Problem List   Diagnosis   • Rectal cancer (CMS/HCC)   • Lung nodule, multiple   • History of DVT (deep vein thrombosis)   • Transient cerebral ischemia   • Hyperkalemia   • Hyperosmolar non-ketotic state in patient with type 2 diabetes mellitus (CMS/HCC)   • DM (diabetes mellitus), secondary, uncontrolled, with hyperosmolarity (CMS/HCC)   • Hyponatremia with extracellular fluid depletion   • Hypothyroidism   • Hyperlipidemia LDL goal <70   • Diabetic polyneuropathy associated with type 2 diabetes mellitus (CMS/HCC)   • Stage 3 chronic kidney disease (CMS/HCC)   • Non compliance w medication regimen   • Essential hypertension   • Upper gastrointestinal hemorrhage   • Osteoarthritis   • Gastroesophageal reflux disease   • Deep vein thrombosis (CMS/HCC)   • Colitis due to Clostridium difficile   • Burn injury   • Anemia   • Cerebrovascular accident (CVA) (CMS/HCC)     Past Medical History:   Diagnosis Date   • Anemia    • Arthritis    • Chronic kidney disease     STAGE III, FOLLOWED BY DR. FREDDIE GONZALES   • Clostridium difficile infection 10/2014   • Diabetes mellitus (CMS/HCC)     TYPE 2, IDDM   • Disease of thyroid gland     ACQUIRED HYPOTHYROIDISM   • Duodenal ulcer 2014   • DVT (deep venous thrombosis) (CMS/HCC) 2015    PORT ASSOCIATED OF LEFT UPPER EXTREMITY   • Electrocution     HAD TO HAVE PARTIAL AMPUTATION OF LEFT FOOT   • GERD (gastroesophageal reflux disease)    • Hearing loss    • Hemorrhagic shock (CMS/HCC)     SECONDARY TO BLEEDING DUODENAL ULCER, ADMITTED TO [facility]   • Histiocytoma (CMS/HCC)    • History of blood transfusion 2015    D/T BLEEDING  DUODENAL ULCER   • History of chemotherapy     FOLLOWED BY DR. NIGEL NOBLE   • History of radiation therapy 2014    FOLLOWED BY DR. RJ HUI   • Hypercholesterolemia    • Hyperkalemia    • Hypertension    • Hypoglycemia 01/04/2018    SEEN AT [facility] ER   • Mixed hyperlipidemia    • Multiple lung nodules    • Neuropathy    • OA (osteoarthritis)    • ANNE MARIE (obstructive sleep apnea)    • Peptic ulceration 2014    Duodenal ulcer   • Pneumonia 03/29/2013    ADMITTED TO [facility]   • Rectal cancer (CMS/HCC) 06/2014    T3N1M0, S/P CHEMO, XRT, AND RESECTION, FOLLOWED BY DR. TRACE HERRING   • Stroke (CMS/HCC) 11/28/2017    TIA, ADMITTED TO [facility]   • Vitamin D deficiency      Past Surgical History:   Procedure Laterality Date   • AMPUTATION FOOT Left 1971    PARTIAL DUE TO AN ELECTRICAL SHOCK INJURY   • ARM TENDON REPAIR Right     DONE BY DR. OLEARY AND KLEINERT   • COLON RESECTION N/A 11/14/2014    LOW ANTERIOR RESECTION WITH CREATION OF COLOSTOMY, DR. TRACE HERRING AT [facility]   • COLONOSCOPY W/ BIOPSIES N/A 06/26/2014    ULCERATED 5CM MASS IN RECTUM, PATH: MODERATELY DIFFERENTIATED ADENOCARCINOMA, MULTIPLE DIVERTICULA IN SIGMOID, DR. LAUREN JAMES AT [facility]   • COLONOSCOPY W/ BIOPSIES N/A 07/09/2014    RECTAL MASS: INVASIVE MODERATLEY DIFFERIENTIATED ADENOCARCINOMA, AREA TATTOOED, DR. TRACE HERRING AT [facility]   • ENDOSCOPY  02/09/2015   • ENDOSCOPY N/A 02/09/2015    RESOLVED DUODENAL ULCER, EGD WNL, DR. FREDDIE MALCOLM AT [facility]   • FLEXIBLE SIGMOIDOSCOPY N/A 10/06/2014    MALIGNANT PARTIALLY OBSTRUCTING TUMOR IN MID RECTUM, DR. DELTA HERRING AT [facility]   • PORTACATH PLACEMENT Left 07/24/2014    LEFT VIRGIL, DR. KWAKU TAY AT [facility]   • RECTAL ULTRASOUND N/A 07/09/2014    ENDORECTAL US FOR CANCER STAGING, T3N1 RECTAL CANCER AT 7 CM, DR. TRACE HERRING AT [facility]   • TRACHEAL SURGERY N/A 08/31/2014    ENDOTRACHEAL INTUBATION, DR. ALMA ROSA HAGAN AT [facility]   • VENOUS ACCESS DEVICE (PORT) REMOVAL Left 03/12/2015    LEFT VIRGIL, DR. AMES  TAY AT [facility]     General Information     Row Name 11/20/19 1312          PT Evaluation Time/Intention    Document Type  therapy note (daily note)  -     Mode of Treatment  physical therapy;individual therapy  -     Row Name 11/20/19 1312          General Information    Existing Precautions/Restrictions  fall  -     Row Name 11/20/19 1312          Cognitive Assessment/Intervention- PT/OT    Orientation Status (Cognition)  oriented x 3  -       User Key  (r) = Recorded By, (t) = Taken By, (c) = Cosigned By    Initials Name Provider Type     Jody Mayen PTA Physical Therapy Assistant        Mobility     Row Name 11/20/19 1312          Bed Mobility Assessment/Treatment    Supine-Sit [place] (Bed Mobility)  not tested  -     Sit-Supine [place] (Bed Mobility)  not tested  -     Comment (Bed Mobility)  Pt UIC  -     Row Name 11/20/19 1312          Sit-Stand Transfer    Sit-Stand [place] (Transfers)  supervision  -     Assistive Device (Sit-Stand Transfers)  -- No AD  -     Row Name 11/20/19 1312          Gait/Stairs Assessment/Training    [place] Level (Gait)  contact guard;stand by assist  -     Assistive Device (Gait)  walker, front-wheeled  -     Distance in Feet (Gait)  300  -     Deviations/Abnormal Patterns (Gait)  polo decreased;gait speed decreased  -       User Key  (r) = Recorded By, (t) = Taken By, (c) = Cosigned By    Initials Name Provider Type     Jody Mayen PTA Physical Therapy Assistant        Obj/Interventions    No documentation.       Goals/Plan    No documentation.       Clinical Impression     Row Name 11/20/19 1313          Positioning and Restraints    Pre-Treatment Position  sitting in chair/recliner  -     Post Treatment Position  chair  -     In Chair  sitting;call light within reach;encouraged to call for assist;exit alarm on;with other staff  -       User Key  (r) = Recorded By, (t) = Taken By, (c) = Cosigned By    Initials Name  Provider Type     Jody Mayen PTA Physical Therapy Assistant        Outcome Measures     Row Name 11/20/19 1314          How much help from another person do you currently need...    Turning from your back to your side while in flat bed without using bedrails?  4  -EH     Moving from lying on back to sitting on the side of a flat bed without bedrails?  4  -EH     Moving to and from a bed to a chair (including a wheelchair)?  3  -EH     Standing up from a chair using your arms (e.g., wheelchair, bedside chair)?  4  -EH     Climbing 3-5 steps with a railing?  2  -EH     To walk in hospital room?  3  -EH     AM-PAC 6 Clicks Score (PT)  20  -EH     Row Name 11/20/19 1314          Modified [place] Scale    Modified [place] Scale  3 - Moderate disability.  Requiring some help, but able to walk without assistance.  -       User Key  (r) = Recorded By, (t) = Taken By, (c) = Cosigned By    Initials Name Provider Type     Jody Mayen PTA Physical Therapy Assistant          PT Recommendation and Plan     Plan of Care Reviewed With: patient  Progress: improving  Outcome Summary: Pt tolerated treatment with no complaints. Pt is supervision for STS transfers. Pt ambulated 300 feet with rwx, SBA, CGA. Pt progressing well with PT.      Time Calculation:   PT Charges     Row Name 11/20/19 1311             Time Calculation    Start Time  1133  -      Stop Time  1147  -      Time Calculation (min)  14 min  -      PT Received On  11/20/19  -      PT - Next Appointment  11/22/19  -         Time Calculation- PT    Total Timed Code Minutes- PT  14 minute(s)  -        User Key  (r) = Recorded By, (t) = Taken By, (c) = Cosigned By    Initials Name Provider Type     Jody Mayen PTA Physical Therapy Assistant        Therapy Charges for Today     Code Description Service Date Service Provider Modifiers Qty    45487431533 HC PT THER PROC EA 15 MIN 11/20/2019 Jody Mayen PTA GP 1          PT G-Codes  Outcome Measure  Options: AM-PAC 6 Clicks Basic Mobility (PT), Modified [place]  AM-PAC 6 Clicks Score (PT): 20  AM-PAC 6 Clicks Score (OT): 17  Modified [place] Scale: 3 - Moderate disability.  Requiring some help, but able to walk without assistance.    Jody Mayen, PTA  11/20/2019

## 2019-11-20 NOTE — NURSING NOTE
Discharge instructions present to and discussed with patient. This included medication changes, follow up appointments, and home health instructions. Patient verbalizes understanding.

## 2019-11-20 NOTE — PLAN OF CARE
Problem: Patient Care Overview  Goal: Plan of Care Review  Outcome: Ongoing (interventions implemented as appropriate)   11/20/19 1314   Coping/Psychosocial   Plan of Care Reviewed With patient   Plan of Care Review   Progress improving   OTHER   Outcome Summary Pt tolerated treatment with no complaints. Pt is supervision for STS transfers. Pt ambulated 300 feet with rwx, SBA, CGA. Pt progressing well with PT.

## 2019-11-20 NOTE — PLAN OF CARE
Problem: Patient Care Overview  Goal: Plan of Care Review  Outcome: Ongoing (interventions implemented as appropriate)   11/20/19 1241   Coping/Psychosocial   Plan of Care Reviewed With patient   OTHER   Outcome Summary VFSS follow up and diet tolerance completed. Pt initially unable to recall any recommendations or swallow strategies. Pt required mod assist to utilize strategies during p.o. trials. Pt exhibited overt cough on mechanical soft solids x1 and thin liquids x1. Will continue to follow. Continue to recommend curred diet of mechanical soft solids/no mixed and thin liquids. Multiple swallows, small bite/drink, no straws. Meds whole in puree.

## 2019-11-20 NOTE — THERAPY TREATMENT NOTE
Acute Care - Occupational Therapy Progress Note  [facility]     Patient Name: Reece Stephen  : 1935  MRN: [id number]  Today's Date: 2019             Admit Date: 2019       ICD-10-CM ICD-9-CM   1. Cerebrovascular accident (CVA), unspecified mechanism (CMS/HCC) I63.9 434.91   2. Hyperglycemia R73.9 790.29   3. Renal insufficiency N28.9 593.9     Patient Active Problem List   Diagnosis   • Rectal cancer (CMS/HCC)   • Lung nodule, multiple   • History of DVT (deep vein thrombosis)   • Transient cerebral ischemia   • Hyperkalemia   • Hyperosmolar non-ketotic state in patient with type 2 diabetes mellitus (CMS/HCC)   • DM (diabetes mellitus), secondary, uncontrolled, with hyperosmolarity (CMS/HCC)   • Hyponatremia with extracellular fluid depletion   • Hypothyroidism   • Hyperlipidemia LDL goal <70   • Diabetic polyneuropathy associated with type 2 diabetes mellitus (CMS/HCC)   • Stage 3 chronic kidney disease (CMS/HCC)   • Non compliance w medication regimen   • Essential hypertension   • Upper gastrointestinal hemorrhage   • Osteoarthritis   • Gastroesophageal reflux disease   • Deep vein thrombosis (CMS/HCC)   • Colitis due to Clostridium difficile   • Burn injury   • Anemia   • Cerebrovascular accident (CVA) (CMS/HCC)     Past Medical History:   Diagnosis Date   • Anemia    • Arthritis    • Chronic kidney disease     STAGE III, FOLLOWED BY DR. FREDDIE GONZALES   • Clostridium difficile infection 10/2014   • Diabetes mellitus (CMS/HCC)     TYPE 2, IDDM   • Disease of thyroid gland     ACQUIRED HYPOTHYROIDISM   • Duodenal ulcer 2014   • DVT (deep venous thrombosis) (CMS/HCC) 2015    PORT ASSOCIATED OF LEFT UPPER EXTREMITY   • Electrocution     HAD TO HAVE PARTIAL AMPUTATION OF LEFT FOOT   • GERD (gastroesophageal reflux disease)    • Hearing loss    • Hemorrhagic shock (CMS/HCC)     SECONDARY TO BLEEDING DUODENAL ULCER, ADMITTED TO [facility]   • Histiocytoma (CMS/HCC)    • History of blood  transfusion 2015    D/T BLEEDING DUODENAL ULCER   • History of chemotherapy     FOLLOWED BY DR. NIGEL NOBLE   • History of radiation therapy 2014    FOLLOWED BY DR. RJ HUI   • Hypercholesterolemia    • Hyperkalemia    • Hypertension    • Hypoglycemia 01/04/2018    SEEN AT [facility] ER   • Mixed hyperlipidemia    • Multiple lung nodules    • Neuropathy    • OA (osteoarthritis)    • ANNE MARIE (obstructive sleep apnea)    • Peptic ulceration 2014    Duodenal ulcer   • Pneumonia 03/29/2013    ADMITTED TO [facility]   • Rectal cancer (CMS/HCC) 06/2014    T3N1M0, S/P CHEMO, XRT, AND RESECTION, FOLLOWED BY DR. TRACE HERRING   • Stroke (CMS/HCC) 11/28/2017    TIA, ADMITTED TO [facility]   • Vitamin D deficiency      Past Surgical History:   Procedure Laterality Date   • AMPUTATION FOOT Left 1971    PARTIAL DUE TO AN ELECTRICAL SHOCK INJURY   • ARM TENDON REPAIR Right     DONE BY DR. OLEARY AND KLEINERT   • COLON RESECTION N/A 11/14/2014    LOW ANTERIOR RESECTION WITH CREATION OF COLOSTOMY, DR. TRACE HERRING AT [facility]   • COLONOSCOPY W/ BIOPSIES N/A 06/26/2014    ULCERATED 5CM MASS IN RECTUM, PATH: MODERATELY DIFFERENTIATED ADENOCARCINOMA, MULTIPLE DIVERTICULA IN SIGMOID, DR. LAUREN JAMES AT [facility]   • COLONOSCOPY W/ BIOPSIES N/A 07/09/2014    RECTAL MASS: INVASIVE MODERATLEY DIFFERIENTIATED ADENOCARCINOMA, AREA TATTOOED, DR. TRACE HERRING AT [facility]   • ENDOSCOPY  02/09/2015   • ENDOSCOPY N/A 02/09/2015    RESOLVED DUODENAL ULCER, EGD WNL, DR. FREDDIE MALCOLM AT [facility]   • FLEXIBLE SIGMOIDOSCOPY N/A 10/06/2014    MALIGNANT PARTIALLY OBSTRUCTING TUMOR IN MID RECTUM, DR. DELTA HERRING AT [facility]   • PORTACATH PLACEMENT Left 07/24/2014    LEFT SUBCLAVIAN, DR. KWAKU TAY AT [facility]   • RECTAL ULTRASOUND N/A 07/09/2014    ENDORECTAL US FOR CANCER STAGING, T3N1 RECTAL CANCER AT 7 CM, DR. TRACE HERRING AT [facility]   • TRACHEAL SURGERY N/A 08/31/2014    ENDOTRACHEAL INTUBATION, DR. ALMA ROSA HAGAN AT [facility]   • VENOUS ACCESS DEVICE (PORT) REMOVAL Left 03/12/2015     LEFT SUBCLAVIAN, DR. KWAKU TAY AT [facility] Treatment    Rehabilitation Treatment Summary     Row Name 11/20/19 1145             Treatment Time/Intention    Discipline  occupational therapist  -SG      Document Type  therapy note (daily note)  -SG      Subjective Information  no complaints  -SG      Mode of Treatment  individual therapy;occupational therapy  -SG      Patient Effort  good  -SG      Existing Precautions/Restrictions  fall  -SG      Recorded by [SG] Elke Gross OTR 11/20/19 1149      Row Name 11/20/19 1145             Cognitive Assessment/Intervention- PT/OT    Orientation Status (Cognition)  oriented x 3  -SG      Follows Commands (Cognition)  WFL  -SG      Recorded by [SG] Elke Gross OTSYLVIA 11/20/19 1149      Row Name 11/20/19 1145             Bed Mobility Assessment/Treatment    Supine-Sit [place] (Bed Mobility)  supervision;verbal cues;nonverbal cues (demo/gesture)  -SG      Recorded by [SG] Elke Gross OTR 11/20/19 1149      Row Name 11/20/19 1145             Functional Mobility    Functional Mobility- Ind. Level  contact guard assist  -SG      Functional Mobility- Comment  ambulates from bed to chair in room  -SG      Recorded by [SG] Elke Gross OTR 11/20/19 1149      Row Name 11/20/19 1145             Transfer Assessment/Treatment    Transfer Assessment/Treatment  sit-stand transfer;stand-sit transfer  -SG      Recorded by [SG] Elke Gross OTR 11/20/19 1149      Row Name 11/20/19 1145             Sit-Stand Transfer    Sit-Stand [place] (Transfers)  supervision  -SG      Recorded by [SG] Elke Gross OTR 11/20/19 1149      Row Name 11/20/19 1145             Stand-Sit Transfer    Stand-Sit [place] (Transfers)  supervision  -SG      Recorded by [SG] Elke Gross OTR 11/20/19 1149      Row Name 11/20/19 1145             ADL Assessment/Intervention    BADL Assessment/Intervention  upper body dressing;lower body  dressing;grooming;toileting  -SG      Recorded by [SG] Elke Gross OTSYLVIA 11/20/19 1149      Row Name 11/20/19 1145             Lower Body Dressing Assessment/Training    Lower Body Dressing [place] Level  lower body dressing skills;doff;don;socks;moderate assist (50% patient effort)  -SG      Lower Body Dressing Position  supported sitting  -SG      Recorded by [SG] Elke Gross OTR 11/20/19 1149      Row Name 11/20/19 1145             Grooming Assessment/Training    [place] Level (Grooming)  grooming skills;wash face, hands;supervision  -SG      Grooming Position  edge of bed sitting  -SG      Recorded by [SG] Elke Gross OTSYLVIA 11/20/19 1149      Row Name 11/20/19 1145             Toileting Assessment/Training    [place] Level (Toileting)  toileting skills;maximum assist (25% patient effort);change pad/brief  -SG      Toileting Position  edge of bed sitting;supported sitting;supported standing  -SG      Comment (Toileting)  pt unaware of being wet and needing brief changed and bed being wet  -SG      Recorded by [SG] Elke Gross OTR 11/20/19 1149      Row Name 11/20/19 1145             BADL Safety/Performance    Impairments, BADL Safety/Performance  cognition;strength;balance  -SG      Skilled BADL Treatment/Intervention  BADL process/adaptation training  -SG      Recorded by [SG] Elke Gross OTR 11/20/19 1149      Row Name 11/20/19 1145             Static Sitting Balance    Level of [place] (Unsupported Sitting, Static Balance)  supervision  -SG      Sitting Position (Unsupported Sitting, Static Balance)  sitting on edge of bed  -SG      Recorded by [SG] Elke Gross OTR 11/20/19 1149      Row Name 11/20/19 1145             Static Standing Balance    Level of [place] (Supported Standing, Static Balance)  supervision;standby assist  -SG      Recorded by [SG] Elke Gross OTSYLVIA 11/20/19 1149      Row Name 11/20/19 1145             Positioning and Restraints     Pre-Treatment Position  in bed  -SG      Post Treatment Position  chair  -SG      In Chair  sitting;call light within reach;encouraged to call for assist;exit alarm on;with nsg  -SG      Recorded by [] Elke Gross OTR 11/20/19 1149      Row Name 11/20/19 1145             Pain Scale: Numbers Pre/Post-Treatment    Pain Scale: Numbers, Pretreatment  0/10 - no pain  -SG      Pain Scale: Numbers, Post-Treatment  0/10 - no pain  -SG      Recorded by [SG] Elke Gross OTR 11/20/19 1149        User Key  (r) = Recorded By, (t) = Taken By, (c) = Cosigned By    Initials Name Effective Dates Discipline     Elke Gross OTR 06/08/18 -  OT             Occupational Therapy Education     Title: PT OT SLP Therapies (In Progress)     Topic: Occupational Therapy (In Progress)     Point: ADL training (Done)     Description: Instruct learner(s) on proper safety adaptation and remediation techniques during self care or transfers.   Instruct in proper use of assistive devices.    Learning Progress Summary           Patient Acceptance, E,TB,D, VU by  at 11/18/2019  3:38 PM    Comment:  role of OT and POC, safety for adl                               User Key     Initials Effective Dates Name Provider Type Discipline     06/08/18 -  Elke Gross OTR Occupational Therapist OT                OT Recommendation and Plan  Planned Therapy Interventions (OT Eval): BADL retraining, transfer/mobility retraining  Therapy Frequency (OT Eval): 5 times/wk  Plan of Care Review  Plan of Care Reviewed With: patient  Plan of Care Reviewed With: patient  Outcome Summary: Pt presents to OT with decreased independence for adls. Pt sits EOB with SBA and performs functional transfers with CGA. Pt does require max a for toileting needs today. Pt will continue to benefit from OT   Outcome Measures     Row Name 11/20/19 7388 11/18/19 7200          How much help from another is currently needed...    Putting on and taking off regular  lower body clothing?  2  -SG  3  -SG     Bathing (including washing, rinsing, and drying)  3  -SG  3  -SG     Toileting (which includes using toilet bed pan or urinal)  3  -SG  3  -SG     Putting on and taking off regular upper body clothing  3  -SG  3  -SG     Taking care of personal grooming (such as brushing teeth)  3  -SG  3  -SG     Eating meals  3  -SG  4  -SG     AM-PAC 6 Clicks Score (OT)  17  -SG  19  -SG        Modified [place] Scale    Modified [place] Scale  --  4 - Moderately severe disability.  Unable to walk without assistance, and unable to attend to own bodily needs without assistance.  -SG        Functional Assessment    Outcome Measure Options  --  AM-PAC 6 Clicks Daily Activity (OT);Modified [place]  -SG       User Key  (r) = Recorded By, (t) = Taken By, (c) = Cosigned By    Initials Name Provider Type    Elke Garcia OTR Occupational Therapist           Time Calculation:   Time Calculation- OT     Row Name 11/20/19 1153             Time Calculation- OT    OT Start Time  1100  -SG      OT Stop Time  1123  -SG      OT Time Calculation (min)  23 min  -      Total Timed Code Minutes- OT  23 minute(s)  -SG      OT Received On  11/20/19  -        User Key  (r) = Recorded By, (t) = Taken By, (c) = Cosigned By    Initials Name Provider Type    Elke Garcia OTR Occupational Therapist        Therapy Charges for Today     Code Description Service Date Service Provider Modifiers Qty    21191083206 HC OT SELF CARE/MGMT/TRAIN EA 15 MIN 11/20/2019 Elke Gross OTR GO 2               DOMINIK Jones  11/20/2019

## 2019-11-20 NOTE — PLAN OF CARE
Problem: Patient Care Overview  Goal: Plan of Care Review  Outcome: Ongoing (interventions implemented as appropriate)   11/20/19 8457   Coping/Psychosocial   Plan of Care Reviewed With patient   OTHER   Outcome Summary Pt presents to OT with decreased independence for adls. Pt sits EOB with SBA and performs functional transfers with CGA. Pt does require max a for toileting needs today. Pt will continue to benefit from OT

## 2019-11-20 NOTE — DISCHARGE SUMMARY
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  Poorly controlled diabetes mellitus  Hypertension  Hyperlipidemia  Chronic kidney disease stage III  Status post colostomy  Gastroesophageal reflux disease    Discharge medications    Current Facility-Administered Medications:   •  amLODIPine (NORVASC) tablet 10 mg, 10 mg, Oral, Q24H, Jose Llanos MD, 10 mg at 11/20/19 0837  •  aspirin chewable tablet 81 mg, 81 mg, Oral, Daily, Brianna Monroe APRN, 81 mg at 11/20/19 0837  •  atorvastatin (LIPITOR) tablet 40 mg, 40 mg, Oral, Nightly, Brianna Monroe APRN, 40 mg at 11/19/19 2057  •  budesonide (PULMICORT) nebulizer solution 0.5 mg, 0.5 mg, Nebulization, BID - RT, Jose Llanos MD, 0.5 mg at 11/20/19 0653  •  calcium-vitamin D 500-200 MG-UNIT tablet 1,000 mg, 1,000 mg, Oral, Daily, Jose Llanos MD, 1,000 mg at 11/20/19 0837  •  clopidogrel (PLAVIX) tablet 75 mg, 75 mg, Oral, Daily, Sid Tse MD, 75 mg at 11/20/19 0837  •  [START ON 11/21/2019] famotidine (PEPCID) tablet 20 mg, 20 mg, Oral, Daily, Jose Llanos MD  •  [START ON 11/21/2019] insulin glargine (LANTUS) injection 25 Units, 25 Units, Subcutaneous, Daily, Jose Llanos MD  •  insulin lispro (humaLOG) injection 0-7 Units, 0-7 Units, Subcutaneous, 4x Daily With Meals & Nightly, Jose Llanos MD, 3 Units at 11/20/19 1254  •  insulin lispro (humaLOG) injection 8 Units, 8 Units, Subcutaneous, TID With Meals, Joes Llanos MD  •  levothyroxine (SYNTHROID, LEVOTHROID) tablet 50 mcg, 50 mcg, Oral, Q AM, Jose Llanos MD, 50 mcg at 11/20/19 0630     Consults obtained  Neurology  Nephrology    Procedures  None    Hospital course  84-year-old -American male with history of hypertension hyperlipidemia diabetes and chronic kidney disease admitted to emergency room with slurred speech a day before admission.  Patient evaluated admit for management  patient admitted his work-up revealed that he has acute infarct right internal capsule and globus pallidus and he was not aspirin Plavix added and his Lipitor continued.  Patient will continue aspirin Plavix for 1 month and then back to aspirin alone.  Patient followed by neurology and nephrology.  Patient kidney function is at baseline.  Patient speech is back to baseline.  Patient clear from neurology nephrology to be discharged.  Patient colostomy working fine and is tolerating regular diet and ambulating with PT OT.    Discharge diet regular    Activity as tolerated    Medication as above    Follow-up with primary doctor in 1 week and follow with neurology and nephrology per the instruction and take medication as directed    RYLEE LE MD

## 2019-11-20 NOTE — PROGRESS NOTES
"Daily progress note    Chief complaint   Doing better  No new complaints  Wants to go home    History of present illness  84-year-old -American male with history of hypertension hyperlipidemia diabetes mellitus TIA and chronic kidney disease stage III presented to [facility]'s emergency room with slurred speech started yesterday.  Patient is a poor historian and according to family that he was doing okay 5 days ago and then he developed this confusion followed by slurred speech.  Patient denies any focal weakness.  Patient evaluated in ER initial work-up was negative but according to family he has left facial droop.  Patient admitted for further management.  At the time of interview he wants to eat and onset all questions appropriately and again no headache chest pain shortness of breath or focal deficit    REVIEW OF SYSTEMS  Unremarkable     PHYSICAL EXAM  Blood pressure 131/78, pulse 66, temperature 98.9 °F (37.2 °C), temperature source Oral, resp. rate 16, height 175.3 cm (69\"), weight 76.5 kg (168 lb 10.4 oz), SpO2 95 %.    Constitutional: He is oriented to person, place, and time. No distress.   Head: Normocephalic and atraumatic.   Eyes: EOM are normal. Pupils are equal, round, and reactive to light.   Neck: Normal range of motion. Neck supple.   Cardiovascular: Normal rate, regular rhythm and normal heart sounds.   Pulmonary/Chest: Effort normal and breath sounds normal. No respiratory distress.   Abdominal: Soft. There is no tenderness. There is no rebound and no guarding.   Musculoskeletal: Normal range of motion. He exhibits no edema.   Neurological: He is alert and oriented to person, place, and time. He has normal sensation and normal strength. Left facial droopSlurred speechAbnormal coordination to the left hand   Skin: Skin is warm and dry.   Psychiatric: Mood and affect normal.     LAB RESULTS  Lab Results (last 24 hours)     Procedure Component Value Units Date/Time    POC Glucose Once " [[id number]]  (Abnormal) Collected:  11/20/19 1136    Specimen:  Blood Updated:  11/20/19 1153     Glucose 246 mg/dL     Basic Metabolic Panel [[id number]]  (Abnormal) Collected:  11/20/19 0549    Specimen:  Blood Updated:  11/20/19 0633     Glucose 288 mg/dL      BUN 28 mg/dL      Creatinine 2.13 mg/dL      Sodium 140 mmol/L      Potassium 4.5 mmol/L      Chloride 107 mmol/L      CO2 19.8 mmol/L      Calcium 9.1 mg/dL      eGFR Non African Amer 30 mL/min/1.73      BUN/Creatinine Ratio 13.1     Anion Gap 13.2 mmol/L     Narrative:       GFR Normal >60  Chronic Kidney Disease <60  Kidney Failure <15    POC Glucose Once [[id number]]  (Abnormal) Collected:  11/20/19 0600    Specimen:  Blood Updated:  11/20/19 0611     Glucose 253 mg/dL     CBC & Differential [[id number]] Collected:  11/20/19 0549    Specimen:  Blood Updated:  11/20/19 0610    Narrative:       The following orders were created for panel order CBC & Differential.  Procedure                               Abnormality         Status                     ---------                               -----------         ------                     CBC Auto Differential[[id number]]        Abnormal            Final result                 Please view results for these tests on the individual orders.    CBC Auto Differential [[id number]]  (Abnormal) Collected:  11/20/19 0549    Specimen:  Blood Updated:  11/20/19 0610     WBC 5.84 10*3/mm3      RBC 3.93 10*6/mm3      Hemoglobin 12.0 g/dL      Hematocrit 35.6 %      MCV 90.6 fL      MCH 30.5 pg      MCHC 33.7 g/dL      RDW 12.3 %      RDW-SD 40.8 fl      MPV 10.4 fL      Platelets 180 10*3/mm3      Neutrophil % 66.2 %      Lymphocyte % 19.5 %      Monocyte % 9.2 %      Eosinophil % 3.9 %      Basophil % 0.5 %      Immature Grans % 0.7 %      Neutrophils, Absolute 3.86 10*3/mm3      Lymphocytes, Absolute 1.14 10*3/mm3      Monocytes, Absolute 0.54 10*3/mm3      Eosinophils, Absolute 0.23 10*3/mm3      Basophils, Absolute 0.03  10*3/mm3      Immature Grans, Absolute 0.04 10*3/mm3      nRBC 0.0 /100 WBC     POC Glucose Once [[id number]]  (Abnormal) Collected:  11/19/19 2000    Specimen:  Blood Updated:  11/19/19 2001     Glucose 187 mg/dL     POC Glucose Once [[id number]]  (Normal) Collected:  11/19/19 1612    Specimen:  Blood Updated:  11/19/19 1615     Glucose 115 mg/dL         Imaging Results (Last 24 Hours)     Procedure Component Value Units Date/Time    FL Video Swallow With Speech [[id number]] Collected:  11/20/19 1222     Updated:  11/20/19 1227    Narrative:       VIDEO SWALLOWING EXAMINATION BY SPEECH PATHOLOGY     Clinical: Dysphasia     Video swallowing examination performed under the direction of speech  pathology. Imaging reviewed by radiologist who concurs with the  findings.     Speech pathology summary:Pt presented with mild oropharyngeal dysphagia  characterized by decreased base of tongue strength and decreased  hyolaryngeal excursion. Pt tolerated thins via cup with no penetration  or aspiration initially. At end of study, trace penetration was noted  with thins from pharyngeal residuals that pt had not cleared. With straw  sips, transient posterior penetration was noted. No penetration or  aspiration was noted with nectar thick or honey thick liquids. Pt had  slow mastication and propulsion of pureed, soft, mixed, and regular.  Mixed consistency spilled to the pyriforms increasing the risk of  aspiration. With all consistencies, pt had mild pharyngeal residuals  (tongue base, valleculae, and pyriforms) which he sometimes needed a cue  to clear.     FLUOROSCOPY TIME: 3 minutes 30 seconds, thousand 815 images.     This report was finalized on 11/20/2019 12:23 PM by Dr. Deejay Chavarria M.D.           ECG 12 Lead        HEART RATE= 75  bpm  RR Interval= 805  ms  AZ Interval= 181  ms  P Horizontal Axis= 6  deg  P Front Axis= 44  deg  QRSD Interval= 120  ms  QT Interval= 394  ms  QRS Axis= -26  deg  T Wave Axis= -8  deg  -  ABNORMAL ECG -  Sinus rhythm  Supraventricular bigeminy  Incomplete left bundle branch block  Probable left ventricular hypertrophy  NO SIGNIFICANT CHANGE FROM PREVIOUS ECG             Current Facility-Administered Medications:   •  amLODIPine (NORVASC) tablet 10 mg, 10 mg, Oral, Q24H, Rylee Llanos MD, 10 mg at 11/20/19 0837  •  aspirin chewable tablet 81 mg, 81 mg, Oral, Daily, Monroe, Brianna, APRN, 81 mg at 11/20/19 0837  •  atorvastatin (LIPITOR) tablet 40 mg, 40 mg, Oral, Nightly, Monroe, Brianna, APRN, 40 mg at 11/19/19 2057  •  budesonide (PULMICORT) nebulizer solution 0.5 mg, 0.5 mg, Nebulization, BID - RT, Rylee Llanos MD, 0.5 mg at 11/20/19 0653  •  calcium-vitamin D 500-200 MG-UNIT tablet 1,000 mg, 1,000 mg, Oral, Daily, Rylee Llanos MD, 1,000 mg at 11/20/19 0837  •  clopidogrel (PLAVIX) tablet 75 mg, 75 mg, Oral, Daily, Sid Tse MD, 75 mg at 11/20/19 0837  •  [START ON 11/21/2019] famotidine (PEPCID) tablet 20 mg, 20 mg, Oral, Daily, Rylee Llanos MD  •  insulin glargine (LANTUS) injection 20 Units, 20 Units, Subcutaneous, Daily, Rylee Llanos MD, 20 Units at 11/20/19 0838  •  insulin lispro (humaLOG) injection 0-7 Units, 0-7 Units, Subcutaneous, 4x Daily With Meals & Nightly, Rylee Llanos MD, 3 Units at 11/20/19 1254  •  insulin lispro (humaLOG) injection 7 Units, 7 Units, Subcutaneous, TID With Meals, Rylee Llanos MD, 7 Units at 11/20/19 1254  •  levothyroxine (SYNTHROID, LEVOTHROID) tablet 50 mcg, 50 mcg, Oral, Q AM, Rylee Llanos MD, 50 mcg at 11/20/19 0630  •  sodium chloride 0.9 % flush 10 mL, 10 mL, Intravenous, PRN, Brianna Monroe, APRN     ASSESSMENT  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

## 2019-11-20 NOTE — PROGRESS NOTES
LOS: 3 days     Chief Complaint/ Reason for encounter: Chronic kidney disease/acute renal failure  Chief Complaint   Patient presents with   • Neuro Deficit(s)         Subjective   No new complaints, he feels well, eating and drinking well with good appetite  Good appetite with no nausea or vomiting  No shortness of breath or chest pain  No edema, anxious to go home    Medical history reviewed:  History of Present Illness    Subjective    History taken from: Patient and chart    Vital Signs  Temp:  [97.7 °F (36.5 °C)-98.9 °F (37.2 °C)] 98.9 °F (37.2 °C)  Heart Rate:  [57-68] 66  Resp:  [16] 16  BP: (131-159)/(78-85) 131/78       Wt Readings from Last 1 Encounters:   11/20/19 0501 76.5 kg (168 lb 10.4 oz)   11/19/19 0825 74.4 kg (164 lb)   11/17/19 1807 74.5 kg (164 lb 3.9 oz)   11/17/19 1505 73 kg (161 lb)       Objective    Objective:  General Appearance:  Comfortable, chronically ill-appearing, in no acute distress and not in pain.  Output: Producing urine.    HEENT: Normal HEENT exam.    Lungs:  Normal effort and normal respiratory rate.  Breath sounds clear to auscultation.  No  respiratory distress.  No rales, decreased breath sounds or rhonchi.    Heart: Normal rate.  Regular rhythm.  S1 normal.  No murmur.   Abdomen: Abdomen is soft.  Bowel sounds are normal.   There is no abdominal tenderness.  There is no epigastric area or suprapubic area tenderness.  There is no rebound tenderness.     Extremities: Normal range of motion.  Trace bilateral edema  Pulses: Distal pulses are intact.    Neurological: Patient is alert and oriented to person, place and time.    Pupils:  Pupils are equal, round, and reactive to light.    Skin:  Warm and dry.  No rash or cyanosis.       Results Review:    Intake/Output:     Intake/Output Summary (Last 24 hours) at 11/20/2019 1451  Last data filed at 11/20/2019 1429  Gross per 24 hour   Intake 1580 ml   Output 800 ml   Net 780 ml         DATA:  Radiology and Labs:  The following  labs independently reviewed by me. Additional labs ordered for tomorrow a.m.  Interval notes, chart personally reviewed by me.   Old records independently reviewed showing chronic kidney disease, stage IV previous RAYMON  Discussed with patient himself at bedside    Risk/ complexity of medical care/ medical decision making moderate risk with new CVA    Labs:   Recent Results (from the past 24 hour(s))   POC Glucose Once    Collection Time: 11/19/19  4:12 PM   Result Value Ref Range    Glucose 115 70 - 130 mg/dL   POC Glucose Once    Collection Time: 11/19/19  8:00 PM   Result Value Ref Range    Glucose 187 (H) 70 - 130 mg/dL   Basic Metabolic Panel    Collection Time: 11/20/19  5:49 AM   Result Value Ref Range    Glucose 288 (H) 65 - 99 mg/dL    BUN 28 (H) 8 - 23 mg/dL    Creatinine 2.13 (H) 0.76 - 1.27 mg/dL    Sodium 140 136 - 145 mmol/L    Potassium 4.5 3.5 - 5.2 mmol/L    Chloride 107 98 - 107 mmol/L    CO2 19.8 (L) 22.0 - 29.0 mmol/L    Calcium 9.1 8.6 - 10.5 mg/dL    eGFR Non African Amer 30 (L) >60 mL/min/1.73    BUN/Creatinine Ratio 13.1 7.0 - 25.0    Anion Gap 13.2 5.0 - 15.0 mmol/L   CBC Auto Differential    Collection Time: 11/20/19  5:49 AM   Result Value Ref Range    WBC 5.84 3.40 - 10.80 10*3/mm3    RBC 3.93 (L) 4.14 - 5.80 10*6/mm3    Hemoglobin 12.0 (L) 13.0 - 17.7 g/dL    Hematocrit 35.6 (L) 37.5 - 51.0 %    MCV 90.6 79.0 - 97.0 fL    MCH 30.5 26.6 - 33.0 pg    MCHC 33.7 31.5 - 35.7 g/dL    RDW 12.3 12.3 - 15.4 %    RDW-SD 40.8 37.0 - 54.0 fl    MPV 10.4 6.0 - 12.0 fL    Platelets 180 140 - 450 10*3/mm3    Neutrophil % 66.2 42.7 - 76.0 %    Lymphocyte % 19.5 (L) 19.6 - 45.3 %    Monocyte % 9.2 5.0 - 12.0 %    Eosinophil % 3.9 0.3 - 6.2 %    Basophil % 0.5 0.0 - 1.5 %    Immature Grans % 0.7 (H) 0.0 - 0.5 %    Neutrophils, Absolute 3.86 1.70 - 7.00 10*3/mm3    Lymphocytes, Absolute 1.14 0.70 - 3.10 10*3/mm3    Monocytes, Absolute 0.54 0.10 - 0.90 10*3/mm3    Eosinophils, Absolute 0.23 0.00 - 0.40  10*3/mm3    Basophils, Absolute 0.03 0.00 - 0.20 10*3/mm3    Immature Grans, Absolute 0.04 0.00 - 0.05 10*3/mm3    nRBC 0.0 0.0 - 0.2 /100 WBC   POC Glucose Once    Collection Time: 11/20/19  6:00 AM   Result Value Ref Range    Glucose 253 (H) 70 - 130 mg/dL   POC Glucose Once    Collection Time: 11/20/19 11:36 AM   Result Value Ref Range    Glucose 246 (H) 70 - 130 mg/dL       Radiology:  Imaging Results (Last 24 Hours)     Procedure Component Value Units Date/Time    FL Video Swallow With Speech [[id number]] Collected:  11/20/19 1222     Updated:  11/20/19 1227    Narrative:       VIDEO SWALLOWING EXAMINATION BY SPEECH PATHOLOGY     Clinical: Dysphasia     Video swallowing examination performed under the direction of speech  pathology. Imaging reviewed by radiologist who concurs with the  findings.     Speech pathology summary:Pt presented with mild oropharyngeal dysphagia  characterized by decreased base of tongue strength and decreased  hyolaryngeal excursion. Pt tolerated thins via cup with no penetration  or aspiration initially. At end of study, trace penetration was noted  with thins from pharyngeal residuals that pt had not cleared. With straw  sips, transient posterior penetration was noted. No penetration or  aspiration was noted with nectar thick or honey thick liquids. Pt had  slow mastication and propulsion of pureed, soft, mixed, and regular.  Mixed consistency spilled to the pyriforms increasing the risk of  aspiration. With all consistencies, pt had mild pharyngeal residuals  (tongue base, valleculae, and pyriforms) which he sometimes needed a cue  to clear.     FLUOROSCOPY TIME: 3 minutes 30 seconds, thousand 815 images.     This report was finalized on 11/20/2019 12:23 PM by Dr. Deejay Chavarria M.D.                Medications have been reviewed:  Current Facility-Administered Medications   Medication Dose Route Frequency Provider Last Rate Last Dose   • amLODIPine (NORVASC) tablet 10 mg  10 mg Oral  Q24H Jose Llanos MD   10 mg at 11/20/19 0837   • aspirin chewable tablet 81 mg  81 mg Oral Daily Brianna Monroe APRN   81 mg at 11/20/19 0837   • atorvastatin (LIPITOR) tablet 40 mg  40 mg Oral Nightly Brianna Monroe APRN   40 mg at 11/19/19 2057   • budesonide (PULMICORT) nebulizer solution 0.5 mg  0.5 mg Nebulization BID - RT Jose Llanos MD   0.5 mg at 11/20/19 0653   • calcium-vitamin D 500-200 MG-UNIT tablet 1,000 mg  1,000 mg Oral Daily Jose Llanos MD   1,000 mg at 11/20/19 0837   • clopidogrel (PLAVIX) tablet 75 mg  75 mg Oral Daily Sid Tse MD   75 mg at 11/20/19 0837   • [START ON 11/21/2019] famotidine (PEPCID) tablet 20 mg  20 mg Oral Daily Jose Llanos MD       • [START ON 11/21/2019] insulin glargine (LANTUS) injection 25 Units  25 Units Subcutaneous Daily Jose Llanos MD       • insulin lispro (humaLOG) injection 0-7 Units  0-7 Units Subcutaneous 4x Daily With Meals & Nightly Jose Llanos MD   3 Units at 11/20/19 1254   • insulin lispro (humaLOG) injection 8 Units  8 Units Subcutaneous TID With Meals Jose Llanos MD       • levothyroxine (SYNTHROID, LEVOTHROID) tablet 50 mcg  50 mcg Oral Q AM Jose Llanos MD   50 mcg at 11/20/19 0630   • sodium chloride 0.9 % flush 10 mL  10 mL Intravenous PRN Brianna Monroe APRN       • sodium chloride 0.9 % infusion  75 mL/hr Intravenous Continuous Jose Llanos MD           ASSESSMENT:  CVA, per MRI, on Plavix per neurology  Chronic kidney disease stage IV, stable, creatinine near baseline today  Hypertension, well controlled  Diabetic kidney disease but absence of proteinuria  Hyperlipidemia  Diabetes mellitus type 2  GERD      Plan:  I believe his creatinine yesterday was a lab error  His usual baseline creatinine is around 2.1-2.3 and he is stable, near baseline today  Continue to encourage oral fluid intake  Continue current antihypertensive regimen  Antiplatelet therapy per neurology  Diabetic management per medicine    Stable for  discharge today from a renal standpoint  He can follow-up with me in the office routinely in 1 to 2 months    Gurvinder Cuba MD   [facility]   Office phone number: [phone]  Answering service phone number: [phone]    11/20/19  2:51 PM    Dictation performed using Dragon dictation [employer]

## 2019-11-20 NOTE — CONSULTS
"Diabetes Education  Assessment/Teaching    Patient Name:  Reece Stephen  YOB: 1935  MRN: [id number]  Admit Date:  11/17/2019      Assessment Date:  11/20/2019    Most Recent Value   General Information    Referral From:  A1c   Height  175.3 cm (69\")   Height Method  Stated   Weight  76.5 kg (168 lb 10.4 oz)   Weight Method  Bed scale   Pregnancy Assessment   Diabetes History   What type of diabetes do you have?  Type 2   Current DM knowledge  good   Education Preferences   Barriers to Learning  other (comment) [None noted]   Nutrition Information   Assessment Topics   Reducing Risk - Assessment  Needs education   DM Goals   Reducing Risk - Goal  30-90 days from discharge   Contact Plan  Follow-up medical care            Most Recent Value   DM Education Needs   Meter  Has own   Meter Type  Contour   Reducing Risks  A1C testing, Cardiovascular [A1c 10%.  Discussed CVA risk factors. ]   Healthy Coping  Appropriate   Discharge Plan  Home, Follow-up with endocrinolgoist, Follow-up with MD   Motivation  Engaged   Teaching Method  Discussion   Patient Response  Verbalized understanding            Other Comments:  Reviewed verbally pt's home routine with patient and niece at bedside.  Pt states he has a pill container and self fills his medications in the container for one week at a time.  Pt denies any omission of his diabetes medications including his Lantus insulin.  Pt states he monitors his BG BID with his Contour glucometer.  When asked about his results, pt states does not recall but he has been writing them down for the past month in preparation for his Endo appointment.  Pt states has seen high values pt states in the 370s. Recommended pt call his MD for hyperglycemia.  Pt states his glucometer is not recalling his values.  Pt given new Contour Next EZ glucometer.  Pt states he does his own grocery shopping and cooking.  Pt states has been eating vegetables.  Encouraged pt to attend his " appointment for Endo to review his labs, BG results and his medications.   Emphasized need for elevated A1c to improve to target.  Discussed CVA risk factors related to DM.  Pt verbalize understanding,.          Electronically signed by:  Los Rubi RN  11/20/19 3:38 PM

## 2019-11-20 NOTE — PLAN OF CARE
Problem: Patient Care Overview  Goal: Plan of Care Review  Outcome: Ongoing (interventions implemented as appropriate)   11/20/19 0513   Coping/Psychosocial   Plan of Care Reviewed With patient   Plan of Care Review   Progress improving   OTHER   Outcome Summary A&O, no complaints of pain, NIH 2, up with assit, colostomy, [place], VSS, will dc home today with HH, will continue to monitor.     Goal: Individualization and Mutuality  Outcome: Ongoing (interventions implemented as appropriate)    Goal: Discharge Needs Assessment  Outcome: Ongoing (interventions implemented as appropriate)      Problem: Stroke (Ischemic) (Adult)  Goal: Signs and Symptoms of Listed Potential Problems Will be Absent, Minimized or Managed (Stroke)  Outcome: Ongoing (interventions implemented as appropriate)      Problem: Fall Risk (Adult)  Goal: Absence of Fall  Outcome: Ongoing (interventions implemented as appropriate)

## 2019-11-21 ENCOUNTER — READMISSION MANAGEMENT (OUTPATIENT)
Dept: CALL CENTER | Facility: HOSPITAL | Age: 84
End: 2019-11-21

## 2019-11-21 NOTE — PROGRESS NOTES
Case Management Discharge Note      Final Note: Pt was dc'd home with [facility]         Destination      No service has been selected for the patient.      Durable Medical Equipment      No service has been selected for the patient.      Dialysis/Infusion      No service has been selected for the patient.      Home Medical Care      No service has been selected for the patient.      Therapy      No service has been selected for the patient.      Community Resources      No service has been selected for the patient.        Transportation Services  Private: Car    Final Discharge Disposition Code: 06 - home with home health care

## 2019-11-22 ENCOUNTER — READMISSION MANAGEMENT (OUTPATIENT)
Dept: CALL CENTER | Facility: HOSPITAL | Age: 84
End: 2019-11-22

## 2019-11-22 NOTE — OUTREACH NOTE
Stroke Week 1 Survey      Responses   Facility patient discharged from?  [facility]   Does the patient have one of the following disease processes/diagnoses(primary or secondary)?  Stroke (TIA)   Is there a successful TCM telephone encounter documented?  No   Week 1 attempt successful?  Yes   Call start time  1207   Rescheduled  Rescheduled-pt requested   General alerts for this patient  colostomy   Discharge diagnosis  Acute CVA, poorly controlled DM          Ángela Giron RN

## 2019-11-22 NOTE — OUTREACH NOTE
Prep Survey      Responses   Facility patient discharged from?  [place]   Is patient eligible?  Yes   Discharge diagnosis  Acute CVA, poorly controlled DM   Does the patient have one of the following disease processes/diagnoses(primary or secondary)?  Stroke (TIA)   Does the patient have Home health ordered?  Yes   What is the Home health agency?   [facility]   Is there a DME ordered?  No   General alerts for this patient  colostomy   Prep survey completed?  Yes          Sophie Pagan RN

## 2019-11-25 ENCOUNTER — READMISSION MANAGEMENT (OUTPATIENT)
Dept: CALL CENTER | Facility: HOSPITAL | Age: 84
End: 2019-11-25

## 2019-11-25 NOTE — OUTREACH NOTE
Stroke Week 1 Survey      Responses   Facility patient discharged from?  [facility]   Does the patient have one of the following disease processes/diagnoses(primary or secondary)?  Stroke (TIA)   Is there a successful TCM telephone encounter documented?  No   Week 1 attempt successful?  Yes   Call start time  1612   Call end time  1619   General alerts for this patient  colostomy   Discharge diagnosis  Acute CVA, poorly controlled DM   Person spoke with today (if not patient) and relationship  Geetha-relative   Meds reviewed with patient/caregiver?  Yes   Is the patient having any side effects they believe may be caused by any medication additions or changes?  No   Does the patient have all medications ordered at discharge?  Yes   Is the patient taking all medications as directed (includes completed medication regime)?  Yes   Does the patient have a primary care provider?   Yes   Does the patient have an appointment with their PCP within 7 days of discharge?  N/A   Comments regarding PCP  Appointment with PCP will be scheduled today   Has the patient kept scheduled appointments due by today?  N/A   What is the Home health agency?   [facility]   Has home health visited the patient within 72 hours of discharge?  Yes   Psychosocial issues?  Yes   Does the patient require any assistance with activities of daily living such as eating, bathing, dressing, walking, etc.?  No   Does the patient have any residual symptoms from stroke/TIA?  Yes   Residual symptoms comments  Slow speech with a slight droop on left side of his face   Does the patient understand the diet ordered at discharge?  Yes   Did the patient receive a copy of their discharge instructions?  Yes   Nursing interventions  Reviewed instructions with patient   What is the patient's perception of their health status since discharge?  Improving   Nursing interventions  Nurse provided patient education   Is the patient able to teach back FAST for Stroke?  Yes   Is the  patient/caregiver able to teach back the risk factors for a stroke?  Diabetes, High blood pressure-goal below 120/80, Smoking, Physical inactivity and obesity, History of TIAs, History of Afib, High Cholesterol, Carotid or other artery disease [Pt is a diabetic, nonsmoker, hypertension]   Is the patient/caregiver able to teach back signs and symptoms related to disease process for when to call PCP?  Yes   Is the patient/caregiver able to teach back signs and symptoms related to disease process for when to call 911?  Yes   If the patient is a current smoker, are they able to teach back resources for cessation?  -- [nonsmoker]   Is the patient/caregiver able to teach back the hierarchy of who to call/visit for symptoms/problems? PCP, Specialist, Home health nurse, Urgent Care, ED, 911  Yes   Week 1 call completed?  Yes          Brianna Webster RN

## 2019-12-03 ENCOUNTER — READMISSION MANAGEMENT (OUTPATIENT)
Dept: CALL CENTER | Facility: HOSPITAL | Age: 84
End: 2019-12-03

## 2019-12-03 NOTE — OUTREACH NOTE
Sepsis Week 2 Survey      Responses   Facility patient discharged from?  [facility]   Does the patient have one of the following disease processes/diagnoses(primary or secondary)?  Stroke (TIA)   Call start time  1108   Call end time  1112   General alerts for this patient  colostomy   Discharge diagnosis  Acute CVA, poorly controlled DM   Person spoke with today (if not patient) and relationship  Geetha-relative   Meds reviewed with patient/caregiver?  Yes   Is the patient taking all medications as directed (includes completed medication regime)?  Yes   Has the patient kept scheduled appointments due by today?  Yes   Home health comments  Pt has been D/C'd from PT   Psychosocial issues?  No   What is the patient's perception of their health status since discharge?  Improving   Additional teach back comments  Geetha reports patient needs help cleaning house and PCP is unable to help with resourses. Advised Geetha it may need to be private pay provider. She verbalized understanding.           Brianna Webster RN

## 2019-12-04 ENCOUNTER — TELEPHONE (OUTPATIENT)
Dept: NEUROLOGY | Facility: CLINIC | Age: 84
End: 2019-12-04

## 2019-12-04 NOTE — TELEPHONE ENCOUNTER
Two Week Stroke Phone Call  Spoke with the patient's relative Geetha  · Admission Date:  11/17/2019  · Discharge Date:   11/20/2019  · Discharge Destination: Home  · Meds reviewed with patient/caregiver?    [x]Yes [] No   o Antiplatelet: ASA and Plavix.  Reminded DAPT x 1 month then ASA only.  - Understands purpose     [x]  Yes     []  No     - Understands how to take      [x]  Yes     []  No    o Cholesterol Reducing: Lipitor  - Understands purpose     [x]  Yes     []  No    - Understands how to take      [x]  Yes     []  No    · Is the patient taking all medication as directed?   [x]  Yes  []  No  Geetha states that pt takes care of his own medications and she is not sure that he is always taking them correctly.  He will not let her assist with getting them together for him.  · Discussed personal risk factors   [x]  Yes []  No    o Physical Inactivity  - Engaged in physical activity [x]  Yes []  No  - Very wobbly at times.    o High blood pressure   - Reviewed medications ordered for high blood pressure  - Has been monitoring BP []  Yes     []  No  - Bp goal <130/80   - Per Geetha, pt states that he checks his BP at home but she thinks he does not really.  o Diabetes   - Reviewed medications ordered for diabetes   o High cholesterol   - Review desired LDL goal <70  o Atherosclerosis  - Plaque inside the arteries, “hardening of the arteries”  • Discussed signs and symptoms of stroke and when patient to call 911?   [x]  Yes []  No  o Sudden weakness or numbness of the face, arm, or leg especially on one side of the body  o Sudden confusion, trouble speaking or understanding  o Sudden trouble seeing in one or both eyes   o Sudden trouble walking, dizziness, loss of balance or coordination  o Sudden severe headaches with no known cause    Notified Patient that if any of these symptoms occur to call 911    Per Geetha, pt doesn't understand the s/s of a stroke or when to call 911.  Mailing some education materials to her so she  can post in his house.    · Does the patient have any new signs or symptoms of a stroke?   []  Yes     [x]  No  · Does the patient have an appointment with PCP?  [x]  Yes     []  No  · Does the patient have 3 month Stroke Clinic appointment?  [x]  Yes     []  No  · Is the patient currently in therapy, outpatient, or home health?  []  Yes     [x]  No    Needs a referral?      []  Yes     [x]  No   Does the patient have increasing stiffness in your arms, hands, or legs?    []  Yes     [x]  No   Is this interfering with activities of daily living?   []  Yes     [x]  No

## 2019-12-11 ENCOUNTER — READMISSION MANAGEMENT (OUTPATIENT)
Dept: CALL CENTER | Facility: HOSPITAL | Age: 84
End: 2019-12-11

## 2019-12-11 NOTE — OUTREACH NOTE
Stroke Week 3 Survey      Responses   Facility patient discharged from?  [facility]   Does the patient have one of the following disease processes/diagnoses(primary or secondary)?  Stroke (TIA)   Week 3 attempt successful?  No   Unsuccessful attempts  Attempt 1          Ana Neal RN

## 2019-12-12 ENCOUNTER — READMISSION MANAGEMENT (OUTPATIENT)
Dept: CALL CENTER | Facility: HOSPITAL | Age: 84
End: 2019-12-12

## 2019-12-12 NOTE — OUTREACH NOTE
Stroke Week 3 Survey      Responses   Facility patient discharged from?  [facility]   Does the patient have one of the following disease processes/diagnoses(primary or secondary)?  Stroke (TIA)   Week 3 attempt successful?  No   Unsuccessful attempts  Attempt 2          Palak Zazueta RN

## 2020-08-19 PROBLEM — T14.8XXA OTHER INJURY OF UNSPECIFIED BODY REGION, INITIAL ENCOUNTER: Status: ACTIVE | Noted: 2018-07-24

## 2020-08-19 PROBLEM — E11.8 TYPE 2 DIABETES MELLITUS WITH COMPLICATION (HCC): Status: ACTIVE | Noted: 2017-11-29

## 2020-08-19 RX ORDER — LISINOPRIL 20 MG/1
20 TABLET ORAL DAILY
COMMUNITY
Start: 2018-07-24 | End: 2022-05-26 | Stop reason: HOSPADM

## 2020-08-21 ENCOUNTER — OFFICE VISIT (OUTPATIENT)
Dept: SURGERY | Facility: CLINIC | Age: 85
End: 2020-08-21

## 2020-08-21 VITALS
DIASTOLIC BLOOD PRESSURE: 72 MMHG | HEIGHT: 69 IN | TEMPERATURE: 97.3 F | OXYGEN SATURATION: 95 % | BODY MASS INDEX: 23.76 KG/M2 | WEIGHT: 160.4 LBS | SYSTOLIC BLOOD PRESSURE: 130 MMHG | HEART RATE: 63 BPM

## 2020-08-21 DIAGNOSIS — Z93.9 HISTORY OF CREATION OF OSTOMY (HCC): Primary | ICD-10-CM

## 2020-08-21 PROCEDURE — 99213 OFFICE O/P EST LOW 20 MIN: CPT | Performed by: COLON & RECTAL SURGERY

## 2020-08-21 RX ORDER — ASPIRIN 81 MG/1
TABLET ORAL
COMMUNITY
End: 2022-05-18

## 2020-08-21 RX ORDER — GLIPIZIDE 5 MG/1
2.5 TABLET ORAL
COMMUNITY
End: 2022-05-18

## 2020-08-21 RX ORDER — CLOPIDOGREL BISULFATE 75 MG/1
75 TABLET ORAL DAILY
COMMUNITY
End: 2022-05-26 | Stop reason: HOSPADM

## 2020-08-21 NOTE — PROGRESS NOTES
"Reece Stephen is a 84 y.o. male in for follow up of History of creation of ostomy (CMS/HCC)    LAR with end colostomy 2014  Parastomal hernia    Needs/wants ostomy belt x2 b/c old one is stretched out.    bm daily  Pouching good.     Stroke in winter  Still driving a little     Seeing nephro for LEANN Barbour dr. daily   appt with cbc cancelled for covid.  Not rescheduled yet    /72 (BP Location: Left arm, Patient Position: Sitting, Cuff Size: Adult)   Pulse 63   Temp 97.3 °F (36.3 °C) (Temporal)   Ht 175.3 cm (69\")   Wt 72.8 kg (160 lb 6.4 oz)   SpO2 95%   BMI 23.69 kg/m²   Body mass index is 23.69 kg/m².      PE:  Physical Exam   Constitutional: He is oriented to person, place, and time. He appears well-developed and well-nourished. No distress.   HENT:   Head: Normocephalic and atraumatic.   Nose: Nose normal.   Mouth/Throat: Oropharynx is clear and moist.   Eyes: Pupils are equal, round, and reactive to light. Conjunctivae and EOM are normal.   Neck: Normal range of motion. No tracheal deviation present.   Pulmonary/Chest: Effort normal and breath sounds normal. No respiratory distress.   Abdominal: Soft. Bowel sounds are normal. He exhibits no distension.   Ostomy LLQ.Medial hernia - easily reducible   Musculoskeletal: Normal range of motion. He exhibits no edema or deformity.   Neurological: He is alert and oriented to person, place, and time. No cranial nerve deficit. Coordination and gait normal.   Skin: Skin is warm and dry.   Psychiatric: He has a normal mood and affect. His behavior is normal. Judgment normal.         Assessment:   1. History of creation of ostomy (CMS/HCC)         Plan:  Referral to [facility] for Ostomy hernia belt  Given his decline in status with CVA - no colonoscopy at this time            "

## 2020-08-28 ENCOUNTER — TELEPHONE (OUTPATIENT)
Dept: WOUND CARE | Facility: HOSPITAL | Age: 85
End: 2020-08-28

## 2020-08-28 NOTE — TELEPHONE ENCOUNTER
CWOCN- noted referral from Dr Hurtado related to patient's hernia belt. I called patient's niece, Geetha. She needs the ordering number for his belt. [id number]. I provided Spike's info and Juan Manuel. She cannot remember who she ordered through before but she remembers that [employer], where he gets his ostomy supplies, was not able to order it. She will call. He does not need to see us. She just needed help ordering. She will call back with questions.

## 2020-09-08 ENCOUNTER — TRANSCRIBE ORDERS (OUTPATIENT)
Dept: ADMINISTRATIVE | Facility: HOSPITAL | Age: 85
End: 2020-09-08

## 2020-09-08 DIAGNOSIS — I73.9 PVD (PERIPHERAL VASCULAR DISEASE) (HCC): Primary | ICD-10-CM

## 2020-09-11 ENCOUNTER — APPOINTMENT (OUTPATIENT)
Dept: CARDIOLOGY | Facility: HOSPITAL | Age: 85
End: 2020-09-11

## 2020-09-21 ENCOUNTER — OFFICE VISIT (OUTPATIENT)
Dept: ONCOLOGY | Facility: CLINIC | Age: 85
End: 2020-09-21

## 2020-09-21 ENCOUNTER — LAB (OUTPATIENT)
Dept: LAB | Facility: HOSPITAL | Age: 85
End: 2020-09-21

## 2020-09-21 ENCOUNTER — APPOINTMENT (OUTPATIENT)
Dept: LAB | Facility: HOSPITAL | Age: 85
End: 2020-09-21

## 2020-09-21 VITALS
OXYGEN SATURATION: 95 % | BODY MASS INDEX: 23.7 KG/M2 | WEIGHT: 160 LBS | RESPIRATION RATE: 14 BRPM | TEMPERATURE: 97.1 F | DIASTOLIC BLOOD PRESSURE: 74 MMHG | SYSTOLIC BLOOD PRESSURE: 106 MMHG | HEIGHT: 69 IN | HEART RATE: 72 BPM

## 2020-09-21 DIAGNOSIS — C20 RECTAL CANCER (HCC): ICD-10-CM

## 2020-09-21 DIAGNOSIS — Z85.048 HISTORY OF RECTAL CANCER: ICD-10-CM

## 2020-09-21 DIAGNOSIS — D64.9 ANEMIA, UNSPECIFIED TYPE: Primary | ICD-10-CM

## 2020-09-21 LAB
ALBUMIN SERPL-MCNC: 4.8 G/DL (ref 3.5–5.2)
ALBUMIN/GLOB SERPL: 1.7 G/DL (ref 1.1–2.4)
ALP SERPL-CCNC: 86 U/L (ref 38–116)
ALT SERPL W P-5'-P-CCNC: 23 U/L (ref 0–41)
ANION GAP SERPL CALCULATED.3IONS-SCNC: 11.9 MMOL/L (ref 5–15)
AST SERPL-CCNC: 21 U/L (ref 0–40)
BASOPHILS # BLD AUTO: 0.07 10*3/MM3 (ref 0–0.2)
BASOPHILS NFR BLD AUTO: 0.8 % (ref 0–1.5)
BILIRUB SERPL-MCNC: 0.2 MG/DL (ref 0.2–1.2)
BUN SERPL-MCNC: 38 MG/DL (ref 6–20)
BUN/CREAT SERPL: 16.7 (ref 7.3–30)
CALCIUM SPEC-SCNC: 9.8 MG/DL (ref 8.5–10.2)
CEA SERPL-MCNC: 4.39 NG/ML
CHLORIDE SERPL-SCNC: 107 MMOL/L (ref 98–107)
CO2 SERPL-SCNC: 24.1 MMOL/L (ref 22–29)
CREAT SERPL-MCNC: 2.28 MG/DL (ref 0.7–1.3)
DEPRECATED RDW RBC AUTO: 43.2 FL (ref 37–54)
EOSINOPHIL # BLD AUTO: 0.39 10*3/MM3 (ref 0–0.4)
EOSINOPHIL NFR BLD AUTO: 4.6 % (ref 0.3–6.2)
ERYTHROCYTE [DISTWIDTH] IN BLOOD BY AUTOMATED COUNT: 12.9 % (ref 12.3–15.4)
GFR SERPL CREATININE-BSD FRML MDRD: 27 ML/MIN/1.73
GLOBULIN UR ELPH-MCNC: 2.8 GM/DL (ref 1.8–3.5)
GLUCOSE SERPL-MCNC: 117 MG/DL (ref 74–124)
HCT VFR BLD AUTO: 42.8 % (ref 37.5–51)
HGB BLD-MCNC: 14 G/DL (ref 13–17.7)
IMM GRANULOCYTES # BLD AUTO: 0.08 10*3/MM3 (ref 0–0.05)
IMM GRANULOCYTES NFR BLD AUTO: 1 % (ref 0–0.5)
LYMPHOCYTES # BLD AUTO: 1.84 10*3/MM3 (ref 0.7–3.1)
LYMPHOCYTES NFR BLD AUTO: 21.9 % (ref 19.6–45.3)
MCH RBC QN AUTO: 30.1 PG (ref 26.6–33)
MCHC RBC AUTO-ENTMCNC: 32.7 G/DL (ref 31.5–35.7)
MCV RBC AUTO: 92 FL (ref 79–97)
MONOCYTES # BLD AUTO: 0.94 10*3/MM3 (ref 0.1–0.9)
MONOCYTES NFR BLD AUTO: 11.2 % (ref 5–12)
NEUTROPHILS NFR BLD AUTO: 5.07 10*3/MM3 (ref 1.7–7)
NEUTROPHILS NFR BLD AUTO: 60.5 % (ref 42.7–76)
NRBC BLD AUTO-RTO: 0 /100 WBC (ref 0–0.2)
PLATELET # BLD AUTO: 227 10*3/MM3 (ref 140–450)
PMV BLD AUTO: 10 FL (ref 6–12)
POTASSIUM SERPL-SCNC: 4.3 MMOL/L (ref 3.5–4.7)
PROT SERPL-MCNC: 7.6 G/DL (ref 6.3–8)
RBC # BLD AUTO: 4.65 10*6/MM3 (ref 4.14–5.8)
SODIUM SERPL-SCNC: 143 MMOL/L (ref 134–145)
WBC # BLD AUTO: 8.39 10*3/MM3 (ref 3.4–10.8)

## 2020-09-21 PROCEDURE — 82378 CARCINOEMBRYONIC ANTIGEN: CPT | Performed by: INTERNAL MEDICINE

## 2020-09-21 PROCEDURE — 85025 COMPLETE CBC W/AUTO DIFF WBC: CPT

## 2020-09-21 PROCEDURE — 36415 COLL VENOUS BLD VENIPUNCTURE: CPT

## 2020-09-21 PROCEDURE — 99213 OFFICE O/P EST LOW 20 MIN: CPT | Performed by: INTERNAL MEDICINE

## 2020-09-21 PROCEDURE — 80053 COMPREHEN METABOLIC PANEL: CPT

## 2020-09-21 NOTE — PROGRESS NOTES
REASONS FOR FOLLOW-UP:  1. Clinical T3N1M0 adenocarcinoma of the rectum, status post neoadjuvant chemoradiation with infusional 5-FU followed by low anterior resection by Dr. Armin Hurtado on 11/14/2014; pathology at surgery showed microscopic foci of residual adenocarcinoma in ulceration with the largest focus 2.5 mm.  He had a single tumor deposit but 14 benign lymph nodes.  Final pathologic stage [id number].   2. Completed 4 months of adjuvant Xeloda post-surgery.     3. Tiny right upper lobe pulmonary nodule being followed radiographically and stable      History of Present Illness    Mr. Stephen returns today for follow-up of his history of rectal cancer currently under observation.  He suffered a cerebrovascular accident since I saw him last year but seems to have recuperated quite well.  He denies weight loss, change in appetite, abdominal pain, blood in the stool.    Past Medical History    1. Diabetes.   2. Hypertension.  3. Hypercholesterolemia.  4. 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 CKD    Social History:  Single.  Nondrinker.  Former smoker, but this is remote (35+ years ago). Assisted by his niece.    Family History have been reviewed and are without significant changes except as mentioned 11/16/15.    Review of Systems   Constitutional: Negative for activity change, fatigue and unexpected weight change.   HENT: Positive for hearing loss.    Respiratory: Negative for cough and shortness of breath.    Cardiovascular: Negative for chest pain.   Gastrointestinal: Negative for abdominal distention, abdominal pain, diarrhea and nausea.   Musculoskeletal: Positive for gait problem.   Neurological: Negative for dizziness and weakness.   Hematological: Negative for adenopathy.   ROS unchanged- 9/21/2020      Medications:  The current medication list was reviewed in the EMR    ALLERGIES:  No Known Allergies    Objective      Vitals:  "   09/21/20 1547   BP: 106/74   Pulse: 72   Resp: 14   Temp: 97.1 °F (36.2 °C)   TempSrc: Tympanic   SpO2: 95%   Weight: 72.6 kg (160 lb)   Height: 175.3 cm (69.02\")   PainSc: 0-No pain     Current Status 9/21/2020   ECOG score 0       Physical Exam   Constitutional: He appears well-developed.   Eyes: No scleral icterus.   Cardiovascular: Normal rate and regular rhythm.   Pulmonary/Chest: Effort normal and breath sounds normal. He has no wheezes.   Abdominal: Soft. Bowel sounds are normal. He exhibits no mass.   Ostomy present   Musculoskeletal: Normal range of motion.   Skin: Skin is warm. No erythema.      He is wearing hearing aids with improved hearing today      RECENT LABS:  Hematology WBC   Date Value Ref Range Status   09/21/2020 8.39 3.40 - 10.80 10*3/mm3 Final     RBC   Date Value Ref Range Status   09/21/2020 4.65 4.14 - 5.80 10*6/mm3 Final     Hemoglobin   Date Value Ref Range Status   09/21/2020 14.0 13.0 - 17.7 g/dL Final     Hematocrit   Date Value Ref Range Status   09/21/2020 42.8 37.5 - 51.0 % Final     MCV   Date Value Ref Range Status   09/21/2020 92.0 79.0 - 97.0 fL Final     MCH   Date Value Ref Range Status   09/21/2020 30.1 26.6 - 33.0 pg Final     MCHC   Date Value Ref Range Status   09/21/2020 32.7 31.5 - 35.7 g/dL Final     RDW   Date Value Ref Range Status   09/21/2020 12.9 12.3 - 15.4 % Final     RDW-SD   Date Value Ref Range Status   09/21/2020 43.2 37.0 - 54.0 fl Final     MPV   Date Value Ref Range Status   09/21/2020 10.0 6.0 - 12.0 fL Final     Platelets   Date Value Ref Range Status   09/21/2020 227 140 - 450 10*3/mm3 Final     Neutrophil %   Date Value Ref Range Status   09/21/2020 60.5 42.7 - 76.0 % Final     Lymphocyte %   Date Value Ref Range Status   09/21/2020 21.9 19.6 - 45.3 % Final     Monocyte %   Date Value Ref Range Status   09/21/2020 11.2 5.0 - 12.0 % Final     Eosinophil %   Date Value Ref Range Status   09/21/2020 4.6 0.3 - 6.2 % Final     Basophil %   Date Value " Ref Range Status   09/21/2020 0.8 0.0 - 1.5 % Final     Immature Grans %   Date Value Ref Range Status   09/21/2020 1.0 (H) 0.0 - 0.5 % Final     Neutrophils, Absolute   Date Value Ref Range Status   09/21/2020 5.07 1.70 - 7.00 10*3/mm3 Final     Lymphocytes, Absolute   Date Value Ref Range Status   09/21/2020 1.84 0.70 - 3.10 10*3/mm3 Final     Monocytes, Absolute   Date Value Ref Range Status   09/21/2020 0.94 (H) 0.10 - 0.90 10*3/mm3 Final     Eosinophils, Absolute   Date Value Ref Range Status   09/21/2020 0.39 0.00 - 0.40 10*3/mm3 Final     Basophils, Absolute   Date Value Ref Range Status   09/21/2020 0.07 0.00 - 0.20 10*3/mm3 Final     Immature Grans, Absolute   Date Value Ref Range Status   09/21/2020 0.08 (H) 0.00 - 0.05 10*3/mm3 Final     nRBC   Date Value Ref Range Status   09/21/2020 0.0 0.0 - 0.2 /100 WBC Final       Glucose   Date Value Ref Range Status   11/20/2019 288 (H) 65 - 99 mg/dL Final     Sodium   Date Value Ref Range Status   11/20/2019 140 136 - 145 mmol/L Final     Potassium   Date Value Ref Range Status   11/20/2019 4.5 3.5 - 5.2 mmol/L Final     CO2   Date Value Ref Range Status   11/20/2019 19.8 (L) 22.0 - 29.0 mmol/L Final     Chloride   Date Value Ref Range Status   11/20/2019 107 98 - 107 mmol/L Final     Anion Gap   Date Value Ref Range Status   11/20/2019 13.2 5.0 - 15.0 mmol/L Final     Creatinine   Date Value Ref Range Status   11/20/2019 2.13 (H) 0.76 - 1.27 mg/dL Final     BUN   Date Value Ref Range Status   11/20/2019 28 (H) 8 - 23 mg/dL Final     BUN/Creatinine Ratio   Date Value Ref Range Status   11/20/2019 13.1 7.0 - 25.0 Final     Calcium   Date Value Ref Range Status   11/20/2019 9.1 8.6 - 10.5 mg/dL Final     eGFR Non  Amer   Date Value Ref Range Status   11/20/2019 30 (L) >60 mL/min/1.73 Final     Alkaline Phosphatase   Date Value Ref Range Status   11/19/2019 69 39 - 117 U/L Final     Total Protein   Date Value Ref Range Status   11/19/2019 6.3 6.0 - 8.5 g/dL  Final     ALT (SGPT)   Date Value Ref Range Status   11/19/2019 18 1 - 41 U/L Final     AST (SGOT)   Date Value Ref Range Status   11/19/2019 16 1 - 40 U/L Final     Total Bilirubin   Date Value Ref Range Status   11/19/2019 0.4 0.2 - 1.2 mg/dL Final     Albumin   Date Value Ref Range Status   11/19/2019 3.50 3.50 - 5.20 g/dL Final     Globulin   Date Value Ref Range Status   11/19/2019 2.8 gm/dL Final     A/G Ratio   Date Value Ref Range Status   04/23/2015 2.1  Final     CEA pending     CT chest, abdomen, pelvis reviewed from 9/27/2019:No evidence of metastatic disease.  There is fibrotic change in the lung bases.    Assessment/Plan       1.   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  He completed 4 months of Xeloda adjuvantly May 2015.       The patient's has history of mild elevation of the CEA although CT scans have shown no evidence of recurrent disease.  Today CEA is pending.  If stable we will just plan a 1 year follow-up again; if there is significant increase in the CEA, we will consider repeat imaging.  We will call the patient's niece when the blood test is available.      2.  Bilateral pulmonary nodules/fibrotic changes: Stable    3.  CKD:  Followed by Dr. Gurvinder Cuba of nephrology              9/21/2020      CC:

## 2020-09-22 ENCOUNTER — TELEPHONE (OUTPATIENT)
Dept: ONCOLOGY | Facility: CLINIC | Age: 85
End: 2020-09-22

## 2020-09-22 NOTE — TELEPHONE ENCOUNTER
Called Mr. Stephen's POA, Luba Ware, and let her know that Mr. Ware's CEA is stable at 4.  He can return in a year to see Dr. Ochoa and check his cbc, cmp, cea. Ms. Ware acknowledged understanding.    ----- Message from Jose Ochoa MD sent at 9/22/2020  6:56 AM EDT -----  Please let his niece/POA know CEA stable at 4.  He can return 1 yr MD cbc cmp cea  ----- Message -----  From: Lab, Background User  Sent: 9/21/2020   3:49 PM EDT  To: Jose Ochoa MD

## 2020-09-23 ENCOUNTER — HOSPITAL ENCOUNTER (OUTPATIENT)
Dept: CARDIOLOGY | Facility: HOSPITAL | Age: 85
Discharge: HOME | End: 2020-09-23
Admitting: INTERNAL MEDICINE

## 2020-09-23 DIAGNOSIS — I73.9 PVD (PERIPHERAL VASCULAR DISEASE) (HCC): ICD-10-CM

## 2020-09-23 LAB
BH CV LOWER ARTERIAL LEFT ABI RATIO: 1.14
BH CV LOWER ARTERIAL LEFT DORSALIS PEDIS SYS MAX: 161 MMHG
BH CV LOWER ARTERIAL LEFT GREAT TOE SYS MAX: 138 MMHG
BH CV LOWER ARTERIAL LEFT POST TIBIAL SYS MAX: 174 MMHG
BH CV LOWER ARTERIAL LEFT TBI RATIO: 0.91
BH CV LOWER ARTERIAL RIGHT ABI RATIO: 1.22
BH CV LOWER ARTERIAL RIGHT DORSALIS PEDIS SYS MAX: 173 MMHG
BH CV LOWER ARTERIAL RIGHT GREAT TOE SYS MAX: 148 MMHG
BH CV LOWER ARTERIAL RIGHT POST TIBIAL SYS MAX: 186 MMHG
BH CV LOWER ARTERIAL RIGHT TBI RATIO: 0.97
UPPER ARTERIAL LEFT ARM BRACHIAL SYS MAX: 149 MMHG
UPPER ARTERIAL RIGHT ARM BRACHIAL SYS MAX: 152 MMHG

## 2020-09-23 PROCEDURE — 93922 UPR/L XTREMITY ART 2 LEVELS: CPT

## 2021-03-02 DIAGNOSIS — Z23 IMMUNIZATION DUE: ICD-10-CM

## 2021-09-17 ENCOUNTER — TELEPHONE (OUTPATIENT)
Dept: ONCOLOGY | Facility: CLINIC | Age: 86
End: 2021-09-17

## 2021-09-17 ENCOUNTER — APPOINTMENT (OUTPATIENT)
Dept: LAB | Facility: HOSPITAL | Age: 86
End: 2021-09-17

## 2021-09-17 NOTE — TELEPHONE ENCOUNTER
Caller: [facility]     Relationship to patient: GAURI RODAS    Best call back number: [phone]    Patient is needing: TO R/S LAB AND F/U APPT. SHE STATES SHE THOUGHT THE APPT WAS ON Monday, 9- AND WAS NOT AWARE IT WAS TODAY. PLEASE RETURN CALL AND R/S.

## 2021-10-14 DIAGNOSIS — Z85.048 HISTORY OF RECTAL CANCER: Primary | ICD-10-CM

## 2021-10-18 ENCOUNTER — LAB (OUTPATIENT)
Dept: LAB | Facility: HOSPITAL | Age: 86
End: 2021-10-18

## 2021-10-18 ENCOUNTER — OFFICE VISIT (OUTPATIENT)
Dept: ONCOLOGY | Facility: CLINIC | Age: 86
End: 2021-10-18

## 2021-10-18 ENCOUNTER — TELEPHONE (OUTPATIENT)
Dept: ONCOLOGY | Facility: CLINIC | Age: 86
End: 2021-10-18

## 2021-10-18 VITALS
OXYGEN SATURATION: 94 % | BODY MASS INDEX: 24.25 KG/M2 | HEIGHT: 68 IN | RESPIRATION RATE: 16 BRPM | TEMPERATURE: 97.8 F | HEART RATE: 68 BPM | DIASTOLIC BLOOD PRESSURE: 72 MMHG | SYSTOLIC BLOOD PRESSURE: 117 MMHG | WEIGHT: 160 LBS

## 2021-10-18 DIAGNOSIS — Z85.048 HISTORY OF RECTAL CANCER: ICD-10-CM

## 2021-10-18 DIAGNOSIS — M25.561 CHRONIC PAIN OF BOTH KNEES: Primary | ICD-10-CM

## 2021-10-18 DIAGNOSIS — G89.29 CHRONIC PAIN OF BOTH KNEES: Primary | ICD-10-CM

## 2021-10-18 DIAGNOSIS — M25.562 CHRONIC PAIN OF BOTH KNEES: Primary | ICD-10-CM

## 2021-10-18 LAB
ALBUMIN SERPL-MCNC: 4.5 G/DL (ref 3.5–5.2)
ALBUMIN/GLOB SERPL: 1.7 G/DL (ref 1.1–2.4)
ALP SERPL-CCNC: 88 U/L (ref 38–116)
ALT SERPL W P-5'-P-CCNC: 25 U/L (ref 0–41)
ANION GAP SERPL CALCULATED.3IONS-SCNC: 11.8 MMOL/L (ref 5–15)
AST SERPL-CCNC: 24 U/L (ref 0–40)
BASOPHILS # BLD AUTO: 0.08 10*3/MM3 (ref 0–0.2)
BASOPHILS NFR BLD AUTO: 1.4 % (ref 0–1.5)
BILIRUB SERPL-MCNC: 0.3 MG/DL (ref 0.2–1.2)
BUN SERPL-MCNC: 42 MG/DL (ref 6–20)
BUN/CREAT SERPL: 18.8 (ref 7.3–30)
CALCIUM SPEC-SCNC: 9.3 MG/DL (ref 8.5–10.2)
CEA SERPL-MCNC: 3.97 NG/ML
CHLORIDE SERPL-SCNC: 108 MMOL/L (ref 98–107)
CO2 SERPL-SCNC: 22.2 MMOL/L (ref 22–29)
CREAT SERPL-MCNC: 2.24 MG/DL (ref 0.7–1.3)
DEPRECATED RDW RBC AUTO: 43.7 FL (ref 37–54)
EOSINOPHIL # BLD AUTO: 0.29 10*3/MM3 (ref 0–0.4)
EOSINOPHIL NFR BLD AUTO: 4.9 % (ref 0.3–6.2)
ERYTHROCYTE [DISTWIDTH] IN BLOOD BY AUTOMATED COUNT: 13 % (ref 12.3–15.4)
GFR SERPL CREATININE-BSD FRML MDRD: 28 ML/MIN/1.73
GLOBULIN UR ELPH-MCNC: 2.6 GM/DL (ref 1.8–3.5)
GLUCOSE SERPL-MCNC: 137 MG/DL (ref 74–124)
HCT VFR BLD AUTO: 40 % (ref 37.5–51)
HGB BLD-MCNC: 13 G/DL (ref 13–17.7)
IMM GRANULOCYTES # BLD AUTO: 0.04 10*3/MM3 (ref 0–0.05)
IMM GRANULOCYTES NFR BLD AUTO: 0.7 % (ref 0–0.5)
LYMPHOCYTES # BLD AUTO: 1.12 10*3/MM3 (ref 0.7–3.1)
LYMPHOCYTES NFR BLD AUTO: 19 % (ref 19.6–45.3)
MCH RBC QN AUTO: 29.7 PG (ref 26.6–33)
MCHC RBC AUTO-ENTMCNC: 32.5 G/DL (ref 31.5–35.7)
MCV RBC AUTO: 91.5 FL (ref 79–97)
MONOCYTES # BLD AUTO: 0.59 10*3/MM3 (ref 0.1–0.9)
MONOCYTES NFR BLD AUTO: 10 % (ref 5–12)
NEUTROPHILS NFR BLD AUTO: 3.77 10*3/MM3 (ref 1.7–7)
NEUTROPHILS NFR BLD AUTO: 64 % (ref 42.7–76)
NRBC BLD AUTO-RTO: 0 /100 WBC (ref 0–0.2)
PLATELET # BLD AUTO: 209 10*3/MM3 (ref 140–450)
PMV BLD AUTO: 10.2 FL (ref 6–12)
POTASSIUM SERPL-SCNC: 4.3 MMOL/L (ref 3.5–4.7)
PROT SERPL-MCNC: 7.1 G/DL (ref 6.3–8)
RBC # BLD AUTO: 4.37 10*6/MM3 (ref 4.14–5.8)
SODIUM SERPL-SCNC: 142 MMOL/L (ref 134–145)
WBC # BLD AUTO: 5.89 10*3/MM3 (ref 3.4–10.8)

## 2021-10-18 PROCEDURE — 85025 COMPLETE CBC W/AUTO DIFF WBC: CPT

## 2021-10-18 PROCEDURE — 80053 COMPREHEN METABOLIC PANEL: CPT

## 2021-10-18 PROCEDURE — 99213 OFFICE O/P EST LOW 20 MIN: CPT | Performed by: INTERNAL MEDICINE

## 2021-10-18 PROCEDURE — 36415 COLL VENOUS BLD VENIPUNCTURE: CPT

## 2021-10-18 PROCEDURE — 82378 CARCINOEMBRYONIC ANTIGEN: CPT | Performed by: INTERNAL MEDICINE

## 2021-10-18 RX ORDER — TIZANIDINE 4 MG/1
TABLET ORAL
COMMUNITY
End: 2022-05-18

## 2021-10-18 NOTE — PROGRESS NOTES
"  REASONS FOR FOLLOW-UP:  1. Clinical T3N1M0 adenocarcinoma of the rectum, status post neoadjuvant chemoradiation with infusional 5-FU followed by low anterior resection by Dr. Armin Hurtado on 11/14/2014; pathology at surgery showed microscopic foci of residual adenocarcinoma in ulceration with the largest focus 2.5 mm.  He had a single tumor deposit but 14 benign lymph nodes.  Final pathologic stage [id number].   2. Completed 4 months of adjuvant Xeloda post-surgery.         History of Present Illness    Mr. Stephen returns today for follow-up of his history of rectal cancer currently under observation.  He denies abdominal pain, changes in the bowel habits or weight loss.  He complains of bilateral knee pain in the knees \"locking up on him\" which is caused couple of falls.    Past Medical History    1. Diabetes.   2. Hypertension.  3. Hypercholesterolemia.  4. 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 CKD    Social History:  Single.  Nondrinker.  Former smoker, but this is remote (35+ years ago). Assisted by his niece.    Family History have been reviewed and are without significant changes except as mentioned 11/16/15.    Review of Systems   Constitutional: Negative for activity change, fatigue and unexpected weight change.   HENT: Positive for hearing loss.    Respiratory: Negative for cough and shortness of breath.    Cardiovascular: Negative for chest pain.   Gastrointestinal: Negative for abdominal distention, abdominal pain, diarrhea and nausea.   Musculoskeletal: Positive for gait problem.   Neurological: Negative for dizziness and weakness.   Hematological: Negative for adenopathy.   ROS unchanged-10/18/2021      Medications:  The current medication list was reviewed in the EMR    ALLERGIES:  No Known Allergies    Objective      Vitals:    10/18/21 1030   BP: 117/72   Pulse: 68   Resp: 16   Temp: 97.8 °F (36.6 °C)   TempSrc: Oral " "  SpO2: 94%   Weight: 72.6 kg (160 lb)   Height: 172.7 cm (68\")   PainSc:   6   PainLoc: Leg  Comment: stiffness in both legs     Current Status 10/18/2021   ECOG score 0       Physical Exam   Constitutional: He appears well-developed.   Eyes: No scleral icterus.   Cardiovascular: Normal rate and regular rhythm.   Pulmonary/Chest: Effort normal and breath sounds normal. He has no wheezes.   Abdominal: Soft. Bowel sounds are normal. He exhibits no mass.   Ostomy present   Musculoskeletal: Normal range of motion.   Skin: Skin is warm. No erythema.      He is wearing hearing aids with improved hearing today  Decreased range of motion bilateral knees with crepitus    RECENT LABS:  Hematology WBC   Date Value Ref Range Status   10/18/2021 5.89 3.40 - 10.80 10*3/mm3 Final     RBC   Date Value Ref Range Status   10/18/2021 4.37 4.14 - 5.80 10*6/mm3 Final     Hemoglobin   Date Value Ref Range Status   10/18/2021 13.0 13.0 - 17.7 g/dL Final     Hematocrit   Date Value Ref Range Status   10/18/2021 40.0 37.5 - 51.0 % Final     MCV   Date Value Ref Range Status   10/18/2021 91.5 79.0 - 97.0 fL Final     MCH   Date Value Ref Range Status   10/18/2021 29.7 26.6 - 33.0 pg Final     MCHC   Date Value Ref Range Status   10/18/2021 32.5 31.5 - 35.7 g/dL Final     RDW   Date Value Ref Range Status   10/18/2021 13.0 12.3 - 15.4 % Final     RDW-SD   Date Value Ref Range Status   10/18/2021 43.7 37.0 - 54.0 fl Final     MPV   Date Value Ref Range Status   10/18/2021 10.2 6.0 - 12.0 fL Final     Platelets   Date Value Ref Range Status   10/18/2021 209 140 - 450 10*3/mm3 Final     Neutrophil %   Date Value Ref Range Status   10/18/2021 64.0 42.7 - 76.0 % Final     Lymphocyte %   Date Value Ref Range Status   10/18/2021 19.0 (L) 19.6 - 45.3 % Final     Monocyte %   Date Value Ref Range Status   10/18/2021 10.0 5.0 - 12.0 % Final     Eosinophil %   Date Value Ref Range Status   10/18/2021 4.9 0.3 - 6.2 % Final     Basophil %   Date Value " Ref Range Status   10/18/2021 1.4 0.0 - 1.5 % Final     Immature Grans %   Date Value Ref Range Status   10/18/2021 0.7 (H) 0.0 - 0.5 % Final     Neutrophils, Absolute   Date Value Ref Range Status   10/18/2021 3.77 1.70 - 7.00 10*3/mm3 Final     Lymphocytes, Absolute   Date Value Ref Range Status   10/18/2021 1.12 0.70 - 3.10 10*3/mm3 Final     Monocytes, Absolute   Date Value Ref Range Status   10/18/2021 0.59 0.10 - 0.90 10*3/mm3 Final     Eosinophils, Absolute   Date Value Ref Range Status   10/18/2021 0.29 0.00 - 0.40 10*3/mm3 Final     Basophils, Absolute   Date Value Ref Range Status   10/18/2021 0.08 0.00 - 0.20 10*3/mm3 Final     Immature Grans, Absolute   Date Value Ref Range Status   10/18/2021 0.04 0.00 - 0.05 10*3/mm3 Final     nRBC   Date Value Ref Range Status   10/18/2021 0.0 0.0 - 0.2 /100 WBC Final       Glucose   Date Value Ref Range Status   09/21/2020 117 74 - 124 mg/dL Final     Sodium   Date Value Ref Range Status   09/21/2020 143 134 - 145 mmol/L Final     Potassium   Date Value Ref Range Status   09/21/2020 4.3 3.5 - 4.7 mmol/L Final     CO2   Date Value Ref Range Status   09/21/2020 24.1 22.0 - 29.0 mmol/L Final     Chloride   Date Value Ref Range Status   09/21/2020 107 98 - 107 mmol/L Final     Anion Gap   Date Value Ref Range Status   09/21/2020 11.9 5.0 - 15.0 mmol/L Final     Creatinine   Date Value Ref Range Status   09/21/2020 2.28 (C) 0.70 - 1.30 mg/dL Final     BUN   Date Value Ref Range Status   09/21/2020 38 (H) 6 - 20 mg/dL Final     BUN/Creatinine Ratio   Date Value Ref Range Status   09/21/2020 16.7 7.3 - 30.0 Final     Calcium   Date Value Ref Range Status   09/21/2020 9.8 8.5 - 10.2 mg/dL Final     eGFR Non  Amer   Date Value Ref Range Status   09/21/2020 27 (L) >60 mL/min/1.73 Final     Alkaline Phosphatase   Date Value Ref Range Status   09/21/2020 86 38 - 116 U/L Final     Total Protein   Date Value Ref Range Status   09/21/2020 7.6 6.3 - 8.0 g/dL Final     ALT  (SGPT)   Date Value Ref Range Status   09/21/2020 23 0 - 41 U/L Final     AST (SGOT)   Date Value Ref Range Status   09/21/2020 21 0 - 40 U/L Final     Total Bilirubin   Date Value Ref Range Status   09/21/2020 0.2 0.2 - 1.2 mg/dL Final     Albumin   Date Value Ref Range Status   09/21/2020 4.80 3.50 - 5.20 g/dL Final     Globulin   Date Value Ref Range Status   09/21/2020 2.8 1.8 - 3.5 gm/dL Final     A/G Ratio   Date Value Ref Range Status   04/23/2015 2.1  Final     CEA pending     CT chest, abdomen, pelvis reviewed from 9/27/2019:No evidence of metastatic disease.  There is fibrotic change in the lung bases.    Assessment/Plan       1.   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  He completed 4 months of Xeloda adjuvantly May 2015.       The patient's has history of mild elevation of the CEA although CT scans have shown no evidence of recurrent disease.  Today CEA is pending and we will call his niece with results when available.  If the CEA is stable from last year likely no further surveillance is required.      2.  Bilateral pulmonary nodules/fibrotic changes: Stable    3.  CKD:  Followed by Dr. Gurvinder Cuba of nephrology    4.  Bilateral knee pain: Orthopedic referral placed for evaluation            10/18/2021      CC:

## 2021-10-18 NOTE — TELEPHONE ENCOUNTER
I contacted the Geetha (niece).  See note below. Geetha v/u.    ----- Message from Jose Ochoa MD sent at 10/18/2021  3:57 PM EDT -----  Can you let his niece contact in chart know his CEA returned stable.  At this point he can follow up as needed.  ----- Message -----  From: Lab, Background User  Sent: 10/18/2021  10:29 AM EDT  To: Jose Ochoa MD

## 2021-10-25 ENCOUNTER — OFFICE VISIT (OUTPATIENT)
Dept: ORTHOPEDIC SURGERY | Facility: CLINIC | Age: 86
End: 2021-10-25

## 2021-10-25 VITALS — HEIGHT: 68 IN | WEIGHT: 159 LBS | TEMPERATURE: 97.8 F | BODY MASS INDEX: 24.1 KG/M2

## 2021-10-25 DIAGNOSIS — M25.561 BILATERAL CHRONIC KNEE PAIN: Primary | ICD-10-CM

## 2021-10-25 DIAGNOSIS — G89.29 BILATERAL CHRONIC KNEE PAIN: Primary | ICD-10-CM

## 2021-10-25 DIAGNOSIS — M25.562 BILATERAL CHRONIC KNEE PAIN: Primary | ICD-10-CM

## 2021-10-25 PROCEDURE — 20610 DRAIN/INJ JOINT/BURSA W/O US: CPT | Performed by: NURSE PRACTITIONER

## 2021-10-25 PROCEDURE — 73562 X-RAY EXAM OF KNEE 3: CPT | Performed by: NURSE PRACTITIONER

## 2021-10-25 PROCEDURE — 99213 OFFICE O/P EST LOW 20 MIN: CPT | Performed by: NURSE PRACTITIONER

## 2021-10-25 RX ORDER — LIDOCAINE HYDROCHLORIDE 20 MG/ML
2 INJECTION, SOLUTION EPIDURAL; INFILTRATION; INTRACAUDAL; PERINEURAL
Status: COMPLETED | OUTPATIENT
Start: 2021-10-25 | End: 2021-10-25

## 2021-10-25 RX ORDER — METHYLPREDNISOLONE ACETATE 80 MG/ML
80 INJECTION, SUSPENSION INTRA-ARTICULAR; INTRALESIONAL; INTRAMUSCULAR; SOFT TISSUE
Status: COMPLETED | OUTPATIENT
Start: 2021-10-25 | End: 2021-10-25

## 2021-10-25 RX ADMIN — LIDOCAINE HYDROCHLORIDE 2 ML: 20 INJECTION, SOLUTION EPIDURAL; INFILTRATION; INTRACAUDAL; PERINEURAL at 09:41

## 2021-10-25 RX ADMIN — METHYLPREDNISOLONE ACETATE 80 MG: 80 INJECTION, SUSPENSION INTRA-ARTICULAR; INTRALESIONAL; INTRAMUSCULAR; SOFT TISSUE at 09:41

## 2021-10-25 NOTE — PROGRESS NOTES
Patient: Reece Stephen  YOB: 1935 86 y.o. male  Medical Record Number: [id number]    Chief Complaints:   Chief Complaint   Patient presents with   • Right Knee - Establish Care, Pain   • Left Knee - Establish Care, Pain       History of Present Illness:Reece Stephen is a 86 y.o. male who presents as a new patient both myself as well as to the practice with complaints of bilateral knee pain.  Patient denies any recent injury.  Has had pain off and on for years, definitely worse the last year or so.  He describes the knee pain as a moderate sometimes severe constant ache worse with standing walking, better with rest    Allergies: No Known Allergies    Medications:   Current Outpatient Medications   Medication Sig Dispense Refill   • Alogliptin Benzoate 6.25 MG tablet Take  by mouth.     • amLODIPine (NORVASC) 10 MG tablet      • aspirin (aspirin) 81 MG EC tablet aspirin 81 mg tablet,delayed release   Take 1 tablet every day by oral route.     • atorvastatin (LIPITOR) 40 MG tablet      • calcium carbonate (OS-NABILA) 600 MG tablet Take 600 mg by mouth Daily.     • cholecalciferol (VITAMIN D3) 10 MCG (400 UNIT) tablet Take 800 Units by mouth Daily.     • clopidogrel (PLAVIX) 75 MG tablet clopidogrel 75 mg tablet   1 po daily     • Fluticasone Furoate 200 MCG/ACT aerosol powder  Inhale.     • glipizide (GLUCOTROL) 10 MG tablet Take 10 mg by mouth 2 (Two) Times a Day Before Meals.     • glipizide (GLUCOTROL) 2.5 MG half tablet Take 2.5 mg by mouth 2 (Two) Times a Day Before Meals.     • glucose blood (Accu-Chek Brook Plus) test strip Accu-Chek Brook Plus test strips   use twice daily     • glyBURIDE micronized (GLYNASE) 3 MG tablet Take 3 mg by mouth 2 (Two) Times a Day Before Meals.     • insulin detemir (Levemir FlexTouch) 100 UNIT/ML injection      • insulin glargine (LANTUS) 100 UNIT/ML injection Inject 20 Units under the skin into the appropriate area as directed Daily.     • levothyroxine (SYNTHROID,  "LEVOTHROID) 50 MCG tablet      • lisinopril (PRINIVIL,ZESTRIL) 20 MG tablet Take  by mouth.     • pantoprazole (PROTONIX) 40 MG EC tablet Take 40 mg by mouth Daily.     • tiZANidine (ZANAFLEX) 4 MG tablet tizanidine 4 mg tablet   1 tablet 3 times a day as neede for back pain       No current facility-administered medications for this visit.         The following portions of the patient's history were reviewed and updated as appropriate: allergies, current medications, past family history, past medical history, past social history, past surgical history and problem list.    Review of Systems:   A 14 point review of systems was performed. All systems negative except pertinent positives/negative listed in HPI above    Physical Exam:   Vitals:    10/25/21 0845   Temp: 97.8 °F (36.6 °C)   Weight: 72.1 kg (159 lb)   Height: 172.7 cm (68\")       General: A and O x 3, ASA, NAD    SCLERA:    Normal    Skin clear no unusual lesions noted  Bilateral knees patient has no appreciable effusion 110 degrees flexion neutral extension with a positive Jennifer negative Lockman calf soft and nontender       Radiology:  Xrays 3views (ap,lateral, sunrise) bilateral knees were ordered and reviewed today secondary to pain and show bone-on-bone end-stage osteoarthritis.  No compared to views available    Assessment/Plan: End-stage osteoarthritis bilateral knees    Patient I discussed options, he would like to proceed with bilateral knee cortisone injections, continue physical therapy, Tylenol as needed, and I will see him back in 3 months for follow-up    Large Joint Arthrocentesis: R knee  Date/Time: 10/25/2021 9:41 AM  Consent given by: patient  Site marked: site marked  Timeout: Immediately prior to procedure a time out was called to verify the correct patient, procedure, equipment, support staff and site/side marked as required   Supporting Documentation  Indications: pain and joint swelling   Procedure Details  Location: knee - R " knee  Preparation: Patient was prepped and draped in the usual sterile fashion  Needle size: 22 G  Approach: anterolateral  Medications administered: 80 mg methylPREDNISolone acetate 80 MG/ML; 2 mL lidocaine PF 2% 2 %  Patient tolerance: patient tolerated the procedure well with no immediate complications    Large Joint Arthrocentesis: L knee  Date/Time: 10/25/2021 9:41 AM  Consent given by: patient  Site marked: site marked  Timeout: Immediately prior to procedure a time out was called to verify the correct patient, procedure, equipment, support staff and site/side marked as required   Supporting Documentation  Indications: pain and joint swelling   Procedure Details  Location: knee - L knee  Preparation: Patient was prepped and draped in the usual sterile fashion  Needle size: 22 G  Approach: anterolateral  Medications administered: 80 mg methylPREDNISolone acetate 80 MG/ML; 2 mL lidocaine PF 2% 2 %  Patient tolerance: patient tolerated the procedure well with no immediate complications            Kylah Toussaint, APRN  10/25/2021

## 2022-03-24 ENCOUNTER — TELEPHONE (OUTPATIENT)
Dept: SURGERY | Facility: CLINIC | Age: 87
End: 2022-03-24

## 2022-03-24 DIAGNOSIS — K92.9 DISORDER OF STOMA: Primary | ICD-10-CM

## 2022-03-24 PROBLEM — E11.40 DIABETIC NEUROPATHY (HCC): Status: ACTIVE | Noted: 2021-05-03

## 2022-03-24 RX ORDER — INSULIN GLARGINE 100 [IU]/ML
INJECTION, SOLUTION SUBCUTANEOUS
COMMUNITY
Start: 2022-03-03 | End: 2022-05-18

## 2022-03-24 RX ORDER — INSULIN LISPRO 100 [IU]/ML
INJECTION, SOLUTION INTRAVENOUS; SUBCUTANEOUS
COMMUNITY
Start: 2022-03-03 | End: 2022-05-18

## 2022-03-24 NOTE — TELEPHONE ENCOUNTER
Hub warm transferred call, spoke with Niece/POA Geetha Stephen #[phone], she is under HIPPA form.    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    Patient denied any fever, abdominal, vomit and nausea.    Should he come into office or should he go and see the wound ostomy nurses/clinic?    Last office visit here was 08/21/2020    Thank you.

## 2022-03-25 ENCOUNTER — TELEPHONE (OUTPATIENT)
Dept: SURGERY | Facility: CLINIC | Age: 87
End: 2022-03-25

## 2022-03-25 ENCOUNTER — DOCUMENTATION (OUTPATIENT)
Dept: SURGERY | Facility: CLINIC | Age: 87
End: 2022-03-25

## 2022-03-25 NOTE — PROGRESS NOTES
Vj/POA returned my call and I relayed message that an order for the wound clinic has been placed and to call office to schedule that appt.    She mentioned that last time she called it was a month out, I suggested if that was the case this time to call the office we will gladly see him, will that be ok if she does call to set appt with you or Na or does he need to be seen by Dr. Hurtado?    Message sent to Jo Das PA-C.

## 2022-03-25 NOTE — TELEPHONE ENCOUNTER
HUB warmed transferred call.    Vj/ADRIANNA returned my call and I relayed message that an order for the wound clinic has been placed and to call office to schedule that appt.    Geetha voiced understanding.    She mentioned that the last time she called it was a month out, I suggested if that was the case this time to call the office we will gladly see him, will that be ok if she does call to set appt with you or Na or does he need to be seen by Dr. Hurtado?    Let me know.    Thank you.

## 2022-03-29 ENCOUNTER — HOSPITAL ENCOUNTER (OUTPATIENT)
Dept: WOUND CARE | Facility: HOSPITAL | Age: 87
Discharge: HOME | End: 2022-03-29

## 2022-03-29 NOTE — NURSING NOTE
CWOCN- outpatient visit. Patient's niece was concerned after patient's caregivers reported blood around the ostomy. Also, they need the number to reorder a hernia belt.   Patient is wearing a cut to fit GARY 1 piece with a barrier ring. He has a pretty good seal- he does not leak often. Abdomen is protruding with what appears to be 3 areas of hernia midline and parastomal.   I removed the pouch. Stoma measures about 35mm but is slightly oval. Patient has been cutting appliance to 25mm. I showed him to cut to 35-38mm. He is able to cut his own pouches but I think he would benefit from precut appliances. I provided # [id number]. Additionally, I will send samples of #[id number], the GARY flip for the hernias.   Patient performs the ostomy appliance change with no difficulty. Patient bowls and uses a 14lb ball. We discussed that heavy lifting could increase the hernia. This is his favorite activity so we discussed using a lighter ball. Also continue use of the hernia belt, which they are going to order.   I am not sure why there was bleeding prior except that if patient was cutting the opening so small, the pouch was likely rubbing on the stoma. He does not know of any trauma to the stoma. He has not fallen or bumped it, he states.   They can follow up as needed. They have our info.

## 2022-05-18 ENCOUNTER — APPOINTMENT (OUTPATIENT)
Dept: GENERAL RADIOLOGY | Facility: HOSPITAL | Age: 87
End: 2022-05-18

## 2022-05-18 ENCOUNTER — APPOINTMENT (OUTPATIENT)
Dept: CT IMAGING | Facility: HOSPITAL | Age: 87
End: 2022-05-18

## 2022-05-18 ENCOUNTER — HOSPITAL ENCOUNTER (INPATIENT)
Facility: HOSPITAL | Age: 87
LOS: 8 days | Discharge: HOME | End: 2022-05-26
Attending: EMERGENCY MEDICINE | Admitting: INTERNAL MEDICINE

## 2022-05-18 DIAGNOSIS — S06.5XAA SDH (SUBDURAL HEMATOMA): ICD-10-CM

## 2022-05-18 DIAGNOSIS — I60.9 SAH (SUBARACHNOID HEMORRHAGE): ICD-10-CM

## 2022-05-18 DIAGNOSIS — I63.131 CEREBROVASCULAR ACCIDENT (CVA) DUE TO EMBOLISM OF RIGHT CAROTID ARTERY: ICD-10-CM

## 2022-05-18 DIAGNOSIS — I61.9 INTRAPARENCHYMAL HEMORRHAGE OF BRAIN: ICD-10-CM

## 2022-05-18 DIAGNOSIS — N28.9 RENAL INSUFFICIENCY: ICD-10-CM

## 2022-05-18 DIAGNOSIS — Z09 FOLLOW-UP EXAM: ICD-10-CM

## 2022-05-18 DIAGNOSIS — R41.82 ALTERED MENTAL STATUS, UNSPECIFIED ALTERED MENTAL STATUS TYPE: Primary | ICD-10-CM
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
## 2022-05-18 PROBLEM — S06.33AA CEREBRAL CONTUSION (HCC): Status: ACTIVE | Noted: 2022-05-18

## 2022-05-18 LAB
ALBUMIN SERPL-MCNC: 3.9 G/DL (ref 3.5–5.2)
ALBUMIN/GLOB SERPL: 1.4 G/DL
ALP SERPL-CCNC: 99 U/L (ref 39–117)
ALT SERPL W P-5'-P-CCNC: 19 U/L (ref 1–41)
ANION GAP SERPL CALCULATED.3IONS-SCNC: 10.6 MMOL/L (ref 5–15)
AST SERPL-CCNC: 13 U/L (ref 1–40)
BACTERIA UR QL AUTO: ABNORMAL /HPF
BASOPHILS # BLD AUTO: 0.06 10*3/MM3 (ref 0–0.2)
BASOPHILS NFR BLD AUTO: 0.6 % (ref 0–1.5)
BILIRUB SERPL-MCNC: 0.3 MG/DL (ref 0–1.2)
BILIRUB UR QL STRIP: NEGATIVE
BUN SERPL-MCNC: 37 MG/DL (ref 8–23)
BUN/CREAT SERPL: 15.5 (ref 7–25)
CALCIUM SPEC-SCNC: 9.5 MG/DL (ref 8.6–10.5)
CHLORIDE SERPL-SCNC: 108 MMOL/L (ref 98–107)
CLARITY UR: CLEAR
CO2 SERPL-SCNC: 19.4 MMOL/L (ref 22–29)
COLOR UR: YELLOW
CREAT SERPL-MCNC: 2.38 MG/DL (ref 0.76–1.27)
DEPRECATED RDW RBC AUTO: 39.8 FL (ref 37–54)
EGFRCR SERPLBLD CKD-EPI 2021: 25.9 ML/MIN/1.73
EOSINOPHIL # BLD AUTO: 0.06 10*3/MM3 (ref 0–0.4)
EOSINOPHIL NFR BLD AUTO: 0.6 % (ref 0.3–6.2)
ERYTHROCYTE [DISTWIDTH] IN BLOOD BY AUTOMATED COUNT: 12.3 % (ref 12.3–15.4)
GLOBULIN UR ELPH-MCNC: 2.7 GM/DL
GLUCOSE BLDC GLUCOMTR-MCNC: 266 MG/DL (ref 70–130)
GLUCOSE SERPL-MCNC: 254 MG/DL (ref 65–99)
GLUCOSE UR STRIP-MCNC: ABNORMAL MG/DL
HCT VFR BLD AUTO: 40.5 % (ref 37.5–51)
HGB BLD-MCNC: 13.3 G/DL (ref 13–17.7)
HGB UR QL STRIP.AUTO: NEGATIVE
HOLD SPECIMEN: NORMAL
HYALINE CASTS UR QL AUTO: ABNORMAL /LPF
IMM GRANULOCYTES # BLD AUTO: 0.04 10*3/MM3 (ref 0–0.05)
IMM GRANULOCYTES NFR BLD AUTO: 0.4 % (ref 0–0.5)
KETONES UR QL STRIP: NEGATIVE
LEUKOCYTE ESTERASE UR QL STRIP.AUTO: ABNORMAL
LYMPHOCYTES # BLD AUTO: 1.06 10*3/MM3 (ref 0.7–3.1)
LYMPHOCYTES NFR BLD AUTO: 10.7 % (ref 19.6–45.3)
MCH RBC QN AUTO: 29.6 PG (ref 26.6–33)
MCHC RBC AUTO-ENTMCNC: 32.8 G/DL (ref 31.5–35.7)
MCV RBC AUTO: 90.2 FL (ref 79–97)
MONOCYTES # BLD AUTO: 0.74 10*3/MM3 (ref 0.1–0.9)
MONOCYTES NFR BLD AUTO: 7.5 % (ref 5–12)
NEUTROPHILS NFR BLD AUTO: 7.95 10*3/MM3 (ref 1.7–7)
NEUTROPHILS NFR BLD AUTO: 80.2 % (ref 42.7–76)
NITRITE UR QL STRIP: NEGATIVE
NRBC BLD AUTO-RTO: 0 /100 WBC (ref 0–0.2)
PH UR STRIP.AUTO: 5.5 [PH] (ref 5–8)
PLATELET # BLD AUTO: 214 10*3/MM3 (ref 140–450)
PMV BLD AUTO: 10.9 FL (ref 6–12)
POTASSIUM SERPL-SCNC: 4.3 MMOL/L (ref 3.5–5.2)
PROT SERPL-MCNC: 6.6 G/DL (ref 6–8.5)
PROT UR QL STRIP: ABNORMAL
RBC # BLD AUTO: 4.49 10*6/MM3 (ref 4.14–5.8)
RBC # UR STRIP: ABNORMAL /HPF
REF LAB TEST METHOD: ABNORMAL
SARS-COV-2 RNA PNL SPEC NAA+PROBE: NOT DETECTED
SODIUM SERPL-SCNC: 138 MMOL/L (ref 136–145)
SP GR UR STRIP: 1.02 (ref 1–1.03)
SQUAMOUS #/AREA URNS HPF: ABNORMAL /HPF
UROBILINOGEN UR QL STRIP: ABNORMAL
WBC # UR STRIP: ABNORMAL /HPF
WBC NRBC COR # BLD: 9.91 10*3/MM3 (ref 3.4–10.8)
WHOLE BLOOD HOLD COAG: NORMAL
WHOLE BLOOD HOLD SPECIMEN: NORMAL

## 2022-05-18 PROCEDURE — 82962 GLUCOSE BLOOD TEST: CPT

## 2022-05-18 PROCEDURE — 93005 ELECTROCARDIOGRAM TRACING: CPT | Performed by: EMERGENCY MEDICINE

## 2022-05-18 PROCEDURE — 81001 URINALYSIS AUTO W/SCOPE: CPT | Performed by: EMERGENCY MEDICINE

## 2022-05-18 PROCEDURE — 93010 ELECTROCARDIOGRAM REPORT: CPT | Performed by: INTERNAL MEDICINE

## 2022-05-18 PROCEDURE — 72125 CT NECK SPINE W/O DYE: CPT

## 2022-05-18 PROCEDURE — 87635 SARS-COV-2 COVID-19 AMP PRB: CPT | Performed by: EMERGENCY MEDICINE

## 2022-05-18 PROCEDURE — 99285 EMERGENCY DEPT VISIT HI MDM: CPT

## 2022-05-18 PROCEDURE — 85025 COMPLETE CBC W/AUTO DIFF WBC: CPT | Performed by: EMERGENCY MEDICINE

## 2022-05-18 PROCEDURE — 80053 COMPREHEN METABOLIC PANEL: CPT | Performed by: EMERGENCY MEDICINE

## 2022-05-18 PROCEDURE — 99223 1ST HOSP IP/OBS HIGH 75: CPT | Performed by: NURSE PRACTITIONER

## 2022-05-18 PROCEDURE — 71045 X-RAY EXAM CHEST 1 VIEW: CPT

## 2022-05-18 PROCEDURE — 70450 CT HEAD/BRAIN W/O DYE: CPT

## 2022-05-18 PROCEDURE — 36415 COLL VENOUS BLD VENIPUNCTURE: CPT

## 2022-05-18 RX ORDER — AMOXICILLIN 250 MG
2 CAPSULE ORAL 2 TIMES DAILY
Status: DISCONTINUED | OUTPATIENT
Start: 2022-05-18 | End: 2022-05-26 | Stop reason: HOSPADM

## 2022-05-18 RX ORDER — MAGNESIUM SULFATE HEPTAHYDRATE 40 MG/ML
4 INJECTION, SOLUTION INTRAVENOUS AS NEEDED
Status: DISCONTINUED | OUTPATIENT
Start: 2022-05-18 | End: 2022-05-26 | Stop reason: HOSPADM

## 2022-05-18 RX ORDER — ONDANSETRON 2 MG/ML
4 INJECTION INTRAMUSCULAR; INTRAVENOUS EVERY 6 HOURS PRN
Status: DISCONTINUED | OUTPATIENT
Start: 2022-05-18 | End: 2022-05-26 | Stop reason: HOSPADM

## 2022-05-18 RX ORDER — SODIUM CHLORIDE 0.9 % (FLUSH) 0.9 %
10 SYRINGE (ML) INJECTION AS NEEDED
Status: DISCONTINUED | OUTPATIENT
Start: 2022-05-18 | End: 2022-05-26 | Stop reason: HOSPADM

## 2022-05-18 RX ORDER — POTASSIUM CHLORIDE 1.5 G/1.77G
40 POWDER, FOR SOLUTION ORAL AS NEEDED
Status: DISCONTINUED | OUTPATIENT
Start: 2022-05-18 | End: 2022-05-26 | Stop reason: HOSPADM

## 2022-05-18 RX ORDER — INSULIN GLARGINE 100 [IU]/ML
34 INJECTION, SOLUTION SUBCUTANEOUS DAILY
COMMUNITY
End: 2022-06-09 | Stop reason: HOSPADM

## 2022-05-18 RX ORDER — MAGNESIUM SULFATE HEPTAHYDRATE 40 MG/ML
2 INJECTION, SOLUTION INTRAVENOUS AS NEEDED
Status: DISCONTINUED | OUTPATIENT
Start: 2022-05-18 | End: 2022-05-26 | Stop reason: HOSPADM

## 2022-05-18 RX ORDER — OMEPRAZOLE 40 MG/1
40 CAPSULE, DELAYED RELEASE ORAL DAILY
COMMUNITY
End: 2022-08-04 | Stop reason: HOSPADM

## 2022-05-18 RX ORDER — SODIUM CHLORIDE 0.9 % (FLUSH) 0.9 %
10 SYRINGE (ML) INJECTION EVERY 12 HOURS SCHEDULED
Status: DISCONTINUED | OUTPATIENT
Start: 2022-05-18 | End: 2022-05-26 | Stop reason: HOSPADM

## 2022-05-18 RX ORDER — BISACODYL 5 MG/1
5 TABLET, DELAYED RELEASE ORAL DAILY PRN
Status: DISCONTINUED | OUTPATIENT
Start: 2022-05-18 | End: 2022-05-26 | Stop reason: HOSPADM

## 2022-05-18 RX ORDER — NICOTINE POLACRILEX 4 MG
15 LOZENGE BUCCAL
Status: DISCONTINUED | OUTPATIENT
Start: 2022-05-18 | End: 2022-05-26 | Stop reason: HOSPADM

## 2022-05-18 RX ORDER — BISACODYL 10 MG
10 SUPPOSITORY, RECTAL RECTAL DAILY PRN
Status: DISCONTINUED | OUTPATIENT
Start: 2022-05-18 | End: 2022-05-26 | Stop reason: HOSPADM

## 2022-05-18 RX ORDER — INSULIN LISPRO 100 [IU]/ML
0-7 INJECTION, SOLUTION INTRAVENOUS; SUBCUTANEOUS
Status: DISCONTINUED | OUTPATIENT
Start: 2022-05-18 | End: 2022-05-20

## 2022-05-18 RX ORDER — LABETALOL HYDROCHLORIDE 5 MG/ML
20 INJECTION, SOLUTION INTRAVENOUS
Status: DISCONTINUED | OUTPATIENT
Start: 2022-05-18 | End: 2022-05-26 | Stop reason: HOSPADM

## 2022-05-18 RX ORDER — DEXTROSE MONOHYDRATE 25 G/50ML
25 INJECTION, SOLUTION INTRAVENOUS
Status: DISCONTINUED | OUTPATIENT
Start: 2022-05-18 | End: 2022-05-26 | Stop reason: HOSPADM

## 2022-05-18 RX ORDER — POTASSIUM CHLORIDE 750 MG/1
40 TABLET, FILM COATED, EXTENDED RELEASE ORAL AS NEEDED
Status: DISCONTINUED | OUTPATIENT
Start: 2022-05-18 | End: 2022-05-26 | Stop reason: HOSPADM

## 2022-05-18 RX ORDER — MAGNESIUM SULFATE 1 G/100ML
1 INJECTION INTRAVENOUS AS NEEDED
Status: DISCONTINUED | OUTPATIENT
Start: 2022-05-18 | End: 2022-05-26 | Stop reason: HOSPADM

## 2022-05-18 RX ORDER — CALCIUM GLUCONATE 20 MG/ML
1 INJECTION, SOLUTION INTRAVENOUS AS NEEDED
Status: DISCONTINUED | OUTPATIENT
Start: 2022-05-18 | End: 2022-05-26 | Stop reason: HOSPADM

## 2022-05-18 RX ORDER — POTASSIUM CHLORIDE 7.45 MG/ML
10 INJECTION INTRAVENOUS
Status: DISCONTINUED | OUTPATIENT
Start: 2022-05-18 | End: 2022-05-26 | Stop reason: HOSPADM

## 2022-05-18 RX ORDER — POLYETHYLENE GLYCOL 3350 17 G/17G
17 POWDER, FOR SOLUTION ORAL DAILY PRN
Status: DISCONTINUED | OUTPATIENT
Start: 2022-05-18 | End: 2022-05-26 | Stop reason: HOSPADM

## 2022-05-18 RX ORDER — SODIUM CHLORIDE 9 MG/ML
100 INJECTION, SOLUTION INTRAVENOUS CONTINUOUS
Status: DISCONTINUED | OUTPATIENT
Start: 2022-05-18 | End: 2022-05-20

## 2022-05-18 RX ADMIN — SODIUM CHLORIDE 100 ML/HR: 9 INJECTION, SOLUTION INTRAVENOUS at 15:04

## 2022-05-18 RX ADMIN — Medication 10 ML: at 13:03

## 2022-05-18 RX ADMIN — Medication 10 ML: at 21:00

## 2022-05-18 RX ADMIN — NICARDIPINE HYDROCHLORIDE 5 MG/HR: 25 INJECTION, SOLUTION INTRAVENOUS at 15:43

## 2022-05-18 RX ADMIN — NICARDIPINE HYDROCHLORIDE 5 MG/HR: 25 INJECTION, SOLUTION INTRAVENOUS at 20:59

## 2022-05-19 ENCOUNTER — APPOINTMENT (OUTPATIENT)
Dept: CT IMAGING | Facility: HOSPITAL | Age: 87
End: 2022-05-19

## 2022-05-19 LAB
ANION GAP SERPL CALCULATED.3IONS-SCNC: 10 MMOL/L (ref 5–15)
BUN SERPL-MCNC: 23 MG/DL (ref 8–23)
BUN/CREAT SERPL: 16.2 (ref 7–25)
CALCIUM SPEC-SCNC: 6.9 MG/DL (ref 8.6–10.5)
CHLORIDE SERPL-SCNC: 117 MMOL/L (ref 98–107)
CO2 SERPL-SCNC: 16 MMOL/L (ref 22–29)
CREAT SERPL-MCNC: 1.42 MG/DL (ref 0.76–1.27)
DEPRECATED RDW RBC AUTO: 38.4 FL (ref 37–54)
DEPRECATED RDW RBC AUTO: 40.9 FL (ref 37–54)
EGFRCR SERPLBLD CKD-EPI 2021: 48.1 ML/MIN/1.73
ERYTHROCYTE [DISTWIDTH] IN BLOOD BY AUTOMATED COUNT: 12.1 % (ref 12.3–15.4)
ERYTHROCYTE [DISTWIDTH] IN BLOOD BY AUTOMATED COUNT: 12.4 % (ref 12.3–15.4)
GLUCOSE BLDC GLUCOMTR-MCNC: 178 MG/DL (ref 70–130)
GLUCOSE BLDC GLUCOMTR-MCNC: 209 MG/DL (ref 70–130)
GLUCOSE BLDC GLUCOMTR-MCNC: 237 MG/DL (ref 70–130)
GLUCOSE BLDC GLUCOMTR-MCNC: 337 MG/DL (ref 70–130)
GLUCOSE BLDC GLUCOMTR-MCNC: 352 MG/DL (ref 70–130)
GLUCOSE SERPL-MCNC: 173 MG/DL (ref 65–99)
HCT VFR BLD AUTO: 33 % (ref 37.5–51)
HCT VFR BLD AUTO: 36.4 % (ref 37.5–51)
HGB BLD-MCNC: 10.9 G/DL (ref 13–17.7)
HGB BLD-MCNC: 12.4 G/DL (ref 13–17.7)
MCH RBC QN AUTO: 29.8 PG (ref 26.6–33)
MCH RBC QN AUTO: 30.1 PG (ref 26.6–33)
MCHC RBC AUTO-ENTMCNC: 33 G/DL (ref 31.5–35.7)
MCHC RBC AUTO-ENTMCNC: 34.1 G/DL (ref 31.5–35.7)
MCV RBC AUTO: 88.3 FL (ref 79–97)
MCV RBC AUTO: 90.2 FL (ref 79–97)
PLATELET # BLD AUTO: 159 10*3/MM3 (ref 140–450)
PLATELET # BLD AUTO: 183 10*3/MM3 (ref 140–450)
PMV BLD AUTO: 10.4 FL (ref 6–12)
PMV BLD AUTO: 10.7 FL (ref 6–12)
POTASSIUM SERPL-SCNC: 3.2 MMOL/L (ref 3.5–5.2)
QT INTERVAL: 429 MS
RBC # BLD AUTO: 3.66 10*6/MM3 (ref 4.14–5.8)
RBC # BLD AUTO: 4.12 10*6/MM3 (ref 4.14–5.8)
SODIUM SERPL-SCNC: 143 MMOL/L (ref 136–145)
WBC NRBC COR # BLD: 5.51 10*3/MM3 (ref 3.4–10.8)
WBC NRBC COR # BLD: 6.85 10*3/MM3 (ref 3.4–10.8)

## 2022-05-19 PROCEDURE — 92610 EVALUATE SWALLOWING FUNCTION: CPT

## 2022-05-19 PROCEDURE — 80048 BASIC METABOLIC PNL TOTAL CA: CPT | Performed by: INTERNAL MEDICINE

## 2022-05-19 PROCEDURE — 94799 UNLISTED PULMONARY SVC/PX: CPT

## 2022-05-19 PROCEDURE — 97162 PT EVAL MOD COMPLEX 30 MIN: CPT

## 2022-05-19 PROCEDURE — 97530 THERAPEUTIC ACTIVITIES: CPT

## 2022-05-19 PROCEDURE — 97166 OT EVAL MOD COMPLEX 45 MIN: CPT

## 2022-05-19 PROCEDURE — 85027 COMPLETE CBC AUTOMATED: CPT | Performed by: INTERNAL MEDICINE

## 2022-05-19 PROCEDURE — 70450 CT HEAD/BRAIN W/O DYE: CPT

## 2022-05-19 PROCEDURE — 94761 N-INVAS EAR/PLS OXIMETRY MLT: CPT

## 2022-05-19 PROCEDURE — 82962 GLUCOSE BLOOD TEST: CPT

## 2022-05-19 PROCEDURE — 97535 SELF CARE MNGMENT TRAINING: CPT

## 2022-05-19 PROCEDURE — 99232 SBSQ HOSP IP/OBS MODERATE 35: CPT | Performed by: NURSE PRACTITIONER

## 2022-05-19 PROCEDURE — 63710000001 INSULIN LISPRO (HUMAN) PER 5 UNITS: Performed by: INTERNAL MEDICINE

## 2022-05-19 RX ORDER — PANTOPRAZOLE SODIUM 40 MG/1
40 TABLET, DELAYED RELEASE ORAL
Status: DISCONTINUED | OUTPATIENT
Start: 2022-05-20 | End: 2022-05-26 | Stop reason: HOSPADM

## 2022-05-19 RX ORDER — ATORVASTATIN CALCIUM 20 MG/1
40 TABLET, FILM COATED ORAL NIGHTLY
Status: DISCONTINUED | OUTPATIENT
Start: 2022-05-19 | End: 2022-05-23

## 2022-05-19 RX ORDER — LEVOTHYROXINE SODIUM 0.07 MG/1
75 TABLET ORAL
Status: DISCONTINUED | OUTPATIENT
Start: 2022-05-19 | End: 2022-05-26 | Stop reason: HOSPADM

## 2022-05-19 RX ORDER — LISINOPRIL 20 MG/1
20 TABLET ORAL
Status: DISCONTINUED | OUTPATIENT
Start: 2022-05-19 | End: 2022-05-22

## 2022-05-19 RX ADMIN — INSULIN LISPRO 6 UNITS: 100 INJECTION, SOLUTION INTRAVENOUS; SUBCUTANEOUS at 16:46

## 2022-05-19 RX ADMIN — NICARDIPINE HYDROCHLORIDE 5 MG/HR: 25 INJECTION, SOLUTION INTRAVENOUS at 04:05

## 2022-05-19 RX ADMIN — Medication 10 ML: at 20:04

## 2022-05-19 RX ADMIN — SODIUM CHLORIDE 100 ML/HR: 9 INJECTION, SOLUTION INTRAVENOUS at 10:22

## 2022-05-19 RX ADMIN — LEVOTHYROXINE SODIUM 75 MCG: 0.07 TABLET ORAL at 14:13

## 2022-05-19 RX ADMIN — LISINOPRIL 20 MG: 20 TABLET ORAL at 14:19

## 2022-05-19 RX ADMIN — ATORVASTATIN CALCIUM 40 MG: 20 TABLET, FILM COATED ORAL at 20:03

## 2022-05-19 RX ADMIN — SODIUM CHLORIDE 100 ML/HR: 9 INJECTION, SOLUTION INTRAVENOUS at 20:38

## 2022-05-19 RX ADMIN — DOCUSATE SODIUM 50MG AND SENNOSIDES 8.6MG 2 TABLET: 8.6; 5 TABLET, FILM COATED ORAL at 20:03

## 2022-05-19 RX ADMIN — INSULIN LISPRO 3 UNITS: 100 INJECTION, SOLUTION INTRAVENOUS; SUBCUTANEOUS at 11:48

## 2022-05-19 RX ADMIN — NICARDIPINE HYDROCHLORIDE 5 MG/HR: 25 INJECTION, SOLUTION INTRAVENOUS at 15:29

## 2022-05-19 RX ADMIN — Medication 10 ML: at 08:54

## 2022-05-19 RX ADMIN — NICARDIPINE HYDROCHLORIDE 5 MG/HR: 25 INJECTION, SOLUTION INTRAVENOUS at 20:38

## 2022-05-19 RX ADMIN — NICARDIPINE HYDROCHLORIDE 5 MG/HR: 25 INJECTION, SOLUTION INTRAVENOUS at 10:21

## 2022-05-20 ENCOUNTER — APPOINTMENT (OUTPATIENT)
Dept: CT IMAGING | Facility: HOSPITAL | Age: 87
End: 2022-05-20

## 2022-05-20 LAB
ANION GAP SERPL CALCULATED.3IONS-SCNC: 9 MMOL/L (ref 5–15)
BUN SERPL-MCNC: 21 MG/DL (ref 8–23)
BUN/CREAT SERPL: 13.4 (ref 7–25)
CALCIUM SPEC-SCNC: 7.9 MG/DL (ref 8.6–10.5)
CHLORIDE SERPL-SCNC: 110 MMOL/L (ref 98–107)
CO2 SERPL-SCNC: 17 MMOL/L (ref 22–29)
CREAT SERPL-MCNC: 1.57 MG/DL (ref 0.76–1.27)
DEPRECATED RDW RBC AUTO: 39.3 FL (ref 37–54)
EGFRCR SERPLBLD CKD-EPI 2021: 42.7 ML/MIN/1.73
ERYTHROCYTE [DISTWIDTH] IN BLOOD BY AUTOMATED COUNT: 12.1 % (ref 12.3–15.4)
GLUCOSE BLDC GLUCOMTR-MCNC: 151 MG/DL (ref 70–130)
GLUCOSE BLDC GLUCOMTR-MCNC: 314 MG/DL (ref 70–130)
GLUCOSE BLDC GLUCOMTR-MCNC: 391 MG/DL (ref 70–130)
GLUCOSE SERPL-MCNC: 351 MG/DL (ref 65–99)
HCT VFR BLD AUTO: 33.4 % (ref 37.5–51)
HGB BLD-MCNC: 11.2 G/DL (ref 13–17.7)
MCH RBC QN AUTO: 30.2 PG (ref 26.6–33)
MCHC RBC AUTO-ENTMCNC: 33.5 G/DL (ref 31.5–35.7)
MCV RBC AUTO: 90 FL (ref 79–97)
PLATELET # BLD AUTO: 163 10*3/MM3 (ref 140–450)
PMV BLD AUTO: 10.5 FL (ref 6–12)
POTASSIUM SERPL-SCNC: 3.9 MMOL/L (ref 3.5–5.2)
RBC # BLD AUTO: 3.71 10*6/MM3 (ref 4.14–5.8)
SODIUM SERPL-SCNC: 136 MMOL/L (ref 136–145)
WBC NRBC COR # BLD: 6.47 10*3/MM3 (ref 3.4–10.8)

## 2022-05-20 PROCEDURE — 70450 CT HEAD/BRAIN W/O DYE: CPT

## 2022-05-20 PROCEDURE — 80048 BASIC METABOLIC PNL TOTAL CA: CPT | Performed by: INTERNAL MEDICINE

## 2022-05-20 PROCEDURE — 99232 SBSQ HOSP IP/OBS MODERATE 35: CPT | Performed by: NURSE PRACTITIONER

## 2022-05-20 PROCEDURE — 85027 COMPLETE CBC AUTOMATED: CPT | Performed by: INTERNAL MEDICINE

## 2022-05-20 PROCEDURE — 97530 THERAPEUTIC ACTIVITIES: CPT

## 2022-05-20 PROCEDURE — 63710000001 INSULIN LISPRO (HUMAN) PER 5 UNITS: Performed by: INTERNAL MEDICINE

## 2022-05-20 PROCEDURE — 82962 GLUCOSE BLOOD TEST: CPT

## 2022-05-20 RX ORDER — AMLODIPINE BESYLATE 5 MG/1
5 TABLET ORAL
Status: DISCONTINUED | OUTPATIENT
Start: 2022-05-20 | End: 2022-05-22

## 2022-05-20 RX ORDER — INSULIN LISPRO 100 [IU]/ML
0-24 INJECTION, SOLUTION INTRAVENOUS; SUBCUTANEOUS
Status: DISCONTINUED | OUTPATIENT
Start: 2022-05-20 | End: 2022-05-26 | Stop reason: HOSPADM

## 2022-05-20 RX ADMIN — INSULIN LISPRO 4 UNITS: 100 INJECTION, SOLUTION INTRAVENOUS; SUBCUTANEOUS at 18:41

## 2022-05-20 RX ADMIN — Medication 10 ML: at 20:01

## 2022-05-20 RX ADMIN — NICARDIPINE HYDROCHLORIDE 5 MG/HR: 25 INJECTION, SOLUTION INTRAVENOUS at 01:09

## 2022-05-20 RX ADMIN — SODIUM CHLORIDE 100 ML/HR: 9 INJECTION, SOLUTION INTRAVENOUS at 05:00

## 2022-05-20 RX ADMIN — Medication 10 ML: at 08:51

## 2022-05-20 RX ADMIN — PANTOPRAZOLE SODIUM 40 MG: 40 TABLET, DELAYED RELEASE ORAL at 05:00

## 2022-05-20 RX ADMIN — NICARDIPINE HYDROCHLORIDE 5 MG/HR: 25 INJECTION, SOLUTION INTRAVENOUS at 09:27

## 2022-05-20 RX ADMIN — DOCUSATE SODIUM 50MG AND SENNOSIDES 8.6MG 2 TABLET: 8.6; 5 TABLET, FILM COATED ORAL at 20:00

## 2022-05-20 RX ADMIN — DOCUSATE SODIUM 50MG AND SENNOSIDES 8.6MG 2 TABLET: 8.6; 5 TABLET, FILM COATED ORAL at 08:51

## 2022-05-20 RX ADMIN — INSULIN LISPRO 20 UNITS: 100 INJECTION, SOLUTION INTRAVENOUS; SUBCUTANEOUS at 14:26

## 2022-05-20 RX ADMIN — LISINOPRIL 20 MG: 20 TABLET ORAL at 08:49

## 2022-05-20 RX ADMIN — LEVOTHYROXINE SODIUM 75 MCG: 0.07 TABLET ORAL at 05:00

## 2022-05-20 RX ADMIN — INSULIN GLARGINE-YFGN 10 UNITS: 100 INJECTION, SOLUTION SUBCUTANEOUS at 20:00

## 2022-05-20 RX ADMIN — ATORVASTATIN CALCIUM 40 MG: 20 TABLET, FILM COATED ORAL at 20:00

## 2022-05-20 RX ADMIN — INSULIN LISPRO 5 UNITS: 100 INJECTION, SOLUTION INTRAVENOUS; SUBCUTANEOUS at 08:49

## 2022-05-20 RX ADMIN — AMLODIPINE BESYLATE 5 MG: 5 TABLET ORAL at 20:00

## 2022-05-20 RX ADMIN — INSULIN GLARGINE-YFGN 10 UNITS: 100 INJECTION, SOLUTION SUBCUTANEOUS at 14:26

## 2022-05-21 LAB
ANION GAP SERPL CALCULATED.3IONS-SCNC: 10 MMOL/L (ref 5–15)
BUN SERPL-MCNC: 18 MG/DL (ref 8–23)
BUN/CREAT SERPL: 11.6 (ref 7–25)
CALCIUM SPEC-SCNC: 8.4 MG/DL (ref 8.6–10.5)
CHLORIDE SERPL-SCNC: 107 MMOL/L (ref 98–107)
CO2 SERPL-SCNC: 18 MMOL/L (ref 22–29)
CREAT SERPL-MCNC: 1.55 MG/DL (ref 0.76–1.27)
DEPRECATED RDW RBC AUTO: 41.3 FL (ref 37–54)
EGFRCR SERPLBLD CKD-EPI 2021: 43.3 ML/MIN/1.73
ERYTHROCYTE [DISTWIDTH] IN BLOOD BY AUTOMATED COUNT: 12.6 % (ref 12.3–15.4)
GLUCOSE BLDC GLUCOMTR-MCNC: 122 MG/DL (ref 70–130)
GLUCOSE BLDC GLUCOMTR-MCNC: 183 MG/DL (ref 70–130)
GLUCOSE BLDC GLUCOMTR-MCNC: 243 MG/DL (ref 70–130)
GLUCOSE BLDC GLUCOMTR-MCNC: 252 MG/DL (ref 70–130)
GLUCOSE SERPL-MCNC: 269 MG/DL (ref 65–99)
HCT VFR BLD AUTO: 37.5 % (ref 37.5–51)
HGB BLD-MCNC: 12.2 G/DL (ref 13–17.7)
MCH RBC QN AUTO: 29.1 PG (ref 26.6–33)
MCHC RBC AUTO-ENTMCNC: 32.5 G/DL (ref 31.5–35.7)
MCV RBC AUTO: 89.5 FL (ref 79–97)
PLATELET # BLD AUTO: 177 10*3/MM3 (ref 140–450)
PMV BLD AUTO: 10.5 FL (ref 6–12)
POTASSIUM SERPL-SCNC: 3.8 MMOL/L (ref 3.5–5.2)
RBC # BLD AUTO: 4.19 10*6/MM3 (ref 4.14–5.8)
SODIUM SERPL-SCNC: 135 MMOL/L (ref 136–145)
WBC NRBC COR # BLD: 8.79 10*3/MM3 (ref 3.4–10.8)

## 2022-05-21 PROCEDURE — 85027 COMPLETE CBC AUTOMATED: CPT | Performed by: INTERNAL MEDICINE

## 2022-05-21 PROCEDURE — 80048 BASIC METABOLIC PNL TOTAL CA: CPT | Performed by: INTERNAL MEDICINE

## 2022-05-21 PROCEDURE — 82962 GLUCOSE BLOOD TEST: CPT

## 2022-05-21 PROCEDURE — 63710000001 INSULIN LISPRO (HUMAN) PER 5 UNITS: Performed by: INTERNAL MEDICINE

## 2022-05-21 PROCEDURE — 99232 SBSQ HOSP IP/OBS MODERATE 35: CPT | Performed by: NEUROLOGICAL SURGERY

## 2022-05-21 RX ADMIN — Medication 10 ML: at 08:50

## 2022-05-21 RX ADMIN — INSULIN LISPRO 4 UNITS: 100 INJECTION, SOLUTION INTRAVENOUS; SUBCUTANEOUS at 11:50

## 2022-05-21 RX ADMIN — AMLODIPINE BESYLATE 5 MG: 5 TABLET ORAL at 08:57

## 2022-05-21 RX ADMIN — LEVOTHYROXINE SODIUM 75 MCG: 0.07 TABLET ORAL at 05:19

## 2022-05-21 RX ADMIN — ATORVASTATIN CALCIUM 40 MG: 20 TABLET, FILM COATED ORAL at 20:03

## 2022-05-21 RX ADMIN — LABETALOL HYDROCHLORIDE 20 MG: 5 INJECTION, SOLUTION INTRAVENOUS at 17:43

## 2022-05-21 RX ADMIN — Medication 10 ML: at 22:58

## 2022-05-21 RX ADMIN — INSULIN GLARGINE-YFGN 10 UNITS: 100 INJECTION, SOLUTION SUBCUTANEOUS at 08:58

## 2022-05-21 RX ADMIN — NICARDIPINE HYDROCHLORIDE 5 MG/HR: 25 INJECTION, SOLUTION INTRAVENOUS at 02:50

## 2022-05-21 RX ADMIN — INSULIN LISPRO 12 UNITS: 100 INJECTION, SOLUTION INTRAVENOUS; SUBCUTANEOUS at 08:57

## 2022-05-21 RX ADMIN — INSULIN GLARGINE-YFGN 10 UNITS: 100 INJECTION, SOLUTION SUBCUTANEOUS at 20:01

## 2022-05-21 RX ADMIN — LABETALOL HYDROCHLORIDE 20 MG: 5 INJECTION, SOLUTION INTRAVENOUS at 19:44

## 2022-05-21 RX ADMIN — LISINOPRIL 20 MG: 20 TABLET ORAL at 08:57

## 2022-05-21 RX ADMIN — PANTOPRAZOLE SODIUM 40 MG: 40 TABLET, DELAYED RELEASE ORAL at 05:19

## 2022-05-21 RX ADMIN — DOCUSATE SODIUM 50MG AND SENNOSIDES 8.6MG 2 TABLET: 8.6; 5 TABLET, FILM COATED ORAL at 08:57

## 2022-05-22 ENCOUNTER — APPOINTMENT (OUTPATIENT)
Dept: MRI IMAGING | Facility: HOSPITAL | Age: 87
End: 2022-05-22

## 2022-05-22 LAB
GLUCOSE BLDC GLUCOMTR-MCNC: 264 MG/DL (ref 70–130)
GLUCOSE BLDC GLUCOMTR-MCNC: 334 MG/DL (ref 70–130)
GLUCOSE BLDC GLUCOMTR-MCNC: 359 MG/DL (ref 70–130)

## 2022-05-22 PROCEDURE — A9577 INJ MULTIHANCE: HCPCS | Performed by: INTERNAL MEDICINE

## 2022-05-22 PROCEDURE — 0 GADOBENATE DIMEGLUMINE 529 MG/ML SOLUTION: Performed by: INTERNAL MEDICINE

## 2022-05-22 PROCEDURE — 63710000001 INSULIN LISPRO (HUMAN) PER 5 UNITS: Performed by: INTERNAL MEDICINE

## 2022-05-22 PROCEDURE — 99231 SBSQ HOSP IP/OBS SF/LOW 25: CPT | Performed by: NEUROLOGICAL SURGERY

## 2022-05-22 PROCEDURE — 70553 MRI BRAIN STEM W/O & W/DYE: CPT

## 2022-05-22 PROCEDURE — 82962 GLUCOSE BLOOD TEST: CPT

## 2022-05-22 RX ORDER — AMLODIPINE BESYLATE 10 MG/1
10 TABLET ORAL
Status: DISCONTINUED | OUTPATIENT
Start: 2022-05-23 | End: 2022-05-26 | Stop reason: HOSPADM

## 2022-05-22 RX ADMIN — Medication 10 ML: at 09:44

## 2022-05-22 RX ADMIN — DOCUSATE SODIUM 50MG AND SENNOSIDES 8.6MG 2 TABLET: 8.6; 5 TABLET, FILM COATED ORAL at 21:47

## 2022-05-22 RX ADMIN — LISINOPRIL 20 MG: 20 TABLET ORAL at 09:44

## 2022-05-22 RX ADMIN — INSULIN LISPRO 16 UNITS: 100 INJECTION, SOLUTION INTRAVENOUS; SUBCUTANEOUS at 19:06

## 2022-05-22 RX ADMIN — Medication 10 ML: at 21:47

## 2022-05-22 RX ADMIN — AMLODIPINE BESYLATE 5 MG: 5 TABLET ORAL at 09:44

## 2022-05-22 RX ADMIN — DOCUSATE SODIUM 50MG AND SENNOSIDES 8.6MG 2 TABLET: 8.6; 5 TABLET, FILM COATED ORAL at 09:44

## 2022-05-22 RX ADMIN — PANTOPRAZOLE SODIUM 40 MG: 40 TABLET, DELAYED RELEASE ORAL at 05:33

## 2022-05-22 RX ADMIN — ATORVASTATIN CALCIUM 40 MG: 20 TABLET, FILM COATED ORAL at 21:47

## 2022-05-22 RX ADMIN — INSULIN LISPRO 16 UNITS: 100 INJECTION, SOLUTION INTRAVENOUS; SUBCUTANEOUS at 12:18

## 2022-05-22 RX ADMIN — GADOBENATE DIMEGLUMINE 15 ML: 529 INJECTION, SOLUTION INTRAVENOUS at 14:33

## 2022-05-22 RX ADMIN — INSULIN GLARGINE-YFGN 10 UNITS: 100 INJECTION, SOLUTION SUBCUTANEOUS at 09:57

## 2022-05-22 RX ADMIN — LEVOTHYROXINE SODIUM 75 MCG: 0.07 TABLET ORAL at 05:33

## 2022-05-22 RX ADMIN — LABETALOL HYDROCHLORIDE 20 MG: 5 INJECTION, SOLUTION INTRAVENOUS at 18:56

## 2022-05-22 RX ADMIN — INSULIN GLARGINE-YFGN 10 UNITS: 100 INJECTION, SOLUTION SUBCUTANEOUS at 21:53

## 2022-05-23 ENCOUNTER — APPOINTMENT (OUTPATIENT)
Dept: OTHER | Facility: HOSPITAL | Age: 87
End: 2022-05-23

## 2022-05-23 ENCOUNTER — APPOINTMENT (OUTPATIENT)
Dept: CT IMAGING | Facility: HOSPITAL | Age: 87
End: 2022-05-23

## 2022-05-23 LAB
GLUCOSE BLDC GLUCOMTR-MCNC: 242 MG/DL (ref 70–130)
GLUCOSE BLDC GLUCOMTR-MCNC: 291 MG/DL (ref 70–130)
GLUCOSE BLDC GLUCOMTR-MCNC: 301 MG/DL (ref 70–130)
GLUCOSE BLDC GLUCOMTR-MCNC: 428 MG/DL (ref 70–130)

## 2022-05-23 PROCEDURE — 70498 CT ANGIOGRAPHY NECK: CPT

## 2022-05-23 PROCEDURE — 97535 SELF CARE MNGMENT TRAINING: CPT

## 2022-05-23 PROCEDURE — 25010000002 IOPAMIDOL 61 % SOLUTION: Performed by: INTERNAL MEDICINE

## 2022-05-23 PROCEDURE — 99231 SBSQ HOSP IP/OBS SF/LOW 25: CPT | Performed by: NURSE PRACTITIONER

## 2022-05-23 PROCEDURE — 63710000001 INSULIN LISPRO (HUMAN) PER 5 UNITS: Performed by: INTERNAL MEDICINE

## 2022-05-23 PROCEDURE — 99223 1ST HOSP IP/OBS HIGH 75: CPT | Performed by: NURSE PRACTITIONER

## 2022-05-23 PROCEDURE — 82962 GLUCOSE BLOOD TEST: CPT

## 2022-05-23 PROCEDURE — 97116 GAIT TRAINING THERAPY: CPT

## 2022-05-23 PROCEDURE — 70496 CT ANGIOGRAPHY HEAD: CPT

## 2022-05-23 PROCEDURE — 97110 THERAPEUTIC EXERCISES: CPT

## 2022-05-23 RX ORDER — SODIUM CHLORIDE 0.9 % (FLUSH) 0.9 %
10 SYRINGE (ML) INJECTION EVERY 12 HOURS SCHEDULED
Status: DISCONTINUED | OUTPATIENT
Start: 2022-05-23 | End: 2022-05-26 | Stop reason: HOSPADM

## 2022-05-23 RX ORDER — ATORVASTATIN CALCIUM 20 MG/1
40 TABLET, FILM COATED ORAL NIGHTLY
Status: DISCONTINUED | OUTPATIENT
Start: 2022-05-23 | End: 2022-05-26 | Stop reason: HOSPADM

## 2022-05-23 RX ORDER — SODIUM CHLORIDE 0.9 % (FLUSH) 0.9 %
10 SYRINGE (ML) INJECTION AS NEEDED
Status: DISCONTINUED | OUTPATIENT
Start: 2022-05-23 | End: 2022-05-26 | Stop reason: HOSPADM

## 2022-05-23 RX ADMIN — INSULIN GLARGINE-YFGN 10 UNITS: 100 INJECTION, SOLUTION SUBCUTANEOUS at 09:23

## 2022-05-23 RX ADMIN — Medication 10 ML: at 20:49

## 2022-05-23 RX ADMIN — Medication 10 ML: at 09:30

## 2022-05-23 RX ADMIN — PANTOPRAZOLE SODIUM 40 MG: 40 TABLET, DELAYED RELEASE ORAL at 05:45

## 2022-05-23 RX ADMIN — Medication 10 ML: at 20:48

## 2022-05-23 RX ADMIN — INSULIN LISPRO 12 UNITS: 100 INJECTION, SOLUTION INTRAVENOUS; SUBCUTANEOUS at 11:23

## 2022-05-23 RX ADMIN — AMLODIPINE BESYLATE 10 MG: 10 TABLET ORAL at 09:30

## 2022-05-23 RX ADMIN — ATORVASTATIN CALCIUM 40 MG: 20 TABLET, FILM COATED ORAL at 20:48

## 2022-05-23 RX ADMIN — IOPAMIDOL 95 ML: 612 INJECTION, SOLUTION INTRAVENOUS at 13:24

## 2022-05-23 RX ADMIN — INSULIN GLARGINE-YFGN 5 UNITS: 100 INJECTION, SOLUTION SUBCUTANEOUS at 17:45

## 2022-05-23 RX ADMIN — LEVOTHYROXINE SODIUM 75 MCG: 0.07 TABLET ORAL at 05:45

## 2022-05-23 RX ADMIN — LISINOPRIL 30 MG: 20 TABLET ORAL at 09:29

## 2022-05-23 RX ADMIN — INSULIN GLARGINE-YFGN 18 UNITS: 100 INJECTION, SOLUTION SUBCUTANEOUS at 20:47

## 2022-05-23 RX ADMIN — INSULIN LISPRO 8 UNITS: 100 INJECTION, SOLUTION INTRAVENOUS; SUBCUTANEOUS at 17:45

## 2022-05-23 RX ADMIN — INSULIN LISPRO 24 UNITS: 100 INJECTION, SOLUTION INTRAVENOUS; SUBCUTANEOUS at 09:25

## 2022-05-24 ENCOUNTER — APPOINTMENT (OUTPATIENT)
Dept: CARDIOLOGY | Facility: HOSPITAL | Age: 87
End: 2022-05-24

## 2022-05-24 DIAGNOSIS — S09.90XD INJURY OF HEAD, SUBSEQUENT ENCOUNTER: Primary | ICD-10-CM

## 2022-05-24 LAB
AORTIC ARCH: 2.8 CM
ASCENDING AORTA: 2.7 CM
BH CV ECHO LEFT VENTRICLE GLOBAL LONGITUDINAL STRAIN: -17.9 %
BH CV ECHO MEAS - ACS: 1.37 CM
BH CV ECHO MEAS - AO MAX PG: 8.1 MMHG
BH CV ECHO MEAS - AO MEAN PG: 4.4 MMHG
BH CV ECHO MEAS - AO ROOT DIAM: 3.1 CM
BH CV ECHO MEAS - AO V2 MAX: 142.5 CM/SEC
BH CV ECHO MEAS - AO V2 VTI: 31.9 CM
BH CV ECHO MEAS - AVA(I,D): 2.33 CM2
BH CV ECHO MEAS - EDV(CUBED): 70.9 ML
BH CV ECHO MEAS - EDV(MOD-SP2): 128 ML
BH CV ECHO MEAS - EDV(MOD-SP4): 116 ML
BH CV ECHO MEAS - EF(MOD-BP): 58.4 %
BH CV ECHO MEAS - EF(MOD-SP2): 57.8 %
BH CV ECHO MEAS - EF(MOD-SP4): 56 %
BH CV ECHO MEAS - ESV(CUBED): 26.7 ML
BH CV ECHO MEAS - ESV(MOD-SP2): 54 ML
BH CV ECHO MEAS - ESV(MOD-SP4): 51 ML
BH CV ECHO MEAS - FS: 27.8 %
BH CV ECHO MEAS - IVS/LVPW: 1.3 CM
BH CV ECHO MEAS - IVSD: 1.67 CM
BH CV ECHO MEAS - LAT PEAK E' VEL: 5 CM/SEC
BH CV ECHO MEAS - LV DIASTOLIC VOL/BSA (35-75): 61.4 CM2
BH CV ECHO MEAS - LV MASS(C)D: 239.1 GRAMS
BH CV ECHO MEAS - LV MAX PG: 4.5 MMHG
BH CV ECHO MEAS - LV MEAN PG: 2.18 MMHG
BH CV ECHO MEAS - LV SYSTOLIC VOL/BSA (12-30): 27 CM2
BH CV ECHO MEAS - LV V1 MAX: 106.3 CM/SEC
BH CV ECHO MEAS - LV V1 VTI: 24.8 CM
BH CV ECHO MEAS - LVIDD: 4.1 CM
BH CV ECHO MEAS - LVIDS: 3 CM
BH CV ECHO MEAS - LVOT AREA: 3 CM2
BH CV ECHO MEAS - LVOT DIAM: 1.95 CM
BH CV ECHO MEAS - LVPWD: 1.29 CM
BH CV ECHO MEAS - MED PEAK E' VEL: 3.8 CM/SEC
BH CV ECHO MEAS - MR MAX PG: 71.7 MMHG
BH CV ECHO MEAS - MR MAX VEL: 423.5 CM/SEC
BH CV ECHO MEAS - MV A DUR: 0.15 SEC
BH CV ECHO MEAS - MV A MAX VEL: 103.7 CM/SEC
BH CV ECHO MEAS - MV DEC SLOPE: 163.9 CM/SEC2
BH CV ECHO MEAS - MV DEC TIME: 0.3 MSEC
BH CV ECHO MEAS - MV E MAX VEL: 51.8 CM/SEC
BH CV ECHO MEAS - MV E/A: 0.5
BH CV ECHO MEAS - MV MAX PG: 6.1 MMHG
BH CV ECHO MEAS - MV MEAN PG: 1.81 MMHG
BH CV ECHO MEAS - MV P1/2T: 118.9 MSEC
BH CV ECHO MEAS - MV V2 VTI: 26.7 CM
BH CV ECHO MEAS - MVA(P1/2T): 1.85 CM2
BH CV ECHO MEAS - MVA(VTI): 2.8 CM2
BH CV ECHO MEAS - PA ACC TIME: 0.1 SEC
BH CV ECHO MEAS - PA PR(ACCEL): 32.7 MMHG
BH CV ECHO MEAS - PA V2 MAX: 108.6 CM/SEC
BH CV ECHO MEAS - PULM A REVS DUR: 0.17 SEC
BH CV ECHO MEAS - PULM A REVS VEL: 47.1 CM/SEC
BH CV ECHO MEAS - PULM DIAS VEL: 28.7 CM/SEC
BH CV ECHO MEAS - PULM S/D: 1.72
BH CV ECHO MEAS - PULM SYS VEL: 49.3 CM/SEC
BH CV ECHO MEAS - QP/QS: 0.66
BH CV ECHO MEAS - RAP SYSTOLE: 3 MMHG
BH CV ECHO MEAS - RV MAX PG: 1.74 MMHG
BH CV ECHO MEAS - RV V1 MAX: 66 CM/SEC
BH CV ECHO MEAS - RV V1 VTI: 12.9 CM
BH CV ECHO MEAS - RVOT DIAM: 2.2 CM
BH CV ECHO MEAS - RVSP: 21.9 MMHG
BH CV ECHO MEAS - SI(MOD-SP2): 39.2 ML/M2
BH CV ECHO MEAS - SI(MOD-SP4): 34.4 ML/M2
BH CV ECHO MEAS - SUP REN AO DIAM: 2.3 CM
BH CV ECHO MEAS - SV(LVOT): 74.2 ML
BH CV ECHO MEAS - SV(MOD-SP2): 74 ML
BH CV ECHO MEAS - SV(MOD-SP4): 65 ML
BH CV ECHO MEAS - SV(RVOT): 49.2 ML
BH CV ECHO MEAS - TAPSE (>1.6): 2.07 CM
BH CV ECHO MEAS - TR MAX PG: 18.9 MMHG
BH CV ECHO MEAS - TR MAX VEL: 217.2 CM/SEC
BH CV ECHO MEASUREMENTS AVERAGE E/E' RATIO: 11.77
BH CV XLRA - RV BASE: 3.4 CM
BH CV XLRA - RV LENGTH: 6.2 CM
BH CV XLRA - RV MID: 3.2 CM
BH CV XLRA - TDI S': 12.8 CM/SEC
CHOLEST SERPL-MCNC: 125 MG/DL (ref 0–200)
GLUCOSE BLDC GLUCOMTR-MCNC: 223 MG/DL (ref 70–130)
GLUCOSE BLDC GLUCOMTR-MCNC: 230 MG/DL (ref 70–130)
GLUCOSE BLDC GLUCOMTR-MCNC: 282 MG/DL (ref 70–130)
GLUCOSE BLDC GLUCOMTR-MCNC: 321 MG/DL (ref 70–130)
HBA1C MFR BLD: 10 % (ref 4.8–5.6)
HDLC SERPL-MCNC: 37 MG/DL (ref 40–60)
LDLC SERPL CALC-MCNC: 68 MG/DL (ref 0–100)
LDLC/HDLC SERPL: 1.81 {RATIO}
LEFT ATRIUM VOLUME INDEX: 12.3 ML/M2
MAXIMAL PREDICTED HEART RATE: 134 BPM
SINUS: 1.59 CM
STJ: 2.7 CM
STRESS TARGET HR: 114 BPM
TRIGL SERPL-MCNC: 105 MG/DL (ref 0–150)
TSH SERPL DL<=0.05 MIU/L-ACNC: 4.76 UIU/ML (ref 0.27–4.2)
VLDLC SERPL-MCNC: 20 MG/DL (ref 5–40)

## 2022-05-24 PROCEDURE — 93356 MYOCRD STRAIN IMG SPCKL TRCK: CPT | Performed by: INTERNAL MEDICINE

## 2022-05-24 PROCEDURE — 97116 GAIT TRAINING THERAPY: CPT

## 2022-05-24 PROCEDURE — 83036 HEMOGLOBIN GLYCOSYLATED A1C: CPT | Performed by: NURSE PRACTITIONER

## 2022-05-24 PROCEDURE — 25010000002 PERFLUTREN (DEFINITY) 8.476 MG IN SODIUM CHLORIDE (PF) 0.9 % 10 ML INJECTION: Performed by: NURSE PRACTITIONER

## 2022-05-24 PROCEDURE — 93356 MYOCRD STRAIN IMG SPCKL TRCK: CPT

## 2022-05-24 PROCEDURE — 80061 LIPID PANEL: CPT | Performed by: NURSE PRACTITIONER

## 2022-05-24 PROCEDURE — 99233 SBSQ HOSP IP/OBS HIGH 50: CPT | Performed by: NURSE PRACTITIONER

## 2022-05-24 PROCEDURE — 84443 ASSAY THYROID STIM HORMONE: CPT | Performed by: NURSE PRACTITIONER

## 2022-05-24 PROCEDURE — 93306 TTE W/DOPPLER COMPLETE: CPT

## 2022-05-24 PROCEDURE — 63710000001 INSULIN LISPRO (HUMAN) PER 5 UNITS: Performed by: INTERNAL MEDICINE

## 2022-05-24 PROCEDURE — 82962 GLUCOSE BLOOD TEST: CPT

## 2022-05-24 PROCEDURE — 93306 TTE W/DOPPLER COMPLETE: CPT | Performed by: INTERNAL MEDICINE

## 2022-05-24 RX ORDER — NITROGLYCERIN 0.4 MG/1
0.4 TABLET SUBLINGUAL
Status: DISCONTINUED | OUTPATIENT
Start: 2022-05-24 | End: 2022-05-26 | Stop reason: HOSPADM

## 2022-05-24 RX ADMIN — INSULIN LISPRO 8 UNITS: 100 INJECTION, SOLUTION INTRAVENOUS; SUBCUTANEOUS at 18:13

## 2022-05-24 RX ADMIN — LEVOTHYROXINE SODIUM 75 MCG: 0.07 TABLET ORAL at 07:17

## 2022-05-24 RX ADMIN — AMLODIPINE BESYLATE 10 MG: 10 TABLET ORAL at 09:29

## 2022-05-24 RX ADMIN — Medication 10 ML: at 21:10

## 2022-05-24 RX ADMIN — DOCUSATE SODIUM 50MG AND SENNOSIDES 8.6MG 2 TABLET: 8.6; 5 TABLET, FILM COATED ORAL at 09:29

## 2022-05-24 RX ADMIN — PERFLUTREN 3 ML: 6.52 INJECTION, SUSPENSION INTRAVENOUS at 08:26

## 2022-05-24 RX ADMIN — INSULIN GLARGINE-YFGN 5 UNITS: 100 INJECTION, SOLUTION SUBCUTANEOUS at 18:13

## 2022-05-24 RX ADMIN — INSULIN GLARGINE-YFGN 18 UNITS: 100 INJECTION, SOLUTION SUBCUTANEOUS at 09:31

## 2022-05-24 RX ADMIN — INSULIN GLARGINE-YFGN 25 UNITS: 100 INJECTION, SOLUTION SUBCUTANEOUS at 21:13

## 2022-05-24 RX ADMIN — Medication 10 ML: at 09:30

## 2022-05-24 RX ADMIN — Medication 10 ML: at 13:05

## 2022-05-24 RX ADMIN — INSULIN LISPRO 12 UNITS: 100 INJECTION, SOLUTION INTRAVENOUS; SUBCUTANEOUS at 09:29

## 2022-05-24 RX ADMIN — ATORVASTATIN CALCIUM 40 MG: 20 TABLET, FILM COATED ORAL at 21:09

## 2022-05-24 RX ADMIN — INSULIN LISPRO 16 UNITS: 100 INJECTION, SOLUTION INTRAVENOUS; SUBCUTANEOUS at 13:05

## 2022-05-24 RX ADMIN — LISINOPRIL 30 MG: 20 TABLET ORAL at 09:29

## 2022-05-24 RX ADMIN — DOCUSATE SODIUM 50MG AND SENNOSIDES 8.6MG 2 TABLET: 8.6; 5 TABLET, FILM COATED ORAL at 21:09

## 2022-05-24 RX ADMIN — PANTOPRAZOLE SODIUM 40 MG: 40 TABLET, DELAYED RELEASE ORAL at 07:17

## 2022-05-25 LAB
GLUCOSE BLDC GLUCOMTR-MCNC: 194 MG/DL (ref 70–130)
GLUCOSE BLDC GLUCOMTR-MCNC: 237 MG/DL (ref 70–130)
GLUCOSE BLDC GLUCOMTR-MCNC: 259 MG/DL (ref 70–130)
GLUCOSE BLDC GLUCOMTR-MCNC: 303 MG/DL (ref 70–130)

## 2022-05-25 PROCEDURE — 97535 SELF CARE MNGMENT TRAINING: CPT

## 2022-05-25 PROCEDURE — 63710000001 INSULIN LISPRO (HUMAN) PER 5 UNITS: Performed by: INTERNAL MEDICINE

## 2022-05-25 PROCEDURE — 97110 THERAPEUTIC EXERCISES: CPT

## 2022-05-25 PROCEDURE — 97530 THERAPEUTIC ACTIVITIES: CPT

## 2022-05-25 PROCEDURE — 82962 GLUCOSE BLOOD TEST: CPT

## 2022-05-25 RX ORDER — LISINOPRIL 20 MG/1
20 TABLET ORAL 2 TIMES DAILY
Status: DISCONTINUED | OUTPATIENT
Start: 2022-05-25 | End: 2022-05-26 | Stop reason: HOSPADM

## 2022-05-25 RX ADMIN — INSULIN GLARGINE-YFGN 25 UNITS: 100 INJECTION, SOLUTION SUBCUTANEOUS at 08:21

## 2022-05-25 RX ADMIN — INSULIN GLARGINE-YFGN 30 UNITS: 100 INJECTION, SOLUTION SUBCUTANEOUS at 21:33

## 2022-05-25 RX ADMIN — DOCUSATE SODIUM 50MG AND SENNOSIDES 8.6MG 2 TABLET: 8.6; 5 TABLET, FILM COATED ORAL at 08:21

## 2022-05-25 RX ADMIN — ATORVASTATIN CALCIUM 40 MG: 20 TABLET, FILM COATED ORAL at 21:33

## 2022-05-25 RX ADMIN — LEVOTHYROXINE SODIUM 75 MCG: 0.07 TABLET ORAL at 06:01

## 2022-05-25 RX ADMIN — LISINOPRIL 30 MG: 20 TABLET ORAL at 08:21

## 2022-05-25 RX ADMIN — INSULIN LISPRO 16 UNITS: 100 INJECTION, SOLUTION INTRAVENOUS; SUBCUTANEOUS at 11:12

## 2022-05-25 RX ADMIN — DOCUSATE SODIUM 50MG AND SENNOSIDES 8.6MG 2 TABLET: 8.6; 5 TABLET, FILM COATED ORAL at 21:33

## 2022-05-25 RX ADMIN — Medication 10 ML: at 21:33

## 2022-05-25 RX ADMIN — LISINOPRIL 20 MG: 20 TABLET ORAL at 21:39

## 2022-05-25 RX ADMIN — PANTOPRAZOLE SODIUM 40 MG: 40 TABLET, DELAYED RELEASE ORAL at 06:01

## 2022-05-25 RX ADMIN — AMLODIPINE BESYLATE 10 MG: 10 TABLET ORAL at 08:21

## 2022-05-25 RX ADMIN — INSULIN LISPRO 8 UNITS: 100 INJECTION, SOLUTION INTRAVENOUS; SUBCUTANEOUS at 08:21

## 2022-05-25 RX ADMIN — INSULIN LISPRO 4 UNITS: 100 INJECTION, SOLUTION INTRAVENOUS; SUBCUTANEOUS at 17:24

## 2022-05-26 VITALS
WEIGHT: 162 LBS | OXYGEN SATURATION: 98 % | SYSTOLIC BLOOD PRESSURE: 124 MMHG | HEART RATE: 65 BPM | TEMPERATURE: 97.5 F | BODY MASS INDEX: 23.99 KG/M2 | HEIGHT: 69 IN | RESPIRATION RATE: 18 BRPM | DIASTOLIC BLOOD PRESSURE: 72 MMHG

## 2022-05-26 PROBLEM — I63.9 EMBOLIC STROKE (HCC): Status: ACTIVE | Noted: 2022-05-26

## 2022-05-26 LAB
GLUCOSE BLDC GLUCOMTR-MCNC: 166 MG/DL (ref 70–130)
GLUCOSE BLDC GLUCOMTR-MCNC: 211 MG/DL (ref 70–130)
GLUCOSE BLDC GLUCOMTR-MCNC: 237 MG/DL (ref 70–130)

## 2022-05-26 PROCEDURE — 82962 GLUCOSE BLOOD TEST: CPT

## 2022-05-26 PROCEDURE — 63710000001 INSULIN LISPRO (HUMAN) PER 5 UNITS: Performed by: INTERNAL MEDICINE

## 2022-05-26 PROCEDURE — 92526 ORAL FUNCTION THERAPY: CPT

## 2022-05-26 RX ORDER — INSULIN LISPRO 100 [IU]/ML
0-24 INJECTION, SOLUTION INTRAVENOUS; SUBCUTANEOUS
Qty: 21.6 ML | Refills: 0 | Status: SHIPPED | OUTPATIENT
Start: 2022-05-26 | End: 2022-06-09 | Stop reason: HOSPADM

## 2022-05-26 RX ORDER — LISINOPRIL 20 MG/1
40 TABLET ORAL DAILY
Qty: 60 TABLET | Refills: 0 | Status: SHIPPED | OUTPATIENT
Start: 2022-05-26 | End: 2022-06-09 | Stop reason: HOSPADM

## 2022-05-26 RX ORDER — AMLODIPINE BESYLATE 10 MG/1
10 TABLET ORAL
Qty: 30 TABLET | Refills: 0 | Status: SHIPPED | OUTPATIENT
Start: 2022-05-27 | End: 2022-06-26

## 2022-05-26 RX ORDER — ACETAMINOPHEN 325 MG/1
650 TABLET ORAL EVERY 6 HOURS PRN
Status: DISCONTINUED | OUTPATIENT
Start: 2022-05-26 | End: 2022-05-26 | Stop reason: HOSPADM

## 2022-05-26 RX ORDER — ASPIRIN 81 MG/1
81 TABLET ORAL DAILY
Qty: 30 TABLET | Refills: 0 | Status: SHIPPED | OUTPATIENT
Start: 2022-06-01 | End: 2022-07-01

## 2022-05-26 RX ADMIN — LISINOPRIL 20 MG: 20 TABLET ORAL at 08:44

## 2022-05-26 RX ADMIN — LEVOTHYROXINE SODIUM 75 MCG: 0.07 TABLET ORAL at 06:13

## 2022-05-26 RX ADMIN — ACETAMINOPHEN 650 MG: 325 TABLET ORAL at 00:31

## 2022-05-26 RX ADMIN — Medication 10 ML: at 08:47

## 2022-05-26 RX ADMIN — INSULIN LISPRO 8 UNITS: 100 INJECTION, SOLUTION INTRAVENOUS; SUBCUTANEOUS at 08:44

## 2022-05-26 RX ADMIN — INSULIN LISPRO 4 UNITS: 100 INJECTION, SOLUTION INTRAVENOUS; SUBCUTANEOUS at 12:29

## 2022-05-26 RX ADMIN — ACETAMINOPHEN 650 MG: 325 TABLET ORAL at 08:55

## 2022-05-26 RX ADMIN — INSULIN GLARGINE-YFGN 30 UNITS: 100 INJECTION, SOLUTION SUBCUTANEOUS at 08:47

## 2022-05-26 RX ADMIN — PANTOPRAZOLE SODIUM 40 MG: 40 TABLET, DELAYED RELEASE ORAL at 06:13

## 2022-05-26 RX ADMIN — LABETALOL HYDROCHLORIDE 20 MG: 5 INJECTION, SOLUTION INTRAVENOUS at 00:31

## 2022-05-26 RX ADMIN — DOCUSATE SODIUM 50MG AND SENNOSIDES 8.6MG 2 TABLET: 8.6; 5 TABLET, FILM COATED ORAL at 08:44

## 2022-05-26 RX ADMIN — AMLODIPINE BESYLATE 10 MG: 10 TABLET ORAL at 08:44

## 2022-05-27 ENCOUNTER — TELEPHONE (OUTPATIENT)
Dept: NEUROSURGERY | Facility: CLINIC | Age: 87
End: 2022-05-27

## 2022-06-06 ENCOUNTER — APPOINTMENT (OUTPATIENT)
Dept: CT IMAGING | Facility: HOSPITAL | Age: 87
End: 2022-06-06

## 2022-06-06 ENCOUNTER — APPOINTMENT (OUTPATIENT)
Dept: GENERAL RADIOLOGY | Facility: HOSPITAL | Age: 87
End: 2022-06-06

## 2022-06-06 ENCOUNTER — HOSPITAL ENCOUNTER (INPATIENT)
Facility: HOSPITAL | Age: 87
LOS: 3 days | Discharge: NURSING, REHAB OR LONG-TERM CARE | End: 2022-06-09
Attending: EMERGENCY MEDICINE | Admitting: HOSPITALIST

## 2022-06-06 DIAGNOSIS — Z86.73 HISTORY OF RECENT STROKE: ICD-10-CM

## 2022-06-06 DIAGNOSIS — R53.1 ACUTE WEAKNESS: Primary | ICD-10-CM

## 2022-06-06 DIAGNOSIS — Z86.79 HISTORY OF INTRACRANIAL HEMORRHAGE: ICD-10-CM

## 2022-06-06 DIAGNOSIS — R41.0 CONFUSION: ICD-10-CM

## 2022-06-06 LAB
ALBUMIN SERPL-MCNC: 4.6 G/DL (ref 3.5–5.2)
ALBUMIN/GLOB SERPL: 1.8 G/DL
ALP SERPL-CCNC: 115 U/L (ref 39–117)
ALT SERPL W P-5'-P-CCNC: 40 U/L (ref 1–41)
AMMONIA BLD-SCNC: 19 UMOL/L (ref 16–60)
ANION GAP SERPL CALCULATED.3IONS-SCNC: 11.4 MMOL/L (ref 5–15)
AST SERPL-CCNC: 20 U/L (ref 1–40)
BACTERIA UR QL AUTO: NORMAL /HPF
BASOPHILS # BLD AUTO: 0.06 10*3/MM3 (ref 0–0.2)
BASOPHILS NFR BLD AUTO: 0.8 % (ref 0–1.5)
BILIRUB SERPL-MCNC: 0.2 MG/DL (ref 0–1.2)
BILIRUB UR QL STRIP: NEGATIVE
BUN SERPL-MCNC: 26 MG/DL (ref 8–23)
BUN/CREAT SERPL: 14.3 (ref 7–25)
CALCIUM SPEC-SCNC: 9.4 MG/DL (ref 8.6–10.5)
CHLORIDE SERPL-SCNC: 105 MMOL/L (ref 98–107)
CLARITY UR: CLEAR
CO2 SERPL-SCNC: 25.6 MMOL/L (ref 22–29)
COLOR UR: YELLOW
CREAT SERPL-MCNC: 1.82 MG/DL (ref 0.76–1.27)
DEPRECATED RDW RBC AUTO: 38.3 FL (ref 37–54)
EGFRCR SERPLBLD CKD-EPI 2021: 35.7 ML/MIN/1.73
EOSINOPHIL # BLD AUTO: 0.19 10*3/MM3 (ref 0–0.4)
EOSINOPHIL NFR BLD AUTO: 2.5 % (ref 0.3–6.2)
ERYTHROCYTE [DISTWIDTH] IN BLOOD BY AUTOMATED COUNT: 12.2 % (ref 12.3–15.4)
GLOBULIN UR ELPH-MCNC: 2.5 GM/DL
GLUCOSE BLDC GLUCOMTR-MCNC: 115 MG/DL (ref 70–130)
GLUCOSE BLDC GLUCOMTR-MCNC: 302 MG/DL (ref 70–130)
GLUCOSE SERPL-MCNC: 89 MG/DL (ref 65–99)
GLUCOSE UR STRIP-MCNC: ABNORMAL MG/DL
HCT VFR BLD AUTO: 39.8 % (ref 37.5–51)
HGB BLD-MCNC: 13.5 G/DL (ref 13–17.7)
HGB UR QL STRIP.AUTO: NEGATIVE
HYALINE CASTS UR QL AUTO: NORMAL /LPF
IMM GRANULOCYTES # BLD AUTO: 0.07 10*3/MM3 (ref 0–0.05)
IMM GRANULOCYTES NFR BLD AUTO: 0.9 % (ref 0–0.5)
INR PPP: 1.01 (ref 0.9–1.1)
KETONES UR QL STRIP: NEGATIVE
LEUKOCYTE ESTERASE UR QL STRIP.AUTO: NEGATIVE
LYMPHOCYTES # BLD AUTO: 1.4 10*3/MM3 (ref 0.7–3.1)
LYMPHOCYTES NFR BLD AUTO: 18.6 % (ref 19.6–45.3)
MAGNESIUM SERPL-MCNC: 1.9 MG/DL (ref 1.6–2.4)
MCH RBC QN AUTO: 29.3 PG (ref 26.6–33)
MCHC RBC AUTO-ENTMCNC: 33.9 G/DL (ref 31.5–35.7)
MCV RBC AUTO: 86.5 FL (ref 79–97)
MONOCYTES # BLD AUTO: 0.65 10*3/MM3 (ref 0.1–0.9)
MONOCYTES NFR BLD AUTO: 8.7 % (ref 5–12)
NEUTROPHILS NFR BLD AUTO: 5.14 10*3/MM3 (ref 1.7–7)
NEUTROPHILS NFR BLD AUTO: 68.5 % (ref 42.7–76)
NITRITE UR QL STRIP: NEGATIVE
NRBC BLD AUTO-RTO: 0 /100 WBC (ref 0–0.2)
NT-PROBNP SERPL-MCNC: 1357 PG/ML (ref 0–1800)
PH UR STRIP.AUTO: 5.5 [PH] (ref 5–8)
PLATELET # BLD AUTO: 246 10*3/MM3 (ref 140–450)
PMV BLD AUTO: 10 FL (ref 6–12)
POTASSIUM SERPL-SCNC: 4.1 MMOL/L (ref 3.5–5.2)
PROT SERPL-MCNC: 7.1 G/DL (ref 6–8.5)
PROT UR QL STRIP: ABNORMAL
PROTHROMBIN TIME: 13.2 SECONDS (ref 11.7–14.2)
QT INTERVAL: 426 MS
RBC # BLD AUTO: 4.6 10*6/MM3 (ref 4.14–5.8)
RBC # UR STRIP: NORMAL /HPF
REF LAB TEST METHOD: NORMAL
SARS-COV-2 RNA PNL SPEC NAA+PROBE: NOT DETECTED
SODIUM SERPL-SCNC: 142 MMOL/L (ref 136–145)
SP GR UR STRIP: 1.02 (ref 1–1.03)
SQUAMOUS #/AREA URNS HPF: NORMAL /HPF
TROPONIN T SERPL-MCNC: 0.01 NG/ML (ref 0–0.03)
TSH SERPL DL<=0.05 MIU/L-ACNC: 2.74 UIU/ML (ref 0.27–4.2)
UROBILINOGEN UR QL STRIP: ABNORMAL
WBC # UR STRIP: NORMAL /HPF
WBC NRBC COR # BLD: 7.51 10*3/MM3 (ref 3.4–10.8)

## 2022-06-06 PROCEDURE — 93010 ELECTROCARDIOGRAM REPORT: CPT | Performed by: INTERNAL MEDICINE

## 2022-06-06 PROCEDURE — 85610 PROTHROMBIN TIME: CPT | Performed by: EMERGENCY MEDICINE

## 2022-06-06 PROCEDURE — 82962 GLUCOSE BLOOD TEST: CPT

## 2022-06-06 PROCEDURE — 87635 SARS-COV-2 COVID-19 AMP PRB: CPT | Performed by: EMERGENCY MEDICINE

## 2022-06-06 PROCEDURE — 82140 ASSAY OF AMMONIA: CPT | Performed by: EMERGENCY MEDICINE

## 2022-06-06 PROCEDURE — 99284 EMERGENCY DEPT VISIT MOD MDM: CPT

## 2022-06-06 PROCEDURE — 70450 CT HEAD/BRAIN W/O DYE: CPT

## 2022-06-06 PROCEDURE — 63710000001 INSULIN LISPRO (HUMAN) PER 5 UNITS: Performed by: HOSPITALIST

## 2022-06-06 PROCEDURE — 83735 ASSAY OF MAGNESIUM: CPT | Performed by: EMERGENCY MEDICINE

## 2022-06-06 PROCEDURE — 85025 COMPLETE CBC W/AUTO DIFF WBC: CPT | Performed by: EMERGENCY MEDICINE

## 2022-06-06 PROCEDURE — 84443 ASSAY THYROID STIM HORMONE: CPT | Performed by: EMERGENCY MEDICINE

## 2022-06-06 PROCEDURE — 81001 URINALYSIS AUTO W/SCOPE: CPT | Performed by: EMERGENCY MEDICINE

## 2022-06-06 PROCEDURE — 83880 ASSAY OF NATRIURETIC PEPTIDE: CPT | Performed by: HOSPITALIST

## 2022-06-06 PROCEDURE — 72131 CT LUMBAR SPINE W/O DYE: CPT

## 2022-06-06 PROCEDURE — 84484 ASSAY OF TROPONIN QUANT: CPT | Performed by: EMERGENCY MEDICINE

## 2022-06-06 PROCEDURE — 93005 ELECTROCARDIOGRAM TRACING: CPT | Performed by: EMERGENCY MEDICINE

## 2022-06-06 PROCEDURE — 80053 COMPREHEN METABOLIC PANEL: CPT | Performed by: EMERGENCY MEDICINE

## 2022-06-06 PROCEDURE — 71045 X-RAY EXAM CHEST 1 VIEW: CPT

## 2022-06-06 RX ORDER — INSULIN LISPRO 100 [IU]/ML
5 INJECTION, SOLUTION INTRAVENOUS; SUBCUTANEOUS
Status: DISCONTINUED | OUTPATIENT
Start: 2022-06-06 | End: 2022-06-07

## 2022-06-06 RX ORDER — AMLODIPINE BESYLATE 5 MG/1
10 TABLET ORAL
Status: DISCONTINUED | OUTPATIENT
Start: 2022-06-06 | End: 2022-06-09 | Stop reason: HOSPADM

## 2022-06-06 RX ORDER — LISINOPRIL 20 MG/1
20 TABLET ORAL EVERY 12 HOURS SCHEDULED
Status: DISCONTINUED | OUTPATIENT
Start: 2022-06-07 | End: 2022-06-09 | Stop reason: HOSPADM

## 2022-06-06 RX ORDER — INSULIN LISPRO 100 [IU]/ML
0-7 INJECTION, SOLUTION INTRAVENOUS; SUBCUTANEOUS
Status: DISCONTINUED | OUTPATIENT
Start: 2022-06-06 | End: 2022-06-09 | Stop reason: HOSPADM

## 2022-06-06 RX ORDER — LEVOTHYROXINE SODIUM 0.07 MG/1
75 TABLET ORAL
Status: DISCONTINUED | OUTPATIENT
Start: 2022-06-07 | End: 2022-06-09 | Stop reason: HOSPADM

## 2022-06-06 RX ORDER — INSULIN GLARGINE 100 [IU]/ML
34 INJECTION, SOLUTION SUBCUTANEOUS DAILY
Status: DISCONTINUED | OUTPATIENT
Start: 2022-06-06 | End: 2022-06-06

## 2022-06-06 RX ORDER — ASPIRIN 81 MG/1
81 TABLET ORAL DAILY
Status: DISCONTINUED | OUTPATIENT
Start: 2022-06-06 | End: 2022-06-06

## 2022-06-06 RX ORDER — CLOPIDOGREL BISULFATE 75 MG/1
75 TABLET ORAL DAILY
COMMUNITY
End: 2022-06-09 | Stop reason: HOSPADM

## 2022-06-06 RX ORDER — SODIUM CHLORIDE 450 MG/100ML
75 INJECTION, SOLUTION INTRAVENOUS CONTINUOUS
Status: DISCONTINUED | OUTPATIENT
Start: 2022-06-06 | End: 2022-06-08

## 2022-06-06 RX ORDER — INSULIN LISPRO 100 [IU]/ML
0-7 INJECTION, SOLUTION INTRAVENOUS; SUBCUTANEOUS
Status: DISCONTINUED | OUTPATIENT
Start: 2022-06-06 | End: 2022-06-06

## 2022-06-06 RX ORDER — ASPIRIN 81 MG/1
81 TABLET, CHEWABLE ORAL DAILY
Status: DISCONTINUED | OUTPATIENT
Start: 2022-06-06 | End: 2022-06-09 | Stop reason: HOSPADM

## 2022-06-06 RX ORDER — LISINOPRIL 40 MG/1
40 TABLET ORAL DAILY
Status: DISCONTINUED | OUTPATIENT
Start: 2022-06-06 | End: 2022-06-06

## 2022-06-06 RX ORDER — PANTOPRAZOLE SODIUM 40 MG/1
40 TABLET, DELAYED RELEASE ORAL EVERY MORNING
Status: DISCONTINUED | OUTPATIENT
Start: 2022-06-07 | End: 2022-06-09 | Stop reason: HOSPADM

## 2022-06-06 RX ORDER — ASPIRIN 325 MG
325 TABLET, DELAYED RELEASE (ENTERIC COATED) ORAL DAILY
Status: DISCONTINUED | OUTPATIENT
Start: 2022-06-06 | End: 2022-06-06

## 2022-06-06 RX ORDER — ATORVASTATIN CALCIUM 80 MG/1
80 TABLET, FILM COATED ORAL DAILY
Status: DISCONTINUED | OUTPATIENT
Start: 2022-06-07 | End: 2022-06-07

## 2022-06-06 RX ORDER — ATORVASTATIN CALCIUM 20 MG/1
40 TABLET, FILM COATED ORAL DAILY
Status: DISCONTINUED | OUTPATIENT
Start: 2022-06-06 | End: 2022-06-06

## 2022-06-06 RX ADMIN — INSULIN GLARGINE-YFGN 20 UNITS: 100 INJECTION, SOLUTION SUBCUTANEOUS at 21:41

## 2022-06-06 RX ADMIN — ASPIRIN 81 MG: 81 TABLET, CHEWABLE ORAL at 18:22

## 2022-06-06 RX ADMIN — INSULIN LISPRO 5 UNITS: 100 INJECTION, SOLUTION INTRAVENOUS; SUBCUTANEOUS at 21:37

## 2022-06-06 RX ADMIN — SODIUM CHLORIDE 75 ML/HR: 4.5 INJECTION, SOLUTION INTRAVENOUS at 18:22

## 2022-06-06 RX ADMIN — AMLODIPINE BESYLATE 10 MG: 5 TABLET ORAL at 21:37

## 2022-06-06 NOTE — ED NOTES
Pt presents to ED via EMS from [facility]. Staff reports he is unable to walk today. Staff reports he has declined since being there. Upon EMS arrival pt was able to ambulate to the stretcher. Pt complained of hip pain, but wouldn't say which hip. Pt did not take any morning medications. Pt is A&OX3 but hard of hearing.    Patient was placed in face mask during first look triage.  Patient was wearing a face mask throughout encounter.  I wore personal protective equipment throughout the encounter.  Hand hygiene was performed before and after patient encounter.

## 2022-06-06 NOTE — DISCHARGE PLACEMENT REQUEST
"Reece Stephen (86 y.o. Male)             Date of Birth   1935    Social Security Number       [place]   [address] [place] [id number]    Home Phone   [phone]    MRN   [id number]       [facility]    Marital Status   Single                            Admission Date   6/6/22    Admission Type   Emergency    Admitting Provider   Jose Llanos MD    Attending Provider   Jose Llanos MD    Department, Room/Bed   [facility] Emergency Department, 38/38       Discharge Date       Discharge Disposition       Discharge Destination                               Attending Provider: Jose Llanos MD    Allergies: No Known Allergies    Isolation: None   Infection: COVID Screen (preop/placement) (06/06/22)   Code Status: Prior   Advance Care Planning Activity    Ht: 175.3 cm (69\")   Wt: 73.5 kg (162 lb)    Admission Cmt: None   Principal Problem: None                Active Insurance as of 6/6/2022     Primary Coverage     Payor Plan Insurance Group Employer/Plan Group    MEDICARE MEDICARE A & B      Payor Plan Address Payor Plan Phone Number Payor Plan Fax Number Effective Dates    [address] [phone]  8/1/2000 - None Entered    [facility] [id number]       Subscriber Name Subscriber Birth Date Member ID       REECE STEPHEN 1935 [id number]           Secondary Coverage     Payor Plan Insurance Group Employer/Plan Group    [facility] SUPP [id number]     Payor Plan Address Payor Plan Phone Number Payor Plan Fax Number Effective Dates    [address]   12/1/2016 - None Entered    [facility] [id number]       Subscriber Name Subscriber Birth Date Member ID       REECE STEPHEN 1935 [id number]                 Emergency Contacts      (Rel.) Home Phone Work Phone Mobile Phone    HaileeGeetha ([employer]) (Relative) -- -- [phone]    [employer] (Relative) -- -- [phone]    CLIVE STEPHEN (Sister) " [phone] -- --

## 2022-06-06 NOTE — NURSING NOTE
Attempted to call report 16:30.  No medicine list availiable at the time of transport. Family at bedside suggested that EMS had one from the facility prior to arrival. Family to contact [facility] for further information. Swallopw study passed at request of 5p RN.      Nursing report ED to floor  Reece Stephen  86 y.o.  male    HPI :   Chief Complaint   Patient presents with   • unable to walk       Admitting doctor:   Jose Llanos MD    Admitting diagnosis:   The primary encounter diagnosis was Acute weakness. Diagnoses of Confusion, History of recent stroke, and History of intracranial hemorrhage were also pertinent to this visit.    Code status:   Current Code Status     Date Active Code Status Order ID Comments User Context       Prior    Advance Care Planning Activity          Allergies:   Patient has no known allergies.    Intake and Output  No intake or output data in the 24 hours ending 06/06/22 1630    Weight:   There were no vitals filed for this visit.    Most recent vitals:   Vitals:    06/06/22 1205 06/06/22 1206   BP: 169/100    BP Location: Right arm    Patient Position: Sitting    Pulse: 75    Resp: 18    Temp:  97.8 °F (36.6 °C)   TempSrc:  Tympanic   SpO2: 96%        Active LDAs/IV Access:   Lines, Drains & Airways     Active LDAs     Name Placement date Placement time Site Days    Peripheral IV 06/06/22 1430 Right;Upper Arm 06/06/22  1430  Arm  less than 1    Colostomy LLQ 01/01/14  0000  LLQ  3078                Labs (abnormal labs have a star):   Labs Reviewed   COMPREHENSIVE METABOLIC PANEL - Abnormal; Notable for the following components:       Result Value    BUN 26 (*)     Creatinine 1.82 (*)     eGFR 35.7 (*)     All other components within normal limits    Narrative:     GFR Normal >60  Chronic Kidney Disease <60  Kidney Failure <15     URINALYSIS W/ MICROSCOPIC IF INDICATED (NO CULTURE) - Abnormal; Notable for the following components:    Glucose,  mg/dL (2+) (*)     Protein,   mg/dL (2+) (*)     All other components within normal limits   CBC WITH AUTO DIFFERENTIAL - Abnormal; Notable for the following components:    RDW 12.2 (*)     Lymphocyte % 18.6 (*)     Immature Grans % 0.9 (*)     Immature Grans, Absolute 0.07 (*)     All other components within normal limits   PROTIME-INR - Normal   TROPONIN (IN-HOUSE) - Normal    Narrative:     Troponin T Reference Range:  <= 0.03 ng/mL-   Negative for AMI  >0.03 ng/mL-     Abnormal for myocardial necrosis.  Clinicians would have to utilize clinical acumen, EKG, Troponin and serial changes to determine if it is an Acute Myocardial Infarction or myocardial injury due to an underlying chronic condition.       Results may be falsely decreased if patient taking Biotin.     AMMONIA - Normal   MAGNESIUM - Normal   TSH - Normal   COVID PRE-OP / PRE-PROCEDURE SCREENING ORDER (NO ISOLATION)    Narrative:     The following orders were created for panel order COVID PRE-OP / PRE-PROCEDURE SCREENING ORDER (NO ISOLATION) - Swab, Nasopharynx.  Procedure                               Abnormality         Status                     ---------                               -----------         ------                     COVID-19, [facility] IN-HOUSE...[[id number]]                                                   Please view results for these tests on the individual orders.   COVID-19, [facility] IN-HOUSE CEPHEID/BERTRAM, NP SWAB IN TRANSPORT MEDIA 8-12 HR TAT   URINALYSIS, MICROSCOPIC ONLY   CBC AND DIFFERENTIAL    Narrative:     The following orders were created for panel order CBC & Differential.  Procedure                               Abnormality         Status                     ---------                               -----------         ------                     CBC Auto Differential[[id number]]        Abnormal            Final result                 Please view results for these tests on the individual orders.       EKG:   ECG 12 Lead   Preliminary Result   HEART RATE= 67   bpm   RR Interval= 900  ms   AL Interval= 190  ms   P Horizontal Axis= 11  deg   P Front Axis= 36  deg   QRSD Interval= 121  ms   QT Interval= 426  ms   QRS Axis= -43  deg   T Wave Axis= -12  deg   - ABNORMAL ECG -   Sinus arrhythmia   Left bundle branch block   Electronically Signed By:    Date and Time of Study: 2022-06-06 13:46:31          Meds given in ED:   Medications - No data to display    Imaging results:  CT Head Without Contrast    Result Date: 6/6/2022  There is near complete resolution of the previously identified right lateral cerebral hemispheric subdural hygroma. The subdural hygroma lateral to left cerebral hemisphere demonstrates no significant interval change in size when compared to the prior study dated 05/23/2022. The previously identified small amount of subarachnoid hemorrhage within a sulcus of the medial portion of the left frontal lobe has resolved. There is also interval resolution of the previously identified intraparenchymal hemorrhage within the right occipital lobe and the small amount of intraventricular hemorrhage identified within the dependent aspects of the lateral ventricles.  There are severe and confluent changes of chronic small vessel ischemic phenomena. Old lacunar disease is again noted.  TECHNIQUE: CT scan of the lumbar spine was obtained with 0.5 mm axial bone algorithm images with sagittal and coronal reconstructions. Additionally, a CT scan of the lumbar spine was obtained with 3 mm axial images along with sagittal reconstructed images.  FINDINGS: There is no evidence for acute fracture or bony malalignment involving the lumbosacral spine.  At T10-11, there is a small central disc protrusion mildly indenting the ventral subarachnoid space. At T11-12, T12-L1, L1-2, L2-3, and L3-4, there is no significant degree of canal or foraminal stenosis.  At L4-5, there is a mild degree of left foraminal and canal narrowing secondary to bulging disc material. Vacuum disc phenomena  are appreciated at the L4-5 level. Mild facet arthropathy is seen.  At L5-S1, there is degenerative disc change with vacuum disc phenomena. There is a disc osteophyte complex at L5-S1 that mildly indents the ventral epidural fat and abuts both descending S1 nerve root sleeves. There is a mild-to-moderate degree of bilateral foraminal narrowing secondary to a disc osteophyte complex.  IMPRESSION: At L5-S1, there are degenerative disc changes with vacuum disc phenomena. A disc osteophyte complex abuts the ventral aspects of both descending S1 nerve root sleeves. Additionally, a disc osteophyte complex results in a mild to moderate degree of bilateral foraminal narrowing. The remaining degenerative phenomena within the lumbar spine are as discussed in detail above.  There is no evidence for acute fracture or bony malalignment involving the lumbar spine.  These findings were discussed with Dr. Johnson/Dr. Macias on 06/06/2022 at approximately 4:12 PM.  Radiation dose reduction techniques were utilized, including automated exposure control and exposure modulation based on body size.        CT Lumbar Spine Without Contrast    Result Date: 6/6/2022  There is near complete resolution of the previously identified right lateral cerebral hemispheric subdural hygroma. The subdural hygroma lateral to left cerebral hemisphere demonstrates no significant interval change in size when compared to the prior study dated 05/23/2022. The previously identified small amount of subarachnoid hemorrhage within a sulcus of the medial portion of the left frontal lobe has resolved. There is also interval resolution of the previously identified intraparenchymal hemorrhage within the right occipital lobe and the small amount of intraventricular hemorrhage identified within the dependent aspects of the lateral ventricles.  There are severe and confluent changes of chronic small vessel ischemic phenomena. Old lacunar disease is again noted.   TECHNIQUE: CT scan of the lumbar spine was obtained with 0.5 mm axial bone algorithm images with sagittal and coronal reconstructions. Additionally, a CT scan of the lumbar spine was obtained with 3 mm axial images along with sagittal reconstructed images.  FINDINGS: There is no evidence for acute fracture or bony malalignment involving the lumbosacral spine.  At T10-11, there is a small central disc protrusion mildly indenting the ventral subarachnoid space. At T11-12, T12-L1, L1-2, L2-3, and L3-4, there is no significant degree of canal or foraminal stenosis.  At L4-5, there is a mild degree of left foraminal and canal narrowing secondary to bulging disc material. Vacuum disc phenomena are appreciated at the L4-5 level. Mild facet arthropathy is seen.  At L5-S1, there is degenerative disc change with vacuum disc phenomena. There is a disc osteophyte complex at L5-S1 that mildly indents the ventral epidural fat and abuts both descending S1 nerve root sleeves. There is a mild-to-moderate degree of bilateral foraminal narrowing secondary to a disc osteophyte complex.  IMPRESSION: At L5-S1, there are degenerative disc changes with vacuum disc phenomena. A disc osteophyte complex abuts the ventral aspects of both descending S1 nerve root sleeves. Additionally, a disc osteophyte complex results in a mild to moderate degree of bilateral foraminal narrowing. The remaining degenerative phenomena within the lumbar spine are as discussed in detail above.  There is no evidence for acute fracture or bony malalignment involving the lumbar spine.  These findings were discussed with Dr. Johnson/Dr. Macias on 06/06/2022 at approximately 4:12 PM.  Radiation dose reduction techniques were utilized, including automated exposure control and exposure modulation based on body size.        XR Chest 1 View    Result Date: 6/6/2022  No change when compared to prior portable chest x-ray from [facility] on 05/18/2022 with no  definite active disease seen in the chest. The lung volumes are low with horizontal linear opacities in the lung bases felt to be bibasilar atelectasis or scarring unchanged.          Ambulatory status:   br    Social issues:   Social History     Socioeconomic History   • Marital status: Single   • Number of children: 0   • Years of education: High school   Tobacco Use   • Smoking status: Former Smoker     Packs/day: 2.00     Years: 3.00     Pack years: 6.00     Types: Cigarettes     Quit date:      Years since quittin.4   • Smokeless tobacco: Never Used   Substance and Sexual Activity   • Alcohol use: No   • Drug use: No   • Sexual activity: Defer       NIH Stroke Scale:        Nursing report ED to floor:br

## 2022-06-06 NOTE — H&P
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  Patient has no headache dizziness chest pain shortness of breath abdominal pain nausea vomiting diarrhea.  Patient has trouble with his speech this morning but not at the time of interview.  Patient evaluated in ER admitted for management.    PAST MEDICAL HISTORY  • Arthritis     • Chronic kidney disease     • Clostridium difficile infection 10/2014   • Diabetes mellitus (HCC)     • Disease of thyroid gland     • Duodenal ulcer 08/2014   • DVT (deep venous thrombosis) (HCC) 01/2015   • Electrocution 1971   • GERD (gastroesophageal reflux disease)     • Hearing loss     • Hemorrhagic shock (HCC) 2015   • Histiocytoma     • History of blood transfusion 2015   • History of chemotherapy     • History of radiation therapy 2014   • Hypercholesterolemia     • Hypertension     • Hypoglycemia 01/04/2018   • Mixed hyperlipidemia     • Multiple lung nodules     • Neuropathy     • OA (osteoarthritis)     • ANNE MARIE (obstructive sleep apnea)     • Peptic ulceration 2014   • Pneumonia 03/29/2013   • Rectal cancer (HCC) 06/2014   • Stroke (HCC) 11/28/2017   • Stroke (HCC) 11/17/2019   • Vitamin D deficiency        PAST SURGICAL HISTORY              Procedure Laterality Date   • AMPUTATION FOOT Left 1971     PARTIAL DUE TO AN ELECTRICAL SHOCK INJURY   • ARM TENDON REPAIR Right       DONE BY DR. OLEARY AND KLEINERT   • COLON RESECTION N/A  2014     LOW ANTERIOR RESECTION WITH CREATION OF COLOSTOMY, DR. TRACE HERRING AT [facility]   • COLONOSCOPY W/ BIOPSIES N/A 2014     ULCERATED 5CM MASS IN RECTUM, PATH: MODERATELY DIFFERENTIATED ADENOCARCINOMA, MULTIPLE DIVERTICULA IN SIGMOID, DR. LAUREN JAMES AT [facility]   • COLONOSCOPY W/ BIOPSIES N/A 2014     RECTAL MASS: INVASIVE MODERATLEY DIFFERIENTIATED ADENOCARCINOMA, AREA TATTOOED, DR. TRACE HERRING AT [facility]   • ENDOSCOPY   2015   • ENDOSCOPY N/A 2015     RESOLVED DUODENAL ULCER, EGD WNL, DR. FREDDIE MALCOLM AT [facility]   • FLEXIBLE SIGMOIDOSCOPY N/A 10/06/2014     MALIGNANT PARTIALLY OBSTRUCTING TUMOR IN MID RECTUM, DR. DELTA HERRING AT [facility]   • PORTACATH PLACEMENT Left 2014     LEFT SUBCLAVIAN, DR. KWAKU TAY AT [facility]   • RECTAL ULTRASOUND N/A 2014     ENDORECTAL US FOR CANCER STAGING, T3N1 RECTAL CANCER AT 7 CM, DR. TRACE HERRING AT [facility]   • TRACHEAL SURGERY N/A 2014     ENDOTRACHEAL INTUBATION, DR. ALMA ROSA HAGAN AT [facility]   • VENOUS ACCESS DEVICE (PORT) REMOVAL Left 2015     LEFT SUBCLAVIAN, DR. KWAKU TAY AT [facility]         FAMILY HISTORY           Problem Relation Age of Onset   • No Known Problems Mother     • No Known Problems Father     • No Known Problems Sister     • Cancer Brother           Lung cancer   • Lung cancer Brother     • No Known Problems Maternal Grandmother     • No Known Problems Maternal Grandfather     • No Known Problems Paternal Grandmother     • No Known Problems Paternal Grandfather     • Diabetes Other        SOCIAL HISTORY                 Socioeconomic History   • Marital status: Single   • Number of children: 0   • Years of education: High school   Tobacco Use   • Smoking status: Former Smoker       Packs/day: 2.00       Years: 3.00       Pack years: 6.00       Types: Cigarettes       Quit date:        Years since quittin.4   • Smokeless tobacco: Never Used   Substance and Sexual Activity   • Alcohol use: No   • Drug  use: No   • Sexual activity: Defer         ALLERGIES  Patient has no known allergies.  Home medications reviewed     REVIEW OF SYSTEMS  All systems reviewed and negative except for those discussed in HPI.      PHYSICAL EXAM   Blood pressure (!) 168/102, pulse 65, temperature 97.8 °F (36.6 °C), temperature source Tympanic, resp. rate 18, SpO2 94 %.    GENERAL: Well developed, well nourished in NOdistress  HENT: NCAT, neck supple, trachea midline  EYES: no scleral icterus, PERRL, normal conjunctivae  CV: regular rhythm, regular rate, no murmur  RESPIRATORY: unlabored effort, CTAB  ABDOMEN: soft, nontender, nondistended, bowel sounds present  MUSCULOSKELETAL: no gross deformity, no pedal edema, no calf tenderness  NEURO: alert,  sensory and motor function of extremities intact, speech clear  SKIN: warm, dry, no rash  PSYCH:  Appropriate mood and affect     LAB RESULTS  Lab Results (last 24 hours)     Procedure Component Value Units Date/Time    COVID PRE-OP / PRE-PROCEDURE SCREENING ORDER (NO ISOLATION) - Swab, Nasopharynx [[id number]]  (Normal) Collected: 06/06/22 1634    Specimen: Swab from Nasopharynx Updated: 06/06/22 1704    Narrative:      The following orders were created for panel order COVID PRE-OP / PRE-PROCEDURE SCREENING ORDER (NO ISOLATION) - Swab, Nasopharynx.  Procedure                               Abnormality         Status                     ---------                               -----------         ------                     COVID-19,[facility]...[[id number]]  Normal              Final result                 Please view results for these tests on the individual orders.    COVID-19,[facility] CEPHEID/BERTRAM NP SWAB IN TRANSPORT MEDIA 8-12 HR TAT - Swab, Nasopharynx [[id number]]  (Normal) Collected: 06/06/22 1634    Specimen: Swab from Nasopharynx Updated: 06/06/22 1704     COVID19 Not Detected    Narrative:      Fact sheet for providers: https://www.fda.gov/media/772733/download    Fact sheet  for patients: https://www.fda.gov/media/947252/download    Test performed by PCR.    Comprehensive Metabolic Panel [[id number]]  (Abnormal) Collected: 06/06/22 1434    Specimen: Blood Updated: 06/06/22 1520     Glucose 89 mg/dL      BUN 26 mg/dL      Creatinine 1.82 mg/dL      Sodium 142 mmol/L      Potassium 4.1 mmol/L      Chloride 105 mmol/L      CO2 25.6 mmol/L      Calcium 9.4 mg/dL      Total Protein 7.1 g/dL      Albumin 4.60 g/dL      ALT (SGPT) 40 U/L      AST (SGOT) 20 U/L      Alkaline Phosphatase 115 U/L      Total Bilirubin 0.2 mg/dL      Globulin 2.5 gm/dL      A/G Ratio 1.8 g/dL      BUN/Creatinine Ratio 14.3     Anion Gap 11.4 mmol/L      eGFR 35.7 mL/min/1.73      Comment: National Kidney Foundation and American Society of Nephrology (ASN) Task Force recommended calculation based on the Chronic Kidney Disease Epidemiology Collaboration (CKD-EPI) equation refit without adjustment for race.       Narrative:      GFR Normal >60  Chronic Kidney Disease <60  Kidney Failure <15      Magnesium [[id number]]  (Normal) Collected: 06/06/22 1434    Specimen: Blood Updated: 06/06/22 1520     Magnesium 1.9 mg/dL     Troponin [[id number]]  (Normal) Collected: 06/06/22 1434    Specimen: Blood Updated: 06/06/22 1512     Troponin T 0.013 ng/mL     Narrative:      Troponin T Reference Range:  <= 0.03 ng/mL-   Negative for AMI  >0.03 ng/mL-     Abnormal for myocardial necrosis.  Clinicians would have to utilize clinical acumen, EKG, Troponin and serial changes to determine if it is an Acute Myocardial Infarction or myocardial injury due to an underlying chronic condition.       Results may be falsely decreased if patient taking Biotin.      TSH [[id number]]  (Normal) Collected: 06/06/22 1434    Specimen: Blood Updated: 06/06/22 1508     TSH 2.740 uIU/mL     Protime-INR [[id number]]  (Normal) Collected: 06/06/22 1434    Specimen: Blood Updated: 06/06/22 1506     Protime 13.2 Seconds      INR 1.01    Urinalysis With  Microscopic If Indicated (No Culture) - Urine, Clean Catch [[id number]]  (Abnormal) Collected: 06/06/22 1434    Specimen: Urine, Clean Catch Updated: 06/06/22 1502     Color, UA Yellow     Appearance, UA Clear     pH, UA 5.5     Specific Gravity, UA 1.023     Glucose,  mg/dL (2+)     Ketones, UA Negative     Bilirubin, UA Negative     Blood, UA Negative     Protein,  mg/dL (2+)     Leuk Esterase, UA Negative     Nitrite, UA Negative     Urobilinogen, UA 0.2 E.U./dL    Urinalysis, Microscopic Only - Urine, Clean Catch [[id number]] Collected: 06/06/22 1434    Specimen: Urine, Clean Catch Updated: 06/06/22 1502     RBC, UA 0-2 /HPF      WBC, UA 0-2 /HPF      Bacteria, UA None Seen /HPF      Squamous Epithelial Cells, UA 0-2 /HPF      Hyaline Casts, UA 3-6 /LPF      Methodology Automated Microscopy    Ammonia [[id number]]  (Normal) Collected: 06/06/22 1434    Specimen: Blood Updated: 06/06/22 1500     Ammonia 19 umol/L     CBC & Differential [[id number]]  (Abnormal) Collected: 06/06/22 1434    Specimen: Blood Updated: 06/06/22 1447    Narrative:      The following orders were created for panel order CBC & Differential.  Procedure                               Abnormality         Status                     ---------                               -----------         ------                     CBC Auto Differential[[id number]]        Abnormal            Final result                 Please view results for these tests on the individual orders.    CBC Auto Differential [[id number]]  (Abnormal) Collected: 06/06/22 1434    Specimen: Blood Updated: 06/06/22 1447     WBC 7.51 10*3/mm3      RBC 4.60 10*6/mm3      Hemoglobin 13.5 g/dL      Hematocrit 39.8 %      MCV 86.5 fL      MCH 29.3 pg      MCHC 33.9 g/dL      RDW 12.2 %      RDW-SD 38.3 fl      MPV 10.0 fL      Platelets 246 10*3/mm3      Neutrophil % 68.5 %      Lymphocyte % 18.6 %      Monocyte % 8.7 %      Eosinophil % 2.5 %      Basophil % 0.8 %      Immature Grans  % 0.9 %      Neutrophils, Absolute 5.14 10*3/mm3      Lymphocytes, Absolute 1.40 10*3/mm3      Monocytes, Absolute 0.65 10*3/mm3      Eosinophils, Absolute 0.19 10*3/mm3      Basophils, Absolute 0.06 10*3/mm3      Immature Grans, Absolute 0.07 10*3/mm3      nRBC 0.0 /100 WBC         Imaging Results (Last 24 Hours)     Procedure Component Value Units Date/Time    CT Head Without Contrast [[id number]] Collected: 06/06/22 1623     Updated: 06/06/22 1623    Narrative:      CT SCAN OF THE HEAD AND LUMBAR SPINE WITHOUT CONTRAST     CLINICAL HISTORY: Unable to walk, weakness, and low back pain after a  fall.     TECHNIQUE: CT scan of the head was obtained with 2 mm axial bone  algorithm and 3 mm axial soft tissue algorithm images. No intravenous  contrast was administered.     COMPARISON: Comparison is made to previous CT angiogram dated 05/23/2022  and prior CT dated 11/17/2019.     FINDINGS:  There is no evidence for a calvarial fracture. There is no evidence for  an acute extra-axial hemorrhage. On the prior CT angiogram dated  05/23/2022, there was a focus of acute intraparenchymal hemorrhage  within the right occipital lobe which measured up to approximately 1.0 x  0.7 cm in greatest axial dimensions. This hemorrhage is no longer  appreciated. Additionally, foci of subarachnoid hemorrhage noted within  the sulci of the medial portion of the left frontal lobe again, have  resolved in the interval. There was a trace amount of intraventricular  hemorrhage noted within the dependent aspects of the lateral ventricles  which has resolved. The previously identified subdural hygroma noted  lateral to the right cerebral hemisphere has nearly completely resolved.  The subdural hygroma identified lateral to the left cerebral hemisphere  measures up to approximately 13 mm in diameter and this subdural hygroma  demonstrates no significant interval change in size.     There are severe and confluent changes of chronic small vessel  ischemic  phenomena. Old lacunar disease is identified within the lentiform  nuclei. The ventricular system is increased in size when compared to the  prior study likely due to interval resolution of the previously  identified right lateral cerebral hemispheric subdural hygroma. The size  of the ventricular system is stable when compared to a baseline study  dated 11/17/2019.       Impression:      There is near complete resolution of the previously identified right  lateral cerebral hemispheric subdural hygroma. The subdural hygroma  lateral to left cerebral hemisphere demonstrates no significant interval  change in size when compared to the prior study dated 05/23/2022. The  previously identified small amount of subarachnoid hemorrhage within a  sulcus of the medial portion of the left frontal lobe has resolved.  There is also interval resolution of the previously identified  intraparenchymal hemorrhage within the right occipital lobe and the  small amount of intraventricular hemorrhage identified within the  dependent aspects of the lateral ventricles.     There are severe and confluent changes of chronic small vessel ischemic  phenomena. Old lacunar disease is again noted.     TECHNIQUE: CT scan of the lumbar spine was obtained with 0.5 mm axial  bone algorithm images with sagittal and coronal reconstructions.  Additionally, a CT scan of the lumbar spine was obtained with 3 mm axial  images along with sagittal reconstructed images.     FINDINGS:  There is no evidence for acute fracture or bony malalignment involving  the lumbosacral spine.     At T10-11, there is a small central disc protrusion mildly indenting the  ventral subarachnoid space. At T11-12, T12-L1, L1-2, L2-3, and L3-4,  there is no significant degree of canal or foraminal stenosis.     At L4-5, there is a mild degree of left foraminal and canal narrowing  secondary to bulging disc material. Vacuum disc phenomena are  appreciated at the L4-5 level.  Mild facet arthropathy is seen.     At L5-S1, there is degenerative disc change with vacuum disc phenomena.  There is a disc osteophyte complex at L5-S1 that mildly indents the  ventral epidural fat and abuts both descending S1 nerve root sleeves.  There is a mild-to-moderate degree of bilateral foraminal narrowing  secondary to a disc osteophyte complex.     IMPRESSION:  At L5-S1, there are degenerative disc changes with vacuum disc  phenomena. A disc osteophyte complex abuts the ventral aspects of both  descending S1 nerve root sleeves. Additionally, a disc osteophyte  complex results in a mild to moderate degree of bilateral foraminal  narrowing. The remaining degenerative phenomena within the lumbar spine  are as discussed in detail above.     There is no evidence for acute fracture or bony malalignment involving  the lumbar spine.     These findings were discussed with Dr. Johnson/Dr. Macias on  06/06/2022 at approximately 4:12 PM.     Radiation dose reduction techniques were utilized, including automated  exposure control and exposure modulation based on body size.             CT Lumbar Spine Without Contrast [[id number]] Collected: 06/06/22 1623     Updated: 06/06/22 1623    Narrative:      CT SCAN OF THE HEAD AND LUMBAR SPINE WITHOUT CONTRAST     CLINICAL HISTORY: Unable to walk, weakness, and low back pain after a  fall.     TECHNIQUE: CT scan of the head was obtained with 2 mm axial bone  algorithm and 3 mm axial soft tissue algorithm images. No intravenous  contrast was administered.     COMPARISON: Comparison is made to previous CT angiogram dated 05/23/2022  and prior CT dated 11/17/2019.     FINDINGS:  There is no evidence for a calvarial fracture. There is no evidence for  an acute extra-axial hemorrhage. On the prior CT angiogram dated  05/23/2022, there was a focus of acute intraparenchymal hemorrhage  within the right occipital lobe which measured up to approximately 1.0 x  0.7 cm in greatest  axial dimensions. This hemorrhage is no longer  appreciated. Additionally, foci of subarachnoid hemorrhage noted within  the sulci of the medial portion of the left frontal lobe again, have  resolved in the interval. There was a trace amount of intraventricular  hemorrhage noted within the dependent aspects of the lateral ventricles  which has resolved. The previously identified subdural hygroma noted  lateral to the right cerebral hemisphere has nearly completely resolved.  The subdural hygroma identified lateral to the left cerebral hemisphere  measures up to approximately 13 mm in diameter and this subdural hygroma  demonstrates no significant interval change in size.     There are severe and confluent changes of chronic small vessel ischemic  phenomena. Old lacunar disease is identified within the lentiform  nuclei. The ventricular system is increased in size when compared to the  prior study likely due to interval resolution of the previously  identified right lateral cerebral hemispheric subdural hygroma. The size  of the ventricular system is stable when compared to a baseline study  dated 11/17/2019.       Impression:      There is near complete resolution of the previously identified right  lateral cerebral hemispheric subdural hygroma. The subdural hygroma  lateral to left cerebral hemisphere demonstrates no significant interval  change in size when compared to the prior study dated 05/23/2022. The  previously identified small amount of subarachnoid hemorrhage within a  sulcus of the medial portion of the left frontal lobe has resolved.  There is also interval resolution of the previously identified  intraparenchymal hemorrhage within the right occipital lobe and the  small amount of intraventricular hemorrhage identified within the  dependent aspects of the lateral ventricles.     There are severe and confluent changes of chronic small vessel ischemic  phenomena. Old lacunar disease is again noted.      TECHNIQUE: CT scan of the lumbar spine was obtained with 0.5 mm axial  bone algorithm images with sagittal and coronal reconstructions.  Additionally, a CT scan of the lumbar spine was obtained with 3 mm axial  images along with sagittal reconstructed images.     FINDINGS:  There is no evidence for acute fracture or bony malalignment involving  the lumbosacral spine.     At T10-11, there is a small central disc protrusion mildly indenting the  ventral subarachnoid space. At T11-12, T12-L1, L1-2, L2-3, and L3-4,  there is no significant degree of canal or foraminal stenosis.     At L4-5, there is a mild degree of left foraminal and canal narrowing  secondary to bulging disc material. Vacuum disc phenomena are  appreciated at the L4-5 level. Mild facet arthropathy is seen.     At L5-S1, there is degenerative disc change with vacuum disc phenomena.  There is a disc osteophyte complex at L5-S1 that mildly indents the  ventral epidural fat and abuts both descending S1 nerve root sleeves.  There is a mild-to-moderate degree of bilateral foraminal narrowing  secondary to a disc osteophyte complex.     IMPRESSION:  At L5-S1, there are degenerative disc changes with vacuum disc  phenomena. A disc osteophyte complex abuts the ventral aspects of both  descending S1 nerve root sleeves. Additionally, a disc osteophyte  complex results in a mild to moderate degree of bilateral foraminal  narrowing. The remaining degenerative phenomena within the lumbar spine  are as discussed in detail above.     There is no evidence for acute fracture or bony malalignment involving  the lumbar spine.     These findings were discussed with Dr. Johnson/Dr. Macias on  06/06/2022 at approximately 4:12 PM.     Radiation dose reduction techniques were utilized, including automated  exposure control and exposure modulation based on body size.             XR Chest 1 View [[id number]] Collected: 06/06/22 1429     Updated: 06/06/22 1429    Narrative:       EMERGENCY PORTABLE VIEW OF THE CHEST 06/06/2022     CLINICAL HISTORY: Weakness.     COMPARISON: This is correlated to a prior portable chest x-ray  05/18/2022.     FINDINGS: The cardiomediastinal silhouette is upper limits of normal to  mildly enlarged. Pulmonary vasculature is within normal limits. The lung  volumes are low with horizontal linear opacities at both lung bases felt  to be bibasilar atelectasis. Otherwise the lungs are clear. The  costophrenic angles are sharp.       Impression:      No change when compared to prior portable chest x-ray from [facility] on 05/18/2022 with no definite active disease seen in  the chest. The lung volumes are low with horizontal linear opacities in  the lung bases felt to be bibasilar atelectasis or scarring unchanged.                 ECG 12 Lead  Component   Ref Range & Units 13:46 2 wk ago   QT Interval   ms 426 P  429    Resulting Agency [facility] ECG  ECG             HEART RATE= 67  bpm  RR Interval= 900  ms  MI Interval= 190  ms  P Horizontal Axis= 11  deg  P Front Axis= 36  deg  QRSD Interval= 121  ms  QT Interval= 426  ms  QRS Axis= -43  deg  T Wave Axis= -12  deg  - ABNORMAL ECG -  Sinus arrhythmia  Left bundle branch block             Current Facility-Administered Medications:   •  amLODIPine (NORVASC) tablet 10 mg, 10 mg, Oral, Q24H, Jose Llanos MD  •  [START ON 6/7/2022] atorvastatin (LIPITOR) tablet 80 mg, 80 mg, Oral, Daily, Jose Llanos MD  •  insulin glargine (LANTUS, SEMGLEE) injection 20 Units, 20 Units, Subcutaneous, Nightly, Jose Llanos MD  •  insulin lispro (ADMELOG) injection 0-7 Units, 0-7 Units, Subcutaneous, TID AC, Jose Llanos MD  •  insulin lispro (ADMELOG) injection 5 Units, 5 Units, Subcutaneous, TID With Meals, Jose Llanos MD  •  [START ON 6/7/2022] levothyroxine (SYNTHROID, LEVOTHROID) tablet 75 mcg, 75 mcg, Oral, Q AM, Jose Llanos MD  •  [START ON 6/7/2022] lisinopril (PRINIVIL,ZESTRIL) tablet 20 mg, 20 mg, Oral,  Q12H, Rylee Llanos MD  •  [START ON 6/7/2022] pantoprazole (PROTONIX) EC tablet 40 mg, 40 mg, Oral, QAM, Rylee Llanos MD  •  sodium chloride 0.45 % infusion, 75 mL/hr, Intravenous, Continuous, Rylee Llanos MD     ASSESSMENT  Generalized weakness with inability to stand and walk with dysarthria with recent intracranial hemorrhage bilateral subdural hematomas hygromas and multiple embolic stroke.  Diabetes mellitus  Hypertension  Hyperlipidemia  Hypothyroidism  Chronic kidney disease stage III  Rectal CA s/p colon resection and colostomy  Gastroesophageal reflux disease    PLAN  Admit  IVF  Aspirin Lipitor  Neurology consult  Adjust home medications  Stress ulcer DVT prophylaxis  Supportive care  DNR  PT OT  Discussed with POA and nursing staff  Follow closely further recommendation current hospital course    RYLEE LLANOS MD
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
## 2022-06-06 NOTE — NURSING NOTE
Called [facility] to clarify medications, Spoke with martha who went over patients medications that he was taking at the assisted living facility.

## 2022-06-07 ENCOUNTER — APPOINTMENT (OUTPATIENT)
Dept: MRI IMAGING | Facility: HOSPITAL | Age: 87
End: 2022-06-07

## 2022-06-07 LAB
ALBUMIN SERPL-MCNC: 3.9 G/DL (ref 3.5–5.2)
ALBUMIN/GLOB SERPL: 2.2 G/DL
ALP SERPL-CCNC: 90 U/L (ref 39–117)
ALT SERPL W P-5'-P-CCNC: 33 U/L (ref 1–41)
ANION GAP SERPL CALCULATED.3IONS-SCNC: 7.9 MMOL/L (ref 5–15)
AST SERPL-CCNC: 21 U/L (ref 1–40)
BASOPHILS # BLD AUTO: 0.07 10*3/MM3 (ref 0–0.2)
BASOPHILS NFR BLD AUTO: 1 % (ref 0–1.5)
BILIRUB SERPL-MCNC: 0.3 MG/DL (ref 0–1.2)
BUN SERPL-MCNC: 21 MG/DL (ref 8–23)
BUN/CREAT SERPL: 13.8 (ref 7–25)
CALCIUM SPEC-SCNC: 8.6 MG/DL (ref 8.6–10.5)
CHLORIDE SERPL-SCNC: 105 MMOL/L (ref 98–107)
CHOLEST SERPL-MCNC: 115 MG/DL (ref 0–200)
CO2 SERPL-SCNC: 21.1 MMOL/L (ref 22–29)
CREAT SERPL-MCNC: 1.52 MG/DL (ref 0.76–1.27)
DEPRECATED RDW RBC AUTO: 39.8 FL (ref 37–54)
EGFRCR SERPLBLD CKD-EPI 2021: 44.3 ML/MIN/1.73
EOSINOPHIL # BLD AUTO: 0.39 10*3/MM3 (ref 0–0.4)
EOSINOPHIL NFR BLD AUTO: 5.6 % (ref 0.3–6.2)
ERYTHROCYTE [DISTWIDTH] IN BLOOD BY AUTOMATED COUNT: 12.4 % (ref 12.3–15.4)
GLOBULIN UR ELPH-MCNC: 1.8 GM/DL
GLUCOSE BLDC GLUCOMTR-MCNC: 128 MG/DL (ref 70–130)
GLUCOSE BLDC GLUCOMTR-MCNC: 154 MG/DL (ref 70–130)
GLUCOSE BLDC GLUCOMTR-MCNC: 156 MG/DL (ref 70–130)
GLUCOSE BLDC GLUCOMTR-MCNC: 210 MG/DL (ref 70–130)
GLUCOSE SERPL-MCNC: 170 MG/DL (ref 65–99)
HBA1C MFR BLD: 9.9 % (ref 4.8–5.6)
HCT VFR BLD AUTO: 37.7 % (ref 37.5–51)
HDLC SERPL-MCNC: 34 MG/DL (ref 40–60)
HGB BLD-MCNC: 12.3 G/DL (ref 13–17.7)
IMM GRANULOCYTES # BLD AUTO: 0.05 10*3/MM3 (ref 0–0.05)
IMM GRANULOCYTES NFR BLD AUTO: 0.7 % (ref 0–0.5)
LDLC SERPL CALC-MCNC: 60 MG/DL (ref 0–100)
LDLC/HDLC SERPL: 1.71 {RATIO}
LYMPHOCYTES # BLD AUTO: 1.47 10*3/MM3 (ref 0.7–3.1)
LYMPHOCYTES NFR BLD AUTO: 21.1 % (ref 19.6–45.3)
MCH RBC QN AUTO: 29.1 PG (ref 26.6–33)
MCHC RBC AUTO-ENTMCNC: 32.6 G/DL (ref 31.5–35.7)
MCV RBC AUTO: 89.1 FL (ref 79–97)
MONOCYTES # BLD AUTO: 0.69 10*3/MM3 (ref 0.1–0.9)
MONOCYTES NFR BLD AUTO: 9.9 % (ref 5–12)
NEUTROPHILS NFR BLD AUTO: 4.31 10*3/MM3 (ref 1.7–7)
NEUTROPHILS NFR BLD AUTO: 61.7 % (ref 42.7–76)
NRBC BLD AUTO-RTO: 0 /100 WBC (ref 0–0.2)
PLATELET # BLD AUTO: 211 10*3/MM3 (ref 140–450)
PMV BLD AUTO: 10.2 FL (ref 6–12)
POTASSIUM SERPL-SCNC: 3.8 MMOL/L (ref 3.5–5.2)
PROT SERPL-MCNC: 5.7 G/DL (ref 6–8.5)
RBC # BLD AUTO: 4.23 10*6/MM3 (ref 4.14–5.8)
SODIUM SERPL-SCNC: 134 MMOL/L (ref 136–145)
TRIGL SERPL-MCNC: 115 MG/DL (ref 0–150)
TSH SERPL DL<=0.05 MIU/L-ACNC: 1.87 UIU/ML (ref 0.27–4.2)
VLDLC SERPL-MCNC: 21 MG/DL (ref 5–40)
WBC NRBC COR # BLD: 6.98 10*3/MM3 (ref 3.4–10.8)

## 2022-06-07 PROCEDURE — 80061 LIPID PANEL: CPT | Performed by: HOSPITALIST

## 2022-06-07 PROCEDURE — 70551 MRI BRAIN STEM W/O DYE: CPT

## 2022-06-07 PROCEDURE — 80053 COMPREHEN METABOLIC PANEL: CPT | Performed by: HOSPITALIST

## 2022-06-07 PROCEDURE — 63710000001 INSULIN LISPRO (HUMAN) PER 5 UNITS: Performed by: HOSPITALIST

## 2022-06-07 PROCEDURE — 82962 GLUCOSE BLOOD TEST: CPT

## 2022-06-07 PROCEDURE — 83036 HEMOGLOBIN GLYCOSYLATED A1C: CPT | Performed by: HOSPITALIST

## 2022-06-07 PROCEDURE — 97166 OT EVAL MOD COMPLEX 45 MIN: CPT

## 2022-06-07 PROCEDURE — 97535 SELF CARE MNGMENT TRAINING: CPT

## 2022-06-07 PROCEDURE — 85025 COMPLETE CBC W/AUTO DIFF WBC: CPT | Performed by: HOSPITALIST

## 2022-06-07 PROCEDURE — 84443 ASSAY THYROID STIM HORMONE: CPT | Performed by: HOSPITALIST

## 2022-06-07 PROCEDURE — 36415 COLL VENOUS BLD VENIPUNCTURE: CPT | Performed by: HOSPITALIST

## 2022-06-07 PROCEDURE — 72148 MRI LUMBAR SPINE W/O DYE: CPT

## 2022-06-07 PROCEDURE — 97162 PT EVAL MOD COMPLEX 30 MIN: CPT

## 2022-06-07 PROCEDURE — 99222 1ST HOSP IP/OBS MODERATE 55: CPT | Performed by: STUDENT IN AN ORGANIZED HEALTH CARE EDUCATION/TRAINING PROGRAM

## 2022-06-07 PROCEDURE — 97530 THERAPEUTIC ACTIVITIES: CPT

## 2022-06-07 RX ORDER — ATORVASTATIN CALCIUM 20 MG/1
10 TABLET, FILM COATED ORAL DAILY
Status: DISCONTINUED | OUTPATIENT
Start: 2022-06-08 | End: 2022-06-09 | Stop reason: HOSPADM

## 2022-06-07 RX ORDER — INSULIN LISPRO 100 [IU]/ML
7 INJECTION, SOLUTION INTRAVENOUS; SUBCUTANEOUS
Status: DISCONTINUED | OUTPATIENT
Start: 2022-06-07 | End: 2022-06-08

## 2022-06-07 RX ADMIN — ASPIRIN 81 MG: 81 TABLET, CHEWABLE ORAL at 08:02

## 2022-06-07 RX ADMIN — INSULIN LISPRO 5 UNITS: 100 INJECTION, SOLUTION INTRAVENOUS; SUBCUTANEOUS at 08:01

## 2022-06-07 RX ADMIN — LISINOPRIL 20 MG: 20 TABLET ORAL at 08:02

## 2022-06-07 RX ADMIN — INSULIN LISPRO 7 UNITS: 100 INJECTION, SOLUTION INTRAVENOUS; SUBCUTANEOUS at 17:51

## 2022-06-07 RX ADMIN — LEVOTHYROXINE SODIUM 75 MCG: 0.07 TABLET ORAL at 06:47

## 2022-06-07 RX ADMIN — ATORVASTATIN CALCIUM 80 MG: 80 TABLET, FILM COATED ORAL at 08:02

## 2022-06-07 RX ADMIN — INSULIN LISPRO 5 UNITS: 100 INJECTION, SOLUTION INTRAVENOUS; SUBCUTANEOUS at 12:02

## 2022-06-07 RX ADMIN — INSULIN LISPRO 3 UNITS: 100 INJECTION, SOLUTION INTRAVENOUS; SUBCUTANEOUS at 21:31

## 2022-06-07 RX ADMIN — AMLODIPINE BESYLATE 10 MG: 5 TABLET ORAL at 08:02

## 2022-06-07 RX ADMIN — INSULIN LISPRO 2 UNITS: 100 INJECTION, SOLUTION INTRAVENOUS; SUBCUTANEOUS at 08:01

## 2022-06-07 RX ADMIN — LISINOPRIL 20 MG: 20 TABLET ORAL at 21:32

## 2022-06-07 RX ADMIN — INSULIN GLARGINE-YFGN 25 UNITS: 100 INJECTION, SOLUTION SUBCUTANEOUS at 21:31

## 2022-06-07 RX ADMIN — INSULIN LISPRO 2 UNITS: 100 INJECTION, SOLUTION INTRAVENOUS; SUBCUTANEOUS at 17:51

## 2022-06-07 RX ADMIN — PANTOPRAZOLE SODIUM 40 MG: 40 TABLET, DELAYED RELEASE ORAL at 06:47

## 2022-06-07 RX ADMIN — SODIUM CHLORIDE 75 ML/HR: 4.5 INJECTION, SOLUTION INTRAVENOUS at 06:47

## 2022-06-07 NOTE — NURSING NOTE
06/07/22 1030   Colostomy LLQ   Placement Date/Time: 01/01/14 0000   Location: LLQ   Stomal Appliance 1 piece;Dry;Intact   Stoma Appearance round;pink;moist   Accessories/Skin Care   (pt wears a Nu-Hope hernia belt  with his Coloplast GARY 1-piece pouch)   Stoma Function flatus;stool   Stool Color brown   Stool Consistency soft   Treatment Placement checked     CWON note: pt seen for existing colostomy. Pt reports his pouch is fine today and wishes to have it changed on Thursday. I have taken his Coloplast GARY 1-piece appliance and barrier rings. He stated he needs some barrier extenders when we come back Thursday for his pouch change.This was noted and we will plan to return for pouch change Thursday.

## 2022-06-07 NOTE — PROGRESS NOTES
Daily progress note    Chief complaint  Doing same  No new complaints  Still feeling weak  Denies any chest pain shortness of breath palpitation    History of present illness  86-year white male with very complex past medical history including intracranial hemorrhage bilateral subdural hematomas hygromas multiple embolic stroke diabetes hypertension hyperlipidemia hypothyroidism and chronic kidney disease stage III who has had rectal CA s/p colon resection and colostomy in place for last 8 years brought to the emergency room by the family as he unable to stand and walk this morning and complained of generalized weakness.  Patient has no headache dizziness chest pain shortness of breath abdominal pain nausea vomiting diarrhea.  Patient has trouble with his speech this morning but not at the time of interview.  Patient evaluated in ER admitted for management.     REVIEW OF SYSTEMS  All systems reviewed and negative except for those discussed in HPI.      PHYSICAL EXAM   Blood pressure 130/73, pulse 72, temperature 98 °F (36.7 °C), temperature source Oral, resp. rate 18, SpO2 95 %.    GENERAL: Well developed, well nourished in NOdistress  HENT: NCAT, neck supple, trachea midline  EYES: no scleral icterus, PERRL, normal conjunctivae  CV: regular rhythm, regular rate, no murmur  RESPIRATORY: unlabored effort, CTAB  ABDOMEN: soft, nontender, nondistended, bowel sounds present  MUSCULOSKELETAL: no gross deformity, no pedal edema, no calf tenderness  NEURO: alert,  sensory and motor function of extremities intact, speech clear  SKIN: warm, dry, no rash  PSYCH:  Appropriate mood and affect     LAB RESULTS  Lab Results (last 24 hours)     Procedure Component Value Units Date/Time    POC Glucose Once [[id number]]  (Normal) Collected: 06/07/22 1112    Specimen: Blood Updated: 06/07/22 1115     Glucose 128 mg/dL      Comment: Meter: [id number] : [id number] Albertina DOE       TSH [[id number]]  (Normal) Collected: 06/07/22  0615    Specimen: Blood Updated: 06/07/22 1000     TSH 1.870 uIU/mL     Hemoglobin A1c [[id number]]  (Abnormal) Collected: 06/07/22 0615    Specimen: Blood Updated: 06/07/22 0831     Hemoglobin A1C 9.90 %     Narrative:      Hemoglobin A1C Ranges:    Increased Risk for Diabetes  5.7% to 6.4%  Diabetes                     >= 6.5%  Diabetic Goal                < 7.0%    Comprehensive Metabolic Panel [[id number]]  (Abnormal) Collected: 06/07/22 0615    Specimen: Blood Updated: 06/07/22 0722     Glucose 170 mg/dL      BUN 21 mg/dL      Creatinine 1.52 mg/dL      Sodium 134 mmol/L      Potassium 3.8 mmol/L      Comment: Slight hemolysis detected by analyzer. Results may be affected.        Chloride 105 mmol/L      CO2 21.1 mmol/L      Calcium 8.6 mg/dL      Total Protein 5.7 g/dL      Albumin 3.90 g/dL      ALT (SGPT) 33 U/L      AST (SGOT) 21 U/L      Alkaline Phosphatase 90 U/L      Total Bilirubin 0.3 mg/dL      Globulin 1.8 gm/dL      A/G Ratio 2.2 g/dL      BUN/Creatinine Ratio 13.8     Anion Gap 7.9 mmol/L      eGFR 44.3 mL/min/1.73      Comment: National Kidney Foundation and American Society of Nephrology (ASN) Task Force recommended calculation based on the Chronic Kidney Disease Epidemiology Collaboration (CKD-EPI) equation refit without adjustment for race.       Narrative:      GFR Normal >60  Chronic Kidney Disease <60  Kidney Failure <15      Lipid Panel [[id number]]  (Abnormal) Collected: 06/07/22 0615    Specimen: Blood Updated: 06/07/22 0718     Total Cholesterol 115 mg/dL      Triglycerides 115 mg/dL      HDL Cholesterol 34 mg/dL      LDL Cholesterol  60 mg/dL      VLDL Cholesterol 21 mg/dL      LDL/HDL Ratio 1.71    Narrative:      Cholesterol Reference Ranges  (U.S. Department of Health and Human Services ATP III Classifications)    Desirable          <200 mg/dL  Borderline High    200-239 mg/dL  High Risk          >240 mg/dL      Triglyceride Reference Ranges  (U.S. Department of Health and Human  Services ATP III Classifications)    Normal           <150 mg/dL  Borderline High  150-199 mg/dL  High             200-499 mg/dL  Very High        >500 mg/dL    HDL Reference Ranges  (U.S. Department of Health and Human Services ATP III Classifications)    Low     <40 mg/dl (major risk factor for CHD)  High    >60 mg/dl ('negative' risk factor for CHD)        LDL Reference Ranges  (U.S. Department of Health and Human Services ATP III Classifications)    Optimal          <100 mg/dL  Near Optimal     100-129 mg/dL  Borderline High  130-159 mg/dL  High             160-189 mg/dL  Very High        >189 mg/dL    POC Glucose Once [[id number]]  (Abnormal) Collected: 06/07/22 0716    Specimen: Blood Updated: 06/07/22 0717     Glucose 154 mg/dL      Comment: Meter: [id number] : [id number] Hatchett Sherrish NA       CBC & Differential [[id number]]  (Abnormal) Collected: 06/07/22 0615    Specimen: Blood Updated: 06/07/22 0653    Narrative:      The following orders were created for panel order CBC & Differential.  Procedure                               Abnormality         Status                     ---------                               -----------         ------                     CBC Auto Differential[[id number]]        Abnormal            Final result                 Please view results for these tests on the individual orders.    CBC Auto Differential [[id number]]  (Abnormal) Collected: 06/07/22 0615    Specimen: Blood Updated: 06/07/22 0653     WBC 6.98 10*3/mm3      RBC 4.23 10*6/mm3      Hemoglobin 12.3 g/dL      Hematocrit 37.7 %      MCV 89.1 fL      MCH 29.1 pg      MCHC 32.6 g/dL      RDW 12.4 %      RDW-SD 39.8 fl      MPV 10.2 fL      Platelets 211 10*3/mm3      Neutrophil % 61.7 %      Lymphocyte % 21.1 %      Monocyte % 9.9 %      Eosinophil % 5.6 %      Basophil % 1.0 %      Immature Grans % 0.7 %      Neutrophils, Absolute 4.31 10*3/mm3      Lymphocytes, Absolute 1.47 10*3/mm3      Monocytes, Absolute 0.69  10*3/mm3      Eosinophils, Absolute 0.39 10*3/mm3      Basophils, Absolute 0.07 10*3/mm3      Immature Grans, Absolute 0.05 10*3/mm3      nRBC 0.0 /100 WBC     POC Glucose Once [[id number]]  (Abnormal) Collected: 06/06/22 2043    Specimen: Blood Updated: 06/06/22 2045     Glucose 302 mg/dL      Comment: Meter: [id number] : [id number] Mello DOE       BNP [[id number]]  (Normal) Collected: 06/06/22 1434    Specimen: Blood Updated: 06/06/22 1901     proBNP 1,357.0 pg/mL     Narrative:      Among patients with dyspnea, NT-proBNP is highly sensitive for the detection of acute congestive heart failure. In addition NT-proBNP of <300 pg/ml effectively rules out acute congestive heart failure with 99% negative predictive value.    Results may be falsely decreased if patient taking Biotin.      POC Glucose Once [[id number]]  (Normal) Collected: 06/06/22 1736    Specimen: Blood Updated: 06/06/22 1739     Glucose 115 mg/dL      Comment: Meter: [id number] : [id number] Jarred Sanchez RN       COVID PRE-OP / PRE-PROCEDURE SCREENING ORDER (NO ISOLATION) - Swab, Nasopharynx [[id number]]  (Normal) Collected: 06/06/22 1634    Specimen: Swab from Nasopharynx Updated: 06/06/22 1704    Narrative:      The following orders were created for panel order COVID PRE-OP / PRE-PROCEDURE SCREENING ORDER (NO ISOLATION) - Swab, Nasopharynx.  Procedure                               Abnormality         Status                     ---------                               -----------         ------                     COVID-19,[facility]...[[id number]]  Normal              Final result                 Please view results for these tests on the individual orders.    COVID-19,[facility] CEPHEID/BERTRAM NP SWAB IN TRANSPORT MEDIA 8-12 HR TAT - Swab, Nasopharynx [[id number]]  (Normal) Collected: 06/06/22 1634    Specimen: Swab from Nasopharynx Updated: 06/06/22 1704     COVID19 Not Detected    Narrative:      Fact sheet for providers:  https://www.fda.gov/media/620279/download    Fact sheet for patients: https://www.fda.gov/media/265385/download    Test performed by PCR.    Comprehensive Metabolic Panel [[id number]]  (Abnormal) Collected: 06/06/22 1434    Specimen: Blood Updated: 06/06/22 1520     Glucose 89 mg/dL      BUN 26 mg/dL      Creatinine 1.82 mg/dL      Sodium 142 mmol/L      Potassium 4.1 mmol/L      Chloride 105 mmol/L      CO2 25.6 mmol/L      Calcium 9.4 mg/dL      Total Protein 7.1 g/dL      Albumin 4.60 g/dL      ALT (SGPT) 40 U/L      AST (SGOT) 20 U/L      Alkaline Phosphatase 115 U/L      Total Bilirubin 0.2 mg/dL      Globulin 2.5 gm/dL      A/G Ratio 1.8 g/dL      BUN/Creatinine Ratio 14.3     Anion Gap 11.4 mmol/L      eGFR 35.7 mL/min/1.73      Comment: National Kidney Foundation and American Society of Nephrology (ASN) Task Force recommended calculation based on the Chronic Kidney Disease Epidemiology Collaboration (CKD-EPI) equation refit without adjustment for race.       Narrative:      GFR Normal >60  Chronic Kidney Disease <60  Kidney Failure <15      Magnesium [[id number]]  (Normal) Collected: 06/06/22 1434    Specimen: Blood Updated: 06/06/22 1520     Magnesium 1.9 mg/dL     Troponin [[id number]]  (Normal) Collected: 06/06/22 1434    Specimen: Blood Updated: 06/06/22 1512     Troponin T 0.013 ng/mL     Narrative:      Troponin T Reference Range:  <= 0.03 ng/mL-   Negative for AMI  >0.03 ng/mL-     Abnormal for myocardial necrosis.  Clinicians would have to utilize clinical acumen, EKG, Troponin and serial changes to determine if it is an Acute Myocardial Infarction or myocardial injury due to an underlying chronic condition.       Results may be falsely decreased if patient taking Biotin.      TSH [[id number]]  (Normal) Collected: 06/06/22 1434    Specimen: Blood Updated: 06/06/22 1508     TSH 2.740 uIU/mL     Protime-INR [[id number]]  (Normal) Collected: 06/06/22 1434    Specimen: Blood Updated: 06/06/22 1506     Protime  13.2 Seconds      INR 1.01    Urinalysis With Microscopic If Indicated (No Culture) - Urine, Clean Catch [[id number]]  (Abnormal) Collected: 06/06/22 1434    Specimen: Urine, Clean Catch Updated: 06/06/22 1502     Color, UA Yellow     Appearance, UA Clear     pH, UA 5.5     Specific Gravity, UA 1.023     Glucose,  mg/dL (2+)     Ketones, UA Negative     Bilirubin, UA Negative     Blood, UA Negative     Protein,  mg/dL (2+)     Leuk Esterase, UA Negative     Nitrite, UA Negative     Urobilinogen, UA 0.2 E.U./dL    Urinalysis, Microscopic Only - Urine, Clean Catch [[id number]] Collected: 06/06/22 1434    Specimen: Urine, Clean Catch Updated: 06/06/22 1502     RBC, UA 0-2 /HPF      WBC, UA 0-2 /HPF      Bacteria, UA None Seen /HPF      Squamous Epithelial Cells, UA 0-2 /HPF      Hyaline Casts, UA 3-6 /LPF      Methodology Automated Microscopy    Ammonia [[id number]]  (Normal) Collected: 06/06/22 1434    Specimen: Blood Updated: 06/06/22 1500     Ammonia 19 umol/L         Imaging Results (Last 24 Hours)     Procedure Component Value Units Date/Time    MRI Brain Without Contrast [[id number]] Resulted: 06/07/22 1455     Updated: 06/07/22 1455    MRI Lumbar Spine Without Contrast [[id number]] Resulted: 06/07/22 1455     Updated: 06/07/22 1455    CT Head Without Contrast [[id number]] Collected: 06/06/22 1623     Updated: 06/07/22 1210    Narrative:      CT SCAN OF THE HEAD AND LUMBAR SPINE WITHOUT CONTRAST     CLINICAL HISTORY: Unable to walk, weakness, and low back pain after a  fall.     TECHNIQUE: CT scan of the head was obtained with 2 mm axial bone  algorithm and 3 mm axial soft tissue algorithm images. No intravenous  contrast was administered.     COMPARISON: Comparison is made to previous CT angiogram dated 05/23/2022  and prior CT dated 11/17/2019.     FINDINGS:     There is no evidence for a calvarial fracture. There is no evidence for  an acute extra-axial hemorrhage. On the prior CT angiogram  dated  05/23/2022, there was a focus of acute intraparenchymal hemorrhage  within the right occipital lobe which measured up to approximately 1.0 x  0.7 cm in greatest axial dimensions. This hemorrhage is no longer  appreciated. Additionally, foci of subarachnoid hemorrhage noted within  the sulci of the medial portion of the left frontal lobe again, have  resolved in the interval. There was a trace amount of intraventricular  hemorrhage noted within the dependent aspects of the lateral ventricles  which has resolved. The previously identified subdural hygroma noted  lateral to the right cerebral hemisphere has nearly completely resolved.  The subdural hygroma identified lateral to the left cerebral hemisphere  measures up to approximately 13 mm in diameter and this subdural hygroma  demonstrates no significant interval change in size.     There are severe and confluent changes of chronic small vessel ischemic  phenomena. Old lacunar disease is identified within the lentiform  nuclei. The ventricular system is increased in size when compared to the  prior study likely due to interval resolution of the previously  identified right lateral cerebral hemispheric subdural hygroma. The size  of the ventricular system is stable when compared to a baseline study  dated 11/17/2019.       Impression:         There is near complete resolution of the previously identified right  lateral cerebral hemispheric subdural hygroma. The subdural hygroma  lateral to left cerebral hemisphere demonstrates no significant interval  change in size when compared to the prior study dated 05/23/2022. The  previously identified small amount of subarachnoid hemorrhage within a  sulcus of the medial portion of the left frontal lobe has resolved.  There is also interval resolution of the previously identified  intraparenchymal hemorrhage within the right occipital lobe and the  small amount of intraventricular hemorrhage previously identified  within  the dependent aspects of the lateral ventricles.     There are severe and confluent changes of chronic small vessel ischemic  phenomena. Old lacunar disease is again noted.        TECHNIQUE: CT scan of the lumbar spine was obtained with 0.5 mm axial  bone algorithm images with sagittal and coronal reconstructions.  Additionally, a CT scan of the lumbar spine was obtained with 3 mm axial  images along with sagittal reconstructed images.     FINDINGS:     There is no evidence for acute fracture or bony malalignment involving  the lumbosacral spine.     At T10-11, there is a small central disc protrusion mildly indenting the  ventral subarachnoid space.      At T11-12, T12-L1, L1-2, L2-3, and L3-4, there is no significant degree  of canal or foraminal stenosis.     At L4-5, there is a mild degree of left foraminal and canal narrowing  secondary to bulging disc material. Vacuum disc phenomena are  appreciated at the L4-5 level. Mild facet arthropathy is seen.     At L5-S1, there is degenerative disc change with vacuum disc phenomena.  There is a disc osteophyte complex at L5-S1 that mildly indents the  ventral epidural fat and abuts both descending S1 nerve root sleeves.  There is a mild-to-moderate degree of bilateral foraminal narrowing  secondary to a disc osteophyte complex.     IMPRESSION:     At L5-S1, there are degenerative disc changes with vacuum disc  phenomena. A disc osteophyte complex abuts the ventral aspects of both  descending S1 nerve root sleeves. Additionally, a disc osteophyte  complex results in a mild to moderate degree of bilateral foraminal  narrowing. The remaining degenerative phenomena within the lumbar spine  are as discussed in detail above.     There is no evidence for acute fracture or bony malalignment involving  the lumbar spine.     These findings were discussed with Dr. Macias on 06/06/2022 at  approximately 4:12 PM.        Radiation dose reduction techniques were utilized,  including automated  exposure control and exposure modulation based on body size.     This report was finalized on 6/7/2022 12:07 PM by Dr. Lamberto Grier M.D.       CT Lumbar Spine Without Contrast [[id number]] Collected: 06/06/22 1623     Updated: 06/07/22 1210    Narrative:      CT SCAN OF THE HEAD AND LUMBAR SPINE WITHOUT CONTRAST     CLINICAL HISTORY: Unable to walk, weakness, and low back pain after a  fall.     TECHNIQUE: CT scan of the head was obtained with 2 mm axial bone  algorithm and 3 mm axial soft tissue algorithm images. No intravenous  contrast was administered.     COMPARISON: Comparison is made to previous CT angiogram dated 05/23/2022  and prior CT dated 11/17/2019.     FINDINGS:     There is no evidence for a calvarial fracture. There is no evidence for  an acute extra-axial hemorrhage. On the prior CT angiogram dated  05/23/2022, there was a focus of acute intraparenchymal hemorrhage  within the right occipital lobe which measured up to approximately 1.0 x  0.7 cm in greatest axial dimensions. This hemorrhage is no longer  appreciated. Additionally, foci of subarachnoid hemorrhage noted within  the sulci of the medial portion of the left frontal lobe again, have  resolved in the interval. There was a trace amount of intraventricular  hemorrhage noted within the dependent aspects of the lateral ventricles  which has resolved. The previously identified subdural hygroma noted  lateral to the right cerebral hemisphere has nearly completely resolved.  The subdural hygroma identified lateral to the left cerebral hemisphere  measures up to approximately 13 mm in diameter and this subdural hygroma  demonstrates no significant interval change in size.     There are severe and confluent changes of chronic small vessel ischemic  phenomena. Old lacunar disease is identified within the lentiform  nuclei. The ventricular system is increased in size when compared to the  prior study likely due to interval  resolution of the previously  identified right lateral cerebral hemispheric subdural hygroma. The size  of the ventricular system is stable when compared to a baseline study  dated 11/17/2019.       Impression:         There is near complete resolution of the previously identified right  lateral cerebral hemispheric subdural hygroma. The subdural hygroma  lateral to left cerebral hemisphere demonstrates no significant interval  change in size when compared to the prior study dated 05/23/2022. The  previously identified small amount of subarachnoid hemorrhage within a  sulcus of the medial portion of the left frontal lobe has resolved.  There is also interval resolution of the previously identified  intraparenchymal hemorrhage within the right occipital lobe and the  small amount of intraventricular hemorrhage previously identified within  the dependent aspects of the lateral ventricles.     There are severe and confluent changes of chronic small vessel ischemic  phenomena. Old lacunar disease is again noted.        TECHNIQUE: CT scan of the lumbar spine was obtained with 0.5 mm axial  bone algorithm images with sagittal and coronal reconstructions.  Additionally, a CT scan of the lumbar spine was obtained with 3 mm axial  images along with sagittal reconstructed images.     FINDINGS:     There is no evidence for acute fracture or bony malalignment involving  the lumbosacral spine.     At T10-11, there is a small central disc protrusion mildly indenting the  ventral subarachnoid space.      At T11-12, T12-L1, L1-2, L2-3, and L3-4, there is no significant degree  of canal or foraminal stenosis.     At L4-5, there is a mild degree of left foraminal and canal narrowing  secondary to bulging disc material. Vacuum disc phenomena are  appreciated at the L4-5 level. Mild facet arthropathy is seen.     At L5-S1, there is degenerative disc change with vacuum disc phenomena.  There is a disc osteophyte complex at L5-S1 that  mildly indents the  ventral epidural fat and abuts both descending S1 nerve root sleeves.  There is a mild-to-moderate degree of bilateral foraminal narrowing  secondary to a disc osteophyte complex.     IMPRESSION:     At L5-S1, there are degenerative disc changes with vacuum disc  phenomena. A disc osteophyte complex abuts the ventral aspects of both  descending S1 nerve root sleeves. Additionally, a disc osteophyte  complex results in a mild to moderate degree of bilateral foraminal  narrowing. The remaining degenerative phenomena within the lumbar spine  are as discussed in detail above.     There is no evidence for acute fracture or bony malalignment involving  the lumbar spine.     These findings were discussed with Dr. Macias on 06/06/2022 at  approximately 4:12 PM.        Radiation dose reduction techniques were utilized, including automated  exposure control and exposure modulation based on body size.     This report was finalized on 6/7/2022 12:07 PM by Dr. Lamberto Grier M.D.       XR Chest 1 View [[id number]] Collected: 06/06/22 1429     Updated: 06/06/22 2025    Narrative:      EMERGENCY PORTABLE VIEW OF THE CHEST 06/06/2022     CLINICAL HISTORY: Weakness.     COMPARISON: This is correlated to a prior portable chest x-ray  05/18/2022.     FINDINGS: The cardiomediastinal silhouette is upper limits of normal to  mildly enlarged. Pulmonary vasculature is within normal limits. The lung  volumes are low with horizontal linear opacities at both lung bases felt  to be bibasilar atelectasis. Otherwise the lungs are clear. The  costophrenic angles are sharp.       Impression:         1. No change when compared to prior portable chest x-ray from [facility] on 05/18/2022 with no definite active disease seen in  the chest. The lung volumes are low with horizontal linear opacities in  the lung bases felt to be bibasilar atelectasis or scarring unchanged.     This report was finalized on 6/6/2022 8:21  PM by Dr. Zach Garcia M.D.           ECG 12 Lead  Component   Ref Range & Units 13:46 2 wk ago   QT Interval   ms 426 P  429    Resulting Agency  ECG  ECG             HEART RATE= 67  bpm  RR Interval= 900  ms  AL Interval= 190  ms  P Horizontal Axis= 11  deg  P Front Axis= 36  deg  QRSD Interval= 121  ms  QT Interval= 426  ms  QRS Axis= -43  deg  T Wave Axis= -12  deg  - ABNORMAL ECG -  Sinus arrhythmia  Left bundle branch block             Current Facility-Administered Medications:   •  amLODIPine (NORVASC) tablet 10 mg, 10 mg, Oral, Q24H, Jose Llanos MD, 10 mg at 06/07/22 0802  •  aspirin chewable tablet 81 mg, 81 mg, Oral, Daily, Jose Llanos MD, 81 mg at 06/07/22 0802  •  atorvastatin (LIPITOR) tablet 80 mg, 80 mg, Oral, Daily, Jose Llanos MD, 80 mg at 06/07/22 0802  •  insulin glargine (LANTUS, SEMGLEE) injection 20 Units, 20 Units, Subcutaneous, Nightly, Jose Llanos MD, 20 Units at 06/06/22 2141  •  insulin lispro (ADMELOG) injection 0-7 Units, 0-7 Units, Subcutaneous, 4x Daily With Meals & Nightly, Jose Llanos MD, 2 Units at 06/07/22 0801  •  insulin lispro (ADMELOG) injection 5 Units, 5 Units, Subcutaneous, TID With Meals, Jose Llanos MD, 5 Units at 06/07/22 1202  •  levothyroxine (SYNTHROID, LEVOTHROID) tablet 75 mcg, 75 mcg, Oral, Q AM, Jose Llanos MD, 75 mcg at 06/07/22 0647  •  lisinopril (PRINIVIL,ZESTRIL) tablet 20 mg, 20 mg, Oral, Q12H, Jose Llanos MD, 20 mg at 06/07/22 0802  •  pantoprazole (PROTONIX) EC tablet 40 mg, 40 mg, Oral, QAM, Jose Llanos MD, 40 mg at 06/07/22 0647  •  sodium chloride 0.45 % infusion, 75 mL/hr, Intravenous, Continuous, Jose Llanos MD, Last Rate: 75 mL/hr at 06/07/22 0647, 75 mL/hr at 06/07/22 0647     ASSESSMENT  Generalized weakness with inability to stand and walk with dysarthria with recent intracranial hemorrhage bilateral subdural hematomas hygromas and multiple embolic stroke.  Diabetes  mellitus  Hypertension  Hyperlipidemia  Hypothyroidism  Chronic kidney disease stage III  Rectal CA s/p colon resection and colostomy  Gastroesophageal reflux disease    PLAN  CPM  IVF  Aspirin Lipitor  Check MRI of the brain and lumbar spine  Neurology consult appreciated  Adjust home medications  Stress ulcer DVT prophylaxis  Supportive care  DNR  PT OT  Discussed with POA and nursing staff  Follow closely further recommendation current hospital course    RYLEE LE MD

## 2022-06-07 NOTE — PLAN OF CARE
Goal Outcome Evaluation:  Plan of Care Reviewed With: patient           Outcome Evaluation: Pt is a 85 yo M who was admitted from AL with generalized weakness and declining ability to ability to ambulate. Pt has a h/p fall with ICH. Pt presents to PT wtih some impaired functional mobility and gait secondary to some mild generalized weakness, impaired balance, and decreased activity tolerance. Pt may benefit fit from skilled PT to address strength, mobility, and gait.

## 2022-06-07 NOTE — THERAPY EVALUATION
Patient Name: Reece Stephen  : 1935    MRN: [id number]                              Today's Date: 2022       Admit Date: 2022    Visit Dx:     ICD-10-CM ICD-9-CM   1. Acute weakness  R53.1 780.79   2. Confusion  R41.0 298.9   3. History of recent stroke  Z86.73 V12.54   4. History of intracranial hemorrhage  Z86.79 V12.59     Patient Active Problem List   Diagnosis   • History of rectal cancer   • Lung nodule, multiple   • History of DVT (deep vein thrombosis)   • Transient cerebral ischemia   • Hyperkalemia   • Hyperosmolar non-ketotic state in patient with type 2 diabetes mellitus (HCC)   • Type 2 diabetes mellitus with complication (HCC)   • Hyponatremia with extracellular fluid depletion   • Hypothyroidism   • Hyperlipidemia   • Diabetic polyneuropathy associated with type 2 diabetes mellitus (HCC)   • Chronic kidney disease, stage 3 (moderate) (CMS/HCC)   • Non compliance w medication regimen   • Essential hypertension   • Upper gastrointestinal hemorrhage   • Osteoarthritis   • Gastroesophageal reflux disease   • Deep vein thrombosis (HCC)   • Colitis due to Clostridium difficile   • Burn injury   • Anemia   • Cerebrovascular accident (CVA) (Formerly [facility])   • Other injury of unspecified body region, initial encounter   • Chronic pain of both knees   • Diabetic neuropathy (HCC)   • Altered mental status   • Fall   • Cerebral contusion (Formerly [facility])   • Embolic stroke (Formerly [facility])   • Acute weakness     Past Medical History:   Diagnosis Date   • Anemia    • Arthritis    • Chronic kidney disease     STAGE III, FOLLOWED BY DR. FREDDIE GONZALES   • Clostridium difficile infection 10/2014   • Diabetes mellitus (HCC)     TYPE 2, IDDM   • Disease of thyroid gland     ACQUIRED HYPOTHYROIDISM   • Duodenal ulcer 2014   • DVT (deep venous thrombosis) (HCC) 2015    PORT ASSOCIATED OF LEFT UPPER EXTREMITY   • Electrocution     HAD TO HAVE PARTIAL AMPUTATION OF LEFT FOOT   • GERD (gastroesophageal reflux disease)    •  Hearing loss    • Hemorrhagic shock (HCC) 2015    SECONDARY TO BLEEDING DUODENAL ULCER, ADMITTED TO [facility]   • Histiocytoma    • History of blood transfusion 2015    D/T BLEEDING DUODENAL ULCER   • History of chemotherapy     FOLLOWED BY DR. NIGEL NOBLE   • History of radiation therapy 2014    FOLLOWED BY DR. RJ HUI   • Hypercholesterolemia    • Hyperkalemia    • Hypertension    • Hypoglycemia 01/04/2018    SEEN AT [facility] ER   • Mixed hyperlipidemia    • Multiple lung nodules    • Neuropathy    • OA (osteoarthritis)    • ANNE MARIE (obstructive sleep apnea)    • Peptic ulceration 2014    Duodenal ulcer   • Pneumonia 03/29/2013    ADMITTED TO [facility]   • Rectal cancer (HCC) 06/2014    T3N1M0, S/P CHEMO, XRT, AND RESECTION, FOLLOWED BY DR. TRACE HERRING   • Stroke (HCC) 11/28/2017    TIA, ADMITTED TO [facility]   • Stroke (HCC) 11/17/2019    CVA, ADMITTED TO [facility]   • Vitamin D deficiency      Past Surgical History:   Procedure Laterality Date   • AMPUTATION FOOT Left 1971    PARTIAL DUE TO AN ELECTRICAL SHOCK INJURY   • ARM TENDON REPAIR Right     DONE BY DR. OLEARY AND KLEINERT   • COLON RESECTION N/A 11/14/2014    LOW ANTERIOR RESECTION WITH CREATION OF COLOSTOMY, DR. TRACE HERRING AT [facility]   • COLONOSCOPY W/ BIOPSIES N/A 06/26/2014    ULCERATED 5CM MASS IN RECTUM, PATH: MODERATELY DIFFERENTIATED ADENOCARCINOMA, MULTIPLE DIVERTICULA IN SIGMOID, DR. LAUREN JAMES AT [facility]   • COLONOSCOPY W/ BIOPSIES N/A 07/09/2014    RECTAL MASS: INVASIVE MODERATLEY DIFFERIENTIATED ADENOCARCINOMA, AREA TATTOOED, DR. TRACE HERRING AT [facility]   • ENDOSCOPY  02/09/2015   • ENDOSCOPY N/A 02/09/2015    RESOLVED DUODENAL ULCER, EGD WNL, DR. FREDDIE MALCOLM AT [facility]   • FLEXIBLE SIGMOIDOSCOPY N/A 10/06/2014    MALIGNANT PARTIALLY OBSTRUCTING TUMOR IN MID RECTUM, DR. DELTA HERIRNG AT [facility]   • PORTACATH PLACEMENT Left 07/24/2014    LEFT SUBCLAVIAN, DR. KWAKU TAY AT [facility]   • RECTAL ULTRASOUND N/A 07/09/2014    ENDORECTAL US FOR CANCER STAGING, T3N1  RECTAL CANCER AT 7 CM, DR. TRACE HERRING AT [facility]   • TRACHEAL SURGERY N/A 08/31/2014    ENDOTRACHEAL INTUBATION, DR. ALMA ROSA HAGAN AT [facility]   • VENOUS ACCESS DEVICE (PORT) REMOVAL Left 03/12/2015    LEFT SUBCLAVIAN, DR. KWAKU TAY AT [facility]      General Information     Row Name 06/07/22 1427          OT Time and Intention    Document Type evaluation  -     Mode of Treatment individual therapy;occupational therapy  -     Row Name 06/07/22 1427          General Information    Patient Profile Reviewed yes  -MW     Prior Level of Function min assist:;ADL's;independent:  pt reports mod (I) and no assist from staff for ADLs, however unsure of accuracy of info provided  -     Existing Precautions/Restrictions fall  -     Barriers to Rehab medically complex;hearing deficit  -     Row Name 06/07/22 1427          Living Environment    People in Home facility resident  [facility]  -     Row Name 06/07/22 1427          Home Main Entrance    Number of Stairs, Main Entrance none  -     Row Name 06/07/22 1427          Stairs Within Home, Primary    Number of Stairs, Within Home, Primary none  -     Row Name 06/07/22 1427          Cognition    Orientation Status (Cognition) oriented to;person;place  -     Row Name 06/07/22 1427          Safety Issues, Functional Mobility    Safety Issues Affecting Function (Mobility) insight into deficits/self-awareness;impulsivity;positioning of assistive device;safety precautions follow-through/compliance;sequencing abilities  -     Impairments Affecting Function (Mobility) balance;endurance/activity tolerance;strength;postural/trunk control  -     Comment, Safety Issues/Impairments (Mobility) non skid socks and gait belt donned  -           User Key  (r) = Recorded By, (t) = Taken By, (c) = Cosigned By    Initials Name Provider Type    MW Anna Briggs OT Occupational Therapist                 Mobility/ADL's     Row Name 06/07/22 1421          Bed Mobility    Bed Mobility  sit-supine  -     Sit-Supine Graham (Bed Mobility) contact guard;minimum assist (75% patient effort)  -     Comment, (Bed Mobility) sitting in chair upon arrival, returned to bed at end of session  -     Row Name 06/07/22 1429          Transfers    Transfers sit-stand transfer;stand-sit transfer;toilet transfer  -     Sit-Stand Graham (Transfers) verbal cues;nonverbal cues (demo/gesture);contact guard  -     Stand-Sit Graham (Transfers) contact guard  -     Graham Level (Toilet Transfer) minimum assist (75% patient effort);contact guard  -     Assistive Device (Toilet Transfer) walker, front-wheeled  -     Row Name 06/07/22 1429          Sit-Stand Transfer    Assistive Device (Sit-Stand Transfers) walker, front-wheeled  -     Row Name 06/07/22 1429          Toilet Transfer    Comment, (Toilet Transfer) pt completed func mob into BR, using Rwx for intermittent support to urinate standing at commode. Max cues for safety with positioning of Rwx as Rwx too far out in front throughuot mobility  -     Row Name 06/07/22 1429          Functional Mobility    Functional Mobility- Ind. Level contact guard assist;minimum assist (75% patient effort)  -     Functional Mobility- Device walker, front-wheeled  -     Functional Mobility- Comment pt requiring max cues for Rwx positoining due to decreased safety awareness with AD to BR and to EOB  -[facility] Name 06/07/22 1429          Activities of Daily Living    BADL Assessment/Intervention toileting  -     Row Name 06/07/22 1429          Toileting Assessment/Training    Assistive Devices (Toileting) commode  -     Comment, (Toileting) standing at commode to urinate, colostomy bag in place  -           User Key  (r) = Recorded By, (t) = Taken By, (c) = Cosigned By    Initials Name Provider Type    Anna Thompson OT Occupational Therapist               Obj/Interventions     Row Name 06/07/22 1432          Sensory  Assessment (Somatosensory)    Sensory Assessment (Somatosensory) UE sensation intact  -[facility] Name 06/07/22 1432          Vision Assessment/Intervention    Visual Impairment/Limitations WFL  -[facility] Name 06/07/22 1432          Range of Motion Comprehensive    General Range of Motion no range of motion deficits identified  -[facility] Name 06/07/22 1432          Strength Comprehensive (MMT)    General Manual Muscle Testing (MMT) Assessment upper extremity strength deficits identified  -     Comment, General Manual Muscle Testing (MMT) Assessment BUE grossly 4-/5  -[facility] Name 06/07/22 1432          Motor Skills    Motor Skills functional endurance  -     Functional Endurance fair  -[facility] Name 06/07/22 1432          Balance    Balance Assessment sitting static balance;sit to stand dynamic balance;sitting dynamic balance;standing static balance;standing dynamic balance  -     Static Sitting Balance standby assist  -     Dynamic Sitting Balance contact guard  -     Position, Sitting Balance sitting in chair  -     Sit to Stand Dynamic Balance contact guard  -     Static Standing Balance contact guard  -     Dynamic Standing Balance minimal assist;contact guard  -     Position/Device Used, Standing Balance supported;walker, front-wheeled  -     Balance Interventions sitting;standing;sit to stand;supported;static;dynamic;minimal challenge;occupation based/functional task  -     Comment, Balance CGa-min A for dynamic challenges due to decreased safety with use of Rwx and impulsivity, no overt LOBs  -           User Key  (r) = Recorded By, (t) = Taken By, (c) = Cosigned By    Initials Name Provider Type    Anna Thompson OT Occupational Therapist               Goals/Plan     [facility] Name 06/07/22 1436          Transfer Goal 1 (OT)    Activity/Assistive Device (Transfer Goal 1, OT) sit-to-stand/stand-to-sit;toilet;bed-to-chair/chair-to-bed  -     [place] Level/Cues Needed  (Transfer Goal 1, OT) standby assist  -MW     Time Frame (Transfer Goal 1, OT) short term goal (STG);2 weeks  -MW     Progress/Outcome (Transfer Goal 1, OT) goal ongoing  -MW     Row Name 06/07/22 1436          Dressing Goal 1 (OT)    Activity/Device (Dressing Goal 1, OT) upper body dressing;lower body dressing  -MW     [place]/Cues Needed (Dressing Goal 1, OT) standby assist  -MW     Time Frame (Dressing Goal 1, OT) short term goal (STG);2 weeks  -MW     Progress/Outcome (Dressing Goal 1, OT) goal ongoing  -MW     Row Name 06/07/22 1436          Toileting Goal 1 (OT)    Activity/Device (Toileting Goal 1, OT) toileting skills, all  -MW     [place] Level/Cues Needed (Toileting Goal 1, OT) standby assist  -MW     Time Frame (Toileting Goal 1, OT) short term goal (STG);2 weeks  -MW     Row Name 06/07/22 1436          Grooming Goal 1 (OT)    Activity/Device (Grooming Goal 1, OT) grooming skills, all  -MW     [place] (Grooming Goal 1, OT) modified independence  -MW     Time Frame (Grooming Goal 1, OT) short term goal (STG);2 weeks  -MW     Progress/Outcome (Grooming Goal 1, OT) goal ongoing  -MW     Row Name 06/07/22 1436          Problem Specific Goal 1 (OT)    Problem Specific Goal 1 (OT) pt will imrpove act tolerance to 8 mins in order to improve (I) with ADLs.  -MW     Time Frame (Problem Specific Goal 1, OT) short term goal (STG);2 weeks  -MW     Progress/Outcome (Problem Specific Goal 1, OT) goal ongoing  -MW     Row Name 06/07/22 1436          Therapy Assessment/Plan (OT)    Planned Therapy Interventions (OT) activity tolerance training;functional balance retraining;BADL retraining;neuromuscular control/coordination retraining;patient/caregiver education/training;occupation/activity based interventions;ROM/therapeutic exercise;strengthening exercise;transfer/mobility retraining  -MW           User Key  (r) = Recorded By, (t) = Taken By, (c) = Cosigned By    Initials Name Provider Type    MW  Anna Briggs, OT Occupational Therapist               Clinical Impression     Row Name 06/07/22 1433          Pain Assessment    Pretreatment Pain Rating 0/10 - no pain  -MW     Posttreatment Pain Rating 0/10 - no pain  -MW     Pain Intervention(s) Rest;Repositioned  -     Row Name 06/07/22 1433          Plan of Care Review    Plan of Care Reviewed With patient  -MW     Progress no change  -MW     Outcome Evaluation Pt is a 85 yo M who was admitted from AL with generalized weakness and declining ability to ability to ambulate. Pt has a h/p fall with ICH roughly 2 weeks ago and staff reports overall decline since this incident per [facility] report. Pt seen for OT eval this date, A&Ox2. Pt resides at [facility], was requiring some assist with ADLs, however able to manage colostomy and meds (I) prior to fall 2 weeks ago. Today, pt presents with impaired ADLs, is a high falls risks, impaired act tolerance, balance, UE strength and functional transfers. UIC upon arrival, pt completed STS with CGA, func mob to BR commode with CGA-min A using Rwx, standing at commode to urinate with CGA. Max cues required for safety with use of Rwx due to poor positioning. Poor carryover demo'd with cues. Pt will continue to benefit from skilled OT to address deficits and decrease risk of falls with ADL completion. Rec SNF at d/c before returning to [facility].  -     Row Name 06/07/22 1433          Therapy Assessment/Plan (OT)    Rehab Potential (OT) good, to achieve stated therapy goals  -     Criteria for Skilled Therapeutic Interventions Met (OT) yes;meets criteria;skilled treatment is necessary  -     Therapy Frequency (OT) 5 times/wk  -     Row Name 06/07/22 1433          Therapy Plan Review/Discharge Plan (OT)    Anticipated Discharge Disposition (OT) skilled nursing facility  -     Row Name 06/07/22 1433          Vital Signs    O2 Delivery Pre Treatment room air  -MW     Pre Patient Position Sitting  -MW     Intra Patient  Position Standing  -MW     Post Patient Position Supine  -MW     Row Name 06/07/22 1433          Positioning and Restraints    Pre-Treatment Position sitting in chair/recliner  -MW     Post Treatment Position bed  -MW     In Bed notified nsg;supine;call light within reach;encouraged to call for assist;exit alarm on  -MW           User Key  (r) = Recorded By, (t) = Taken By, (c) = Cosigned By    Initials Name Provider Type    Anna Thompson, [place]LIN Occupational Therapist               Outcome Measures     Row Name 06/07/22 1437          How much help from another is currently needed...    Putting on and taking off regular lower body clothing? 2  -MW     Bathing (including washing, rinsing, and drying) 2  -MW     Toileting (which includes using toilet bed pan or urinal) 2  -MW     Putting on and taking off regular upper body clothing 3  -MW     Taking care of personal grooming (such as brushing teeth) 3  -MW     Eating meals 3  -MW     AM-PAC 6 Clicks Score (OT) 15  -MW     Row Name 06/07/22 1353          How much help from another person do you currently need...    Turning from your back to your side while in flat bed without using bedrails? 3  -CH     Moving from lying on back to sitting on the side of a flat bed without bedrails? 3  -CH     Moving to and from a bed to a chair (including a wheelchair)? 3  -CH     Standing up from a chair using your arms (e.g., wheelchair, bedside chair)? 3  -CH     Climbing 3-5 steps with a railing? 1  -CH     To walk in hospital room? 3  -CH     AM-PAC 6 Clicks Score (PT) 16  -CH     Highest level of mobility 5 --> Static standing  -CH     Row Name 06/07/22 1437 06/07/22 1353       Modified Hastings Scale    Modified Hastings Scale 4 - Moderately severe disability.  Unable to walk without assistance, and unable to attend to own bodily needs without assistance.  -MW 4 - Moderately severe disability.  Unable to walk without assistance, and unable to attend to own bodily needs without  assistance.  -    Row Name 06/07/22 1437 06/07/22 1353       Functional Assessment    Outcome Measure Options AM-PAC 6 Clicks Daily Activity (OT);Franca BALDERRAMA AM-PAC 6 Clicks Basic Mobility (PT);Modified Bernarda  -          User Key  (r) = Recorded By, (t) = Taken By, (c) = Cosigned By    Initials Name Provider Type     Wanda Peres PT Physical Therapist    Anna Thompson OT Occupational Therapist                Occupational Therapy Education                 Title: PT OT SLP Therapies (In Progress)     Topic: Occupational Therapy (Done)     Point: ADL training (Done)     Description:   Instruct learner(s) on proper safety adaptation and remediation techniques during self care or transfers.   Instruct in proper use of assistive devices.              Learning Progress Summary           Patient Acceptance, E, VU,NR by  at 6/7/2022 1438    Comment: role of OT, d/c rec, safety with Rwx use, therapy goals                   Point: Home exercise program (Done)     Description:   Instruct learner(s) on appropriate technique for monitoring, assisting and/or progressing therapeutic exercises/activities.              Learning Progress Summary           Patient Acceptance, E, VU,NR by  at 6/7/2022 1438    Comment: role of OT, d/c rec, safety with Rwx use, therapy goals                   Point: Precautions (Done)     Description:   Instruct learner(s) on prescribed precautions during self-care and functional transfers.              Learning Progress Summary           Patient Acceptance, E, VU,NR by  at 6/7/2022 1438    Comment: role of OT, d/c rec, safety with Rwx use, therapy goals                   Point: Body mechanics (Done)     Description:   Instruct learner(s) on proper positioning and spine alignment during self-care, functional mobility activities and/or exercises.              Learning Progress Summary           Patient Acceptance, E, VU,NR by  at 6/7/2022 1438    Comment: role of OT,  d/c rec, safety with Rwx use, therapy goals                               User Key     Initials Effective Dates Name Provider Type Discipline     08/20/21 -  Anna Briggs OT Occupational Therapist OT              OT Recommendation and Plan  Planned Therapy Interventions (OT): activity tolerance training, functional balance retraining, BADL retraining, neuromuscular control/coordination retraining, patient/caregiver education/training, occupation/activity based interventions, ROM/therapeutic exercise, strengthening exercise, transfer/mobility retraining  Therapy Frequency (OT): 5 times/wk  Plan of Care Review  Plan of Care Reviewed With: patient  Progress: no change  Outcome Evaluation: Pt is a 87 yo M who was admitted from AL with generalized weakness and declining ability to ability to ambulate. Pt has a h/p fall with ICH roughly 2 weeks ago and staff reports overall decline since this incident per [facility] report. Pt seen for OT eval this date, A&Ox2. Pt resides at [facility], was requiring some assist with ADLs, however able to manage colostomy and meds (I) prior to fall 2 weeks ago. Today, pt presents with impaired ADLs, is a high falls risks, impaired act tolerance, balance, UE strength and functional transfers. UIC upon arrival, pt completed STS with CGA, func mob to BR commode with CGA-min A using Rwx, standing at commode to urinate with CGA. Max cues required for safety with use of Rwx due to poor positioning. Poor carryover demo'd with cues. Pt will continue to benefit from skilled OT to address deficits and decrease risk of falls with ADL completion. Rec SNF at d/c before returning to [facility].     Time Calculation:    Time Calculation- OT     Row Name 06/07/22 1438             Time Calculation- OT    OT Start Time 1339  -MW      OT Stop Time 1354  -MW      OT Time Calculation (min) 15 min  -MW      Total Timed Code Minutes- OT 8 minute(s)  -MW      OT Received On 06/07/22  -MW      OT - Next Appointment  06/08/22  -MW      OT Goal Re-Cert Due Date 06/21/22  -MW              Timed Charges    25355 - OT Self Care/Mgmt Minutes 8  -MW              Untimed Charges    OT Eval/Re-eval Minutes 7  -MW              Total Minutes    Timed Charges Total Minutes 8  -MW      Untimed Charges Total Minutes 7  -MW       Total Minutes 15  -MW            User Key  (r) = Recorded By, (t) = Taken By, (c) = Cosigned By    Initials Name Provider Type     Anna Briggs OT Occupational Therapist              Therapy Charges for Today     Code Description Service Date Service Provider Modifiers Qty    36850943608 HC OT SELF CARE/MGMT/TRAIN EA 15 MIN 6/7/2022 Anna Briggs OT GO 1    23173461209 HC OT EVAL MOD COMPLEXITY 2 6/7/2022 Anna Briggs OT GO 1               Anna Briggs OT  6/7/2022

## 2022-06-07 NOTE — PLAN OF CARE
Goal Outcome Evaluation:  Plan of Care Reviewed With: patient        Progress: no change  Outcome Evaluation: Pt is a 87 yo M who was admitted from AL with generalized weakness and declining ability to ability to ambulate. Pt has a h/p fall with ICH roughly 2 weeks ago and staff reports overall decline since this incident per [facility] report. Pt seen for OT eval this date, A&Ox2. Pt resides at [place], was requiring some assist with ADLs, however able to manage colostomy and meds (I) prior to fall 2 weeks ago. Today, pt presents with impaired ADLs, is a high falls risks, impaired act tolerance, balance, UE strength and functional transfers. UIC upon arrival, pt completed STS with CGA, func mob to BR commode with CGA-min A using Rwx, standing at commode to urinate with CGA. Max cues required for safety with use of Rwx due to poor positioning. Poor carryover demo'd with cues. Pt will continue to benefit from skilled OT to address deficits and decrease risk of falls with ADL completion. Rec SNF at d/c before returning to [facility].

## 2022-06-07 NOTE — CONSULTS
Neurology Consult Note    Consult Date: 6/7/2022    Referring MD: Jose Llanos MD    Reason for Consult I have been asked to see the patient in neurological consultation to render advice and opinion regarding low back pain with generalized weakness.    Reece Stephen is a 86 y.o. right-handed white male with known diagnosis of type 2 diabetes, hypertension, mixed hyperlipidemia, recent embolic-looking strokes on baby aspirin as anticoagulation felt to be high risk, amyloid angiopathy, risk of falls who [facility] for low back pain and bilateral lower extremity weakness that has been gone for the past 1 to 2 weeks.  Patient uses a walker at baseline for several months.  He states in the past couple week he had worsening lower extremity weakness with low back pain after he tripped and had a fall, he also complaining of bilateral hip pain.  Neurology/stroke saw the patient on May of this year when he had bilateral embolic-looking strokes, bilateral small intraventricular hemorrhage, ICH, hygroma and despite him having cardioembolic stronger blood thinner felt high risk and he was discharged on baby aspirin with the patient state compliant, patient also on Lipitor 40 mg daily for mixed hyperlipidemia.  The patient stated that he is still able to mobilize using his walker, on neurological examination he was able to lift arms and legs against gravity with no focal weakness, reflexes 1+ on the lower extremities, 2+ on the upper extremities, negative Charlette and no clonus, plantars difficult to evaluate due to severe withdrawal, he denied fecal or urinary continence or loss of sensation in the perianal area.  He denied smoking, drinking or drug illicit    Past Medical History:   Diagnosis Date   • Anemia    • Arthritis    • Chronic kidney disease     STAGE III, FOLLOWED BY DR. FREDDIE GONZALES   • Clostridium difficile infection 10/2014   • Diabetes mellitus (HCC)     TYPE 2, IDDM   • Disease  of thyroid gland     ACQUIRED HYPOTHYROIDISM   • Duodenal ulcer 08/2014   • DVT (deep venous thrombosis) ([facility]) 01/2015    PORT ASSOCIATED OF LEFT UPPER EXTREMITY   • Electrocution 1971    HAD TO HAVE PARTIAL AMPUTATION OF LEFT FOOT   • GERD (gastroesophageal reflux disease)    • Hearing loss    • Hemorrhagic shock (HCC) 2015    SECONDARY TO BLEEDING DUODENAL ULCER, ADMITTED TO [facility]   • Histiocytoma    • History of blood transfusion 2015    D/T BLEEDING DUODENAL ULCER   • History of chemotherapy     FOLLOWED BY DR. NIGEL NOBLE   • History of radiation therapy 2014    FOLLOWED BY DR. RJ HUI   • Hypercholesterolemia    • Hyperkalemia    • Hypertension    • Hypoglycemia 01/04/2018    SEEN AT [facility] ER   • Mixed hyperlipidemia    • Multiple lung nodules    • Neuropathy    • OA (osteoarthritis)    • ANNE MARIE (obstructive sleep apnea)    • Peptic ulceration 2014    Duodenal ulcer   • Pneumonia 03/29/2013    ADMITTED TO [facility]   • Rectal cancer ([facility]) 06/2014    T3N1M0, S/P CHEMO, XRT, AND RESECTION, FOLLOWED BY DR. TRACE HERRING   • Stroke ([facility]) 11/28/2017    TIA, ADMITTED TO [facility]   • Stroke ([facility]) 11/17/2019    CVA, ADMITTED TO [facility]   • Vitamin D deficiency        ROS:  No fevers, chills  No weakness, numbness    Exam  /92 (BP Location: Left arm, Patient Position: Lying)   Pulse 79   Temp 97.8 °F (36.6 °C) (Oral)   Resp 17   SpO2 93%   Gen: NAD, sitting comfortably on the chair, vitals reviewed  MS: Hard of hearing, oriented x3, recent/remote memory impaired, fair attention/concentration, fair judgment, language mostly intact, no neglect.  CN: visual acuity grossly normal, PERRL, EOMI, no facial droop, mild dysarthria  Motor: 4/5 throughout upper and lower extremities, normal tone  Sensory exam: Normal to light touch, temperature, vibration throughout  Coordination: Normal finger-nose-finger bilaterally  Reflexes: 1+ on the lower extremities, 2+ on the upper extremities, negative Charlette, no clonus, plantars unable to  "evaluate secondary to severe withdrawals.  Gait: Not assessed due to risk of falls      DATA:    Lab Results   Component Value Date    GLUCOSE 170 (H) 06/07/2022    CALCIUM 8.6 06/07/2022     (L) 06/07/2022    K 3.8 06/07/2022    CO2 21.1 (L) 06/07/2022     06/07/2022    BUN 21 06/07/2022    CREATININE 1.52 (H) 06/07/2022    EGFRIFAFRI 56 04/23/2015    EGFRIFNONA 28 (L) 10/18/2021    BCR 13.8 06/07/2022    ANIONGAP 7.9 06/07/2022     Lab Results   Component Value Date    WBC 6.98 06/07/2022    HGB 12.3 (L) 06/07/2022    HCT 37.7 06/07/2022    MCV 89.1 06/07/2022     06/07/2022       Lab review: Glucose 170, sodium 134, potassium 3.8, BUN 21, creatinine 1.52, WBC 6.9    Imaging review: I personally reviewed his repeated CT head which showed resolution of the intraventricular hemorrhage, right hemisphere hygroma,, no significant change on the left side hygroma compared to prior exam.    Diagnoses:  Worsening lower extremity weakness with low back pain  Prior embolic-looking strokes not on anticoagulation due to risk of falls and amyloid angiopathy with intraventricular hemorrhage on prior admission  Essential hypertension  Mixed hyperlipidemia  Type 2 diabetes      PLAN:   -Will order MRI brain without contrast to evaluate for new embolic strokes if he continues to have stroke will discuss with the family other options for secondary stroke probations.  If MRI brain negative would keep him on the same management with aspirin 81 mg daily and Lipitor 40 mg daily.  -MRI lumbar spine to evaluate for low back pain after the fall  -PT/OT evaluation    Further recommendations pending MRI brain and L-spine.  We will discussed the results of the MRIs and further recommendations with his niece.    Plan discussed with nursing staff, and will discuss with the admitting attending.    \"Dictated utilizing Dragon dictation\".    Discussed signs and symptoms of stroke and when to call 911. Instructed to follow a low " fat diet including the Mediterranean diet. Instructed to take all medications daily as prescribed. Encouraged 30 minutes of exercise 3-4 times a week as tolerated. Stay well hydrated. Discussed potential side effects of new medications and signs and symptoms to report.  Reviewed stroke risk factors and stroke prevention plan. Patient and family verbalizes understanding and agrees with plan.

## 2022-06-07 NOTE — THERAPY EVALUATION
Patient Name: Reece Stephen  : 1935    MRN: [id number]                              Today's Date: 2022       Admit Date: 2022    Visit Dx:     ICD-10-CM ICD-9-CM   1. Acute weakness  R53.1 780.79   2. Confusion  R41.0 298.9   3. History of recent stroke  Z86.73 V12.54   4. History of intracranial hemorrhage  Z86.79 V12.59     Patient Active Problem List   Diagnosis   • History of rectal cancer   • Lung nodule, multiple   • History of DVT (deep vein thrombosis)   • Transient cerebral ischemia   • Hyperkalemia   • Hyperosmolar non-ketotic state in patient with type 2 diabetes mellitus (HCC)   • Type 2 diabetes mellitus with complication (HCC)   • Hyponatremia with extracellular fluid depletion   • Hypothyroidism   • Hyperlipidemia   • Diabetic polyneuropathy associated with type 2 diabetes mellitus (HCC)   • Chronic kidney disease, stage 3 (moderate) (CMS/HCC)   • Non compliance w medication regimen   • Essential hypertension   • Upper gastrointestinal hemorrhage   • Osteoarthritis   • Gastroesophageal reflux disease   • Deep vein thrombosis (HCC)   • Colitis due to Clostridium difficile   • Burn injury   • Anemia   • Cerebrovascular accident (CVA) ([facility])   • Other injury of unspecified body region, initial encounter   • Chronic pain of both knees   • Diabetic neuropathy (HCC)   • Altered mental status   • Fall   • Cerebral contusion ([facility])   • Embolic stroke ([facility])   • Acute weakness     Past Medical History:   Diagnosis Date   • Anemia    • Arthritis    • Chronic kidney disease     STAGE III, FOLLOWED BY DR. FREDDIE GONZALES   • Clostridium difficile infection 10/2014   • Diabetes mellitus (HCC)     TYPE 2, IDDM   • Disease of thyroid gland     ACQUIRED HYPOTHYROIDISM   • Duodenal ulcer 2014   • DVT (deep venous thrombosis) (HCC) 2015    PORT ASSOCIATED OF LEFT UPPER EXTREMITY   • Electrocution     HAD TO HAVE PARTIAL AMPUTATION OF LEFT FOOT   • GERD (gastroesophageal reflux disease)    •  Hearing loss    • Hemorrhagic shock (HCC) 2015    SECONDARY TO BLEEDING DUODENAL ULCER, ADMITTED TO [facility]   • Histiocytoma    • History of blood transfusion 2015    D/T BLEEDING DUODENAL ULCER   • History of chemotherapy     FOLLOWED BY DR. NIGEL NOBLE   • History of radiation therapy 2014    FOLLOWED BY DR. RJ HUI   • Hypercholesterolemia    • Hyperkalemia    • Hypertension    • Hypoglycemia 01/04/2018    SEEN AT [facility] ER   • Mixed hyperlipidemia    • Multiple lung nodules    • Neuropathy    • OA (osteoarthritis)    • ANNE MARIE (obstructive sleep apnea)    • Peptic ulceration 2014    Duodenal ulcer   • Pneumonia 03/29/2013    ADMITTED TO [facility]   • Rectal cancer (HCC) 06/2014    T3N1M0, S/P CHEMO, XRT, AND RESECTION, FOLLOWED BY DR. TRACE HERRING   • Stroke (HCC) 11/28/2017    TIA, ADMITTED TO [facility]   • Stroke (HCC) 11/17/2019    CVA, ADMITTED TO [facility]   • Vitamin D deficiency      Past Surgical History:   Procedure Laterality Date   • AMPUTATION FOOT Left 1971    PARTIAL DUE TO AN ELECTRICAL SHOCK INJURY   • ARM TENDON REPAIR Right     DONE BY DR. OLEARY AND KLEINERT   • COLON RESECTION N/A 11/14/2014    LOW ANTERIOR RESECTION WITH CREATION OF COLOSTOMY, DR. TRACE HERRING AT [facility]   • COLONOSCOPY W/ BIOPSIES N/A 06/26/2014    ULCERATED 5CM MASS IN RECTUM, PATH: MODERATELY DIFFERENTIATED ADENOCARCINOMA, MULTIPLE DIVERTICULA IN SIGMOID, DR. LAUREN JAMES AT [facility]   • COLONOSCOPY W/ BIOPSIES N/A 07/09/2014    RECTAL MASS: INVASIVE MODERATLEY DIFFERIENTIATED ADENOCARCINOMA, AREA TATTOOED, DR. TRACE HERRING AT [facility]   • ENDOSCOPY  02/09/2015   • ENDOSCOPY N/A 02/09/2015    RESOLVED DUODENAL ULCER, EGD WNL, DR. FREDDIE MALCOLM AT [facility]   • FLEXIBLE SIGMOIDOSCOPY N/A 10/06/2014    MALIGNANT PARTIALLY OBSTRUCTING TUMOR IN MID RECTUM, DR. DELTA HERRING AT [facility]   • PORTACATH PLACEMENT Left 07/24/2014    LEFT SUBCLAVIAN, DR. KWAKU TAY AT [facility]   • RECTAL ULTRASOUND N/A 07/09/2014    ENDORECTAL US FOR CANCER STAGING, T3N1  RECTAL CANCER AT 7 CM, DR. TRACE HERRING AT [facility]   • TRACHEAL SURGERY N/A 08/31/2014    ENDOTRACHEAL INTUBATION, DR. ALMA ROSA HAGAN AT [facility]   • VENOUS ACCESS DEVICE (PORT) REMOVAL Left 03/12/2015    LEFT SUBCLAVIAN, DR. KWAKU TAY AT [facility]      General Information     Row Name 06/07/22 1342          Physical Therapy Time and Intention    Document Type evaluation  -     Mode of Treatment individual therapy;physical therapy  -     Row Name 06/07/22 1342          General Information    Prior Level of Function independent:;gait;transfer;bed mobility  walks with walker  -     Existing Precautions/Restrictions fall  -     Barriers to Rehab medically complex;hearing deficit  -     Row Name 06/07/22 1342          Living Environment    People in Home facility resident  pt is from [facility]  -     Row Name 06/07/22 1342          Home Main Entrance    Number of Stairs, Main Entrance none  -     Row Name 06/07/22 1342          Cognition    Orientation Status (Cognition) oriented to;person;place  pt is [facility]  -     Row Name 06/07/22 1342          Safety Issues, Functional Mobility    Safety Issues Affecting Function (Mobility) impulsivity;awareness of need for assistance  -     Impairments Affecting Function (Mobility) balance;endurance/activity tolerance;strength  -           User Key  (r) = Recorded By, (t) = Taken By, (c) = Cosigned By    Initials Name Provider Type     Wanda Peres, PT Physical Therapist               Mobility     Row Name 06/07/22 1344          Bed Mobility    Bed Mobility supine-sit;sit-supine  -     Supine-Sit [place] (Bed Mobility) not tested  -     Sit-Supine [place] (Bed Mobility) not tested  -     Comment, (Bed Mobility) sitting in chair  -     Row Name 06/07/22 1344          Sit-Stand Transfer    Sit-Stand [place] (Transfers) verbal cues;nonverbal cues (demo/gesture);contact guard  -     Assistive Device (Sit-Stand Transfers) walker, front-wheeled  -      Row Name 06/07/22 1344          Gait/Stairs (Locomotion)    [place] Level (Gait) verbal cues;nonverbal cues (demo/gesture)  -     Assistive Device (Gait) walker, front-wheeled  -     Distance in Feet (Gait) 50  -     Deviations/Abnormal Patterns (Gait) polo decreased;gait speed decreased;stride length decreased  -     Bilateral Gait Deviations forward flexed posture  -     Comment, (Gait/Stairs) pt walked in linder, then became incontinent of urine and returned to room to go to restroom  -           User Key  (r) = Recorded By, (t) = Taken By, (c) = Cosigned By    Initials Name Provider Type     Wanda Peres, PT Physical Therapist               Obj/Interventions     [facility] Name 06/07/22 1349          Range of Motion Comprehensive    General Range of Motion no range of motion deficits identified  -CH     Row Name 06/07/22 1349          Strength Comprehensive (MMT)    Comment, General Manual Muscle Testing (MMT) Assessment B LE grossly >/=4/5  -[facility] Name 06/07/22 1349          Balance    Balance Assessment standing static balance;standing dynamic balance  -     Static Standing Balance contact guard  -     Dynamic Standing Balance contact guard  -     Position/Device Used, Standing Balance walker, rolling  -           User Key  (r) = Recorded By, (t) = Taken By, (c) = Cosigned By    Initials Name Provider Type     Wanda Peres, PT Physical Therapist               Goals/Plan     [facility] Name 06/07/22 1352          Bed Mobility Goal 1 (PT)    Activity/Assistive Device (Bed Mobility Goal 1, PT) bed mobility activities, all  -     [place] Level/Cues Needed (Bed Mobility Goal 1, PT) supervision required  -     Time Frame (Bed Mobility Goal 1, PT) 1 week  -[facility] Name 06/07/22 1352          Transfer Goal 1 (PT)    Activity/Assistive Device (Transfer Goal 1, PT) transfers, all;walker, rolling  -     [place] Level/Cues Needed (Transfer Goal 1, PT) supervision  required  -CH     Time Frame (Transfer Goal 1, PT) 1 week  -     Row Name 06/07/22 1355          Gait Training Goal 1 (PT)    Activity/Assistive Device (Gait Training Goal 1, PT) gait (walking locomotion);walker, rolling  -     [place] Level (Gait Training Goal 1, PT) supervision required  -CH     Distance (Gait Training Goal 1, PT) 150  -CH     Time Frame (Gait Training Goal 1, PT) 1 week  -     Row Name 06/07/22 1356          Therapy Assessment/Plan (PT)    Planned Therapy Interventions (PT) balance training;bed mobility training;gait training;home exercise program;patient/family education;strengthening;transfer training  -           User Key  (r) = Recorded By, (t) = Taken By, (c) = Cosigned By    Initials Name Provider Type     Wanda Peres, PT Physical Therapist               Clinical Impression     Row Name 06/07/22 135          Pain    Pretreatment Pain Rating 0/10 - no pain  -     Posttreatment Pain Rating 0/10 - no pain  -     Row Name 06/07/22 1355          Plan of Care Review    Plan of Care Reviewed With patient  -     Outcome Evaluation Pt is a 85 yo M who was admitted from AL with generalized weakness and declining ability to ability to ambulate. Pt has a h/p fall with ICH. Pt presents to PT wtih some impaired functional mobility and gait secondary to some mild generalized weakness, impaired balance, and decreased activity tolerance. Pt may benefit fit from skilled PT to address strength, mobility, and gait.  -     Row Name 06/07/22 1353          Therapy Assessment/Plan (PT)    Patient/Family Therapy Goals Statement (PT) to return to [facility]  -     Rehab Potential (PT) good, to achieve stated therapy goals  -     Criteria for Skilled Interventions Met (PT) skilled treatment is necessary  -     Therapy Frequency (PT) 5 times/wk  -     Row Name 06/07/22 3365          Positioning and Restraints    Pre-Treatment Position sitting in chair/recliner  -     Post Treatment  Position chair  -     In Chair reclined;call light within reach;encouraged to call for assist;exit alarm on;notified nsg  aide notified that pt needs to be cleaned up  -           User Key  (r) = Recorded By, (t) = Taken By, (c) = Cosigned By    Initials Name Provider Type    Wanda Gallego PT Physical Therapist               Outcome Measures     Row Name 06/07/22 1353          How much help from another person do you currently need...    Turning from your back to your side while in flat bed without using bedrails? 3  -CH     Moving from lying on back to sitting on the side of a flat bed without bedrails? 3  -CH     Moving to and from a bed to a chair (including a wheelchair)? 3  -CH     Standing up from a chair using your arms (e.g., wheelchair, bedside chair)? 3  -CH     Climbing 3-5 steps with a railing? 1  -CH     To walk in hospital room? 3  -     AM-PAC 6 Clicks Score (PT) 16  -CH     Highest level of mobility 5 --> Static standing  -     Row Name 06/07/22 1353          Modified [place] Scale    Modified [place] Scale 4 - Moderately severe disability.  Unable to walk without assistance, and unable to attend to own bodily needs without assistance.  -     Row Name 06/07/22 1353          Functional Assessment    Outcome Measure Options AM-PAC 6 Clicks Basic Mobility (PT);Modified [place]  -           User Key  (r) = Recorded By, (t) = Taken By, (c) = Cosigned By    Initials Name Provider Type     Wanda Peres PT Physical Therapist                             Physical Therapy Education                 Title: PT OT SLP Therapies (In Progress)     Topic: Physical Therapy (In Progress)     Point: Mobility training (Done)     Learning Progress Summary           Patient Acceptance, E,TB,D, VU,NR by  at 6/7/2022 1354                   Point: Home exercise program (Not Started)     Learner Progress:  Not documented in this visit.          Point: Body mechanics (Done)     Learning Progress  Summary           Patient Acceptance, E,TB,D, VU,NR by  at 6/7/2022 1354                   Point: Precautions (Done)     Learning Progress Summary           Patient Acceptance, E,TB,D, VU,NR by  at 6/7/2022 1354                               User Key     Initials Effective Dates Name Provider Type [facility] 06/16/21 -  Wanda Peres PT Physical Therapist PT              PT Recommendation and Plan  Planned Therapy Interventions (PT): balance training, bed mobility training, gait training, home exercise program, patient/family education, strengthening, transfer training  Plan of Care Reviewed With: patient  Outcome Evaluation: Pt is a 85 yo M who was admitted from AL with generalized weakness and declining ability to ability to ambulate. Pt has a h/p fall with ICH. Pt presents to PT wt some impaired functional mobility and gait secondary to some mild generalized weakness, impaired balance, and decreased activity tolerance. Pt may benefit fit from skilled PT to address strength, mobility, and gait.     Time Calculation:    PT Charges     Row Name 06/07/22 1134             Time Calculation    Start Time 1000  -      Stop Time 1013  -      Time Calculation (min) 13 min  -      PT Received On 06/07/22  -      PT - Next Appointment 06/08/22  -      PT Goal Re-Cert Due Date 06/14/22  -              Time Calculation- PT    Total Timed Code Minutes- PT 8 minute(s)  -              Timed Charges    64389 - PT Therapeutic Activity Minutes 8  -              Total Minutes    Timed Charges Total Minutes 8  -CH       Total Minutes 8  -CH            User Key  (r) = Recorded By, (t) = Taken By, (c) = Cosigned By    Initials Name Provider Type     Wanda Peres PT Physical Therapist              Therapy Charges for Today     Code Description Service Date Service Provider Modifiers Qty    [id number]  PT THERAPEUTIC ACT EA 15 MIN 6/7/2022 Wanda Peres, PT GP 1    [id number]  PT EVAL  MOD COMPLEXITY 2 6/7/2022 Wanda Peres, PT GP 1          PT G-Codes  Outcome Measure Options: AM-PAC 6 Clicks Basic Mobility (PT), Modified [place]  AM-PAC 6 Clicks Score (PT): 16  Modified [place] Scale: 4 - Moderately severe disability.  Unable to walk without assistance, and unable to attend to own bodily needs without assistance.    Wanda Peres, PT  6/7/2022

## 2022-06-08 LAB
ANION GAP SERPL CALCULATED.3IONS-SCNC: 9.4 MMOL/L (ref 5–15)
BASOPHILS # BLD AUTO: 0.06 10*3/MM3 (ref 0–0.2)
BASOPHILS NFR BLD AUTO: 1 % (ref 0–1.5)
BUN SERPL-MCNC: 21 MG/DL (ref 8–23)
BUN/CREAT SERPL: 13 (ref 7–25)
CALCIUM SPEC-SCNC: 8.7 MG/DL (ref 8.6–10.5)
CHLORIDE SERPL-SCNC: 107 MMOL/L (ref 98–107)
CO2 SERPL-SCNC: 21.6 MMOL/L (ref 22–29)
CREAT SERPL-MCNC: 1.61 MG/DL (ref 0.76–1.27)
DEPRECATED RDW RBC AUTO: 37.3 FL (ref 37–54)
EGFRCR SERPLBLD CKD-EPI 2021: 41.4 ML/MIN/1.73
EOSINOPHIL # BLD AUTO: 0.39 10*3/MM3 (ref 0–0.4)
EOSINOPHIL NFR BLD AUTO: 6.8 % (ref 0.3–6.2)
ERYTHROCYTE [DISTWIDTH] IN BLOOD BY AUTOMATED COUNT: 12.3 % (ref 12.3–15.4)
GLUCOSE BLDC GLUCOMTR-MCNC: 143 MG/DL (ref 70–130)
GLUCOSE BLDC GLUCOMTR-MCNC: 187 MG/DL (ref 70–130)
GLUCOSE BLDC GLUCOMTR-MCNC: 223 MG/DL (ref 70–130)
GLUCOSE BLDC GLUCOMTR-MCNC: 91 MG/DL (ref 70–130)
GLUCOSE SERPL-MCNC: 92 MG/DL (ref 65–99)
HCT VFR BLD AUTO: 35.9 % (ref 37.5–51)
HGB BLD-MCNC: 12.2 G/DL (ref 13–17.7)
IMM GRANULOCYTES # BLD AUTO: 0.03 10*3/MM3 (ref 0–0.05)
IMM GRANULOCYTES NFR BLD AUTO: 0.5 % (ref 0–0.5)
LYMPHOCYTES # BLD AUTO: 1.25 10*3/MM3 (ref 0.7–3.1)
LYMPHOCYTES NFR BLD AUTO: 21.7 % (ref 19.6–45.3)
MCH RBC QN AUTO: 28.9 PG (ref 26.6–33)
MCHC RBC AUTO-ENTMCNC: 34 G/DL (ref 31.5–35.7)
MCV RBC AUTO: 85.1 FL (ref 79–97)
MONOCYTES # BLD AUTO: 0.61 10*3/MM3 (ref 0.1–0.9)
MONOCYTES NFR BLD AUTO: 10.6 % (ref 5–12)
NEUTROPHILS NFR BLD AUTO: 3.43 10*3/MM3 (ref 1.7–7)
NEUTROPHILS NFR BLD AUTO: 59.4 % (ref 42.7–76)
NRBC BLD AUTO-RTO: 0 /100 WBC (ref 0–0.2)
PLATELET # BLD AUTO: 216 10*3/MM3 (ref 140–450)
PMV BLD AUTO: 10.1 FL (ref 6–12)
POTASSIUM SERPL-SCNC: 3.7 MMOL/L (ref 3.5–5.2)
RBC # BLD AUTO: 4.22 10*6/MM3 (ref 4.14–5.8)
SODIUM SERPL-SCNC: 138 MMOL/L (ref 136–145)
WBC NRBC COR # BLD: 5.77 10*3/MM3 (ref 3.4–10.8)

## 2022-06-08 PROCEDURE — 63710000001 INSULIN LISPRO (HUMAN) PER 5 UNITS: Performed by: HOSPITALIST

## 2022-06-08 PROCEDURE — 82962 GLUCOSE BLOOD TEST: CPT

## 2022-06-08 PROCEDURE — 85025 COMPLETE CBC W/AUTO DIFF WBC: CPT | Performed by: HOSPITALIST

## 2022-06-08 PROCEDURE — 80048 BASIC METABOLIC PNL TOTAL CA: CPT | Performed by: HOSPITALIST

## 2022-06-08 PROCEDURE — 99233 SBSQ HOSP IP/OBS HIGH 50: CPT | Performed by: PHYSICIAN ASSISTANT

## 2022-06-08 RX ORDER — INSULIN LISPRO 100 [IU]/ML
8 INJECTION, SOLUTION INTRAVENOUS; SUBCUTANEOUS
Status: DISCONTINUED | OUTPATIENT
Start: 2022-06-08 | End: 2022-06-09 | Stop reason: HOSPADM

## 2022-06-08 RX ADMIN — LISINOPRIL 20 MG: 20 TABLET ORAL at 09:50

## 2022-06-08 RX ADMIN — LEVOTHYROXINE SODIUM 75 MCG: 0.07 TABLET ORAL at 06:58

## 2022-06-08 RX ADMIN — LISINOPRIL 20 MG: 20 TABLET ORAL at 21:33

## 2022-06-08 RX ADMIN — INSULIN LISPRO 8 UNITS: 100 INJECTION, SOLUTION INTRAVENOUS; SUBCUTANEOUS at 18:40

## 2022-06-08 RX ADMIN — INSULIN LISPRO 2 UNITS: 100 INJECTION, SOLUTION INTRAVENOUS; SUBCUTANEOUS at 13:19

## 2022-06-08 RX ADMIN — INSULIN GLARGINE-YFGN 25 UNITS: 100 INJECTION, SOLUTION SUBCUTANEOUS at 21:34

## 2022-06-08 RX ADMIN — AMLODIPINE BESYLATE 10 MG: 5 TABLET ORAL at 09:50

## 2022-06-08 RX ADMIN — INSULIN LISPRO 7 UNITS: 100 INJECTION, SOLUTION INTRAVENOUS; SUBCUTANEOUS at 09:50

## 2022-06-08 RX ADMIN — INSULIN LISPRO 7 UNITS: 100 INJECTION, SOLUTION INTRAVENOUS; SUBCUTANEOUS at 13:19

## 2022-06-08 RX ADMIN — ASPIRIN 81 MG: 81 TABLET, CHEWABLE ORAL at 09:50

## 2022-06-08 RX ADMIN — PANTOPRAZOLE SODIUM 40 MG: 40 TABLET, DELAYED RELEASE ORAL at 06:58

## 2022-06-08 RX ADMIN — ATORVASTATIN CALCIUM 10 MG: 20 TABLET, FILM COATED ORAL at 09:50

## 2022-06-08 RX ADMIN — INSULIN LISPRO 3 UNITS: 100 INJECTION, SOLUTION INTRAVENOUS; SUBCUTANEOUS at 21:33

## 2022-06-08 NOTE — PLAN OF CARE
Problem: Skin Injury Risk Increased  Goal: Skin Health and Integrity  Outcome: Ongoing, Progressing  Intervention: Optimize Skin Protection  Recent Flowsheet Documentation  Taken 6/8/2022 0800 by Jose Samuel RN  Head of Bed (HOB) Positioning: HOB elevated     Problem: Fall Injury Risk  Goal: Absence of Fall and Fall-Related Injury  Outcome: Ongoing, Progressing  Intervention: Identify and Manage Contributors  Recent Flowsheet Documentation  Taken 6/8/2022 1000 by Jose Samuel RN  Medication Review/Management: medications reviewed  Taken 6/8/2022 0800 by Jose Samuel RN  Medication Review/Management: medications reviewed  Intervention: Promote Injury-Free Environment  Recent Flowsheet Documentation  Taken 6/8/2022 1800 by Jose Samuel RN  Safety Promotion/Fall Prevention:   activity supervised   assistive device/personal items within reach   clutter free environment maintained   fall prevention program maintained   gait belt   lighting adjusted   mobility aid in reach   muscle strengthening facilitated   nonskid shoes/slippers when out of bed   room organization consistent   safety round/check completed  Taken 6/8/2022 1600 by Jose Samuel RN  Safety Promotion/Fall Prevention:   activity supervised   assistive device/personal items within reach   clutter free environment maintained   fall prevention program maintained   gait belt   mobility aid in reach   lighting adjusted   muscle strengthening facilitated   nonskid shoes/slippers when out of bed   room organization consistent   safety round/check completed  Taken 6/8/2022 1400 by Jose Samuel RN  Safety Promotion/Fall Prevention:   activity supervised   assistive device/personal items within reach   clutter free environment maintained   fall prevention program maintained   lighting adjusted   mobility aid in reach   muscle strengthening facilitated   gait belt   nonskid shoes/slippers when out of bed   room organization consistent    safety round/check completed  Taken 6/8/2022 1200 by Jose Samuel RN  Safety Promotion/Fall Prevention:   activity supervised   assistive device/personal items within reach   clutter free environment maintained   fall prevention program maintained   gait belt   lighting adjusted   mobility aid in reach   muscle strengthening facilitated   nonskid shoes/slippers when out of bed   room organization consistent   safety round/check completed  Taken 6/8/2022 1000 by Jose Samuel RN  Safety Promotion/Fall Prevention:   activity supervised   assistive device/personal items within reach   clutter free environment maintained   fall prevention program maintained   gait belt   lighting adjusted   mobility aid in reach   muscle strengthening facilitated   nonskid shoes/slippers when out of bed  Taken 6/8/2022 0800 by Jose Samuel RN  Safety Promotion/Fall Prevention:   activity supervised   assistive device/personal items within reach   clutter free environment maintained   fall prevention program maintained   gait belt   mobility aid in reach   muscle strengthening facilitated   lighting adjusted   nonskid shoes/slippers when out of bed   safety round/check completed   room organization consistent     Problem: Adult Inpatient Plan of Care  Goal: Plan of Care Review  Outcome: Ongoing, Progressing  Flowsheets (Taken 6/8/2022 1853)  Plan of Care Reviewed With: patient  Outcome Evaluation: VSS, RA.  No complaints of pain. A&Ox4. SNF pending.  Goal: Patient-Specific Goal (Individualized)  Outcome: Ongoing, Progressing  Goal: Absence of Hospital-Acquired Illness or Injury  Outcome: Ongoing, Progressing  Intervention: Identify and Manage Fall Risk  Recent Flowsheet Documentation  Taken 6/8/2022 1800 by Jose Samuel RN  Safety Promotion/Fall Prevention:   activity supervised   assistive device/personal items within reach   clutter free environment maintained   fall prevention program maintained   gait belt    lighting adjusted   mobility aid in reach   muscle strengthening facilitated   nonskid shoes/slippers when out of bed   room organization consistent   safety round/check completed  Taken 6/8/2022 1600 by Jose Samuel RN  Safety Promotion/Fall Prevention:   activity supervised   assistive device/personal items within reach   clutter free environment maintained   fall prevention program maintained   gait belt   mobility aid in reach   lighting adjusted   muscle strengthening facilitated   nonskid shoes/slippers when out of bed   room organization consistent   safety round/check completed  Taken 6/8/2022 1400 by Jose Samuel RN  Safety Promotion/Fall Prevention:   activity supervised   assistive device/personal items within reach   clutter free environment maintained   fall prevention program maintained   lighting adjusted   mobility aid in reach   muscle strengthening facilitated   gait belt   nonskid shoes/slippers when out of bed   room organization consistent   safety round/check completed  Taken 6/8/2022 1200 by Jose Samuel RN  Safety Promotion/Fall Prevention:   activity supervised   assistive device/personal items within reach   clutter free environment maintained   fall prevention program maintained   gait belt   lighting adjusted   mobility aid in reach   muscle strengthening facilitated   nonskid shoes/slippers when out of bed   room organization consistent   safety round/check completed  Taken 6/8/2022 1000 by Jose Samuel RN  Safety Promotion/Fall Prevention:   activity supervised   assistive device/personal items within reach   clutter free environment maintained   fall prevention program maintained   gait belt   lighting adjusted   mobility aid in reach   muscle strengthening facilitated   nonskid shoes/slippers when out of bed  Taken 6/8/2022 0800 by Jose Samuel RN  Safety Promotion/Fall Prevention:   activity supervised   assistive device/personal items within reach   clutter  free environment maintained   fall prevention program maintained   gait belt   mobility aid in reach   muscle strengthening facilitated   lighting adjusted   nonskid shoes/slippers when out of bed   safety round/check completed   room organization consistent  Intervention: Prevent Skin Injury  Recent Flowsheet Documentation  Taken 6/8/2022 0800 by Jose Samuel RN  Body Position: supine  Intervention: Prevent and Manage VTE (Venous Thromboembolism) Risk  Recent Flowsheet Documentation  Taken 6/8/2022 0800 by Jose Samuel RN  Activity Management: activity adjusted per tolerance  Goal: Optimal Comfort and Wellbeing  Outcome: Ongoing, Progressing  Intervention: Provide Person-Centered Care  Recent Flowsheet Documentation  Taken 6/8/2022 0800 by Jose Samuel RN  Trust Relationship/Rapport:   care explained   choices provided   questions answered   questions encouraged  Goal: Readiness for Transition of Care  Outcome: Ongoing, Progressing   Goal Outcome Evaluation:  Plan of Care Reviewed With: patient           Outcome Evaluation: VSS, RA.  No complaints of pain. A&Ox4. SNF pending.

## 2022-06-08 NOTE — SIGNIFICANT NOTE
06/08/22 1523   OTHER   Discipline occupational therapist   Rehab Time/Intention   Session Not Performed patient/family declined treatment  (after helping pt w making a phone call pt declines therapy, too tired today, busy day mult tests done and not up for therapy. [phone])   Recommendation   OT - Next Appointment 06/09/22

## 2022-06-08 NOTE — PROGRESS NOTES
Daily progress note    Chief complaint  Doing same  No new complaints  Still feeling weak  Denies any chest pain shortness of breath palpitation    History of present illness  86-year white male with very complex past medical history including intracranial hemorrhage bilateral subdural hematomas hygromas multiple embolic stroke diabetes hypertension hyperlipidemia hypothyroidism and chronic kidney disease stage III who has had rectal CA s/p colon resection and colostomy in place for last 8 years brought to the emergency room by the family as he unable to stand and walk this morning and complained of generalized weakness.  Patient has no headache dizziness chest pain shortness of breath abdominal pain nausea vomiting diarrhea.  Patient has trouble with his speech this morning but not at the time of interview.  Patient evaluated in ER admitted for management.     REVIEW OF SYSTEMS  Unremarkable except weakness     PHYSICAL EXAM   Blood pressure 145/75, pulse 76, temperature 97.8 °F (36.6 °C), temperature source Oral, resp. rate 18, SpO2 92 %.    GENERAL: Well developed, well nourished in NOdistress  HENT: NCAT, neck supple, trachea midline  EYES: no scleral icterus, PERRL, normal conjunctivae  CV: regular rhythm, regular rate, no murmur  RESPIRATORY: unlabored effort, CTAB  ABDOMEN: soft, nontender, nondistended, bowel sounds present  MUSCULOSKELETAL: no gross deformity, no pedal edema, no calf tenderness  NEURO: alert,  sensory and motor function of extremities intact, speech clear  SKIN: warm, dry, no rash  PSYCH:  Appropriate mood and affect     LAB RESULTS  Lab Results (last 24 hours)     Procedure Component Value Units Date/Time    POC Glucose Once [[id number]]  (Abnormal) Collected: 06/08/22 1105    Specimen: Blood Updated: 06/08/22 1106     Glucose 187 mg/dL      Comment: Meter: [id number] : [id number] [facility]       Basic Metabolic Panel [[id number]]  (Abnormal) Collected: 06/08/22 0632    Specimen:  Blood Updated: 06/08/22 0727     Glucose 92 mg/dL      BUN 21 mg/dL      Creatinine 1.61 mg/dL      Sodium 138 mmol/L      Potassium 3.7 mmol/L      Chloride 107 mmol/L      CO2 21.6 mmol/L      Calcium 8.7 mg/dL      BUN/Creatinine Ratio 13.0     Anion Gap 9.4 mmol/L      eGFR 41.4 mL/min/1.73      Comment: National Kidney Foundation and American Society of Nephrology (ASN) Task Force recommended calculation based on the Chronic Kidney Disease Epidemiology Collaboration (CKD-EPI) equation refit without adjustment for race.       Narrative:      GFR Normal >60  Chronic Kidney Disease <60  Kidney Failure <15      POC Glucose Once [[id number]]  (Normal) Collected: 06/08/22 0725    Specimen: Blood Updated: 06/08/22 0726     Glucose 91 mg/dL      Comment: Meter: [id number] : [id number] [employer]       CBC & Differential [[id number]]  (Abnormal) Collected: 06/08/22 0632    Specimen: Blood Updated: 06/08/22 0710    Narrative:      The following orders were created for panel order CBC & Differential.  Procedure                               Abnormality         Status                     ---------                               -----------         ------                     CBC Auto Differential[[id number]]        Abnormal            Final result                 Please view results for these tests on the individual orders.    CBC Auto Differential [[id number]]  (Abnormal) Collected: 06/08/22 0632    Specimen: Blood Updated: 06/08/22 0710     WBC 5.77 10*3/mm3      RBC 4.22 10*6/mm3      Hemoglobin 12.2 g/dL      Hematocrit 35.9 %      MCV 85.1 fL      MCH 28.9 pg      MCHC 34.0 g/dL      RDW 12.3 %      RDW-SD 37.3 fl      MPV 10.1 fL      Platelets 216 10*3/mm3      Neutrophil % 59.4 %      Lymphocyte % 21.7 %      Monocyte % 10.6 %      Eosinophil % 6.8 %      Basophil % 1.0 %      Immature Grans % 0.5 %      Neutrophils, Absolute 3.43 10*3/mm3      Lymphocytes, Absolute 1.25 10*3/mm3      Monocytes, Absolute 0.61  10*3/mm3      Eosinophils, Absolute 0.39 10*3/mm3      Basophils, Absolute 0.06 10*3/mm3      Immature Grans, Absolute 0.03 10*3/mm3      nRBC 0.0 /100 WBC     POC Glucose Once [[id number]]  (Abnormal) Collected: 06/07/22 2059    Specimen: Blood Updated: 06/07/22 2100     Glucose 210 mg/dL      Comment: Meter: [id number] : [id number] John DOE       POC Glucose Once [[id number]]  (Abnormal) Collected: 06/07/22 1632    Specimen: Blood Updated: 06/07/22 1634     Glucose 156 mg/dL      Comment: Meter: [id number] : [id number] Hatchett Sherrish NA           Imaging Results (Last 24 Hours)     Procedure Component Value Units Date/Time    MRI Brain Without Contrast [[id number]] Collected: 06/07/22 1649     Updated: 06/07/22 1658    Narrative:      MRI LUMBAR SPINE WITHOUT CONTRAST AND MRI BRAIN WITHOUT CONTRAST     HISTORY: Low back pain, lower extremity weakness. Remote history of  rectal cancer.     COMPARISON: CT head and lumbar spine 06/06/2022, CT angiogram 05/23/2022  of the head and neck and MRI examination of the brain 05/22/2022. No  prior MRI examination of the lumbar spine is available for comparison.     MRI LUMBAR SPINE WITHOUT CONTRAST:  FINDINGS: There is moderate loss of disc height and grade 1  retrolisthesis at L5-S1. There is increased signal intensity involving  the marrow of the visualized sacrum on the T1 sequences likely  representing radiation-related changes. The conus is at L1 and the  caudal aspect of the spinal cord appears unremarkable.     L1-L2: There is no evidence of disc bulge or herniation.     L2-L3: A mild central disc bulge is present with no evidence of  herniation.     L3-L4: Mild facet degenerative disease is present bilaterally.     L4-L5: A small central disc osteophyte complex is present with no  evidence of herniation.     L5-S1: Mild facet degenerative disease is present bilaterally. A left  paracentral disc osteophyte complex is present which abuts but does  not  displace the left S1 nerve root. Mild foraminal stenosis is present  bilaterally secondary to loss of disc height, retrolisthesis of L5 upon  S1 and extension of a small disc osteophyte complex into the neural  foramen.       Impression:      Radiation-related changes involving the sacrum are noted.  Degenerative disease involving the lumbar spine is appreciated, most  prominent of which is at L5-S1 where there is loss of disc height, disc  desiccation and a grade 1 retrolisthesis. There is no evidence of focal  herniation. Foraminal stenosis is most prominent at L5-S1 where it is  estimated to be mild. See above.     MRI BRAIN WITHOUT CONTRAST:  COMPARISON: CT head 06/06/2022, CT angiogram of the head 05/23/2022, MRI  of the brain 05/22/2022 and CT examinations of the head from 05/18/2022  to 05/20/2022.     TECHNIQUE: An MRI examination of the brain was performed utilizing  sagittal T1, axial diffusion, T1, T2 and gradient echo T2 imaging.     FINDINGS: The diffusion sequence shows no evidence of restricted  diffusion to suggest acute infarction. There is an area of increased  signal intensity on the diffusion sequence, T2 FLAIR sequence and to a  lesser extent T1 sequence involving the medial aspect of the right  occipital lobe measuring approximately 9 mm in AP dimension, 7 mm in  transverse dimension and 9 mm in craniocaudal dimension which  corresponds in location to the intraparenchymal hemorrhage noted on the  initial CT examination of 05/18/2022. The edema adjacent to the  hemorrhage has decreased as compared to the MRI examination of  05/22/2022. There is no evidence of new intra-axial hemorrhage.  Extensive small vessel ischemic disease is appreciated. A subdural fluid  collection is noted anterior to the right frontal lobe measuring  approximately 6-7 mm in maximum thickness, similar in appearance as  compared to the CT examination of 06/06/2022. A broader subdural is  appreciated overlying the left  cerebral hemisphere laterally, most  prominent overlying the anterolateral aspect of the left frontal lobe  where it measures 13 mm in thickness, similar in appearance as compared  to 06/06/2022. The subdural collections are mildly T2 FLAIR  hyperintense, slightly more prominent on the right. The T2 FLAIR  hyperintensity is new versus the MRI examination of 05/23/2022. The  hygroma on the left is similar in size as compared to 05/22/2022, but  the subdural collection on the right is smaller as compared to the MRI  examination of 05/22/2022. There is approximately 3 mm of midline shift  to the right.     IMPRESSION: Bilateral hygromas are appreciated which are unchanged in  size as compared to the CT examination of 06/06/2022. The hygroma on the  left is larger and unchanged in size as compared to the MRI examination  of 05/22/2022. However, there is mild T2 FLAIR hyperintensity involving  the hygroma which is new versus the prior MRI examination. The subdural  collection on the right is smaller in size as compared to the MRI  examination of 05/22/2022 and again is T2 FLAIR hyperintense which is  new as compared to the prior MRI examination. There is expected  evolution of an intraparenchymal hemorrhage involving the medial aspect  of the right occipital lobe with decreasing adjacent edema. On the MRI  examination of 05/22/2022 the hemorrhage was T1 and T2 hyperintense and  hypointense respectively. The intraparenchymal hematoma is now T1 and T2  hyperintense consistent with extracellular methemoglobin. The adjacent  edema has diminished. There is no evidence of new hemorrhage. There is  approximately 3 mm of midline shift to the right, unchanged.     This report was finalized on 6/7/2022 4:55 PM by Dr. Malcolm Guillaume M.D.       MRI Lumbar Spine Without Contrast [[id number]] Collected: 06/07/22 1649     Updated: 06/07/22 1658    Narrative:      MRI LUMBAR SPINE WITHOUT CONTRAST AND MRI BRAIN WITHOUT CONTRAST      HISTORY: Low back pain, lower extremity weakness. Remote history of  rectal cancer.     COMPARISON: CT head and lumbar spine 06/06/2022, CT angiogram 05/23/2022  of the head and neck and MRI examination of the brain 05/22/2022. No  prior MRI examination of the lumbar spine is available for comparison.     MRI LUMBAR SPINE WITHOUT CONTRAST:  FINDINGS: There is moderate loss of disc height and grade 1  retrolisthesis at L5-S1. There is increased signal intensity involving  the marrow of the visualized sacrum on the T1 sequences likely  representing radiation-related changes. The conus is at L1 and the  caudal aspect of the spinal cord appears unremarkable.     L1-L2: There is no evidence of disc bulge or herniation.     L2-L3: A mild central disc bulge is present with no evidence of  herniation.     L3-L4: Mild facet degenerative disease is present bilaterally.     L4-L5: A small central disc osteophyte complex is present with no  evidence of herniation.     L5-S1: Mild facet degenerative disease is present bilaterally. A left  paracentral disc osteophyte complex is present which abuts but does not  displace the left S1 nerve root. Mild foraminal stenosis is present  bilaterally secondary to loss of disc height, retrolisthesis of L5 upon  S1 and extension of a small disc osteophyte complex into the neural  foramen.       Impression:      Radiation-related changes involving the sacrum are noted.  Degenerative disease involving the lumbar spine is appreciated, most  prominent of which is at L5-S1 where there is loss of disc height, disc  desiccation and a grade 1 retrolisthesis. There is no evidence of focal  herniation. Foraminal stenosis is most prominent at L5-S1 where it is  estimated to be mild. See above.     MRI BRAIN WITHOUT CONTRAST:  COMPARISON: CT head 06/06/2022, CT angiogram of the head 05/23/2022, MRI  of the brain 05/22/2022 and CT examinations of the head from 05/18/2022  to 05/20/2022.     TECHNIQUE:  An MRI examination of the brain was performed utilizing  sagittal T1, axial diffusion, T1, T2 and gradient echo T2 imaging.     FINDINGS: The diffusion sequence shows no evidence of restricted  diffusion to suggest acute infarction. There is an area of increased  signal intensity on the diffusion sequence, T2 FLAIR sequence and to a  lesser extent T1 sequence involving the medial aspect of the right  occipital lobe measuring approximately 9 mm in AP dimension, 7 mm in  transverse dimension and 9 mm in craniocaudal dimension which  corresponds in location to the intraparenchymal hemorrhage noted on the  initial CT examination of 05/18/2022. The edema adjacent to the  hemorrhage has decreased as compared to the MRI examination of  05/22/2022. There is no evidence of new intra-axial hemorrhage.  Extensive small vessel ischemic disease is appreciated. A subdural fluid  collection is noted anterior to the right frontal lobe measuring  approximately 6-7 mm in maximum thickness, similar in appearance as  compared to the CT examination of 06/06/2022. A broader subdural is  appreciated overlying the left cerebral hemisphere laterally, most  prominent overlying the anterolateral aspect of the left frontal lobe  where it measures 13 mm in thickness, similar in appearance as compared  to 06/06/2022. The subdural collections are mildly T2 FLAIR  hyperintense, slightly more prominent on the right. The T2 FLAIR  hyperintensity is new versus the MRI examination of 05/23/2022. The  hygroma on the left is similar in size as compared to 05/22/2022, but  the subdural collection on the right is smaller as compared to the MRI  examination of 05/22/2022. There is approximately 3 mm of midline shift  to the right.     IMPRESSION: Bilateral hygromas are appreciated which are unchanged in  size as compared to the CT examination of 06/06/2022. The hygroma on the  left is larger and unchanged in size as compared to the MRI examination  of  05/22/2022. However, there is mild T2 FLAIR hyperintensity involving  the hygroma which is new versus the prior MRI examination. The subdural  collection on the right is smaller in size as compared to the MRI  examination of 05/22/2022 and again is T2 FLAIR hyperintense which is  new as compared to the prior MRI examination. There is expected  evolution of an intraparenchymal hemorrhage involving the medial aspect  of the right occipital lobe with decreasing adjacent edema. On the MRI  examination of 05/22/2022 the hemorrhage was T1 and T2 hyperintense and  hypointense respectively. The intraparenchymal hematoma is now T1 and T2  hyperintense consistent with extracellular methemoglobin. The adjacent  edema has diminished. There is no evidence of new hemorrhage. There is  approximately 3 mm of midline shift to the right, unchanged.     This report was finalized on 6/7/2022 4:55 PM by Dr. Malcolm Guillaume M.D.           ECG 12 Lead  Component   Ref Range & Units 13:46 2 wk ago   QT Interval   ms 426 P  429    Resulting Agency  ECG  ECG             HEART RATE= 67  bpm  RR Interval= 900  ms  SD Interval= 190  ms  P Horizontal Axis= 11  deg  P Front Axis= 36  deg  QRSD Interval= 121  ms  QT Interval= 426  ms  QRS Axis= -43  deg  T Wave Axis= -12  deg  - ABNORMAL ECG -  Sinus arrhythmia  Left bundle branch block             Current Facility-Administered Medications:   •  amLODIPine (NORVASC) tablet 10 mg, 10 mg, Oral, Q24H, Jose Llanos MD, 10 mg at 06/08/22 0950  •  aspirin chewable tablet 81 mg, 81 mg, Oral, Daily, Jose Llanos MD, 81 mg at 06/08/22 0950  •  atorvastatin (LIPITOR) tablet 10 mg, 10 mg, Oral, Daily, Jose Llanos MD, 10 mg at 06/08/22 0950  •  insulin glargine (LANTUS, SEMGLEE) injection 25 Units, 25 Units, Subcutaneous, Nightly, Jose Llanos MD, 25 Units at 06/07/22 2131  •  insulin lispro (ADMELOG) injection 0-7 Units, 0-7 Units, Subcutaneous, 4x Daily With Meals & Nightly, Jose Llanos MD, 2  Units at 06/08/22 1319  •  insulin lispro (ADMELOG) injection 7 Units, 7 Units, Subcutaneous, TID With Meals, Rylee Llanos MD, 7 Units at 06/08/22 1319  •  levothyroxine (SYNTHROID, LEVOTHROID) tablet 75 mcg, 75 mcg, Oral, Q AM, Rylee Llanos MD, 75 mcg at 06/08/22 0658  •  lisinopril (PRINIVIL,ZESTRIL) tablet 20 mg, 20 mg, Oral, Q12H, Rylee Llanos MD, 20 mg at 06/08/22 0950  •  pantoprazole (PROTONIX) EC tablet 40 mg, 40 mg, Oral, QAM, Rylee Llanos MD, 40 mg at 06/08/22 0658  •  sodium chloride 0.45 % infusion, 75 mL/hr, Intravenous, Continuous, Rylee Llanos MD, Last Rate: 75 mL/hr at 06/07/22 0647, 75 mL/hr at 06/07/22 0647     ASSESSMENT  Generalized weakness with inability to stand and walk with dysarthria with recent intracranial hemorrhage bilateral subdural hematomas hygromas and multiple embolic stroke.  Diabetes mellitus  Hypertension  Hyperlipidemia  Hypothyroidism  Chronic kidney disease stage III  Rectal CA s/p colon resection and colostomy  Gastroesophageal reflux disease    PLAN  CPM  Discontinue IVF  Aspirin Lipitor  Neurology consult appreciated  Adjust home medications  Stress ulcer DVT prophylaxis  Supportive care  DNR  PT OT  Discussed with POA and nursing staff  Discharge planning    RYLEE LLANOS MD

## 2022-06-08 NOTE — PROGRESS NOTES
DOS: 2022  NAME: Reece Stephen   : 1935  PCP: Ana María Atkinson MD  Chief Complaint   Patient presents with   • unable to walk       Chief complaint: Fall, weakness  Subjective: Patient lying in bed TV on mildly.  He cannot find his hearing aids.  He does not have specific complaints.  No headache, no dizziness, no weakness to report.  He has been up ambulating with physical therapy today according to him    Objective:  Vital signs: /75 (BP Location: Right arm, Patient Position: Lying)   Pulse 76   Temp 97.8 °F (36.6 °C) (Oral)   Resp 18   SpO2 92%    Gen: NAD, vitals reviewed  MS: oriented x3, recent/remote memory intact, normal attention/concentration, language intact, no neglect.  CN: visual acuity grossly normal, PERRL, EOMI, no facial droop, no dysarthria  Motor: Confrontational testing difficult, patient has trouble with directions, no drift of the upper extremities, 5/5 throughout lower extremities including quadriceps, normal tone  Sensory: intact to light touch all 4 ext.    ROS:  No weakness, numbness  No fevers, chills      Laboratory results:  Lab Results   Component Value Date    GLUCOSE 92 2022    CALCIUM 8.7 2022     2022    K 3.7 2022    CO2 21.6 (L) 2022     2022    BUN 21 2022    CREATININE 1.61 (H) 2022    EGFRIFAFRI 56 2015    EGFRIFNONA 28 (L) 10/18/2021    BCR 13.0 2022    ANIONGAP 9.4 2022     Lab Results   Component Value Date    WBC 5.77 2022    HGB 12.2 (L) 2022    HCT 35.9 (L) 2022    MCV 85.1 2022     2022     Lab Results   Component Value Date    LDL 60 2022    LDL 68 2022    LDL 52 2019         Lab 22  0615   HEMOGLOBIN A1C 9.90*        Review of labs: A1c 9.9, LDL 52, TSH normal at 1.8    Review and interpretation of imaging:   Brain and L spine MRI without    IMPRESSION: Bilateral hygromas are appreciated which are unchanged  in  size as compared to the CT examination of 06/06/2022. The hygroma on the  left is larger and unchanged in size as compared to the MRI examination  of 05/22/2022. However, there is mild T2 FLAIR hyperintensity involving  the hygroma which is new versus the prior MRI examination. The subdural  collection on the right is smaller in size as compared to the MRI  examination of 05/22/2022 and again is T2 FLAIR hyperintense which is  new as compared to the prior MRI examination. There is expected  evolution of an intraparenchymal hemorrhage involving the medial aspect  of the right occipital lobe with decreasing adjacent edema. On the MRI  examination of 05/22/2022 the hemorrhage was T1 and T2 hyperintense and  hypointense respectively. The intraparenchymal hematoma is now T1 and T2  hyperintense consistent with extracellular methemoglobin. The adjacent  edema has diminished. There is no evidence of new hemorrhage. There is  approximately 3 mm of midline shift to the right, unchanged.     Radiation-related changes involving the sacrum are noted.  Degenerative disease involving the lumbar spine is appreciated, most  prominent of which is at L5-S1 where there is loss of disc height, disc  desiccation and a grade 1 retrolisthesis. There is no evidence of focal  herniation. Foraminal stenosis is most prominent at L5-S1 where it is  estimated to be mild           Workup to date:    Diagnoses:    1. General weakness  2.  History of subdural hematoma  3.  History of embolic appearing strokes right frontal temporal lobe  4.  Concern for cerebral amyloidosis    Impression: 86-year-old male with recent discharge after ICH and traumatic SDH, embolic appearing strokes, diabetes, hypertension, hyperlipidemia, CKD stage III, history of rectal carcinoma status post chemoradiation who presented from assisted living with difficulty ambulating and complaining of hip pain.      Plan:  1.  MRI brain negative for new stroke.  Continue on  monotherapy with aspirin and statin.   Neuro attending asked for Medrol Dosepak to help with hygromas which are stable.   2.  MRI lumbar spine with degenerative changes but no significant findings to explain weakness  3.  Continue with PT OT.  SNF is recommended      Nidia Haley PA-C  [phone]      Thank you for this consultation.  Discussed above plan with neuro attending, Dr. Camejo who agrees with above plan.  Neurology team is available for concerns or questions.  We will be signing off    I spent at least 30 minutes interviewing, examining, and counseling patient.  I independently reviewed documentation, laboratory and diagnostic findings, external documentation where applicable, and formulated treatment plan which was discussed with the patient.

## 2022-06-09 VITALS
OXYGEN SATURATION: 93 % | DIASTOLIC BLOOD PRESSURE: 94 MMHG | RESPIRATION RATE: 18 BRPM | HEART RATE: 79 BPM | TEMPERATURE: 97.6 F | SYSTOLIC BLOOD PRESSURE: 136 MMHG

## 2022-06-09 LAB
ANION GAP SERPL CALCULATED.3IONS-SCNC: 10.6 MMOL/L (ref 5–15)
BASOPHILS # BLD AUTO: 0.06 10*3/MM3 (ref 0–0.2)
BASOPHILS NFR BLD AUTO: 0.9 % (ref 0–1.5)
BUN SERPL-MCNC: 23 MG/DL (ref 8–23)
BUN/CREAT SERPL: 13.5 (ref 7–25)
CALCIUM SPEC-SCNC: 8.7 MG/DL (ref 8.6–10.5)
CHLORIDE SERPL-SCNC: 106 MMOL/L (ref 98–107)
CO2 SERPL-SCNC: 22.4 MMOL/L (ref 22–29)
CREAT SERPL-MCNC: 1.7 MG/DL (ref 0.76–1.27)
DEPRECATED RDW RBC AUTO: 39.6 FL (ref 37–54)
EGFRCR SERPLBLD CKD-EPI 2021: 38.8 ML/MIN/1.73
EOSINOPHIL # BLD AUTO: 0.36 10*3/MM3 (ref 0–0.4)
EOSINOPHIL NFR BLD AUTO: 5.3 % (ref 0.3–6.2)
ERYTHROCYTE [DISTWIDTH] IN BLOOD BY AUTOMATED COUNT: 12.2 % (ref 12.3–15.4)
GLUCOSE BLDC GLUCOMTR-MCNC: 159 MG/DL (ref 70–130)
GLUCOSE BLDC GLUCOMTR-MCNC: 69 MG/DL (ref 70–130)
GLUCOSE SERPL-MCNC: 165 MG/DL (ref 65–99)
HCT VFR BLD AUTO: 37.2 % (ref 37.5–51)
HGB BLD-MCNC: 12.3 G/DL (ref 13–17.7)
IMM GRANULOCYTES # BLD AUTO: 0.04 10*3/MM3 (ref 0–0.05)
IMM GRANULOCYTES NFR BLD AUTO: 0.6 % (ref 0–0.5)
LYMPHOCYTES # BLD AUTO: 1.15 10*3/MM3 (ref 0.7–3.1)
LYMPHOCYTES NFR BLD AUTO: 16.9 % (ref 19.6–45.3)
MCH RBC QN AUTO: 29.4 PG (ref 26.6–33)
MCHC RBC AUTO-ENTMCNC: 33.1 G/DL (ref 31.5–35.7)
MCV RBC AUTO: 89 FL (ref 79–97)
MONOCYTES # BLD AUTO: 0.7 10*3/MM3 (ref 0.1–0.9)
MONOCYTES NFR BLD AUTO: 10.3 % (ref 5–12)
NEUTROPHILS NFR BLD AUTO: 4.5 10*3/MM3 (ref 1.7–7)
NEUTROPHILS NFR BLD AUTO: 66 % (ref 42.7–76)
NRBC BLD AUTO-RTO: 0 /100 WBC (ref 0–0.2)
PLATELET # BLD AUTO: 206 10*3/MM3 (ref 140–450)
PMV BLD AUTO: 10.4 FL (ref 6–12)
POTASSIUM SERPL-SCNC: 3.7 MMOL/L (ref 3.5–5.2)
RBC # BLD AUTO: 4.18 10*6/MM3 (ref 4.14–5.8)
SODIUM SERPL-SCNC: 139 MMOL/L (ref 136–145)
WBC NRBC COR # BLD: 6.81 10*3/MM3 (ref 3.4–10.8)

## 2022-06-09 PROCEDURE — 85025 COMPLETE CBC W/AUTO DIFF WBC: CPT | Performed by: HOSPITALIST

## 2022-06-09 PROCEDURE — 80048 BASIC METABOLIC PNL TOTAL CA: CPT | Performed by: HOSPITALIST

## 2022-06-09 PROCEDURE — 82962 GLUCOSE BLOOD TEST: CPT

## 2022-06-09 PROCEDURE — 63710000001 INSULIN LISPRO (HUMAN) PER 5 UNITS: Performed by: HOSPITALIST

## 2022-06-09 PROCEDURE — 97110 THERAPEUTIC EXERCISES: CPT

## 2022-06-09 RX ORDER — LISINOPRIL 20 MG/1
20 TABLET ORAL EVERY 12 HOURS SCHEDULED
Qty: 60 TABLET | Refills: 0 | Status: SHIPPED | OUTPATIENT
Start: 2022-06-09 | End: 2022-08-04 | Stop reason: HOSPADM

## 2022-06-09 RX ORDER — ATORVASTATIN CALCIUM 10 MG/1
10 TABLET, FILM COATED ORAL DAILY
Qty: 30 TABLET | Refills: 0 | Status: SHIPPED | OUTPATIENT
Start: 2022-06-10 | End: 2022-07-10

## 2022-06-09 RX ORDER — INSULIN LISPRO 100 [IU]/ML
8 INJECTION, SOLUTION INTRAVENOUS; SUBCUTANEOUS
Qty: 100 ML | Refills: 0 | Status: SHIPPED | OUTPATIENT
Start: 2022-06-09 | End: 2022-08-04 | Stop reason: HOSPADM

## 2022-06-09 RX ORDER — NITROGLYCERIN 0.4 MG/1
0.4 TABLET SUBLINGUAL
Status: DISCONTINUED | OUTPATIENT
Start: 2022-06-09 | End: 2022-06-09

## 2022-06-09 RX ADMIN — ATORVASTATIN CALCIUM 10 MG: 20 TABLET, FILM COATED ORAL at 08:37

## 2022-06-09 RX ADMIN — INSULIN LISPRO 2 UNITS: 100 INJECTION, SOLUTION INTRAVENOUS; SUBCUTANEOUS at 08:38

## 2022-06-09 RX ADMIN — AMLODIPINE BESYLATE 10 MG: 5 TABLET ORAL at 08:37

## 2022-06-09 RX ADMIN — LISINOPRIL 20 MG: 20 TABLET ORAL at 08:37

## 2022-06-09 RX ADMIN — LEVOTHYROXINE SODIUM 75 MCG: 0.07 TABLET ORAL at 06:34

## 2022-06-09 RX ADMIN — ASPIRIN 81 MG: 81 TABLET, CHEWABLE ORAL at 08:37

## 2022-06-09 RX ADMIN — PANTOPRAZOLE SODIUM 40 MG: 40 TABLET, DELAYED RELEASE ORAL at 06:34

## 2022-06-09 RX ADMIN — INSULIN LISPRO 8 UNITS: 100 INJECTION, SOLUTION INTRAVENOUS; SUBCUTANEOUS at 08:38

## 2022-06-09 NOTE — PLAN OF CARE
Goal Outcome Evaluation:  Plan of Care Reviewed With: patient           Outcome Evaluation: VSS, RA.  No complaints of pain. A&Ox4. SNF pending.

## 2022-06-09 NOTE — DISCHARGE SUMMARY
Discharge summary    Date of admission 6/6/2022  Date of discharge 6/9/2022    Final diagnosis  Generalized weakness with recent intracranial hemorrhage bi subdural hematomas/hygromas and multiple embolic stroke.  Insulin-dependent diabetes mellitus  Hypertension  Hyperlipidemia  Hypothyroidism  Chronic kidney disease stage III  Degenerative disc disease  Rectal CA s/p colon resection and colostomy  Gastroesophageal reflux disease    Discharge medications    Current Facility-Administered Medications:   •  amLODIPine (NORVASC) tablet 10 mg, 10 mg, Oral, Q24H, Jose Llanos MD, 10 mg at 06/09/22 0837  •  aspirin chewable tablet 81 mg, 81 mg, Oral, Daily, Jose Llanos MD, 81 mg at 06/09/22 0837  •  atorvastatin (LIPITOR) tablet 10 mg, 10 mg, Oral, Daily, Jose Llanos MD, 10 mg at 06/09/22 0837  •  insulin glargine (LANTUS, SEMGLEE) injection 25 Units, 25 Units, Subcutaneous, Nightly, Jose Llanos MD, 25 Units at 06/08/22 2134  •  insulin lispro (ADMELOG) injection 0-7 Units, 0-7 Units, Subcutaneous, 4x Daily With Meals & Nightly, Jose Llanos MD, 2 Units at 06/09/22 0838  •  insulin lispro (ADMELOG) injection 8 Units, 8 Units, Subcutaneous, TID With Meals, Jose Llanos MD, 8 Units at 06/09/22 0838  •  levothyroxine (SYNTHROID, LEVOTHROID) tablet 75 mcg, 75 mcg, Oral, Q AM, Jose Llanos MD, 75 mcg at 06/09/22 0634  •  lisinopril (PRINIVIL,ZESTRIL) tablet 20 mg, 20 mg, Oral, Q12H, Jose Llanos MD, 20 mg at 06/09/22 0837  •  pantoprazole (PROTONIX) EC tablet 40 mg, 40 mg, Oral, QAM, Jose Llanos MD, 40 mg at 06/09/22 0634     Consults obtained  Neurology    Procedures  None    Hospital course  86-year-old white male with history of diabetes hypertension hyperlipidemia and chronic kidney disease stage III who was recently admitted for multiple embolic stroke and also found to have intracranial hemorrhage with bilateral subdural hematomas and hygromas brought to the emergency at [facility] with increased  weakness unable to stand and walk.  Patient evaluated admit for further work-up.  Patient further work-up with MRI of the brain which showed no new stroke and remained unchanged and neurology recommended to continue baby aspirin and statin at this time.  Patient also had MRI of the lumbar spine which showed degenerative change and explained his weakness.  Patient feeling little better and will be discharged to subacute rehab for PT OT nursing care.  I have discussed the case with his daughter who is a registered nurse.    Discharge diet regular    Activity per PT OT    Medication as above    Further care per accepting physician at subacute rehab and follow-up with neurology per the instruction and take medication as directed.    RYLEE LE MD

## 2022-06-09 NOTE — DISCHARGE PLACEMENT REQUEST
"Reece Stephen (86 y.o. Male)             Date of Birth   1935    Social Security Number       Address   [address] [place] [id number]    Home Phone   [phone]    MRN   [id number]       [facility]    Marital Status   Single                            Admission Date   6/6/22    Admission Type   Emergency    Admitting Provider   Jose Llanos MD    Attending Provider   Jose Llanos MD    Department, Room/Bed   [address], P582/1       Discharge Date       Discharge Disposition       Discharge Destination                               Attending Provider: Jose Llanos MD    Allergies: No Known Allergies    Isolation: None   Infection: None   Code Status: No CPR   Advance Care Planning Activity    Ht: 175.3 cm (69\")   Wt: 73.5 kg (162 lb)    Admission Cmt: None   Principal Problem: None                Active Insurance as of 6/6/2022     Primary Coverage     Payor Plan Insurance Group Employer/Plan Group    MEDICARE MEDICARE A & B      Payor Plan Address Payor Plan Phone Number Payor Plan Fax Number Effective Dates    [address] [phone]  8/1/2000 - None Entered    [facility] [id number]       Subscriber Name Subscriber Birth Date Member ID       REECE STEPHEN 1935 [id number]           Secondary Coverage     Payor Plan Insurance Group Employer/Plan Group    [facility] SUPP [id number]     Payor Plan Address Payor Plan Phone Number Payor Plan Fax Number Effective Dates    [address]   12/1/2016 - None Entered    [facility] [id number]       Subscriber Name Subscriber Birth Date Member ID       REECE STEPHEN 1935 [id number]                 Emergency Contacts      (Rel.) Home Phone Work Phone Mobile Phone    Geetha Stephen ([employer]SO [employer]) (Relative) -- -- [phone]    [employer] (Relative) -- -- [phone]    MOODYCLIVE (Sister) [phone] -- --            {Outbreak/Travel/Exposure " Documentation......;  Question Available Choices Patient Response   COVID-19 Outbreak Screen:  Do you currently have a new onset of the following symptoms?        Fever/Chills, Cough, Shortness of air, Loss of taste or smell, No, Unknown  No (06/06/22 1205)   COVID-19 Outbreak Screen: In the last 14 days, have you had contact with anyone who is ill, has show any of the symptoms listed above and/or has been diagnosis with the 2019 Novel Coronavirus? This includes any immediate household members but excludes any patients with whom you have been in contact within your normal work duties wearing proper PPE, if you are a healthcare worker.  Yes, No, Unknown              No (06/06/22 1205)   COVID-19 Outbreak Screen: Who was notified? Free text (not recorded)   Ebola Screening Outbreak Screen: Have you traveled to the [place] the [place] or [place] within the past 21 days?  Yes, No, Unknown (not recorded)   Ebola Screening Outbreak Screen: Do you have ANY of the following symptoms: Fever/Chills, Vomiting, Diarrhea, Fatigue, Headache, Muscle pain, Unexplained bleeding, Abdominal (stomach) pain, No, Unknown (not recorded)   Ebola Screening Outbreak Screen: Name of Person notified Free text (not recorded)   Travel Screen: Have you traveled in the last month? If so, to what country have you traveled? If [place] what state? Yes, No, Unknown  List of all countries  List of all States No (06/06/22 1205)  (not recorded)  (not recorded)   Infection Risk: Do you currently have the following symptoms?  (If cough is selected, the Tuberculosis Screen is performed.) Cough, Fever, Rash, No No (06/06/22 1205)   Tuberculosis Screen: Do you have any of the following Tuberculosis Risks?  · Have you lived or spent time with anyone who had or may have TB?  · Have you lived in or visited any of the following areas for more than one month: [place], [place], [place], [place] or [place], the [place] or [place]?  · Do you have  HIV/AIDS?  · Have you lived in or worked in a nursing home, homeless shelter, correctional facility, or substance abuse treatment facility?   · No    If Yes do you have any of the following symptoms? Yes responses display to the right    If Yes, symptoms listed are:  Cough greater than or equal to 3 weeks, Loss of appetite, Unexplained weight loss, Night sweats, Bloody sputum or hemoptysis, Hoarseness, Fever, Fatigue, Chest pain, No (not recorded)  (not recorded)   Exposure Screen: Have you been exposed to any of these contagious diseases in the last month? Measles, Chickenpox, Meningitis, Pertussis, Whooping Cough, No No (06/06/22 8051)

## 2022-06-09 NOTE — PLAN OF CARE
Goal Outcome Evaluation:  Plan of Care Reviewed With: patient, significant other        Progress: improving     Problem: Skin Injury Risk Increased  Goal: Skin Health and Integrity  Outcome: Ongoing, Progressing  Intervention: Optimize Skin Protection  Recent Flowsheet Documentation  Taken 6/9/2022 0420 by Sera Pineda RN  Head of Bed (HOB) Positioning: HOB elevated  Taken 6/9/2022 0206 by Sera Pineda RN  Head of Bed (HOB) Positioning: HOB lowered  Taken 6/9/2022 0015 by Sera Pineda RN  Head of Bed (HOB) Positioning: HOB elevated  Taken 6/8/2022 2222 by Sera Pineda RN  Head of Bed (HOB) Positioning: HOB elevated  Taken 6/8/2022 2000 by Sera Pineda RN  Head of Bed (HOB) Positioning: HOB elevated     Problem: Fall Injury Risk  Goal: Absence of Fall and Fall-Related Injury  Outcome: Ongoing, Progressing  Intervention: Identify and Manage Contributors  Recent Flowsheet Documentation  Taken 6/8/2022 2000 by Sera Pineda RN  Medication Review/Management: medications reviewed  Intervention: Promote Injury-Free Environment  Recent Flowsheet Documentation  Taken 6/9/2022 0420 by Sera Pineda RN  Safety Promotion/Fall Prevention:   activity supervised   safety round/check completed  Taken 6/9/2022 0206 by Sera Pineda RN  Safety Promotion/Fall Prevention:   activity supervised   safety round/check completed  Taken 6/9/2022 0015 by Sera Pineda RN  Safety Promotion/Fall Prevention:   activity supervised   safety round/check completed  Taken 6/8/2022 2222 by Sera Pineda RN  Safety Promotion/Fall Prevention:   activity supervised   safety round/check completed  Taken 6/8/2022 2000 by Sera Pineda RN  Safety Promotion/Fall Prevention:   activity supervised   assistive device/personal items within reach   clutter free environment maintained   fall prevention program maintained   nonskid shoes/slippers when out of bed   room organization consistent   safety  round/check completed     Problem: Adult Inpatient Plan of Care  Goal: Plan of Care Review  Outcome: Ongoing, Progressing  Flowsheets (Taken 6/9/2022 0548)  Progress: improving  Plan of Care Reviewed With:   patient   significant other  Goal: Patient-Specific Goal (Individualized)  Outcome: Ongoing, Progressing  Goal: Absence of Hospital-Acquired Illness or Injury  Outcome: Ongoing, Progressing  Intervention: Identify and Manage Fall Risk  Recent Flowsheet Documentation  Taken 6/9/2022 0420 by Sera Pineda RN  Safety Promotion/Fall Prevention:   activity supervised   safety round/check completed  Taken 6/9/2022 0206 by Sera Pineda RN  Safety Promotion/Fall Prevention:   activity supervised   safety round/check completed  Taken 6/9/2022 0015 by Sera Pineda RN  Safety Promotion/Fall Prevention:   activity supervised   safety round/check completed  Taken 6/8/2022 2222 by Sera Pineda RN  Safety Promotion/Fall Prevention:   activity supervised   safety round/check completed  Taken 6/8/2022 2000 by Sera Pineda RN  Safety Promotion/Fall Prevention:   activity supervised   assistive device/personal items within reach   clutter free environment maintained   fall prevention program maintained   nonskid shoes/slippers when out of bed   room organization consistent   safety round/check completed  Intervention: Prevent Skin Injury  Recent Flowsheet Documentation  Taken 6/9/2022 0420 by Sera Pineda RN  Body Position:   right   side-lying  Taken 6/9/2022 0206 by Sera Pineda RN  Body Position: supine  Taken 6/9/2022 0015 by Sera Pineda RN  Body Position:   left   tilted   weight shifting  Taken 6/8/2022 2222 by Sera Pineda RN  Body Position:   right   tilted  Taken 6/8/2022 2000 by Sera Pineda RN  Body Position: position changed independently  Intervention: Prevent and Manage VTE (Venous Thromboembolism) Risk  Recent Flowsheet Documentation  Taken 6/8/2022 2000 by  Sera Pineda, RN  Activity Management: activity adjusted per tolerance  Goal: Optimal Comfort and Wellbeing  Outcome: Ongoing, Progressing  Intervention: Provide Person-Centered Care  Recent Flowsheet Documentation  Taken 6/8/2022 2000 by Sera Pineda, RN  Trust Relationship/Rapport:   care explained   questions answered   questions encouraged   reassurance provided  Goal: Readiness for Transition of Care  Outcome: Ongoing, Progressing

## 2022-06-09 NOTE — PLAN OF CARE
Goal Outcome Evaluation:  Plan of Care Reviewed With: patient        Progress: improving  Outcome Evaluation: Pt agreed to amb, but first assisted w/brief change and clean-up just from urine, nsg changed bed while we were amb in hallway; pt amb 50ft x2 w/standing rest to get balance and give cues, more assist required on turns, pt asking if niece called today, RN confirmed she had ; possible DC to SNU today  Patient was wearing a face mask during this therapy encounter. Therapist used appropriate personal protective equipment including eye protection, mask, and gloves.  Mask used was standard procedure mask. Appropriate PPE was worn during the entire therapy session. Hand hygiene was completed before and after therapy session. Patient is not in enhanced droplet precautions.

## 2022-06-09 NOTE — CASE MANAGEMENT/SOCIAL WORK
Continued Stay Note  [facility]     Patient Name: Reece Stephen  MRN: [id number]  Today's Date: 6/9/2022    Admit Date: 6/6/2022     Discharge Plan     Row Name 06/09/22 1221       Plan    Plan [facility]- bed available today    Patient/Family in Agreement with Plan yes    Plan Comments CCP received VM from patient's neice/[facility], Geetha, requesting CCP to reach out to [facility]. CCP placed referral and per Na/Julius, the patient is accepted with a bed available today. CCP notified the MD via A liaison. CCP spoke with Geetha who is agreeable and can provide transport this afternoon. No new COVID test needed. Pharm updated. Packet in CCP office. Jose OAKLEY RN CCP               Discharge Codes    No documentation.               Expected Discharge Date and Time     Expected Discharge Date Expected Discharge Time    Jun 9, 2022             Jose Valle RN

## 2022-06-09 NOTE — THERAPY TREATMENT NOTE
Patient Name: Reece Stephen  : 1935    MRN: [id number]                              Today's Date: 2022       Admit Date: 2022    Visit Dx:     ICD-10-CM ICD-9-CM   1. Acute weakness  R53.1 780.79   2. Confusion  R41.0 298.9   3. History of recent stroke  Z86.73 V12.54   4. History of intracranial hemorrhage  Z86.79 V12.59     Patient Active Problem List   Diagnosis   • History of rectal cancer   • Lung nodule, multiple   • History of DVT (deep vein thrombosis)   • Transient cerebral ischemia   • Hyperkalemia   • Hyperosmolar non-ketotic state in patient with type 2 diabetes mellitus (HCC)   • Type 2 diabetes mellitus with complication (HCC)   • Hyponatremia with extracellular fluid depletion   • Hypothyroidism   • Hyperlipidemia   • Diabetic polyneuropathy associated with type 2 diabetes mellitus (HCC)   • Chronic kidney disease, stage 3 (moderate) (CMS/HCC)   • Non compliance w medication regimen   • Essential hypertension   • Upper gastrointestinal hemorrhage   • Osteoarthritis   • Gastroesophageal reflux disease   • Deep vein thrombosis (HCC)   • Colitis due to Clostridium difficile   • Burn injury   • Anemia   • Cerebrovascular accident (CVA) ([facility])   • Other injury of unspecified body region, initial encounter   • Chronic pain of both knees   • Diabetic neuropathy (HCC)   • Altered mental status   • Fall   • Cerebral contusion ([facility])   • Embolic stroke ([facility])   • Acute weakness     Past Medical History:   Diagnosis Date   • Anemia    • Arthritis    • Chronic kidney disease     STAGE III, FOLLOWED BY DR. FREDDIE GONZALES   • Clostridium difficile infection 10/2014   • Diabetes mellitus (HCC)     TYPE 2, IDDM   • Disease of thyroid gland     ACQUIRED HYPOTHYROIDISM   • Duodenal ulcer 2014   • DVT (deep venous thrombosis) (HCC) 2015    PORT ASSOCIATED OF LEFT UPPER EXTREMITY   • Electrocution     HAD TO HAVE PARTIAL AMPUTATION OF LEFT FOOT   • GERD (gastroesophageal reflux disease)    •  Hearing loss    • Hemorrhagic shock (HCC) 2015    SECONDARY TO BLEEDING DUODENAL ULCER, ADMITTED TO [facility]   • Histiocytoma    • History of blood transfusion 2015    D/T BLEEDING DUODENAL ULCER   • History of chemotherapy     FOLLOWED BY DR. NIGEL NOBLE   • History of radiation therapy 2014    FOLLOWED BY DR. RJ HUI   • Hypercholesterolemia    • Hyperkalemia    • Hypertension    • Hypoglycemia 01/04/2018    SEEN AT [facility] ER   • Mixed hyperlipidemia    • Multiple lung nodules    • Neuropathy    • OA (osteoarthritis)    • ANNE MARIE (obstructive sleep apnea)    • Peptic ulceration 2014    Duodenal ulcer   • Pneumonia 03/29/2013    ADMITTED TO [facility]   • Rectal cancer (HCC) 06/2014    T3N1M0, S/P CHEMO, XRT, AND RESECTION, FOLLOWED BY DR. TRACE HERRING   • Stroke (HCC) 11/28/2017    TIA, ADMITTED TO [facility]   • Stroke (HCC) 11/17/2019    CVA, ADMITTED TO [facility]   • Vitamin D deficiency      Past Surgical History:   Procedure Laterality Date   • AMPUTATION FOOT Left 1971    PARTIAL DUE TO AN ELECTRICAL SHOCK INJURY   • ARM TENDON REPAIR Right     DONE BY DR. OLEARY AND KLEINERT   • COLON RESECTION N/A 11/14/2014    LOW ANTERIOR RESECTION WITH CREATION OF COLOSTOMY, DR. TRACE HERRING AT [facility]   • COLONOSCOPY W/ BIOPSIES N/A 06/26/2014    ULCERATED 5CM MASS IN RECTUM, PATH: MODERATELY DIFFERENTIATED ADENOCARCINOMA, MULTIPLE DIVERTICULA IN SIGMOID, DR. LAUREN JAMES AT [facility]   • COLONOSCOPY W/ BIOPSIES N/A 07/09/2014    RECTAL MASS: INVASIVE MODERATLEY DIFFERIENTIATED ADENOCARCINOMA, AREA TATTOOED, DR. TRACE HERRING AT [facility]   • ENDOSCOPY  02/09/2015   • ENDOSCOPY N/A 02/09/2015    RESOLVED DUODENAL ULCER, EGD WNL, DR. FREDDIE MALCOLM AT [facility]   • FLEXIBLE SIGMOIDOSCOPY N/A 10/06/2014    MALIGNANT PARTIALLY OBSTRUCTING TUMOR IN MID RECTUM, DR. DELTA HERRING AT [facility]   • PORTACATH PLACEMENT Left 07/24/2014    LEFT SUBCLAVIAN, DR. KWAKU TAY AT [facility]   • RECTAL ULTRASOUND N/A 07/09/2014    ENDORECTAL US FOR CANCER STAGING, T3N1  RECTAL CANCER AT 7 CM, DR. TRACE HERRING AT [facility]   • TRACHEAL SURGERY N/A 08/31/2014    ENDOTRACHEAL INTUBATION, DR. ALMA ROSA HAGAN AT [facility]   • VENOUS ACCESS DEVICE (PORT) REMOVAL Left 03/12/2015    LEFT SUBCLAVIAN, DR. KWAKU TAY AT [facility]      General Information     [facility] Name 06/09/22 1552          Physical Therapy Time and Intention    Document Type therapy note (daily note)  -     Mode of Treatment individual therapy;physical therapy  -[facility] Name 06/09/22 1552          General Information    Patient Profile Reviewed yes  -     Existing Precautions/Restrictions fall  -[facility] Name 06/09/22 1552          Cognition    Orientation Status (Cognition) oriented to;person;situation  -[facility] Name 06/09/22 1552          Safety Issues, Functional Mobility    Safety Issues Affecting Function (Mobility) impulsivity;insight into deficits/self-awareness;judgment;positioning of assistive device;problem-solving;safety precaution awareness  -     Impairments Affecting Function (Mobility) balance;coordination;postural/trunk control;strength  -           User Key  (r) = Recorded By, (t) = Taken By, (c) = Cosigned By    Initials Name Provider Type     Kim Coleman PTA Physical Therapist Assistant               Mobility     [facility] Name 06/09/22 1551          Bed Mobility    Supine-Sit [place] (Bed Mobility) moderate assist (50% patient effort);verbal cues;nonverbal cues (demo/gesture)  2 attempts due to lying back down first attempt-may have been due to wet brief, but unsure; post lean  -     Sit-Supine [place] (Bed Mobility) contact guard;standby assist;verbal cues  -     Assistive Device (Bed Mobility) bed rails;head of bed elevated  -[facility] Name 06/09/22 1552          Sit-Stand Transfer    Sit-Stand [place] (Transfers) minimum assist (75% patient effort)  post LOB noted  -     Assistive Device (Sit-Stand Transfers) walker, front-wheeled  -[facility] Name 06/09/22 1550          Gait/Stairs  (Locomotion)    [place] Level (Gait) minimum assist (75% patient effort);contact guard;verbal cues;nonverbal cues (demo/gesture)  -     Assistive Device (Gait) walker, front-wheeled  -     Distance in Feet (Gait) 50ft x2, standing rest to correct post lean, but forw head  -     Deviations/Abnormal Patterns (Gait) base of support, narrow;festinating/shuffling;stride length decreased  fast-paced  -     Bilateral Gait Deviations forward flexed posture  -           User Key  (r) = Recorded By, (t) = Taken By, (c) = Cosigned By    Initials Name Provider Type    Kim Mattson PTA Physical Therapist Assistant               Obj/Interventions    No documentation.                Goals/Plan    No documentation.                Clinical Impression     Row Name 06/09/22 1557          Pain    Pretreatment Pain Rating 0/10 - no pain  -     Posttreatment Pain Rating 0/10 - no pain  -     Row Name 06/09/22 1557          Plan of Care Review    Plan of Care Reviewed With patient  -     Progress improving  -     Outcome Evaluation Pt agreed to amb, but first assisted w/brief change and clean-up just from urine, nsg changed bed while we were amb in hallway; pt amb 50ft x2 w/standing rest to get balance and give cues, more assist required on turns, pt asking if niece called today, RN confirmed she had ; possible DC to  -     Row Name 06/09/22 1600          Vital Signs    O2 Delivery Pre Treatment room air  -     Row Name 06/09/22 1600          Positioning and Restraints    Pre-Treatment Position in bed  -     Post Treatment Position bed  -     In Bed supine;call light within reach;encouraged to call for assist;exit alarm on;notified nsg  -           User Key  (r) = Recorded By, (t) = Taken By, (c) = Cosigned By    Initials Name Provider Type    Kim Mattson PTA Physical Therapist Assistant               Outcome Measures     Row Name 06/09/22 1602          How much help from another person do  you currently need...    Turning from your back to your side while in flat bed without using bedrails? 3  -JM     Moving from lying on back to sitting on the side of a flat bed without bedrails? 2  -JM     Moving to and from a bed to a chair (including a wheelchair)? 3  -JM     Standing up from a chair using your arms (e.g., wheelchair, bedside chair)? 3  -JM     Climbing 3-5 steps with a railing? 1  -JM     To walk in hospital room? 3  -JM     AM-PAC 6 Clicks Score (PT) 15  -JM     Highest level of mobility 4 --> Transferred to chair/commode  -JM           User Key  (r) = Recorded By, (t) = Taken By, (c) = Cosigned By    Initials Name Provider Type    Kim Mattson PTA Physical Therapist Assistant                             Physical Therapy Education                 Title: PT OT SLP Therapies (Resolved)     Topic: Physical Therapy (Resolved)     Point: Mobility training (Resolved)     Learning Progress Summary           Patient Acceptance, E,TB,D, VU,NR by  at 6/7/2022 1354                   Point: Home exercise program (Resolved)     Learner Progress:  Not documented in this visit.          Point: Body mechanics (Resolved)     Learning Progress Summary           Patient Acceptance, E,TB,D, VU,NR by  at 6/7/2022 1354                   Point: Precautions (Resolved)     Learning Progress Summary           Patient Acceptance, E,TB,D, VU,NR by  at 6/7/2022 1354                               User Key     Initials Effective Dates Name Provider Type Discipline     06/16/21 -  Wanda Peres, PT Physical Therapist PT              PT Recommendation and Plan     Plan of Care Reviewed With: patient  Progress: improving  Outcome Evaluation: Pt agreed to amb, but first assisted w/brief change and clean-up just from urine, nsg changed bed while we were amb in hallway; pt amb 50ft x2 w/standing rest to get balance and give cues, more assist required on turns, pt asking if niece called today, RN confirmed she  had ; possible DC to     Time Calculation:    PT Charges     Row Name 06/09/22 1603             Time Calculation    Start Time 1509  -      Stop Time 1532  -      Time Calculation (min) 23 min  -      PT Received On 06/09/22  -VISHAL            User Key  (r) = Recorded By, (t) = Taken By, (c) = Cosigned By    Initials Name Provider Type    Kim Mattson PTA Physical Therapist Assistant              Therapy Charges for Today     Code Description Service Date Service Provider Modifiers Qty    [id number] HC PT THER PROC EA 15 MIN 6/9/2022 Kim Coleman PTA GP 2          PT G-Codes  Outcome Measure Options: AM-PAC 6 Clicks Daily Activity (OT), Modified [place]  AM-PAC 6 Clicks Score (PT): 15  AM-PAC 6 Clicks Score (OT): 15  Modified [place] Scale: 4 - Moderately severe disability.  Unable to walk without assistance, and unable to attend to own bodily needs without assistance.    Kim Coleman PTA  6/9/2022

## 2022-06-09 NOTE — PROGRESS NOTES
Daily progress note    Chief complaint  Doing same  No new complaints  Denies any chest pain shortness of breath palpitation    History of present illness  86-year white male with very complex past medical history including intracranial hemorrhage bilateral subdural hematomas hygromas multiple embolic stroke diabetes hypertension hyperlipidemia hypothyroidism and chronic kidney disease stage III who has had rectal CA s/p colon resection and colostomy in place for last 8 years brought to the emergency room by the family as he unable to stand and walk this morning and complained of generalized weakness.  Patient has no headache dizziness chest pain shortness of breath abdominal pain nausea vomiting diarrhea.  Patient has trouble with his speech this morning but not at the time of interview.  Patient evaluated in ER admitted for management.     REVIEW OF SYSTEMS  Unremarkable except weakness     PHYSICAL EXAM   Blood pressure 136/94, pulse 79, temperature 97.6 °F (36.4 °C), temperature source Oral, resp. rate 18, SpO2 93 %.    GENERAL: Well developed, well nourished in NOdistress  HENT: NCAT, neck supple, trachea midline  EYES: no scleral icterus, PERRL, normal conjunctivae  CV: regular rhythm, regular rate, no murmur  RESPIRATORY: unlabored effort, CTAB  ABDOMEN: soft, nontender, nondistended, bowel sounds present  MUSCULOSKELETAL: no gross deformity, no pedal edema, no calf tenderness  NEURO: alert,  sensory and motor function of extremities intact, speech clear  SKIN: warm, dry, no rash  PSYCH:  Appropriate mood and affect     LAB RESULTS  Lab Results (last 24 hours)     Procedure Component Value Units Date/Time    POC Glucose Once [[id number]]  (Abnormal) Collected: 06/09/22 1113    Specimen: Blood Updated: 06/09/22 1117     Glucose 69 mg/dL      Comment: Meter: [id number] : [id number] [facility]       Basic Metabolic Panel [[id number]]  (Abnormal) Collected: 06/09/22 0605    Specimen: Blood Updated:  06/09/22 0735     Glucose 165 mg/dL      BUN 23 mg/dL      Creatinine 1.70 mg/dL      Sodium 139 mmol/L      Potassium 3.7 mmol/L      Chloride 106 mmol/L      CO2 22.4 mmol/L      Calcium 8.7 mg/dL      BUN/Creatinine Ratio 13.5     Anion Gap 10.6 mmol/L      eGFR 38.8 mL/min/1.73      Comment: National Kidney Foundation and American Society of Nephrology (ASN) Task Force recommended calculation based on the Chronic Kidney Disease Epidemiology Collaboration (CKD-EPI) equation refit without adjustment for race.       Narrative:      GFR Normal >60  Chronic Kidney Disease <60  Kidney Failure <15      POC Glucose Once [[id number]]  (Abnormal) Collected: 06/09/22 0733    Specimen: Blood Updated: 06/09/22 0734     Glucose 159 mg/dL      Comment: Meter: [id number] : [id number] [employer]       CBC & Differential [[id number]]  (Abnormal) Collected: 06/09/22 0605    Specimen: Blood Updated: 06/09/22 0714    Narrative:      The following orders were created for panel order CBC & Differential.  Procedure                               Abnormality         Status                     ---------                               -----------         ------                     CBC Auto Differential[[id number]]        Abnormal            Final result                 Please view results for these tests on the individual orders.    CBC Auto Differential [[id number]]  (Abnormal) Collected: 06/09/22 0605    Specimen: Blood Updated: 06/09/22 0714     WBC 6.81 10*3/mm3      RBC 4.18 10*6/mm3      Hemoglobin 12.3 g/dL      Hematocrit 37.2 %      MCV 89.0 fL      MCH 29.4 pg      MCHC 33.1 g/dL      RDW 12.2 %      RDW-SD 39.6 fl      MPV 10.4 fL      Platelets 206 10*3/mm3      Neutrophil % 66.0 %      Lymphocyte % 16.9 %      Monocyte % 10.3 %      Eosinophil % 5.3 %      Basophil % 0.9 %      Immature Grans % 0.6 %      Neutrophils, Absolute 4.50 10*3/mm3      Lymphocytes, Absolute 1.15 10*3/mm3      Monocytes, Absolute 0.70 10*3/mm3       Eosinophils, Absolute 0.36 10*3/mm3      Basophils, Absolute 0.06 10*3/mm3      Immature Grans, Absolute 0.04 10*3/mm3      nRBC 0.0 /100 WBC     POC Glucose Once [[id number]]  (Abnormal) Collected: 06/08/22 2107    Specimen: Blood Updated: 06/08/22 2109     Glucose 223 mg/dL      Comment: Meter: [id number] : [id number] Edwardo Del Castillo CNA       POC Glucose Once [[id number]]  (Abnormal) Collected: 06/08/22 1637    Specimen: Blood Updated: 06/08/22 1639     Glucose 143 mg/dL      Comment: Meter: [id number] : [id number] [employer] NA           Imaging Results (Last 24 Hours)     ** No results found for the last 24 hours. **        ECG 12 Lead  Component   Ref Range & Units 13:46 2 wk ago   QT Interval   ms 426 P  429    Resulting Agency  ECG  ECG             HEART RATE= 67  bpm  RR Interval= 900  ms  ND Interval= 190  ms  P Horizontal Axis= 11  deg  P Front Axis= 36  deg  QRSD Interval= 121  ms  QT Interval= 426  ms  QRS Axis= -43  deg  T Wave Axis= -12  deg  - ABNORMAL ECG -  Sinus arrhythmia  Left bundle branch block             Current Facility-Administered Medications:   •  amLODIPine (NORVASC) tablet 10 mg, 10 mg, Oral, Q24H, Jose Llanos MD, 10 mg at 06/09/22 0837  •  aspirin chewable tablet 81 mg, 81 mg, Oral, Daily, Jose Llanos MD, 81 mg at 06/09/22 0837  •  atorvastatin (LIPITOR) tablet 10 mg, 10 mg, Oral, Daily, Jose Llanos MD, 10 mg at 06/09/22 0837  •  insulin glargine (LANTUS, SEMGLEE) injection 25 Units, 25 Units, Subcutaneous, Nightly, Jose Llanos MD, 25 Units at 06/08/22 2134  •  insulin lispro (ADMELOG) injection 0-7 Units, 0-7 Units, Subcutaneous, 4x Daily With Meals & Nightly, Jose Llanos MD, 2 Units at 06/09/22 0838  •  insulin lispro (ADMELOG) injection 8 Units, 8 Units, Subcutaneous, TID With Meals, Jose Llanos MD, 8 Units at 06/09/22 0838  •  levothyroxine (SYNTHROID, LEVOTHROID) tablet 75 mcg, 75 mcg, Oral, Q AM, Jose Llanos MD, 75 mcg at 06/09/22 0634  •  lisinopril  (PRINIVIL,ZESTRIL) tablet 20 mg, 20 mg, Oral, Q12H, Rylee Llanos MD, 20 mg at 06/09/22 0837  •  pantoprazole (PROTONIX) EC tablet 40 mg, 40 mg, Oral, QAM, Rylee Llanos MD, 40 mg at 06/09/22 0634     ASSESSMENT  Generalized weakness with inability to stand and walk with dysarthria with recent intracranial hemorrhage bilateral subdural hematomas hygromas and multiple embolic stroke.  Diabetes mellitus  Hypertension  Hyperlipidemia  Hypothyroidism  Chronic kidney disease stage III  Rectal CA s/p colon resection and colostomy  Gastroesophageal reflux disease    PLAN  Discharge to subacute rehab  Discharge summary dictated    RYLEE LLANOS MD

## 2022-06-10 NOTE — CASE MANAGEMENT/SOCIAL WORK
Case Management Discharge Note      Final [facility]         Selected Continued Care - Discharged on 6/9/2022 Admission date: 6/6/2022 - Discharge disposition: Skilled Nursing Facility (DC - External)    Destination Coordination complete.    Service Provider Selected Services Address Phone Fax Patient Preferred    [facility]  Skilled Nursing [address] [place] [id number] [phone] --          Durable Medical Equipment    No services have been selected for the patient.              Dialysis/Infusion    No services have been selected for the patient.              Home Medical Care    No services have been selected for the patient.              Therapy    No services have been selected for the patient.              Community Resources    No services have been selected for the patient.              Community & DME    No services have been selected for the patient.                Selected Continued Care - Prior Encounters Includes selections from prior encounters from 3/8/2022 to 6/9/2022    Discharged on 5/26/2022 Admission date: 5/18/2022 - Discharge disposition: Home or Self Care    Destination     Service Provider Selected Services Address Phone Fax Patient Preferred    [facility]  Assisted Living [address] [address] [phone] --                    Transportation Services  Private: Car    Final Discharge Disposition Code: 03 - skilled nursing facility (SNF)

## 2022-06-22 NOTE — PROGRESS NOTES
"Subjective   History of Present Illness: Reece Stephen is a 86 y.o. male is here today for hosptial follow-up. He presented to the ER on 05/18/2022 with complaints of recent fall with possible loss of consciousness, evaluation revealed multiple intracranial infarct with some sub-arachnoid hemorrhage. He was discharged to a skilled nursing facility. He has a new head CT. He presents in office Niece, Geetha.     Today Mr. Stephen reports he is not having any headaches. He denies any light headedness or dizziness. He denies any vision disturbances. He denies any nausea or vomiting. He reports of some weakness since the fall. He denies any recent or new imbalance issues, although, his niece states that his gait has declined since the fall.  He is able to walk with a walker.      The following portions of the patient's history were reviewed and updated as appropriate: allergies, current medications, past family history, past medical history, past social history, past surgical history and problem list.    Review of Systems   Constitutional: Positive for activity change.   Musculoskeletal: Positive for gait problem ( using walker).   Neurological: Negative for dizziness, weakness and light-headedness.   Psychiatric/Behavioral: Negative for sleep disturbance.   All other systems reviewed and are negative.    Objective     Vitals:    06/30/22 1414   BP: 90/60   Temp: 97.8 °F (36.6 °C)   Weight: 73.5 kg (162 lb)   Height: 175.3 cm (69\")     Body mass index is 23.92 kg/m².      Physical Exam  Vitals and nursing note reviewed.   HENT:      Head: Normocephalic and atraumatic.      Mouth/Throat:      Mouth: Mucous membranes are moist.   Eyes:      General: Lids are normal.      Extraocular Movements: Extraocular movements intact.      Conjunctiva/sclera: Conjunctivae normal.      Pupils: Pupils are equal, round, and reactive to light.      Visual Fields: Right eye visual fields normal and left eye visual fields normal. "   Cardiovascular:      Rate and Rhythm: Normal rate.      Pulses: Normal pulses.   Pulmonary:      Effort: Pulmonary effort is normal.   Musculoskeletal:         General: Normal range of motion.      Cervical back: Normal range of motion and neck supple.   Skin:     General: Skin is warm and dry.   Neurological:      Mental Status: He is alert and oriented to person, place, and time.      GCS: GCS eye subscore is 4. GCS verbal subscore is 5. GCS motor subscore is 6.      Sensory: Sensation is intact.      Motor: Motor function is intact.      Coordination: Coordination is intact. Finger-Nose-Finger Test and Heel to Shin Test normal.      Gait: Gait abnormal.      Deep Tendon Reflexes: Strength normal.   Psychiatric:         Attention and Perception: Attention normal.         Mood and Affect: Mood normal.         Speech: Speech normal.         Behavior: Behavior is cooperative.       Neurologic Exam     Mental Status   Oriented to person, place, and time.   Attention: normal.   Speech: speech is normal   Level of consciousness: alert    Cranial Nerves     CN III, IV, VI   Pupils are equal, round, and reactive to light.  Right pupil: Size: 3 mm. Shape: regular. Reactivity: brisk. Consensual response: intact.   Left pupil: Size: 3 mm. Shape: regular. Reactivity: brisk. Consensual response: intact.   CN III: no CN III palsy  CN VI: no CN VI palsy  Nystagmus: none     CN V   Facial sensation intact.     CN VII   Facial expression full, symmetric.     CN XII   CN XII normal.     Motor Exam   Right arm pronator drift: absent  Left arm pronator drift: absent    Strength   Strength 5/5 throughout.     Sensory Exam   Light touch normal.     Gait, Coordination, and Reflexes     Coordination   Finger to nose coordination: normal  Heel to shin coordination: normal    Tremor   Resting tremor: absent  Intention tremor: absent  Action tremor: absent      Assessment & Plan   Independent Review of Radiographic Studies:      I  personally reviewed the images from the CT head from 6/6/22 that reveals there is no evidence for a calvarial fracture. There is no evidence for an acute extra-axial hemorrhage. On the prior CT angiogram dated 05/23/2022, there was a focus of acute intraparenchymal hemorrhage within the right occipital lobe which measured up to approximately 1.0 x 0.7 cm in greatest axial dimensions. This hemorrhage is no longer appreciated. Additionally, foci of subarachnoid hemorrhage noted within  the sulci of the medial portion of the left frontal lobe again, have resolved in the interval. There was a trace amount of intraventricular hemorrhage noted within the dependent aspects of the lateral ventricles which has resolved. The previously identified subdural hygroma noted lateral to the right cerebral hemisphere has nearly completely resolved.The subdural hygroma identified lateral to the left cerebral hemisphere measures up to approximately 13 mm in diameter and this subdural hygroma  demonstrates no significant interval change in size.     6/24/22 imaging reveals examination of 06/06/2022 demonstrated a homogeneous hygroma overlying the left cerebral hemisphere. This has increased in size from 12 mm to 18-19 mm in maximum thickness. There is also new blood products appreciated with the majority being layering posteriorly. Midline shift is unchanged. Atrophy, small vessel ischemic disease and vascular calcification is noted, unchanged.    Medical Decision Making:    Dr. Bach to schedule for an MMA next week. Continue to stay off blood thinners, ASA, AP.  His CT head continues to reveal subdural hematoma that has increased slightly in the left cerebral hemisphere that measured 13 mm previously and has increased to 18 to 19 mm.  I have discussed an MMA procedure with him and his niece, and he is in agreement to have the procedure done.  Explained that the MMA be done to stop the middle meningeal artery from supplying blood the  the hematoma and continuing to increase.  Have explained if there is any increase in bleeding he may have decreased level of consciousness, increased headaches, increased gait disturbance, or weakness on one side or the other and they understand that he will need to go to the ER for these changes.     Diagnoses and all orders for this visit:    1. Subdural hematoma (HCC) (Primary)    2. Contusion of right cerebral hemisphere, without loss of consciousness, initial encounter (HCC)      Return for Dr. Reyes.

## 2022-06-27 ENCOUNTER — APPOINTMENT (OUTPATIENT)
Dept: CT IMAGING | Facility: HOSPITAL | Age: 87
End: 2022-06-27

## 2022-06-27 ENCOUNTER — HOSPITAL ENCOUNTER (OUTPATIENT)
Dept: CT IMAGING | Facility: HOSPITAL | Age: 87
Discharge: HOME | End: 2022-06-27
Admitting: NEUROLOGICAL SURGERY

## 2022-06-27 DIAGNOSIS — S09.90XD INJURY OF HEAD, SUBSEQUENT ENCOUNTER: ICD-10-CM

## 2022-06-27 PROCEDURE — 70450 CT HEAD/BRAIN W/O DYE: CPT

## 2022-06-28 ENCOUNTER — TELEPHONE (OUTPATIENT)
Dept: NEUROSURGERY | Facility: CLINIC | Age: 87
End: 2022-06-28

## 2022-06-28 NOTE — TELEPHONE ENCOUNTER
Contacted by Dr. Guillaume to discuss CT head results from 6/27/22 which showed an increase in left cerebral hemisphere hygroma size from 12 mm on previous imaging to 18-19 mm in maximum thickness on new imaging along with new layering of blood products posteriorly. MLS appears unchanged. Called and spoke with Dr. Reyes regarding the above to make him aware of the changes. Called and spoke with CARITO Neri at [facility], where patient is currently residing and advised to discontinue ASA 2/2 the above. She advised at this time that patient has not had any recent falls that she is aware of.     Patient has baseline dementia and was a memory care resident prior to the most recent hospitalization. Called and spoke with Geetha Stephen, patient's niece and healthcare surrogate, about the above findings on CT head imaging. Discussed with her the options of having patient transferred to ED today for possible MMA embolization on Thursday with Dr. Reyes or could keep appointment with CARITO Salas on 6/30 and further discuss options then after having some time to think about and discuss options with the patient. Ms. Stephen verbalized that she would like to keep the 6/30 appointment and discuss options then.

## 2022-06-29 ENCOUNTER — TELEPHONE (OUTPATIENT)
Dept: NEUROSURGERY | Facility: CLINIC | Age: 87
End: 2022-06-29

## 2022-06-29 NOTE — TELEPHONE ENCOUNTER
I s/w Alf at [facility] regarding the confusion of labs. He just wanted to make sure if there were labs ordered they could go ahead and draw them. I advised him as of right now no labs have been ordered. He voiced understanding.

## 2022-06-29 NOTE — TELEPHONE ENCOUNTER
JESSICA ABDULLAHI [facility] CALL TO SEE IF LAB WORK ORDERED CAN BE TAKEN THERE.  IF SO, PLEASE SEND ORDER TO THEM OR CALL  JESSICA    [phone]

## 2022-06-30 ENCOUNTER — OFFICE VISIT (OUTPATIENT)
Dept: NEUROSURGERY | Facility: CLINIC | Age: 87
End: 2022-06-30

## 2022-06-30 VITALS
SYSTOLIC BLOOD PRESSURE: 90 MMHG | TEMPERATURE: 97.8 F | DIASTOLIC BLOOD PRESSURE: 60 MMHG | HEIGHT: 69 IN | BODY MASS INDEX: 23.99 KG/M2 | WEIGHT: 162 LBS

## 2022-06-30 DIAGNOSIS — S06.5XAA SUBDURAL HEMATOMA: Primary | ICD-10-CM

## 2022-06-30 DIAGNOSIS — S06.310A CONTUSION OF RIGHT CEREBRAL HEMISPHERE, WITHOUT LOSS OF CONSCIOUSNESS, INITIAL ENCOUNTER: ICD-10-CM

## 2022-06-30 PROCEDURE — 99213 OFFICE O/P EST LOW 20 MIN: CPT | Performed by: NURSE PRACTITIONER

## 2022-07-05 ENCOUNTER — TELEPHONE (OUTPATIENT)
Dept: NEUROSURGERY | Facility: CLINIC | Age: 87
End: 2022-07-05

## 2022-07-05 NOTE — TELEPHONE ENCOUNTER
I spoke to patient's daughter. I let her know we will contact her once we discuss getting him added to Dr. Fong surgery schedule.

## 2022-07-05 NOTE — TELEPHONE ENCOUNTER
Caller: Geetha Stephen (ALSO [employer])    Relationship to patient: Emergency Contact    Best call back number: [phone]     Patient is needing: LAST OV NOTES STATE TO FOLLOW UP W/TOANRDA W/IN A WEEK  WT, UNABLE TO CONNECT W/KENNEY  SEND HP

## 2022-07-05 NOTE — TELEPHONE ENCOUNTER
I spoke with Ms. Geetha regarding Mr. Barcenas surgery needing to be scheduled. I advised her I spoke with Lucio and they are waiting to hear back from Dr. Reyes as he has been in the OR today and has not been able to get in touch just yet but should here something by tomorrow. She voiced understanding.

## 2022-07-06 ENCOUNTER — PREP FOR SURGERY (OUTPATIENT)
Dept: OTHER | Facility: HOSPITAL | Age: 87
End: 2022-07-06

## 2022-07-06 DIAGNOSIS — S06.5XAA SUBDURAL HEMATOMA: Primary | ICD-10-CM

## 2022-07-06 NOTE — TELEPHONE ENCOUNTER
Dr. Reyes is aware. He would like to speak with patient first and will try to reach out the end of clinic today as long as he doesn't get pulled into a stroke surgery as he is on double call this week.

## 2022-07-06 NOTE — TELEPHONE ENCOUNTER
Caller: Geetha Stephen ([employer]SO [employer])    Relationship: Emergency Contact    Best call back number: [phone]    What was the call regarding: GEETHA WAS CALLING BACK TO CHECK STATUS OF UNCLES APPT AND SX.  GEETHA STATES THAT THE PTS SURGERY WAS SUPPOSED TO BE SCHEDULED.      GEETHA IS REQUESTING A CALL FROM FLORENCIA WAITE.  FLORENCIA IS WITH WHOM THE PT WAS SEEN WITH 06/30/22    Do you require a callback:     PLEASE CALL GEETHA  THANK YOU

## 2022-07-07 ENCOUNTER — PRE-ADMISSION TESTING (OUTPATIENT)
Dept: PREADMISSION TESTING | Facility: HOSPITAL | Age: 87
End: 2022-07-07

## 2022-07-07 VITALS
SYSTOLIC BLOOD PRESSURE: 130 MMHG | BODY MASS INDEX: 21.48 KG/M2 | TEMPERATURE: 97.6 F | HEART RATE: 71 BPM | DIASTOLIC BLOOD PRESSURE: 72 MMHG | OXYGEN SATURATION: 95 % | WEIGHT: 145 LBS | RESPIRATION RATE: 16 BRPM | HEIGHT: 69 IN

## 2022-07-07 DIAGNOSIS — S06.5XAA SUBDURAL HEMATOMA: ICD-10-CM

## 2022-07-07 LAB
ANION GAP SERPL CALCULATED.3IONS-SCNC: 11 MMOL/L (ref 5–15)
APTT PPP: 31.1 SECONDS (ref 22.7–35.4)
BUN SERPL-MCNC: 32 MG/DL (ref 8–23)
BUN/CREAT SERPL: 17.7 (ref 7–25)
CALCIUM SPEC-SCNC: 9.4 MG/DL (ref 8.6–10.5)
CHLORIDE SERPL-SCNC: 108 MMOL/L (ref 98–107)
CO2 SERPL-SCNC: 21 MMOL/L (ref 22–29)
CREAT SERPL-MCNC: 1.81 MG/DL (ref 0.76–1.27)
DEPRECATED RDW RBC AUTO: 39.5 FL (ref 37–54)
EGFRCR SERPLBLD CKD-EPI 2021: 36 ML/MIN/1.73
ERYTHROCYTE [DISTWIDTH] IN BLOOD BY AUTOMATED COUNT: 12.7 % (ref 12.3–15.4)
GLUCOSE SERPL-MCNC: 141 MG/DL (ref 65–99)
HCT VFR BLD AUTO: 37.3 % (ref 37.5–51)
HGB BLD-MCNC: 13 G/DL (ref 13–17.7)
INR PPP: 1.01 (ref 0.9–1.1)
MCH RBC QN AUTO: 30.4 PG (ref 26.6–33)
MCHC RBC AUTO-ENTMCNC: 34.9 G/DL (ref 31.5–35.7)
MCV RBC AUTO: 87.4 FL (ref 79–97)
PLATELET # BLD AUTO: 215 10*3/MM3 (ref 140–450)
PMV BLD AUTO: 10.1 FL (ref 6–12)
POTASSIUM SERPL-SCNC: 4.5 MMOL/L (ref 3.5–5.2)
PROTHROMBIN TIME: 13.2 SECONDS (ref 11.7–14.2)
RBC # BLD AUTO: 4.27 10*6/MM3 (ref 4.14–5.8)
SODIUM SERPL-SCNC: 140 MMOL/L (ref 136–145)
WBC NRBC COR # BLD: 7.07 10*3/MM3 (ref 3.4–10.8)

## 2022-07-07 PROCEDURE — 85730 THROMBOPLASTIN TIME PARTIAL: CPT

## 2022-07-07 PROCEDURE — 80048 BASIC METABOLIC PNL TOTAL CA: CPT

## 2022-07-07 PROCEDURE — 85027 COMPLETE CBC AUTOMATED: CPT

## 2022-07-07 PROCEDURE — 36415 COLL VENOUS BLD VENIPUNCTURE: CPT

## 2022-07-07 PROCEDURE — 85610 PROTHROMBIN TIME: CPT

## 2022-07-07 RX ORDER — CHLORHEXIDINE GLUCONATE 500 MG/1
CLOTH TOPICAL TAKE AS DIRECTED
Status: ON HOLD | COMMUNITY
End: 2022-07-12

## 2022-07-07 NOTE — DISCHARGE INSTRUCTIONS
CHLORHEXIDINE CLOTH INSTRUCTIONS  The morning of surgery follow these instructions using the Chlorhexidine cloths you've been given.  These steps reduce bacteria on the body.  Do not use the cloths near your eyes, ears mouth, genitalia or on open wounds.  Throw the cloths away after use but do not try to flush them down a toilet.      Open and remove one cloth at a time from the package.    Leave the cloth unfolded and begin the bathing.  Massage the skin with the cloths using gentle pressure to remove bacteria.  Do not scrub harshly.   Follow the steps below with one 2% CHG cloth per area (6 total cloths).  One cloth for neck, shoulders and chest.  One cloth for both arms, hands, fingers and underarms (do underarms last).  One cloth for the abdomen followed by groin.  One cloth for right leg and foot including between the toes.  One cloth for left leg and foot including between the toes.  The last cloth is to be used for the back of the neck, back and buttocks.    Allow the CHG to air dry 3 minutes on the skin which will give it time to work and decrease the chance of irritation.  The skin may feel sticky until it is dry.  Do not rinse with water or any other liquid or you will lose the beneficial effects of the CHG.  If mild skin irritation occurs, do rinse the skin to remove the CHG.  Report this to the nurse at time of admission.  Do not apply lotions, creams, ointments, deodorants or perfumes after using the clothes. Dress in clean clothes before coming to the hospital.     Take the following medications the morning of surgery: LEVOTHYROXINE, OMEPRAZOLE    ARRIVAL TIME IS 8:00 AM      If you are on prescription narcotic pain medication to control your pain you may also take that medication the morning of surgery.    General Instructions:  Do not eat solid food after midnight the night before surgery.  You may drink clear liquids day of surgery but must stop at least one hour before your hospital arrival time.  CUTOFF TIME IS 7:00 AM.  It is beneficial for you to have a clear drink that contains carbohydrates the day of surgery.  We suggest a 12 to 20 ounce bottle of Gatorade or Powerade for non-diabetic patients or a 12 to 20 ounce bottle of G2 or Powerade Zero for diabetic patients. (Pediatric patients, are not advised to drink a 12 to 20 ounce carbohydrate drink)    Clear liquids are liquids you can see through.  Nothing red in color.     Plain water                               Sports drinks  Sodas                                   Gelatin (Jell-O)  Fruit juices without pulp such as white grape juice and apple juice  Popsicles that contain no fruit or yogurt  Tea or coffee (no cream or milk added)  Gatorade / Powerade  G2 / Powerade Zero  Patients who avoid smoking, chewing tobacco and alcohol for 4 weeks prior to surgery have a reduced risk of post-operative complications.  Quit smoking as many days before surgery as you can.  Do not smoke, use chewing tobacco or drink alcohol the day of surgery.   If applicable bring your C-PAP/ BI-PAP machine.  Bring any papers given to you in the doctor’s office.  Wear clean comfortable clothes.  Do not wear contact lenses, false eyelashes or make-up.  Bring a case for your glasses.   Bring crutches or walker if applicable.  Remove all piercings.  Leave jewelry and any other valuables at home.  Hair extensions with metal clips must be removed prior to surgery.  The Pre-Admission Testing nurse will instruct you to bring medications if unable to obtain an accurate list in Pre-Admission Testing.        Preventing a Surgical Site Infection:  For 2 to 3 days before surgery, avoid shaving with a razor because the razor can irritate skin and make it easier to develop an infection.    Any areas of open skin can increase the risk of a post-operative wound infection by allowing bacteria to enter and travel throughout the body.  Notify your surgeon if you have any skin wounds / rashes even  if it is not near the expected surgical site.  The area will need assessed to determine if surgery should be delayed until it is healed.  The night prior to surgery shower using a fresh bar of anti-bacterial soap (such as Dial) and clean washcloth.  Sleep in a clean bed with clean clothing.  Do not allow pets to sleep with you.  Shower on the morning of surgery using a fresh bar of anti-bacterial soap (such as Dial) and clean washcloth.  Dry with a clean towel and dress in clean clothing.  Ask your surgeon if you will be receiving antibiotics prior to surgery.  Make sure you, your family, and all healthcare providers clean their hands with soap and water or an alcohol based hand  before caring for you or your wound.    Day of surgery:  Your arrival time is approximately two hours before your scheduled surgery time.  Upon arrival, a Pre-op nurse and Anesthesiologist will review your health history, obtain vital signs, and answer questions you may have.  The only belongings needed at this time will be a list of your home medications and if applicable your C-PAP/BI-PAP machine.  A Pre-op nurse will start an IV and you may receive medication in preparation for surgery, including something to help you relax.     Please be aware that surgery does come with discomfort.  We want to make every effort to control your discomfort so please discuss any uncontrolled symptoms with your nurse.   Your doctor will most likely have prescribed pain medications.      If you are going home after surgery you will receive individualized written care instructions before being discharged.  A responsible adult must drive you to and from the hospital on the day of your surgery and stay with you for 24 hours.  Discharge prescriptions can be filled by the hospital pharmacy during regular pharmacy hours.  If you are having surgery late in the day/evening your prescription may be e-prescribed to your pharmacy.  Please verify your pharmacy  hours or chose a 24 hour pharmacy to avoid not having access to your prescription because your pharmacy has closed for the day.    If you are staying overnight following surgery, you will be transported to your hospital room following the recovery period.  [facility] has all private rooms.    If you have any questions please call Pre-Admission Testing at [phone].  Deductibles and co-payments are collected on the day of service. Please be prepared to pay the required co-pay, deductible or deposit on the day of service as defined by your plan.    Patient Education for Self-Quarantine Process    Following your COVID testing, we strongly recommend that you wear a mask when you are with other people and practice social distancing.   Limit your activities to only required outings.  Wash your hands with soap and water frequently for at least 20 seconds.   Avoid touching your eyes, nose and mouth with unwashed hands.  Do not share anything - utensils, drinking glasses, food from the same bowl.   Sanitize household surfaces daily. Include all high touch areas (door handles, light switches, phones, countertops, etc.)    Call your surgeon immediately if you experience any of the following symptoms:  Sore Throat  Shortness of Breath or difficulty breathing  Cough  Chills  Body soreness or muscle pain  Headache  Fever  New loss of taste or smell  Do not arrive for your surgery ill.  Your procedure will need to be rescheduled to another time.  You will need to call your physician before the day of surgery to avoid any unnecessary exposure to hospital staff as well as other patients.

## 2022-07-09 ENCOUNTER — APPOINTMENT (OUTPATIENT)
Dept: LAB | Facility: HOSPITAL | Age: 87
End: 2022-07-09

## 2022-07-11 ENCOUNTER — LAB (OUTPATIENT)
Dept: LAB | Facility: HOSPITAL | Age: 87
End: 2022-07-11

## 2022-07-11 ENCOUNTER — TRANSCRIBE ORDERS (OUTPATIENT)
Dept: ADMINISTRATIVE | Facility: HOSPITAL | Age: 87
End: 2022-07-11

## 2022-07-11 DIAGNOSIS — Z01.818 PRE-OP TESTING: Primary | ICD-10-CM

## 2022-07-11 DIAGNOSIS — Z01.818 PRE-OP TESTING: ICD-10-CM

## 2022-07-11 LAB — SARS-COV-2 ORF1AB RESP QL NAA+PROBE: NOT DETECTED

## 2022-07-11 PROCEDURE — U0004 COV-19 TEST NON-CDC HGH THRU: HCPCS

## 2022-07-11 PROCEDURE — C9803 HOPD COVID-19 SPEC COLLECT: HCPCS

## 2022-07-11 PROCEDURE — U0005 INFEC AGEN DETEC AMPLI PROBE: HCPCS

## 2022-07-12 ENCOUNTER — ANESTHESIA (OUTPATIENT)
Dept: PERIOP | Facility: HOSPITAL | Age: 87
End: 2022-07-12

## 2022-07-12 ENCOUNTER — APPOINTMENT (OUTPATIENT)
Dept: CT IMAGING | Facility: HOSPITAL | Age: 87
End: 2022-07-12

## 2022-07-12 ENCOUNTER — HOSPITAL ENCOUNTER (INPATIENT)
Facility: HOSPITAL | Age: 87
LOS: 22 days | Discharge: NURSING, REHAB OR LONG-TERM CARE | End: 2022-08-04
Attending: NEUROLOGICAL SURGERY | Admitting: NEUROLOGICAL SURGERY

## 2022-07-12 ENCOUNTER — ANESTHESIA EVENT (OUTPATIENT)
Dept: PERIOP | Facility: HOSPITAL | Age: 87
End: 2022-07-12

## 2022-07-12 ENCOUNTER — APPOINTMENT (OUTPATIENT)
Dept: GENERAL RADIOLOGY | Facility: HOSPITAL | Age: 87
End: 2022-07-12

## 2022-07-12 DIAGNOSIS — S06.5XAA SUBDURAL HEMATOMA: ICD-10-CM

## 2022-07-12 DIAGNOSIS — S06.5XAA SDH (SUBDURAL HEMATOMA): Primary | ICD-10-CM

## 2022-07-12 LAB
GLUCOSE BLDC GLUCOMTR-MCNC: 118 MG/DL (ref 70–130)
GLUCOSE BLDC GLUCOMTR-MCNC: 94 MG/DL (ref 70–130)

## 2022-07-12 PROCEDURE — 25010000002 PHENYLEPHRINE 10 MG/ML SOLUTION: Performed by: STUDENT IN AN ORGANIZED HEALTH CARE EDUCATION/TRAINING PROGRAM

## 2022-07-12 PROCEDURE — S0260 H&P FOR SURGERY: HCPCS | Performed by: NEUROLOGICAL SURGERY

## 2022-07-12 PROCEDURE — C1887 CATHETER, GUIDING: HCPCS | Performed by: NEUROLOGICAL SURGERY

## 2022-07-12 PROCEDURE — 25010000002 PROPOFOL 10 MG/ML EMULSION: Performed by: STUDENT IN AN ORGANIZED HEALTH CARE EDUCATION/TRAINING PROGRAM

## 2022-07-12 PROCEDURE — 70450 CT HEAD/BRAIN W/O DYE: CPT

## 2022-07-12 PROCEDURE — 25010000002 FENTANYL CITRATE (PF) 50 MCG/ML SOLUTION: Performed by: STUDENT IN AN ORGANIZED HEALTH CARE EDUCATION/TRAINING PROGRAM

## 2022-07-12 PROCEDURE — 25010000002 CEFAZOLIN IN DEXTROSE 2-4 GM/100ML-% SOLUTION: Performed by: NEUROLOGICAL SURGERY

## 2022-07-12 PROCEDURE — C1769 GUIDE WIRE: HCPCS | Performed by: NEUROLOGICAL SURGERY

## 2022-07-12 PROCEDURE — 25010000002 HYDROMORPHONE PER 4 MG: Performed by: STUDENT IN AN ORGANIZED HEALTH CARE EDUCATION/TRAINING PROGRAM

## 2022-07-12 PROCEDURE — 82962 GLUCOSE BLOOD TEST: CPT

## 2022-07-12 PROCEDURE — 0 LIDOCAINE 1 % SOLUTION: Performed by: NEUROLOGICAL SURGERY

## 2022-07-12 PROCEDURE — 25010000002 NEOSTIGMINE 10 MG/10ML SOLUTION: Performed by: STUDENT IN AN ORGANIZED HEALTH CARE EDUCATION/TRAINING PROGRAM

## 2022-07-12 PROCEDURE — 25010000002 ONDANSETRON PER 1 MG: Performed by: STUDENT IN AN ORGANIZED HEALTH CARE EDUCATION/TRAINING PROGRAM

## 2022-07-12 PROCEDURE — 61154 BURR HOLE W/EVAC&/DRG HMTMA: CPT | Performed by: NEUROLOGICAL SURGERY

## 2022-07-12 PROCEDURE — C1894 INTRO/SHEATH, NON-LASER: HCPCS | Performed by: NEUROLOGICAL SURGERY

## 2022-07-12 PROCEDURE — C1729 CATH, DRAINAGE: HCPCS | Performed by: NEUROLOGICAL SURGERY

## 2022-07-12 PROCEDURE — 0 POTASSIUM CHLORIDE PER 2 MEQ: Performed by: NEUROLOGICAL SURGERY

## 2022-07-12 PROCEDURE — 00C43ZZ EXTIRPATION OF MATTER FROM INTRACRANIAL SUBDURAL SPACE, PERCUTANEOUS APPROACH: ICD-10-PCS | Performed by: NEUROLOGICAL SURGERY

## 2022-07-12 DEVICE — BONE WAX
Type: IMPLANTABLE DEVICE | Site: SUBDURAL SPACE | Status: FUNCTIONAL
Brand: ETHICON

## 2022-07-12 DEVICE — FLOSEAL HEMOSTATIC MATRIX, 10ML
Type: IMPLANTABLE DEVICE | Site: SUBDURAL SPACE | Status: FUNCTIONAL
Brand: FLOSEAL HEMOSTATIC MATRIX

## 2022-07-12 RX ORDER — LIDOCAINE HYDROCHLORIDE 10 MG/ML
INJECTION, SOLUTION INFILTRATION; PERINEURAL AS NEEDED
Status: DISCONTINUED | OUTPATIENT
Start: 2022-07-12 | End: 2022-07-12 | Stop reason: HOSPADM

## 2022-07-12 RX ORDER — ONDANSETRON 2 MG/ML
INJECTION INTRAMUSCULAR; INTRAVENOUS AS NEEDED
Status: DISCONTINUED | OUTPATIENT
Start: 2022-07-12 | End: 2022-07-12 | Stop reason: SURG

## 2022-07-12 RX ORDER — LEVOTHYROXINE SODIUM 0.07 MG/1
75 TABLET ORAL
Status: DISCONTINUED | OUTPATIENT
Start: 2022-07-13 | End: 2022-08-04 | Stop reason: HOSPADM

## 2022-07-12 RX ORDER — PROMETHAZINE HYDROCHLORIDE 25 MG/1
25 SUPPOSITORY RECTAL ONCE AS NEEDED
Status: DISCONTINUED | OUTPATIENT
Start: 2022-07-12 | End: 2022-07-12 | Stop reason: HOSPADM

## 2022-07-12 RX ORDER — PROMETHAZINE HYDROCHLORIDE 25 MG/1
25 TABLET ORAL ONCE AS NEEDED
Status: DISCONTINUED | OUTPATIENT
Start: 2022-07-12 | End: 2022-07-12 | Stop reason: HOSPADM

## 2022-07-12 RX ORDER — MIDAZOLAM HYDROCHLORIDE 1 MG/ML
0.5 INJECTION INTRAMUSCULAR; INTRAVENOUS
Status: DISCONTINUED | OUTPATIENT
Start: 2022-07-12 | End: 2022-07-12 | Stop reason: HOSPADM

## 2022-07-12 RX ORDER — ESMOLOL HYDROCHLORIDE 20 MG/ML
INJECTION, SOLUTION INTRAVENOUS CONTINUOUS PRN
Status: DISCONTINUED | OUTPATIENT
Start: 2022-07-12 | End: 2022-07-12

## 2022-07-12 RX ORDER — FLUMAZENIL 0.1 MG/ML
0.2 INJECTION INTRAVENOUS AS NEEDED
Status: DISCONTINUED | OUTPATIENT
Start: 2022-07-12 | End: 2022-07-12 | Stop reason: HOSPADM

## 2022-07-12 RX ORDER — EPHEDRINE SULFATE 50 MG/ML
5 INJECTION, SOLUTION INTRAVENOUS ONCE AS NEEDED
Status: DISCONTINUED | OUTPATIENT
Start: 2022-07-12 | End: 2022-07-12 | Stop reason: HOSPADM

## 2022-07-12 RX ORDER — PHENYLEPHRINE HYDROCHLORIDE 10 MG/ML
INJECTION INTRAVENOUS AS NEEDED
Status: DISCONTINUED | OUTPATIENT
Start: 2022-07-12 | End: 2022-07-12 | Stop reason: SURG

## 2022-07-12 RX ORDER — ONDANSETRON 2 MG/ML
4 INJECTION INTRAMUSCULAR; INTRAVENOUS ONCE AS NEEDED
Status: DISCONTINUED | OUTPATIENT
Start: 2022-07-12 | End: 2022-07-12 | Stop reason: HOSPADM

## 2022-07-12 RX ORDER — LIDOCAINE HYDROCHLORIDE 20 MG/ML
INJECTION, SOLUTION INFILTRATION; PERINEURAL AS NEEDED
Status: DISCONTINUED | OUTPATIENT
Start: 2022-07-12 | End: 2022-07-12 | Stop reason: SURG

## 2022-07-12 RX ORDER — SODIUM CHLORIDE AND POTASSIUM CHLORIDE 150; 450 MG/100ML; MG/100ML
75 INJECTION, SOLUTION INTRAVENOUS CONTINUOUS
Status: DISCONTINUED | OUTPATIENT
Start: 2022-07-12 | End: 2022-07-14

## 2022-07-12 RX ORDER — ESMOLOL HYDROCHLORIDE 10 MG/ML
INJECTION INTRAVENOUS AS NEEDED
Status: DISCONTINUED | OUTPATIENT
Start: 2022-07-12 | End: 2022-07-12 | Stop reason: SURG

## 2022-07-12 RX ORDER — FENTANYL CITRATE 50 UG/ML
50 INJECTION, SOLUTION INTRAMUSCULAR; INTRAVENOUS
Status: DISCONTINUED | OUTPATIENT
Start: 2022-07-12 | End: 2022-07-12 | Stop reason: HOSPADM

## 2022-07-12 RX ORDER — NALOXONE HCL 0.4 MG/ML
0.2 VIAL (ML) INJECTION AS NEEDED
Status: DISCONTINUED | OUTPATIENT
Start: 2022-07-12 | End: 2022-07-12 | Stop reason: HOSPADM

## 2022-07-12 RX ORDER — LISINOPRIL 20 MG/1
20 TABLET ORAL EVERY 12 HOURS SCHEDULED
Status: DISCONTINUED | OUTPATIENT
Start: 2022-07-12 | End: 2022-07-25

## 2022-07-12 RX ORDER — NEOSTIGMINE METHYLSULFATE 1 MG/ML
INJECTION, SOLUTION INTRAVENOUS AS NEEDED
Status: DISCONTINUED | OUTPATIENT
Start: 2022-07-12 | End: 2022-07-12 | Stop reason: SURG

## 2022-07-12 RX ORDER — AMOXICILLIN 250 MG
1 CAPSULE ORAL NIGHTLY PRN
Status: DISCONTINUED | OUTPATIENT
Start: 2022-07-12 | End: 2022-08-04 | Stop reason: HOSPADM

## 2022-07-12 RX ORDER — SODIUM CHLORIDE 0.9 % (FLUSH) 0.9 %
10 SYRINGE (ML) INJECTION AS NEEDED
Status: DISCONTINUED | OUTPATIENT
Start: 2022-07-12 | End: 2022-07-12 | Stop reason: HOSPADM

## 2022-07-12 RX ORDER — SODIUM CHLORIDE, SODIUM LACTATE, POTASSIUM CHLORIDE, CALCIUM CHLORIDE 600; 310; 30; 20 MG/100ML; MG/100ML; MG/100ML; MG/100ML
9 INJECTION, SOLUTION INTRAVENOUS CONTINUOUS PRN
Status: DISCONTINUED | OUTPATIENT
Start: 2022-07-12 | End: 2022-07-12 | Stop reason: HOSPADM

## 2022-07-12 RX ORDER — PANTOPRAZOLE SODIUM 40 MG/1
40 TABLET, DELAYED RELEASE ORAL EVERY MORNING
Status: DISCONTINUED | OUTPATIENT
Start: 2022-07-13 | End: 2022-08-04 | Stop reason: HOSPADM

## 2022-07-12 RX ORDER — HYDRALAZINE HYDROCHLORIDE 20 MG/ML
5 INJECTION INTRAMUSCULAR; INTRAVENOUS
Status: DISCONTINUED | OUTPATIENT
Start: 2022-07-12 | End: 2022-07-12 | Stop reason: HOSPADM

## 2022-07-12 RX ORDER — PROPOFOL 10 MG/ML
VIAL (ML) INTRAVENOUS AS NEEDED
Status: DISCONTINUED | OUTPATIENT
Start: 2022-07-12 | End: 2022-07-12 | Stop reason: SURG

## 2022-07-12 RX ORDER — MORPHINE SULFATE 2 MG/ML
2 INJECTION, SOLUTION INTRAMUSCULAR; INTRAVENOUS
Status: DISCONTINUED | OUTPATIENT
Start: 2022-07-12 | End: 2022-07-15

## 2022-07-12 RX ORDER — NALOXONE HCL 0.4 MG/ML
0.4 VIAL (ML) INJECTION
Status: DISCONTINUED | OUTPATIENT
Start: 2022-07-12 | End: 2022-07-15

## 2022-07-12 RX ORDER — FAMOTIDINE 10 MG/ML
20 INJECTION, SOLUTION INTRAVENOUS
Status: COMPLETED | OUTPATIENT
Start: 2022-07-12 | End: 2022-07-12

## 2022-07-12 RX ORDER — LABETALOL HYDROCHLORIDE 5 MG/ML
5 INJECTION, SOLUTION INTRAVENOUS
Status: DISCONTINUED | OUTPATIENT
Start: 2022-07-12 | End: 2022-07-12 | Stop reason: HOSPADM

## 2022-07-12 RX ORDER — CEFAZOLIN SODIUM 2 G/100ML
2 INJECTION, SOLUTION INTRAVENOUS EVERY 8 HOURS
Status: COMPLETED | OUTPATIENT
Start: 2022-07-12 | End: 2022-07-13

## 2022-07-12 RX ORDER — HYDROMORPHONE HCL 110MG/55ML
PATIENT CONTROLLED ANALGESIA SYRINGE INTRAVENOUS AS NEEDED
Status: DISCONTINUED | OUTPATIENT
Start: 2022-07-12 | End: 2022-07-12 | Stop reason: SURG

## 2022-07-12 RX ORDER — FENTANYL CITRATE 50 UG/ML
INJECTION, SOLUTION INTRAMUSCULAR; INTRAVENOUS AS NEEDED
Status: DISCONTINUED | OUTPATIENT
Start: 2022-07-12 | End: 2022-07-12 | Stop reason: SURG

## 2022-07-12 RX ORDER — GLYCOPYRROLATE 0.2 MG/ML
INJECTION INTRAMUSCULAR; INTRAVENOUS AS NEEDED
Status: DISCONTINUED | OUTPATIENT
Start: 2022-07-12 | End: 2022-07-12 | Stop reason: SURG

## 2022-07-12 RX ORDER — ONDANSETRON 2 MG/ML
4 INJECTION INTRAMUSCULAR; INTRAVENOUS EVERY 6 HOURS PRN
Status: DISCONTINUED | OUTPATIENT
Start: 2022-07-12 | End: 2022-08-04 | Stop reason: HOSPADM

## 2022-07-12 RX ORDER — LABETALOL HYDROCHLORIDE 5 MG/ML
INJECTION, SOLUTION INTRAVENOUS AS NEEDED
Status: DISCONTINUED | OUTPATIENT
Start: 2022-07-12 | End: 2022-07-12 | Stop reason: SURG

## 2022-07-12 RX ORDER — CEFAZOLIN SODIUM 2 G/100ML
2 INJECTION, SOLUTION INTRAVENOUS ONCE
Status: COMPLETED | OUTPATIENT
Start: 2022-07-12 | End: 2022-07-12

## 2022-07-12 RX ORDER — EPHEDRINE SULFATE 50 MG/ML
INJECTION, SOLUTION INTRAVENOUS AS NEEDED
Status: DISCONTINUED | OUTPATIENT
Start: 2022-07-12 | End: 2022-07-12 | Stop reason: SURG

## 2022-07-12 RX ORDER — DIPHENHYDRAMINE HYDROCHLORIDE 50 MG/ML
12.5 INJECTION INTRAMUSCULAR; INTRAVENOUS
Status: DISCONTINUED | OUTPATIENT
Start: 2022-07-12 | End: 2022-07-12 | Stop reason: HOSPADM

## 2022-07-12 RX ORDER — HYDROMORPHONE HYDROCHLORIDE 1 MG/ML
0.5 INJECTION, SOLUTION INTRAMUSCULAR; INTRAVENOUS; SUBCUTANEOUS
Status: DISCONTINUED | OUTPATIENT
Start: 2022-07-12 | End: 2022-07-12 | Stop reason: HOSPADM

## 2022-07-12 RX ORDER — ONDANSETRON 4 MG/1
4 TABLET, FILM COATED ORAL EVERY 6 HOURS PRN
Status: DISCONTINUED | OUTPATIENT
Start: 2022-07-12 | End: 2022-08-04 | Stop reason: HOSPADM

## 2022-07-12 RX ORDER — ROCURONIUM BROMIDE 10 MG/ML
INJECTION, SOLUTION INTRAVENOUS AS NEEDED
Status: DISCONTINUED | OUTPATIENT
Start: 2022-07-12 | End: 2022-07-12 | Stop reason: SURG

## 2022-07-12 RX ORDER — DIPHENHYDRAMINE HCL 25 MG
25 CAPSULE ORAL
Status: DISCONTINUED | OUTPATIENT
Start: 2022-07-12 | End: 2022-07-12 | Stop reason: HOSPADM

## 2022-07-12 RX ORDER — SODIUM CHLORIDE 0.9 % (FLUSH) 0.9 %
10 SYRINGE (ML) INJECTION EVERY 12 HOURS SCHEDULED
Status: DISCONTINUED | OUTPATIENT
Start: 2022-07-12 | End: 2022-07-12 | Stop reason: HOSPADM

## 2022-07-12 RX ORDER — DOCUSATE SODIUM 100 MG/1
100 CAPSULE, LIQUID FILLED ORAL 2 TIMES DAILY PRN
Status: DISCONTINUED | OUTPATIENT
Start: 2022-07-12 | End: 2022-08-04 | Stop reason: HOSPADM

## 2022-07-12 RX ADMIN — SODIUM CHLORIDE, POTASSIUM CHLORIDE, SODIUM LACTATE AND CALCIUM CHLORIDE 9 ML/HR: 600; 310; 30; 20 INJECTION, SOLUTION INTRAVENOUS at 09:16

## 2022-07-12 RX ADMIN — HYDROMORPHONE HYDROCHLORIDE 0.5 MG: 2 INJECTION, SOLUTION INTRAMUSCULAR; INTRAVENOUS; SUBCUTANEOUS at 18:18

## 2022-07-12 RX ADMIN — ESMOLOL HYDROCHLORIDE 20 MG: 100 INJECTION, SOLUTION INTRAVENOUS at 17:01

## 2022-07-12 RX ADMIN — NICARDIPINE HYDROCHLORIDE 5 MG/HR: 25 INJECTION, SOLUTION INTRAVENOUS at 18:45

## 2022-07-12 RX ADMIN — CEFAZOLIN 2 G: 10 INJECTION, POWDER, FOR SOLUTION INTRAVENOUS at 21:28

## 2022-07-12 RX ADMIN — PROPOFOL 50 MG: 10 INJECTION, EMULSION INTRAVENOUS at 16:55

## 2022-07-12 RX ADMIN — EPHEDRINE SULFATE 10 MG: 50 INJECTION INTRAVENOUS at 17:04

## 2022-07-12 RX ADMIN — PROPOFOL 30 MG: 10 INJECTION, EMULSION INTRAVENOUS at 16:58

## 2022-07-12 RX ADMIN — EPHEDRINE SULFATE 10 MG: 50 INJECTION INTRAVENOUS at 17:38

## 2022-07-12 RX ADMIN — GLYCOPYRROLATE 0.5 MG: 0.2 INJECTION INTRAMUSCULAR; INTRAVENOUS at 18:10

## 2022-07-12 RX ADMIN — PROPOFOL 120 MG: 10 INJECTION, EMULSION INTRAVENOUS at 16:50

## 2022-07-12 RX ADMIN — ESMOLOL HYDROCHLORIDE 30 MG: 100 INJECTION, SOLUTION INTRAVENOUS at 16:58

## 2022-07-12 RX ADMIN — LIDOCAINE HYDROCHLORIDE 100 MG: 20 INJECTION, SOLUTION INFILTRATION; PERINEURAL at 16:50

## 2022-07-12 RX ADMIN — PHENYLEPHRINE HYDROCHLORIDE 100 MCG: 10 INJECTION, SOLUTION INTRAVENOUS at 17:10

## 2022-07-12 RX ADMIN — FENTANYL CITRATE 50 MCG: 50 INJECTION INTRAMUSCULAR; INTRAVENOUS at 16:55

## 2022-07-12 RX ADMIN — FENTANYL CITRATE 50 MCG: 50 INJECTION INTRAMUSCULAR; INTRAVENOUS at 16:50

## 2022-07-12 RX ADMIN — CEFAZOLIN SODIUM 2 G: 2 INJECTION, SOLUTION INTRAVENOUS at 16:36

## 2022-07-12 RX ADMIN — POTASSIUM CHLORIDE AND SODIUM CHLORIDE 75 ML/HR: 450; 150 INJECTION, SOLUTION INTRAVENOUS at 22:05

## 2022-07-12 RX ADMIN — FAMOTIDINE 20 MG: 10 INJECTION, SOLUTION INTRAVENOUS at 09:16

## 2022-07-12 RX ADMIN — LABETALOL HYDROCHLORIDE 5 MG: 5 INJECTION, SOLUTION INTRAVENOUS at 18:21

## 2022-07-12 RX ADMIN — CEFAZOLIN 2 G: 10 INJECTION, POWDER, FOR SOLUTION INTRAVENOUS at 17:05

## 2022-07-12 RX ADMIN — EPHEDRINE SULFATE 10 MG: 50 INJECTION INTRAVENOUS at 17:07

## 2022-07-12 RX ADMIN — ONDANSETRON 4 MG: 2 INJECTION INTRAMUSCULAR; INTRAVENOUS at 18:10

## 2022-07-12 RX ADMIN — ESMOLOL HYDROCHLORIDE 30 MG: 100 INJECTION, SOLUTION INTRAVENOUS at 18:19

## 2022-07-12 RX ADMIN — ROCURONIUM BROMIDE 40 MG: 50 INJECTION INTRAVENOUS at 16:50

## 2022-07-12 RX ADMIN — NEOSTIGMINE METHYLSULFATE 4 MG: 1 INJECTION INTRAVENOUS at 18:10

## 2022-07-12 RX ADMIN — PHENYLEPHRINE HYDROCHLORIDE 100 MCG: 10 INJECTION, SOLUTION INTRAVENOUS at 17:36

## 2022-07-12 NOTE — ANESTHESIA PREPROCEDURE EVALUATION
Anesthesia Evaluation     Patient summary reviewed                Airway   Mallampati: II  Neck ROM: limited  No difficulty expected  Dental      Pulmonary    (+) sleep apnea,   Cardiovascular     ECG reviewed  Rhythm: regular    (+) hypertension, DVT,       Neuro/Psych  (+) CVA,    GI/Hepatic/Renal/Endo    (+)   diabetes mellitus, thyroid problem     Musculoskeletal     Abdominal    Substance History      OB/GYN          Other   arthritis,                    Anesthesia Plan    ASA 3     general       Anesthetic plan, risks, benefits, and alternatives have been provided, discussed and informed consent has been obtained with: patient.  Use of blood products discussed with patient .       CODE STATUS:

## 2022-07-12 NOTE — ANESTHESIA PROCEDURE NOTES
Airway  Urgency: elective    Date/Time: 7/12/2022 4:57 PM  Airway not difficult    General Information and Staff    Patient location during procedure: OR  Anesthesiologist: Erik Lockhart MD    Indications and Patient Condition  Indications for airway management: airway protection    Preoxygenated: yes  MILS maintained throughout  Mask difficulty assessment: 1 - vent by mask    Final Airway Details  Final airway type: endotracheal airway      Successful airway: ETT  Cuffed: yes   Successful intubation technique: direct laryngoscopy  Endotracheal tube insertion site: oral  Blade: Daya  Blade size: 3  ETT size (mm): 7.5  Cormack-Lehane Classification: grade IIa - partial view of glottis  Placement verified by: chest auscultation and capnometry   Measured from: teeth  Number of attempts at approach: 1  Assessment: lips, teeth, and gum same as pre-op and atraumatic intubation    Additional Comments  With first intubation attempt, lower dentures removed from mouth. Unable to pass ETT on first attempt despite grade II view. Bougie used on second attempt and patient successfully intubated. Easy mask.

## 2022-07-13 ENCOUNTER — APPOINTMENT (OUTPATIENT)
Dept: CT IMAGING | Facility: HOSPITAL | Age: 87
End: 2022-07-13

## 2022-07-13 PROBLEM — Z41.9 SURGERY, ELECTIVE: Status: ACTIVE | Noted: 2022-07-13

## 2022-07-13 LAB
ANION GAP SERPL CALCULATED.3IONS-SCNC: 12 MMOL/L (ref 5–15)
APTT PPP: 28.8 SECONDS (ref 22.7–35.4)
BASOPHILS # BLD AUTO: 0.05 10*3/MM3 (ref 0–0.2)
BASOPHILS NFR BLD AUTO: 0.6 % (ref 0–1.5)
BUN SERPL-MCNC: 27 MG/DL (ref 8–23)
BUN/CREAT SERPL: 14.7 (ref 7–25)
CALCIUM SPEC-SCNC: 8.7 MG/DL (ref 8.6–10.5)
CHLORIDE SERPL-SCNC: 109 MMOL/L (ref 98–107)
CO2 SERPL-SCNC: 20 MMOL/L (ref 22–29)
CREAT SERPL-MCNC: 1.84 MG/DL (ref 0.76–1.27)
DEPRECATED RDW RBC AUTO: 39.2 FL (ref 37–54)
EGFRCR SERPLBLD CKD-EPI 2021: 35.3 ML/MIN/1.73
EOSINOPHIL # BLD AUTO: 0.2 10*3/MM3 (ref 0–0.4)
EOSINOPHIL NFR BLD AUTO: 2.4 % (ref 0.3–6.2)
ERYTHROCYTE [DISTWIDTH] IN BLOOD BY AUTOMATED COUNT: 12.5 % (ref 12.3–15.4)
GLUCOSE BLDC GLUCOMTR-MCNC: 136 MG/DL (ref 70–130)
GLUCOSE BLDC GLUCOMTR-MCNC: 218 MG/DL (ref 70–130)
GLUCOSE SERPL-MCNC: 125 MG/DL (ref 65–99)
HCT VFR BLD AUTO: 34.2 % (ref 37.5–51)
HGB BLD-MCNC: 11.6 G/DL (ref 13–17.7)
IMM GRANULOCYTES # BLD AUTO: 0.04 10*3/MM3 (ref 0–0.05)
IMM GRANULOCYTES NFR BLD AUTO: 0.5 % (ref 0–0.5)
INR PPP: 1.08 (ref 0.9–1.1)
LYMPHOCYTES # BLD AUTO: 1.49 10*3/MM3 (ref 0.7–3.1)
LYMPHOCYTES NFR BLD AUTO: 17.9 % (ref 19.6–45.3)
MCH RBC QN AUTO: 29.5 PG (ref 26.6–33)
MCHC RBC AUTO-ENTMCNC: 33.9 G/DL (ref 31.5–35.7)
MCV RBC AUTO: 87 FL (ref 79–97)
MONOCYTES # BLD AUTO: 0.76 10*3/MM3 (ref 0.1–0.9)
MONOCYTES NFR BLD AUTO: 9.1 % (ref 5–12)
NEUTROPHILS NFR BLD AUTO: 5.79 10*3/MM3 (ref 1.7–7)
NEUTROPHILS NFR BLD AUTO: 69.5 % (ref 42.7–76)
NRBC BLD AUTO-RTO: 0 /100 WBC (ref 0–0.2)
PLATELET # BLD AUTO: 183 10*3/MM3 (ref 140–450)
PMV BLD AUTO: 10.6 FL (ref 6–12)
POTASSIUM SERPL-SCNC: 4.3 MMOL/L (ref 3.5–5.2)
PROTHROMBIN TIME: 13.9 SECONDS (ref 11.7–14.2)
RBC # BLD AUTO: 3.93 10*6/MM3 (ref 4.14–5.8)
SODIUM SERPL-SCNC: 141 MMOL/L (ref 136–145)
WBC NRBC COR # BLD: 8.33 10*3/MM3 (ref 3.4–10.8)

## 2022-07-13 PROCEDURE — 85730 THROMBOPLASTIN TIME PARTIAL: CPT | Performed by: NEUROLOGICAL SURGERY

## 2022-07-13 PROCEDURE — 92610 EVALUATE SWALLOWING FUNCTION: CPT | Performed by: SPEECH-LANGUAGE PATHOLOGIST

## 2022-07-13 PROCEDURE — 0 POTASSIUM CHLORIDE PER 2 MEQ: Performed by: NEUROLOGICAL SURGERY

## 2022-07-13 PROCEDURE — 82962 GLUCOSE BLOOD TEST: CPT

## 2022-07-13 PROCEDURE — 80048 BASIC METABOLIC PNL TOTAL CA: CPT | Performed by: NEUROLOGICAL SURGERY

## 2022-07-13 PROCEDURE — 25010000002 CEFAZOLIN PER 500 MG: Performed by: NEUROLOGICAL SURGERY

## 2022-07-13 PROCEDURE — 85610 PROTHROMBIN TIME: CPT | Performed by: NEUROLOGICAL SURGERY

## 2022-07-13 PROCEDURE — 25010000002 MORPHINE PER 10 MG: Performed by: NEUROLOGICAL SURGERY

## 2022-07-13 PROCEDURE — 70450 CT HEAD/BRAIN W/O DYE: CPT

## 2022-07-13 PROCEDURE — 85025 COMPLETE CBC W/AUTO DIFF WBC: CPT | Performed by: NEUROLOGICAL SURGERY

## 2022-07-13 PROCEDURE — 99024 POSTOP FOLLOW-UP VISIT: CPT | Performed by: NURSE PRACTITIONER

## 2022-07-13 RX ADMIN — POTASSIUM CHLORIDE AND SODIUM CHLORIDE 75 ML/HR: 450; 150 INJECTION, SOLUTION INTRAVENOUS at 10:42

## 2022-07-13 RX ADMIN — CEFAZOLIN 2 G: 10 INJECTION, POWDER, FOR SOLUTION INTRAVENOUS at 02:58

## 2022-07-13 RX ADMIN — LISINOPRIL 20 MG: 20 TABLET ORAL at 20:31

## 2022-07-13 RX ADMIN — NICARDIPINE HYDROCHLORIDE 5 MG/HR: 25 INJECTION, SOLUTION INTRAVENOUS at 20:42

## 2022-07-13 RX ADMIN — MORPHINE SULFATE 2 MG: 2 INJECTION, SOLUTION INTRAMUSCULAR; INTRAVENOUS at 03:04

## 2022-07-13 RX ADMIN — NICARDIPINE HYDROCHLORIDE 2.5 MG/HR: 25 INJECTION, SOLUTION INTRAVENOUS at 04:59

## 2022-07-14 ENCOUNTER — APPOINTMENT (OUTPATIENT)
Dept: CT IMAGING | Facility: HOSPITAL | Age: 87
End: 2022-07-14

## 2022-07-14 LAB
ANION GAP SERPL CALCULATED.3IONS-SCNC: 12 MMOL/L (ref 5–15)
BASOPHILS # BLD AUTO: 0.06 10*3/MM3 (ref 0–0.2)
BASOPHILS NFR BLD AUTO: 0.8 % (ref 0–1.5)
BUN SERPL-MCNC: 21 MG/DL (ref 8–23)
BUN/CREAT SERPL: 12.6 (ref 7–25)
CALCIUM SPEC-SCNC: 8.7 MG/DL (ref 8.6–10.5)
CHLORIDE SERPL-SCNC: 103 MMOL/L (ref 98–107)
CO2 SERPL-SCNC: 19 MMOL/L (ref 22–29)
CREAT SERPL-MCNC: 1.67 MG/DL (ref 0.76–1.27)
DEPRECATED RDW RBC AUTO: 41 FL (ref 37–54)
EGFRCR SERPLBLD CKD-EPI 2021: 39.6 ML/MIN/1.73
EOSINOPHIL # BLD AUTO: 0.33 10*3/MM3 (ref 0–0.4)
EOSINOPHIL NFR BLD AUTO: 4.2 % (ref 0.3–6.2)
ERYTHROCYTE [DISTWIDTH] IN BLOOD BY AUTOMATED COUNT: 12.4 % (ref 12.3–15.4)
GLUCOSE BLDC GLUCOMTR-MCNC: 207 MG/DL (ref 70–130)
GLUCOSE BLDC GLUCOMTR-MCNC: 212 MG/DL (ref 70–130)
GLUCOSE BLDC GLUCOMTR-MCNC: 233 MG/DL (ref 70–130)
GLUCOSE BLDC GLUCOMTR-MCNC: 332 MG/DL (ref 70–130)
GLUCOSE SERPL-MCNC: 227 MG/DL (ref 65–99)
HCT VFR BLD AUTO: 36.8 % (ref 37.5–51)
HGB BLD-MCNC: 12 G/DL (ref 13–17.7)
IMM GRANULOCYTES # BLD AUTO: 0.03 10*3/MM3 (ref 0–0.05)
IMM GRANULOCYTES NFR BLD AUTO: 0.4 % (ref 0–0.5)
LYMPHOCYTES # BLD AUTO: 1.35 10*3/MM3 (ref 0.7–3.1)
LYMPHOCYTES NFR BLD AUTO: 17.2 % (ref 19.6–45.3)
MCH RBC QN AUTO: 29.5 PG (ref 26.6–33)
MCHC RBC AUTO-ENTMCNC: 32.6 G/DL (ref 31.5–35.7)
MCV RBC AUTO: 90.4 FL (ref 79–97)
MONOCYTES # BLD AUTO: 1.04 10*3/MM3 (ref 0.1–0.9)
MONOCYTES NFR BLD AUTO: 13.2 % (ref 5–12)
NEUTROPHILS NFR BLD AUTO: 5.05 10*3/MM3 (ref 1.7–7)
NEUTROPHILS NFR BLD AUTO: 64.2 % (ref 42.7–76)
NRBC BLD AUTO-RTO: 0 /100 WBC (ref 0–0.2)
PLATELET # BLD AUTO: 182 10*3/MM3 (ref 140–450)
PMV BLD AUTO: 10.3 FL (ref 6–12)
POTASSIUM SERPL-SCNC: 4.5 MMOL/L (ref 3.5–5.2)
RBC # BLD AUTO: 4.07 10*6/MM3 (ref 4.14–5.8)
SODIUM SERPL-SCNC: 134 MMOL/L (ref 136–145)
WBC NRBC COR # BLD: 7.86 10*3/MM3 (ref 3.4–10.8)

## 2022-07-14 PROCEDURE — 82962 GLUCOSE BLOOD TEST: CPT

## 2022-07-14 PROCEDURE — 99024 POSTOP FOLLOW-UP VISIT: CPT | Performed by: NURSE PRACTITIONER

## 2022-07-14 PROCEDURE — 80048 BASIC METABOLIC PNL TOTAL CA: CPT | Performed by: INTERNAL MEDICINE

## 2022-07-14 PROCEDURE — 92507 TX SP LANG VOICE COMM INDIV: CPT

## 2022-07-14 PROCEDURE — 70450 CT HEAD/BRAIN W/O DYE: CPT

## 2022-07-14 PROCEDURE — 63710000001 INSULIN LISPRO (HUMAN) PER 5 UNITS: Performed by: INTERNAL MEDICINE

## 2022-07-14 PROCEDURE — 85025 COMPLETE CBC W/AUTO DIFF WBC: CPT | Performed by: INTERNAL MEDICINE

## 2022-07-14 RX ORDER — INSULIN LISPRO 100 [IU]/ML
0-9 INJECTION, SOLUTION INTRAVENOUS; SUBCUTANEOUS
Status: DISCONTINUED | OUTPATIENT
Start: 2022-07-14 | End: 2022-07-22

## 2022-07-14 RX ORDER — DEXTROSE MONOHYDRATE 25 G/50ML
25 INJECTION, SOLUTION INTRAVENOUS
Status: DISCONTINUED | OUTPATIENT
Start: 2022-07-14 | End: 2022-08-04 | Stop reason: HOSPADM

## 2022-07-14 RX ORDER — AMLODIPINE BESYLATE 5 MG/1
5 TABLET ORAL
Status: DISCONTINUED | OUTPATIENT
Start: 2022-07-14 | End: 2022-07-27

## 2022-07-14 RX ORDER — NICOTINE POLACRILEX 4 MG
15 LOZENGE BUCCAL
Status: DISCONTINUED | OUTPATIENT
Start: 2022-07-14 | End: 2022-08-04 | Stop reason: HOSPADM

## 2022-07-14 RX ADMIN — LISINOPRIL 20 MG: 20 TABLET ORAL at 08:16

## 2022-07-14 RX ADMIN — INSULIN LISPRO 4 UNITS: 100 INJECTION, SOLUTION INTRAVENOUS; SUBCUTANEOUS at 17:11

## 2022-07-14 RX ADMIN — INSULIN LISPRO 4 UNITS: 100 INJECTION, SOLUTION INTRAVENOUS; SUBCUTANEOUS at 08:17

## 2022-07-14 RX ADMIN — NICARDIPINE HYDROCHLORIDE 7.5 MG/HR: 25 INJECTION, SOLUTION INTRAVENOUS at 02:08

## 2022-07-14 RX ADMIN — NICARDIPINE HYDROCHLORIDE 7.5 MG/HR: 25 INJECTION, SOLUTION INTRAVENOUS at 06:14

## 2022-07-14 RX ADMIN — INSULIN LISPRO 4 UNITS: 100 INJECTION, SOLUTION INTRAVENOUS; SUBCUTANEOUS at 12:11

## 2022-07-14 RX ADMIN — LEVOTHYROXINE SODIUM 75 MCG: 0.07 TABLET ORAL at 06:14

## 2022-07-14 RX ADMIN — AMLODIPINE BESYLATE 5 MG: 5 TABLET ORAL at 10:45

## 2022-07-14 RX ADMIN — LISINOPRIL 20 MG: 20 TABLET ORAL at 21:36

## 2022-07-14 RX ADMIN — PANTOPRAZOLE SODIUM 40 MG: 40 TABLET, DELAYED RELEASE ORAL at 06:14

## 2022-07-14 NOTE — ANESTHESIA POSTPROCEDURE EVALUATION
Patient: Reece Stephen    Procedure Summary     Date: 07/12/22 Room / Location: [facility] OR [address] [place] [address] MAIN OR    Anesthesia Start: 1641 Anesthesia Stop: 1841    Procedure: LEFT KWABENA HOLE FOR EVACUATION OF SUBDURAL HEMATOMA (Left ) Diagnosis:       Subdural hematoma (HCC)      (Subdural hematoma (HCC) [S06.5X9A])    Surgeons: Ciaran Reyes MD Provider: Erik Lockhart MD    Anesthesia Type: general ASA Status: 3          Anesthesia Type: general    Vitals  Vitals Value Taken Time   /66 07/12/22 2030   Temp 36.7 °C (98 °F) 07/12/22 2000   Pulse 75 07/12/22 2030   Resp 14 07/12/22 2030   SpO2 94 % 07/12/22 2030           Post Anesthesia Care and Evaluation    Pain management: adequate    Airway patency: patent  Anesthetic complications: No anesthetic complications  PONV Status: controlled  Cardiovascular status: blood pressure returned to baseline and acceptable  Respiratory status: acceptable  Hydration status: acceptable

## 2022-07-15 ENCOUNTER — APPOINTMENT (OUTPATIENT)
Dept: CT IMAGING | Facility: HOSPITAL | Age: 87
End: 2022-07-15

## 2022-07-15 LAB
ALBUMIN SERPL-MCNC: 3.7 G/DL (ref 3.5–5.2)
ANION GAP SERPL CALCULATED.3IONS-SCNC: 11 MMOL/L (ref 5–15)
BUN SERPL-MCNC: 24 MG/DL (ref 8–23)
BUN/CREAT SERPL: 14.9 (ref 7–25)
CALCIUM SPEC-SCNC: 8.8 MG/DL (ref 8.6–10.5)
CHLORIDE SERPL-SCNC: 105 MMOL/L (ref 98–107)
CO2 SERPL-SCNC: 21 MMOL/L (ref 22–29)
CREAT SERPL-MCNC: 1.61 MG/DL (ref 0.76–1.27)
EGFRCR SERPLBLD CKD-EPI 2021: 41.4 ML/MIN/1.73
GLUCOSE BLDC GLUCOMTR-MCNC: 170 MG/DL (ref 70–130)
GLUCOSE BLDC GLUCOMTR-MCNC: 245 MG/DL (ref 70–130)
GLUCOSE BLDC GLUCOMTR-MCNC: 268 MG/DL (ref 70–130)
GLUCOSE BLDC GLUCOMTR-MCNC: 290 MG/DL (ref 70–130)
GLUCOSE SERPL-MCNC: 334 MG/DL (ref 65–99)
PHOSPHATE SERPL-MCNC: 2.8 MG/DL (ref 2.5–4.5)
POTASSIUM SERPL-SCNC: 4.4 MMOL/L (ref 3.5–5.2)
SODIUM SERPL-SCNC: 137 MMOL/L (ref 136–145)

## 2022-07-15 PROCEDURE — 63710000001 INSULIN LISPRO (HUMAN) PER 5 UNITS: Performed by: INTERNAL MEDICINE

## 2022-07-15 PROCEDURE — 80069 RENAL FUNCTION PANEL: CPT | Performed by: INTERNAL MEDICINE

## 2022-07-15 PROCEDURE — 70450 CT HEAD/BRAIN W/O DYE: CPT

## 2022-07-15 PROCEDURE — 63710000001 INSULIN LISPRO (HUMAN) PER 5 UNITS: Performed by: NURSE PRACTITIONER

## 2022-07-15 PROCEDURE — 82962 GLUCOSE BLOOD TEST: CPT

## 2022-07-15 PROCEDURE — 99024 POSTOP FOLLOW-UP VISIT: CPT | Performed by: NURSE PRACTITIONER

## 2022-07-15 RX ADMIN — INSULIN LISPRO 6 UNITS: 100 INJECTION, SOLUTION INTRAVENOUS; SUBCUTANEOUS at 07:51

## 2022-07-15 RX ADMIN — LISINOPRIL 20 MG: 20 TABLET ORAL at 22:10

## 2022-07-15 RX ADMIN — INSULIN LISPRO 4 UNITS: 100 INJECTION, SOLUTION INTRAVENOUS; SUBCUTANEOUS at 11:50

## 2022-07-15 RX ADMIN — LEVOTHYROXINE SODIUM 75 MCG: 0.07 TABLET ORAL at 07:51

## 2022-07-15 RX ADMIN — INSULIN LISPRO 6 UNITS: 100 INJECTION, SOLUTION INTRAVENOUS; SUBCUTANEOUS at 19:04

## 2022-07-15 RX ADMIN — PANTOPRAZOLE SODIUM 40 MG: 40 TABLET, DELAYED RELEASE ORAL at 07:52

## 2022-07-15 RX ADMIN — LISINOPRIL 20 MG: 20 TABLET ORAL at 08:00

## 2022-07-15 RX ADMIN — INSULIN GLARGINE-YFGN 10 UNITS: 100 INJECTION, SOLUTION SUBCUTANEOUS at 22:10

## 2022-07-15 RX ADMIN — INSULIN GLARGINE-YFGN 10 UNITS: 100 INJECTION, SOLUTION SUBCUTANEOUS at 11:50

## 2022-07-15 RX ADMIN — AMLODIPINE BESYLATE 5 MG: 5 TABLET ORAL at 08:00

## 2022-07-16 LAB
ALBUMIN SERPL-MCNC: 3.4 G/DL (ref 3.5–5.2)
ANION GAP SERPL CALCULATED.3IONS-SCNC: 9 MMOL/L (ref 5–15)
BUN SERPL-MCNC: 26 MG/DL (ref 8–23)
BUN/CREAT SERPL: 14.3 (ref 7–25)
CALCIUM SPEC-SCNC: 8.8 MG/DL (ref 8.6–10.5)
CHLORIDE SERPL-SCNC: 104 MMOL/L (ref 98–107)
CO2 SERPL-SCNC: 22 MMOL/L (ref 22–29)
CREAT SERPL-MCNC: 1.82 MG/DL (ref 0.76–1.27)
EGFRCR SERPLBLD CKD-EPI 2021: 35.7 ML/MIN/1.73
GLUCOSE BLDC GLUCOMTR-MCNC: 170 MG/DL (ref 70–130)
GLUCOSE BLDC GLUCOMTR-MCNC: 181 MG/DL (ref 70–130)
GLUCOSE BLDC GLUCOMTR-MCNC: 185 MG/DL (ref 70–130)
GLUCOSE BLDC GLUCOMTR-MCNC: 375 MG/DL (ref 70–130)
GLUCOSE SERPL-MCNC: 218 MG/DL (ref 65–99)
PHOSPHATE SERPL-MCNC: 2.7 MG/DL (ref 2.5–4.5)
POTASSIUM SERPL-SCNC: 4.2 MMOL/L (ref 3.5–5.2)
SODIUM SERPL-SCNC: 135 MMOL/L (ref 136–145)

## 2022-07-16 PROCEDURE — 80069 RENAL FUNCTION PANEL: CPT | Performed by: NURSE PRACTITIONER

## 2022-07-16 PROCEDURE — 63710000001 INSULIN LISPRO (HUMAN) PER 5 UNITS: Performed by: NURSE PRACTITIONER

## 2022-07-16 PROCEDURE — 99024 POSTOP FOLLOW-UP VISIT: CPT | Performed by: NURSE PRACTITIONER

## 2022-07-16 PROCEDURE — 82962 GLUCOSE BLOOD TEST: CPT

## 2022-07-16 RX ORDER — GUAIFENESIN 600 MG/1
600 TABLET, EXTENDED RELEASE ORAL EVERY 12 HOURS SCHEDULED
Status: DISCONTINUED | OUTPATIENT
Start: 2022-07-16 | End: 2022-08-04 | Stop reason: HOSPADM

## 2022-07-16 RX ORDER — ACETAMINOPHEN 325 MG/1
650 TABLET ORAL EVERY 6 HOURS PRN
Status: DISCONTINUED | OUTPATIENT
Start: 2022-07-16 | End: 2022-07-21

## 2022-07-16 RX ADMIN — PANTOPRAZOLE SODIUM 40 MG: 40 TABLET, DELAYED RELEASE ORAL at 06:25

## 2022-07-16 RX ADMIN — AMLODIPINE BESYLATE 5 MG: 5 TABLET ORAL at 09:41

## 2022-07-16 RX ADMIN — INSULIN GLARGINE-YFGN 10 UNITS: 100 INJECTION, SOLUTION SUBCUTANEOUS at 21:01

## 2022-07-16 RX ADMIN — LISINOPRIL 20 MG: 20 TABLET ORAL at 21:01

## 2022-07-16 RX ADMIN — GUAIFENESIN 600 MG: 600 TABLET, EXTENDED RELEASE ORAL at 21:01

## 2022-07-16 RX ADMIN — LISINOPRIL 20 MG: 20 TABLET ORAL at 09:41

## 2022-07-16 RX ADMIN — LEVOTHYROXINE SODIUM 75 MCG: 0.07 TABLET ORAL at 06:24

## 2022-07-16 RX ADMIN — INSULIN LISPRO 2 UNITS: 100 INJECTION, SOLUTION INTRAVENOUS; SUBCUTANEOUS at 17:25

## 2022-07-16 RX ADMIN — INSULIN LISPRO 8 UNITS: 100 INJECTION, SOLUTION INTRAVENOUS; SUBCUTANEOUS at 12:37

## 2022-07-16 RX ADMIN — INSULIN GLARGINE-YFGN 10 UNITS: 100 INJECTION, SOLUTION SUBCUTANEOUS at 09:39

## 2022-07-16 RX ADMIN — GUAIFENESIN 600 MG: 600 TABLET, EXTENDED RELEASE ORAL at 15:52

## 2022-07-16 RX ADMIN — INSULIN LISPRO 2 UNITS: 100 INJECTION, SOLUTION INTRAVENOUS; SUBCUTANEOUS at 09:41

## 2022-07-16 RX ADMIN — ACETAMINOPHEN 650 MG: 325 TABLET, FILM COATED ORAL at 12:43

## 2022-07-17 LAB
ALBUMIN SERPL-MCNC: 3.4 G/DL (ref 3.5–5.2)
ANION GAP SERPL CALCULATED.3IONS-SCNC: 12 MMOL/L (ref 5–15)
BUN SERPL-MCNC: 33 MG/DL (ref 8–23)
BUN/CREAT SERPL: 17.4 (ref 7–25)
CALCIUM SPEC-SCNC: 9 MG/DL (ref 8.6–10.5)
CHLORIDE SERPL-SCNC: 106 MMOL/L (ref 98–107)
CO2 SERPL-SCNC: 20 MMOL/L (ref 22–29)
CREAT SERPL-MCNC: 1.9 MG/DL (ref 0.76–1.27)
EGFRCR SERPLBLD CKD-EPI 2021: 33.9 ML/MIN/1.73
GLUCOSE BLDC GLUCOMTR-MCNC: 137 MG/DL (ref 70–130)
GLUCOSE BLDC GLUCOMTR-MCNC: 197 MG/DL (ref 70–130)
GLUCOSE BLDC GLUCOMTR-MCNC: 229 MG/DL (ref 70–130)
GLUCOSE BLDC GLUCOMTR-MCNC: 250 MG/DL (ref 70–130)
GLUCOSE SERPL-MCNC: 161 MG/DL (ref 65–99)
PHOSPHATE SERPL-MCNC: 3.2 MG/DL (ref 2.5–4.5)
POTASSIUM SERPL-SCNC: 4.5 MMOL/L (ref 3.5–5.2)
SODIUM SERPL-SCNC: 138 MMOL/L (ref 136–145)

## 2022-07-17 PROCEDURE — 82962 GLUCOSE BLOOD TEST: CPT

## 2022-07-17 PROCEDURE — 97530 THERAPEUTIC ACTIVITIES: CPT | Performed by: PHYSICAL THERAPIST

## 2022-07-17 PROCEDURE — 63710000001 INSULIN LISPRO (HUMAN) PER 5 UNITS: Performed by: NURSE PRACTITIONER

## 2022-07-17 PROCEDURE — 97162 PT EVAL MOD COMPLEX 30 MIN: CPT | Performed by: PHYSICAL THERAPIST

## 2022-07-17 PROCEDURE — 80069 RENAL FUNCTION PANEL: CPT | Performed by: NURSE PRACTITIONER

## 2022-07-17 RX ORDER — BENZONATATE 100 MG/1
100 CAPSULE ORAL 3 TIMES DAILY PRN
Status: DISCONTINUED | OUTPATIENT
Start: 2022-07-17 | End: 2022-08-04 | Stop reason: HOSPADM

## 2022-07-17 RX ADMIN — ACETAMINOPHEN 650 MG: 325 TABLET, FILM COATED ORAL at 16:11

## 2022-07-17 RX ADMIN — INSULIN GLARGINE-YFGN 10 UNITS: 100 INJECTION, SOLUTION SUBCUTANEOUS at 10:59

## 2022-07-17 RX ADMIN — BENZONATATE 100 MG: 100 CAPSULE ORAL at 23:24

## 2022-07-17 RX ADMIN — INSULIN LISPRO 6 UNITS: 100 INJECTION, SOLUTION INTRAVENOUS; SUBCUTANEOUS at 18:41

## 2022-07-17 RX ADMIN — BENZONATATE 100 MG: 100 CAPSULE ORAL at 18:42

## 2022-07-17 RX ADMIN — LISINOPRIL 20 MG: 20 TABLET ORAL at 10:59

## 2022-07-17 RX ADMIN — GUAIFENESIN 600 MG: 600 TABLET, EXTENDED RELEASE ORAL at 10:59

## 2022-07-17 RX ADMIN — INSULIN GLARGINE-YFGN 10 UNITS: 100 INJECTION, SOLUTION SUBCUTANEOUS at 21:08

## 2022-07-17 RX ADMIN — LEVOTHYROXINE SODIUM 75 MCG: 0.07 TABLET ORAL at 06:30

## 2022-07-17 RX ADMIN — INSULIN LISPRO 4 UNITS: 100 INJECTION, SOLUTION INTRAVENOUS; SUBCUTANEOUS at 12:35

## 2022-07-17 RX ADMIN — PANTOPRAZOLE SODIUM 40 MG: 40 TABLET, DELAYED RELEASE ORAL at 06:30

## 2022-07-17 RX ADMIN — AMLODIPINE BESYLATE 5 MG: 5 TABLET ORAL at 10:59

## 2022-07-17 RX ADMIN — LISINOPRIL 20 MG: 20 TABLET ORAL at 21:08

## 2022-07-17 RX ADMIN — GUAIFENESIN 600 MG: 600 TABLET, EXTENDED RELEASE ORAL at 21:07

## 2022-07-18 ENCOUNTER — APPOINTMENT (OUTPATIENT)
Dept: CT IMAGING | Facility: HOSPITAL | Age: 87
End: 2022-07-18

## 2022-07-18 LAB
ALBUMIN SERPL-MCNC: 3.4 G/DL (ref 3.5–5.2)
ANION GAP SERPL CALCULATED.3IONS-SCNC: 10 MMOL/L (ref 5–15)
BUN SERPL-MCNC: 29 MG/DL (ref 8–23)
BUN/CREAT SERPL: 17.7 (ref 7–25)
CALCIUM SPEC-SCNC: 9 MG/DL (ref 8.6–10.5)
CHLORIDE SERPL-SCNC: 105 MMOL/L (ref 98–107)
CO2 SERPL-SCNC: 21 MMOL/L (ref 22–29)
CREAT SERPL-MCNC: 1.64 MG/DL (ref 0.76–1.27)
EGFRCR SERPLBLD CKD-EPI 2021: 40.5 ML/MIN/1.73
GLUCOSE BLDC GLUCOMTR-MCNC: 129 MG/DL (ref 70–130)
GLUCOSE BLDC GLUCOMTR-MCNC: 141 MG/DL (ref 70–130)
GLUCOSE BLDC GLUCOMTR-MCNC: 163 MG/DL (ref 70–130)
GLUCOSE BLDC GLUCOMTR-MCNC: 216 MG/DL (ref 70–130)
GLUCOSE SERPL-MCNC: 162 MG/DL (ref 65–99)
PHOSPHATE SERPL-MCNC: 3.3 MG/DL (ref 2.5–4.5)
POTASSIUM SERPL-SCNC: 4.3 MMOL/L (ref 3.5–5.2)
SODIUM SERPL-SCNC: 136 MMOL/L (ref 136–145)

## 2022-07-18 PROCEDURE — 99024 POSTOP FOLLOW-UP VISIT: CPT

## 2022-07-18 PROCEDURE — 70450 CT HEAD/BRAIN W/O DYE: CPT

## 2022-07-18 PROCEDURE — 97530 THERAPEUTIC ACTIVITIES: CPT

## 2022-07-18 PROCEDURE — 82962 GLUCOSE BLOOD TEST: CPT

## 2022-07-18 PROCEDURE — 63710000001 INSULIN LISPRO (HUMAN) PER 5 UNITS: Performed by: NURSE PRACTITIONER

## 2022-07-18 PROCEDURE — 97166 OT EVAL MOD COMPLEX 45 MIN: CPT

## 2022-07-18 PROCEDURE — 80069 RENAL FUNCTION PANEL: CPT | Performed by: NURSE PRACTITIONER

## 2022-07-18 RX ADMIN — GUAIFENESIN 600 MG: 600 TABLET, EXTENDED RELEASE ORAL at 08:38

## 2022-07-18 RX ADMIN — INSULIN GLARGINE-YFGN 10 UNITS: 100 INJECTION, SOLUTION SUBCUTANEOUS at 08:40

## 2022-07-18 RX ADMIN — AMLODIPINE BESYLATE 5 MG: 5 TABLET ORAL at 08:38

## 2022-07-18 RX ADMIN — LISINOPRIL 20 MG: 20 TABLET ORAL at 20:26

## 2022-07-18 RX ADMIN — INSULIN LISPRO 2 UNITS: 100 INJECTION, SOLUTION INTRAVENOUS; SUBCUTANEOUS at 08:38

## 2022-07-18 RX ADMIN — LISINOPRIL 20 MG: 20 TABLET ORAL at 08:38

## 2022-07-18 RX ADMIN — PANTOPRAZOLE SODIUM 40 MG: 40 TABLET, DELAYED RELEASE ORAL at 06:03

## 2022-07-18 RX ADMIN — BENZONATATE 100 MG: 100 CAPSULE ORAL at 20:26

## 2022-07-18 RX ADMIN — GUAIFENESIN 600 MG: 600 TABLET, EXTENDED RELEASE ORAL at 20:26

## 2022-07-18 RX ADMIN — LEVOTHYROXINE SODIUM 75 MCG: 0.07 TABLET ORAL at 06:03

## 2022-07-18 RX ADMIN — INSULIN GLARGINE-YFGN 10 UNITS: 100 INJECTION, SOLUTION SUBCUTANEOUS at 20:26

## 2022-07-19 LAB
ALBUMIN SERPL-MCNC: 3.6 G/DL (ref 3.5–5.2)
ANION GAP SERPL CALCULATED.3IONS-SCNC: 10 MMOL/L (ref 5–15)
BUN SERPL-MCNC: 31 MG/DL (ref 8–23)
BUN/CREAT SERPL: 16.8 (ref 7–25)
CALCIUM SPEC-SCNC: 9.3 MG/DL (ref 8.6–10.5)
CHLORIDE SERPL-SCNC: 103 MMOL/L (ref 98–107)
CO2 SERPL-SCNC: 23 MMOL/L (ref 22–29)
CREAT SERPL-MCNC: 1.85 MG/DL (ref 0.76–1.27)
EGFRCR SERPLBLD CKD-EPI 2021: 35 ML/MIN/1.73
GLUCOSE BLDC GLUCOMTR-MCNC: 111 MG/DL (ref 70–130)
GLUCOSE BLDC GLUCOMTR-MCNC: 237 MG/DL (ref 70–130)
GLUCOSE BLDC GLUCOMTR-MCNC: 254 MG/DL (ref 70–130)
GLUCOSE BLDC GLUCOMTR-MCNC: 91 MG/DL (ref 70–130)
GLUCOSE SERPL-MCNC: 90 MG/DL (ref 65–99)
PHOSPHATE SERPL-MCNC: 3.7 MG/DL (ref 2.5–4.5)
POTASSIUM SERPL-SCNC: 4 MMOL/L (ref 3.5–5.2)
SODIUM SERPL-SCNC: 136 MMOL/L (ref 136–145)

## 2022-07-19 PROCEDURE — 80069 RENAL FUNCTION PANEL: CPT | Performed by: NURSE PRACTITIONER

## 2022-07-19 PROCEDURE — 63710000001 INSULIN LISPRO (HUMAN) PER 5 UNITS: Performed by: NURSE PRACTITIONER

## 2022-07-19 PROCEDURE — 99024 POSTOP FOLLOW-UP VISIT: CPT

## 2022-07-19 PROCEDURE — 82962 GLUCOSE BLOOD TEST: CPT

## 2022-07-19 RX ORDER — NITROGLYCERIN 0.4 MG/1
0.4 TABLET SUBLINGUAL
Status: DISCONTINUED | OUTPATIENT
Start: 2022-07-19 | End: 2022-08-04 | Stop reason: HOSPADM

## 2022-07-19 RX ADMIN — INSULIN LISPRO 6 UNITS: 100 INJECTION, SOLUTION INTRAVENOUS; SUBCUTANEOUS at 12:03

## 2022-07-19 RX ADMIN — GUAIFENESIN 600 MG: 600 TABLET, EXTENDED RELEASE ORAL at 21:32

## 2022-07-19 RX ADMIN — GUAIFENESIN 600 MG: 600 TABLET, EXTENDED RELEASE ORAL at 09:12

## 2022-07-19 RX ADMIN — PANTOPRAZOLE SODIUM 40 MG: 40 TABLET, DELAYED RELEASE ORAL at 06:19

## 2022-07-19 RX ADMIN — LEVOTHYROXINE SODIUM 75 MCG: 0.07 TABLET ORAL at 06:19

## 2022-07-19 RX ADMIN — LISINOPRIL 20 MG: 20 TABLET ORAL at 09:12

## 2022-07-19 RX ADMIN — BENZONATATE 100 MG: 100 CAPSULE ORAL at 09:12

## 2022-07-19 RX ADMIN — INSULIN GLARGINE-YFGN 10 UNITS: 100 INJECTION, SOLUTION SUBCUTANEOUS at 09:12

## 2022-07-19 RX ADMIN — LISINOPRIL 20 MG: 20 TABLET ORAL at 21:32

## 2022-07-19 RX ADMIN — ACETAMINOPHEN 650 MG: 325 TABLET, FILM COATED ORAL at 10:37

## 2022-07-19 RX ADMIN — INSULIN GLARGINE-YFGN 10 UNITS: 100 INJECTION, SOLUTION SUBCUTANEOUS at 21:32

## 2022-07-19 RX ADMIN — AMLODIPINE BESYLATE 5 MG: 5 TABLET ORAL at 09:12

## 2022-07-20 LAB
ALBUMIN SERPL-MCNC: 3.4 G/DL (ref 3.5–5.2)
ANION GAP SERPL CALCULATED.3IONS-SCNC: 9.3 MMOL/L (ref 5–15)
BUN SERPL-MCNC: 33 MG/DL (ref 8–23)
BUN/CREAT SERPL: 18.1 (ref 7–25)
CALCIUM SPEC-SCNC: 9.2 MG/DL (ref 8.6–10.5)
CHLORIDE SERPL-SCNC: 107 MMOL/L (ref 98–107)
CO2 SERPL-SCNC: 22.7 MMOL/L (ref 22–29)
CREAT SERPL-MCNC: 1.82 MG/DL (ref 0.76–1.27)
EGFRCR SERPLBLD CKD-EPI 2021: 35.7 ML/MIN/1.73
GLUCOSE BLDC GLUCOMTR-MCNC: 159 MG/DL (ref 70–130)
GLUCOSE BLDC GLUCOMTR-MCNC: 170 MG/DL (ref 70–130)
GLUCOSE BLDC GLUCOMTR-MCNC: 173 MG/DL (ref 70–130)
GLUCOSE BLDC GLUCOMTR-MCNC: 215 MG/DL (ref 70–130)
GLUCOSE SERPL-MCNC: 174 MG/DL (ref 65–99)
PHOSPHATE SERPL-MCNC: 3.5 MG/DL (ref 2.5–4.5)
POTASSIUM SERPL-SCNC: 4.7 MMOL/L (ref 3.5–5.2)
SARS-COV-2 ORF1AB RESP QL NAA+PROBE: NOT DETECTED
SODIUM SERPL-SCNC: 139 MMOL/L (ref 136–145)

## 2022-07-20 PROCEDURE — 97110 THERAPEUTIC EXERCISES: CPT

## 2022-07-20 PROCEDURE — 97530 THERAPEUTIC ACTIVITIES: CPT

## 2022-07-20 PROCEDURE — U0004 COV-19 TEST NON-CDC HGH THRU: HCPCS | Performed by: NURSE PRACTITIONER

## 2022-07-20 PROCEDURE — U0005 INFEC AGEN DETEC AMPLI PROBE: HCPCS | Performed by: NURSE PRACTITIONER

## 2022-07-20 PROCEDURE — 63710000001 INSULIN LISPRO (HUMAN) PER 5 UNITS: Performed by: NURSE PRACTITIONER

## 2022-07-20 PROCEDURE — 80069 RENAL FUNCTION PANEL: CPT | Performed by: NURSE PRACTITIONER

## 2022-07-20 PROCEDURE — 99024 POSTOP FOLLOW-UP VISIT: CPT | Performed by: NURSE PRACTITIONER

## 2022-07-20 PROCEDURE — 82962 GLUCOSE BLOOD TEST: CPT

## 2022-07-20 RX ADMIN — AMLODIPINE BESYLATE 5 MG: 5 TABLET ORAL at 08:38

## 2022-07-20 RX ADMIN — LISINOPRIL 20 MG: 20 TABLET ORAL at 08:38

## 2022-07-20 RX ADMIN — GUAIFENESIN 600 MG: 600 TABLET, EXTENDED RELEASE ORAL at 21:13

## 2022-07-20 RX ADMIN — INSULIN LISPRO 2 UNITS: 100 INJECTION, SOLUTION INTRAVENOUS; SUBCUTANEOUS at 17:40

## 2022-07-20 RX ADMIN — INSULIN GLARGINE-YFGN 10 UNITS: 100 INJECTION, SOLUTION SUBCUTANEOUS at 08:38

## 2022-07-20 RX ADMIN — INSULIN GLARGINE-YFGN 10 UNITS: 100 INJECTION, SOLUTION SUBCUTANEOUS at 21:13

## 2022-07-20 RX ADMIN — INSULIN LISPRO 2 UNITS: 100 INJECTION, SOLUTION INTRAVENOUS; SUBCUTANEOUS at 08:38

## 2022-07-20 RX ADMIN — GUAIFENESIN 600 MG: 600 TABLET, EXTENDED RELEASE ORAL at 08:38

## 2022-07-20 RX ADMIN — PANTOPRAZOLE SODIUM 40 MG: 40 TABLET, DELAYED RELEASE ORAL at 06:03

## 2022-07-20 RX ADMIN — LEVOTHYROXINE SODIUM 75 MCG: 0.07 TABLET ORAL at 06:03

## 2022-07-20 RX ADMIN — INSULIN LISPRO 2 UNITS: 100 INJECTION, SOLUTION INTRAVENOUS; SUBCUTANEOUS at 12:10

## 2022-07-20 RX ADMIN — LISINOPRIL 20 MG: 20 TABLET ORAL at 21:13

## 2022-07-21 ENCOUNTER — HOSPITAL ENCOUNTER (INPATIENT)
Facility: HOSPITAL | Age: 87
End: 2022-07-21
Attending: PHYSICAL MEDICINE & REHABILITATION | Admitting: PHYSICAL MEDICINE & REHABILITATION

## 2022-07-21 ENCOUNTER — ANESTHESIA (OUTPATIENT)
Dept: PERIOP | Facility: HOSPITAL | Age: 87
End: 2022-07-21

## 2022-07-21 ENCOUNTER — ANESTHESIA EVENT (OUTPATIENT)
Dept: PERIOP | Facility: HOSPITAL | Age: 87
End: 2022-07-21

## 2022-07-21 ENCOUNTER — APPOINTMENT (OUTPATIENT)
Dept: GENERAL RADIOLOGY | Facility: HOSPITAL | Age: 87
End: 2022-07-21

## 2022-07-21 LAB
ALBUMIN SERPL-MCNC: 3.7 G/DL (ref 3.5–5.2)
ANION GAP SERPL CALCULATED.3IONS-SCNC: 9 MMOL/L (ref 5–15)
BUN SERPL-MCNC: 40 MG/DL (ref 8–23)
BUN/CREAT SERPL: 21.9 (ref 7–25)
CALCIUM SPEC-SCNC: 9.2 MG/DL (ref 8.6–10.5)
CHLORIDE SERPL-SCNC: 106 MMOL/L (ref 98–107)
CO2 SERPL-SCNC: 22 MMOL/L (ref 22–29)
CREAT SERPL-MCNC: 1.83 MG/DL (ref 0.76–1.27)
EGFRCR SERPLBLD CKD-EPI 2021: 35.5 ML/MIN/1.73
GLUCOSE BLDC GLUCOMTR-MCNC: 117 MG/DL (ref 70–130)
GLUCOSE SERPL-MCNC: 140 MG/DL (ref 65–99)
PHOSPHATE SERPL-MCNC: 3.6 MG/DL (ref 2.5–4.5)
POTASSIUM SERPL-SCNC: 4.6 MMOL/L (ref 3.5–5.2)
SODIUM SERPL-SCNC: 137 MMOL/L (ref 136–145)

## 2022-07-21 PROCEDURE — 97530 THERAPEUTIC ACTIVITIES: CPT

## 2022-07-21 PROCEDURE — 97535 SELF CARE MNGMENT TRAINING: CPT

## 2022-07-21 PROCEDURE — 0 IODIXANOL PER 1 ML: Performed by: NEUROLOGICAL SURGERY

## 2022-07-21 PROCEDURE — C1769 GUIDE WIRE: HCPCS | Performed by: NEUROLOGICAL SURGERY

## 2022-07-21 PROCEDURE — 61624 TCAT PERM OCCLS/EMBOLJ CNS: CPT | Performed by: NEUROLOGICAL SURGERY

## 2022-07-21 PROCEDURE — 75898 FOLLOW-UP ANGIOGRAPHY: CPT | Performed by: NEUROLOGICAL SURGERY

## 2022-07-21 PROCEDURE — C1894 INTRO/SHEATH, NON-LASER: HCPCS | Performed by: NEUROLOGICAL SURGERY

## 2022-07-21 PROCEDURE — C1760 CLOSURE DEV, VASC: HCPCS | Performed by: NEUROLOGICAL SURGERY

## 2022-07-21 PROCEDURE — 25010000002 MIDAZOLAM PER 1 MG: Performed by: ANESTHESIOLOGY

## 2022-07-21 PROCEDURE — 75898 FOLLOW-UP ANGIOGRAPHY: CPT

## 2022-07-21 PROCEDURE — C1887 CATHETER, GUIDING: HCPCS | Performed by: NEUROLOGICAL SURGERY

## 2022-07-21 PROCEDURE — 25010000002 CEFAZOLIN PER 500 MG: Performed by: NEUROLOGICAL SURGERY

## 2022-07-21 PROCEDURE — 82962 GLUCOSE BLOOD TEST: CPT

## 2022-07-21 PROCEDURE — 75894 X-RAYS TRANSCATH THERAPY: CPT | Performed by: NEUROLOGICAL SURGERY

## 2022-07-21 PROCEDURE — 25010000002 FENTANYL CITRATE (PF) 50 MCG/ML SOLUTION: Performed by: NURSE ANESTHETIST, CERTIFIED REGISTERED

## 2022-07-21 PROCEDURE — 25010000002 HEPARIN (PORCINE) PER 1000 UNITS: Performed by: NEUROLOGICAL SURGERY

## 2022-07-21 PROCEDURE — 80069 RENAL FUNCTION PANEL: CPT | Performed by: NURSE PRACTITIONER

## 2022-07-21 PROCEDURE — 25010000002 ONDANSETRON PER 1 MG: Performed by: NURSE ANESTHETIST, CERTIFIED REGISTERED

## 2022-07-21 PROCEDURE — 25010000002 HYDRALAZINE PER 20 MG: Performed by: NURSE ANESTHETIST, CERTIFIED REGISTERED

## 2022-07-21 PROCEDURE — 03VG3DZ RESTRICTION OF INTRACRANIAL ARTERY WITH INTRALUMINAL DEVICE, PERCUTANEOUS APPROACH: ICD-10-PCS | Performed by: NEUROLOGICAL SURGERY

## 2022-07-21 PROCEDURE — 25010000002 DIPHENHYDRAMINE PER 50 MG: Performed by: NURSE ANESTHETIST, CERTIFIED REGISTERED

## 2022-07-21 PROCEDURE — 25010000002 PHENYLEPHRINE 10 MG/ML SOLUTION: Performed by: NURSE ANESTHETIST, CERTIFIED REGISTERED

## 2022-07-21 PROCEDURE — 25010000002 PROPOFOL 10 MG/ML EMULSION: Performed by: NURSE ANESTHETIST, CERTIFIED REGISTERED

## 2022-07-21 PROCEDURE — 36223 PLACE CATH CAROTID/INOM ART: CPT | Performed by: NEUROLOGICAL SURGERY

## 2022-07-21 PROCEDURE — 97110 THERAPEUTIC EXERCISES: CPT

## 2022-07-21 PROCEDURE — 36227 PLACE CATH XTRNL CAROTID: CPT | Performed by: NEUROLOGICAL SURGERY

## 2022-07-21 PROCEDURE — 25010000002 ONDANSETRON PER 1 MG: Performed by: NEUROLOGICAL SURGERY

## 2022-07-21 PROCEDURE — 75894 X-RAYS TRANSCATH THERAPY: CPT

## 2022-07-21 DEVICE — SYS DEL LIQ EMB ONYX 18 EVOH/6PCT VL1.5ML: Type: IMPLANTABLE DEVICE | Site: BRAIN | Status: FUNCTIONAL

## 2022-07-21 RX ORDER — LIDOCAINE HYDROCHLORIDE 10 MG/ML
0.5 INJECTION, SOLUTION EPIDURAL; INFILTRATION; INTRACAUDAL; PERINEURAL ONCE AS NEEDED
Status: DISCONTINUED | OUTPATIENT
Start: 2022-07-21 | End: 2022-07-21 | Stop reason: HOSPADM

## 2022-07-21 RX ORDER — DIPHENHYDRAMINE HCL 25 MG
25 CAPSULE ORAL
Status: DISCONTINUED | OUTPATIENT
Start: 2022-07-21 | End: 2022-07-21 | Stop reason: HOSPADM

## 2022-07-21 RX ORDER — PROPOFOL 10 MG/ML
VIAL (ML) INTRAVENOUS AS NEEDED
Status: DISCONTINUED | OUTPATIENT
Start: 2022-07-21 | End: 2022-07-21 | Stop reason: SURG

## 2022-07-21 RX ORDER — SODIUM CHLORIDE 0.9 % (FLUSH) 0.9 %
3 SYRINGE (ML) INJECTION EVERY 12 HOURS SCHEDULED
Status: DISCONTINUED | OUTPATIENT
Start: 2022-07-21 | End: 2022-07-21 | Stop reason: HOSPADM

## 2022-07-21 RX ORDER — IODIXANOL 320 MG/ML
200 INJECTION, SOLUTION INTRAVASCULAR
Status: COMPLETED | OUTPATIENT
Start: 2022-07-21 | End: 2022-07-21

## 2022-07-21 RX ORDER — SODIUM CHLORIDE, SODIUM LACTATE, POTASSIUM CHLORIDE, CALCIUM CHLORIDE 600; 310; 30; 20 MG/100ML; MG/100ML; MG/100ML; MG/100ML
9 INJECTION, SOLUTION INTRAVENOUS CONTINUOUS
Status: DISCONTINUED | OUTPATIENT
Start: 2022-07-21 | End: 2022-07-21

## 2022-07-21 RX ORDER — FENTANYL CITRATE 50 UG/ML
INJECTION, SOLUTION INTRAMUSCULAR; INTRAVENOUS AS NEEDED
Status: DISCONTINUED | OUTPATIENT
Start: 2022-07-21 | End: 2022-07-21 | Stop reason: SURG

## 2022-07-21 RX ORDER — MIDAZOLAM HYDROCHLORIDE 1 MG/ML
0.5 INJECTION INTRAMUSCULAR; INTRAVENOUS
Status: DISCONTINUED | OUTPATIENT
Start: 2022-07-21 | End: 2022-07-21 | Stop reason: HOSPADM

## 2022-07-21 RX ORDER — FLUMAZENIL 0.1 MG/ML
0.2 INJECTION INTRAVENOUS AS NEEDED
Status: DISCONTINUED | OUTPATIENT
Start: 2022-07-21 | End: 2022-07-21 | Stop reason: HOSPADM

## 2022-07-21 RX ORDER — SODIUM CHLORIDE 0.9 % (FLUSH) 0.9 %
3-10 SYRINGE (ML) INJECTION AS NEEDED
Status: DISCONTINUED | OUTPATIENT
Start: 2022-07-21 | End: 2022-07-21 | Stop reason: HOSPADM

## 2022-07-21 RX ORDER — LIDOCAINE HYDROCHLORIDE 20 MG/ML
INJECTION, SOLUTION INFILTRATION; PERINEURAL AS NEEDED
Status: DISCONTINUED | OUTPATIENT
Start: 2022-07-21 | End: 2022-07-21 | Stop reason: SURG

## 2022-07-21 RX ORDER — CEFAZOLIN SODIUM 2 G/100ML
2 INJECTION, SOLUTION INTRAVENOUS ONCE
Status: COMPLETED | OUTPATIENT
Start: 2022-07-21 | End: 2022-07-21

## 2022-07-21 RX ORDER — HYDRALAZINE HYDROCHLORIDE 20 MG/ML
INJECTION INTRAMUSCULAR; INTRAVENOUS AS NEEDED
Status: DISCONTINUED | OUTPATIENT
Start: 2022-07-21 | End: 2022-07-21 | Stop reason: SURG

## 2022-07-21 RX ORDER — OXYCODONE AND ACETAMINOPHEN 7.5; 325 MG/1; MG/1
1 TABLET ORAL EVERY 4 HOURS PRN
Status: DISCONTINUED | OUTPATIENT
Start: 2022-07-21 | End: 2022-07-21 | Stop reason: HOSPADM

## 2022-07-21 RX ORDER — FENTANYL CITRATE 50 UG/ML
25 INJECTION, SOLUTION INTRAMUSCULAR; INTRAVENOUS
Status: DISCONTINUED | OUTPATIENT
Start: 2022-07-21 | End: 2022-07-21 | Stop reason: HOSPADM

## 2022-07-21 RX ORDER — DIPHENHYDRAMINE HYDROCHLORIDE 50 MG/ML
12.5 INJECTION INTRAMUSCULAR; INTRAVENOUS
Status: DISCONTINUED | OUTPATIENT
Start: 2022-07-21 | End: 2022-07-21 | Stop reason: HOSPADM

## 2022-07-21 RX ORDER — EPHEDRINE SULFATE 50 MG/ML
5 INJECTION, SOLUTION INTRAVENOUS ONCE AS NEEDED
Status: DISCONTINUED | OUTPATIENT
Start: 2022-07-21 | End: 2022-07-21 | Stop reason: HOSPADM

## 2022-07-21 RX ORDER — SODIUM CHLORIDE 9 MG/ML
75 INJECTION, SOLUTION INTRAVENOUS CONTINUOUS
Status: DISCONTINUED | OUTPATIENT
Start: 2022-07-21 | End: 2022-07-22

## 2022-07-21 RX ORDER — ONDANSETRON 2 MG/ML
4 INJECTION INTRAMUSCULAR; INTRAVENOUS ONCE AS NEEDED
Status: COMPLETED | OUTPATIENT
Start: 2022-07-21 | End: 2022-07-21

## 2022-07-21 RX ORDER — PHENYLEPHRINE HYDROCHLORIDE 10 MG/ML
INJECTION INTRAVENOUS AS NEEDED
Status: DISCONTINUED | OUTPATIENT
Start: 2022-07-21 | End: 2022-07-21 | Stop reason: SURG

## 2022-07-21 RX ORDER — FENTANYL CITRATE 50 UG/ML
50 INJECTION, SOLUTION INTRAMUSCULAR; INTRAVENOUS
Status: DISCONTINUED | OUTPATIENT
Start: 2022-07-21 | End: 2022-07-21 | Stop reason: HOSPADM

## 2022-07-21 RX ORDER — HYDRALAZINE HYDROCHLORIDE 20 MG/ML
5 INJECTION INTRAMUSCULAR; INTRAVENOUS
Status: DISCONTINUED | OUTPATIENT
Start: 2022-07-21 | End: 2022-07-21 | Stop reason: HOSPADM

## 2022-07-21 RX ORDER — ACETAMINOPHEN 325 MG/1
650 TABLET ORAL EVERY 6 HOURS PRN
Status: DISCONTINUED | OUTPATIENT
Start: 2022-07-21 | End: 2022-08-04 | Stop reason: HOSPADM

## 2022-07-21 RX ORDER — HYDROCODONE BITARTRATE AND ACETAMINOPHEN 5; 325 MG/1; MG/1
1 TABLET ORAL ONCE AS NEEDED
Status: DISCONTINUED | OUTPATIENT
Start: 2022-07-21 | End: 2022-07-21 | Stop reason: HOSPADM

## 2022-07-21 RX ORDER — VERAPAMIL HYDROCHLORIDE 2.5 MG/ML
INJECTION, SOLUTION INTRAVENOUS AS NEEDED
Status: DISCONTINUED | OUTPATIENT
Start: 2022-07-21 | End: 2022-07-21 | Stop reason: HOSPADM

## 2022-07-21 RX ORDER — ONDANSETRON 2 MG/ML
INJECTION INTRAMUSCULAR; INTRAVENOUS AS NEEDED
Status: DISCONTINUED | OUTPATIENT
Start: 2022-07-21 | End: 2022-07-21 | Stop reason: SURG

## 2022-07-21 RX ORDER — NALOXONE HCL 0.4 MG/ML
0.2 VIAL (ML) INJECTION AS NEEDED
Status: DISCONTINUED | OUTPATIENT
Start: 2022-07-21 | End: 2022-07-21 | Stop reason: HOSPADM

## 2022-07-21 RX ORDER — HYDROMORPHONE HYDROCHLORIDE 1 MG/ML
0.25 INJECTION, SOLUTION INTRAMUSCULAR; INTRAVENOUS; SUBCUTANEOUS
Status: DISCONTINUED | OUTPATIENT
Start: 2022-07-21 | End: 2022-07-21 | Stop reason: HOSPADM

## 2022-07-21 RX ORDER — LABETALOL HYDROCHLORIDE 5 MG/ML
5 INJECTION, SOLUTION INTRAVENOUS
Status: DISCONTINUED | OUTPATIENT
Start: 2022-07-21 | End: 2022-07-21 | Stop reason: HOSPADM

## 2022-07-21 RX ORDER — FAMOTIDINE 10 MG/ML
20 INJECTION, SOLUTION INTRAVENOUS ONCE
Status: COMPLETED | OUTPATIENT
Start: 2022-07-21 | End: 2022-07-21

## 2022-07-21 RX ORDER — HEPARIN SODIUM 1000 [USP'U]/ML
INJECTION, SOLUTION INTRAVENOUS; SUBCUTANEOUS AS NEEDED
Status: DISCONTINUED | OUTPATIENT
Start: 2022-07-21 | End: 2022-07-21 | Stop reason: HOSPADM

## 2022-07-21 RX ORDER — ROCURONIUM BROMIDE 10 MG/ML
INJECTION, SOLUTION INTRAVENOUS AS NEEDED
Status: DISCONTINUED | OUTPATIENT
Start: 2022-07-21 | End: 2022-07-21 | Stop reason: SURG

## 2022-07-21 RX ADMIN — ONDANSETRON 4 MG: 2 INJECTION INTRAMUSCULAR; INTRAVENOUS at 19:05

## 2022-07-21 RX ADMIN — PHENYLEPHRINE HYDROCHLORIDE 100 MCG: 10 INJECTION, SOLUTION INTRAVENOUS at 16:44

## 2022-07-21 RX ADMIN — CEFAZOLIN 2 G: 10 INJECTION, POWDER, FOR SOLUTION INTRAVENOUS at 15:48

## 2022-07-21 RX ADMIN — ROCURONIUM BROMIDE 30 MG: 50 INJECTION INTRAVENOUS at 17:02

## 2022-07-21 RX ADMIN — GUAIFENESIN 600 MG: 600 TABLET, EXTENDED RELEASE ORAL at 08:14

## 2022-07-21 RX ADMIN — SUGAMMADEX 400 MG: 100 INJECTION, SOLUTION INTRAVENOUS at 17:51

## 2022-07-21 RX ADMIN — PHENYLEPHRINE HYDROCHLORIDE 100 MCG: 10 INJECTION, SOLUTION INTRAVENOUS at 16:25

## 2022-07-21 RX ADMIN — ROCURONIUM BROMIDE 50 MG: 50 INJECTION INTRAVENOUS at 16:06

## 2022-07-21 RX ADMIN — SODIUM CHLORIDE 75 ML/HR: 9 INJECTION, SOLUTION INTRAVENOUS at 21:14

## 2022-07-21 RX ADMIN — GUAIFENESIN 600 MG: 600 TABLET, EXTENDED RELEASE ORAL at 23:02

## 2022-07-21 RX ADMIN — LIDOCAINE HYDROCHLORIDE 100 MG: 20 INJECTION, SOLUTION INFILTRATION; PERINEURAL at 16:06

## 2022-07-21 RX ADMIN — SODIUM CHLORIDE, POTASSIUM CHLORIDE, SODIUM LACTATE AND CALCIUM CHLORIDE: 600; 310; 30; 20 INJECTION, SOLUTION INTRAVENOUS at 18:12

## 2022-07-21 RX ADMIN — SODIUM CHLORIDE, POTASSIUM CHLORIDE, SODIUM LACTATE AND CALCIUM CHLORIDE 9 ML/HR: 600; 310; 30; 20 INJECTION, SOLUTION INTRAVENOUS at 15:25

## 2022-07-21 RX ADMIN — FENTANYL CITRATE 50 MCG: 50 INJECTION INTRAMUSCULAR; INTRAVENOUS at 16:32

## 2022-07-21 RX ADMIN — MIDAZOLAM 0.5 MG: 1 INJECTION INTRAMUSCULAR; INTRAVENOUS at 15:25

## 2022-07-21 RX ADMIN — FENTANYL CITRATE 25 MCG: 50 INJECTION INTRAMUSCULAR; INTRAVENOUS at 19:18

## 2022-07-21 RX ADMIN — DIPHENHYDRAMINE HYDROCHLORIDE 12.5 MG: 50 INJECTION, SOLUTION INTRAMUSCULAR; INTRAVENOUS at 19:06

## 2022-07-21 RX ADMIN — FAMOTIDINE 20 MG: 10 INJECTION, SOLUTION INTRAVENOUS at 15:26

## 2022-07-21 RX ADMIN — LISINOPRIL 20 MG: 20 TABLET ORAL at 23:02

## 2022-07-21 RX ADMIN — HYDRALAZINE HYDROCHLORIDE 10 MG: 20 INJECTION, SOLUTION INTRAMUSCULAR; INTRAVENOUS at 17:48

## 2022-07-21 RX ADMIN — LEVOTHYROXINE SODIUM 75 MCG: 0.07 TABLET ORAL at 05:45

## 2022-07-21 RX ADMIN — PANTOPRAZOLE SODIUM 40 MG: 40 TABLET, DELAYED RELEASE ORAL at 06:32

## 2022-07-21 RX ADMIN — ONDANSETRON 4 MG: 2 INJECTION INTRAMUSCULAR; INTRAVENOUS at 23:10

## 2022-07-21 RX ADMIN — PROPOFOL 100 MG: 10 INJECTION, EMULSION INTRAVENOUS at 16:06

## 2022-07-21 RX ADMIN — IODIXANOL 85 ML: 320 INJECTION, SOLUTION INTRAVASCULAR at 18:23

## 2022-07-21 RX ADMIN — ONDANSETRON 4 MG: 2 INJECTION INTRAMUSCULAR; INTRAVENOUS at 17:46

## 2022-07-21 RX ADMIN — FENTANYL CITRATE 50 MCG: 50 INJECTION INTRAMUSCULAR; INTRAVENOUS at 16:06

## 2022-07-21 RX ADMIN — FENTANYL CITRATE 25 MCG: 50 INJECTION INTRAMUSCULAR; INTRAVENOUS at 19:23

## 2022-07-21 RX ADMIN — ACETAMINOPHEN 650 MG: 325 TABLET, FILM COATED ORAL at 23:02

## 2022-07-21 NOTE — ANESTHESIA PREPROCEDURE EVALUATION
Anesthesia Evaluation     Patient summary reviewed and Nursing notes reviewed                Airway   Mallampati: II  TM distance: >3 FB  Neck ROM: full  Dental      Pulmonary    (+) sleep apnea,   Cardiovascular     ECG reviewed  Rate: normal    (+) hypertension, DVT, hyperlipidemia,       Neuro/Psych- negative ROS  GI/Hepatic/Renal/Endo    (+)  GERD, PUD, GI bleeding , renal disease CRI, diabetes mellitus type 2 using insulin, thyroid problem hypothyroidism    Musculoskeletal (-) negative ROS    Abdominal    Substance History - negative use     OB/GYN negative ob/gyn ROS         Other                        Anesthesia Plan    ASA 4     general     (Discu  No art line PSR for procedure    I have reviewed the patient's history and the chart, including all pertinent laboratory results and imaging. I have explained the risks of anesthesia including but not limited to dental damage, corneal abrasion, nerve injury, MI, stroke, and death to Geetha Becker. Questions asked and answered. Anesthetic plan discussed. Geetha expressed understanding of the above.  )  intravenous induction     Anesthetic plan, risks, benefits, and alternatives have been provided, discussed and informed consent has been obtained with: patient.        CODE STATUS:    Level Of Support Discussed With: Patient  Code Status (Patient has no pulse and is not breathing): CPR (Attempt to Resuscitate)  Medical Interventions (Patient has pulse or is breathing): Full Support

## 2022-07-21 NOTE — ANESTHESIA POSTPROCEDURE EVALUATION
"Patient: Reece Stephen    Procedure Summary     Date: 07/21/22 [facility] / [facility]    Anesthesia Start: 1556 Anesthesia Stop: 1826    Procedure: CEREBRAL ANGIOGRAM with MMA embolization (N/A ) Diagnosis:       SDH (subdural hematoma) (HCC)      Subdural hematoma (HCC)      (SDH (subdural hematoma) (HCC) [S06.5X9A])      (Subdural hematoma (HCC) [S06.5X9A])    Surgeons: Ciaran Reyes MD Provider: Sung Olivier MD    Anesthesia Type: general ASA Status: 4          Anesthesia Type: general    Vitals  Vitals Value Taken Time   /92 07/21/22 1946   Temp     Pulse 84 07/21/22 1955   Resp 14 07/21/22 1945   SpO2 92 % 07/21/22 1955   Vitals shown include unvalidated device data.        Post Anesthesia Care and Evaluation    Patient location during evaluation: bedside  Patient participation: complete - patient participated  Level of consciousness: awake and alert  Pain management: adequate    Airway patency: patent  Anesthetic complications: No anesthetic complications    Cardiovascular status: acceptable  Respiratory status: acceptable  Hydration status: acceptable    Comments: /78   Pulse 80   Temp 36.7 °C (98 °F) (Oral)   Resp 14   Ht 172.7 cm (68\")   Wt 70.8 kg (156 lb 1.4 oz)   SpO2 98%   BMI 23.73 kg/m²       "

## 2022-07-21 NOTE — ANESTHESIA PROCEDURE NOTES
Airway  Urgency: elective    Date/Time: 7/21/2022 4:08 PM  Airway not difficult    General Information and Staff    Patient location during procedure: OR  CRNA/CAA: Leighann Gómez CRNA    Indications and Patient Condition  Indications for airway management: airway protection    Preoxygenated: yes  MILS not maintained throughout  Mask difficulty assessment: 1 - vent by mask    Final Airway Details  Final airway type: endotracheal airway      Successful airway: ETT  Cuffed: yes   Successful intubation technique: direct laryngoscopy  Endotracheal tube insertion site: oral  Blade: Daya  Blade size: 4  ETT size (mm): 7.5  Cormack-Lehane Classification: grade I - full view of glottis  Placement verified by: chest auscultation and capnometry   Measured from: lips  ETT/EBT  to lips (cm): 23  Number of attempts at approach: 1  Assessment: lips, teeth, and gum same as pre-op and atraumatic intubation    Additional Comments  Dentition intact and unchanged. CBEBS.  +ETCO2.

## 2022-07-22 ENCOUNTER — APPOINTMENT (OUTPATIENT)
Dept: CT IMAGING | Facility: HOSPITAL | Age: 87
End: 2022-07-22

## 2022-07-22 LAB
ALBUMIN SERPL-MCNC: 3.5 G/DL (ref 3.5–5.2)
ANION GAP SERPL CALCULATED.3IONS-SCNC: 10 MMOL/L (ref 5–15)
BUN SERPL-MCNC: 29 MG/DL (ref 8–23)
BUN/CREAT SERPL: 15.8 (ref 7–25)
CALCIUM SPEC-SCNC: 8.6 MG/DL (ref 8.6–10.5)
CHLORIDE SERPL-SCNC: 110 MMOL/L (ref 98–107)
CO2 SERPL-SCNC: 22 MMOL/L (ref 22–29)
CREAT SERPL-MCNC: 1.83 MG/DL (ref 0.76–1.27)
EGFRCR SERPLBLD CKD-EPI 2021: 35.5 ML/MIN/1.73
GLUCOSE BLDC GLUCOMTR-MCNC: 119 MG/DL (ref 70–130)
GLUCOSE BLDC GLUCOMTR-MCNC: 147 MG/DL (ref 70–130)
GLUCOSE BLDC GLUCOMTR-MCNC: 197 MG/DL (ref 70–130)
GLUCOSE BLDC GLUCOMTR-MCNC: 208 MG/DL (ref 70–130)
GLUCOSE BLDC GLUCOMTR-MCNC: 211 MG/DL (ref 70–130)
GLUCOSE SERPL-MCNC: 106 MG/DL (ref 65–99)
PHOSPHATE SERPL-MCNC: 3.2 MG/DL (ref 2.5–4.5)
POTASSIUM SERPL-SCNC: 4.2 MMOL/L (ref 3.5–5.2)
SODIUM SERPL-SCNC: 142 MMOL/L (ref 136–145)

## 2022-07-22 PROCEDURE — 63710000001 INSULIN LISPRO (HUMAN) PER 5 UNITS: Performed by: NEUROLOGICAL SURGERY

## 2022-07-22 PROCEDURE — 97530 THERAPEUTIC ACTIVITIES: CPT

## 2022-07-22 PROCEDURE — 97164 PT RE-EVAL EST PLAN CARE: CPT

## 2022-07-22 PROCEDURE — 63710000001 INSULIN LISPRO (HUMAN) PER 5 UNITS: Performed by: NURSE PRACTITIONER

## 2022-07-22 PROCEDURE — 97168 OT RE-EVAL EST PLAN CARE: CPT

## 2022-07-22 PROCEDURE — 82962 GLUCOSE BLOOD TEST: CPT

## 2022-07-22 PROCEDURE — 99024 POSTOP FOLLOW-UP VISIT: CPT

## 2022-07-22 PROCEDURE — 80069 RENAL FUNCTION PANEL: CPT | Performed by: NEUROLOGICAL SURGERY

## 2022-07-22 PROCEDURE — 70450 CT HEAD/BRAIN W/O DYE: CPT

## 2022-07-22 RX ORDER — INSULIN LISPRO 100 [IU]/ML
0-14 INJECTION, SOLUTION INTRAVENOUS; SUBCUTANEOUS
Status: DISCONTINUED | OUTPATIENT
Start: 2022-07-22 | End: 2022-08-04 | Stop reason: HOSPADM

## 2022-07-22 RX ORDER — CALCIUM GLUCONATE 20 MG/ML
1 INJECTION, SOLUTION INTRAVENOUS AS NEEDED
Status: DISCONTINUED | OUTPATIENT
Start: 2022-07-22 | End: 2022-07-26

## 2022-07-22 RX ORDER — MAGNESIUM SULFATE HEPTAHYDRATE 40 MG/ML
4 INJECTION, SOLUTION INTRAVENOUS AS NEEDED
Status: DISCONTINUED | OUTPATIENT
Start: 2022-07-22 | End: 2022-07-26

## 2022-07-22 RX ORDER — POTASSIUM CHLORIDE 7.45 MG/ML
10 INJECTION INTRAVENOUS
Status: DISCONTINUED | OUTPATIENT
Start: 2022-07-22 | End: 2022-08-04 | Stop reason: HOSPADM

## 2022-07-22 RX ORDER — POTASSIUM CHLORIDE 750 MG/1
40 TABLET, FILM COATED, EXTENDED RELEASE ORAL AS NEEDED
Status: DISCONTINUED | OUTPATIENT
Start: 2022-07-22 | End: 2022-08-04 | Stop reason: HOSPADM

## 2022-07-22 RX ORDER — POTASSIUM CHLORIDE 1.5 G/1.77G
40 POWDER, FOR SOLUTION ORAL AS NEEDED
Status: DISCONTINUED | OUTPATIENT
Start: 2022-07-22 | End: 2022-08-04 | Stop reason: HOSPADM

## 2022-07-22 RX ORDER — HYDRALAZINE HYDROCHLORIDE 20 MG/ML
20 INJECTION INTRAMUSCULAR; INTRAVENOUS EVERY 6 HOURS PRN
Status: DISCONTINUED | OUTPATIENT
Start: 2022-07-22 | End: 2022-08-04 | Stop reason: HOSPADM

## 2022-07-22 RX ORDER — MAGNESIUM SULFATE HEPTAHYDRATE 40 MG/ML
2 INJECTION, SOLUTION INTRAVENOUS AS NEEDED
Status: DISCONTINUED | OUTPATIENT
Start: 2022-07-22 | End: 2022-07-26

## 2022-07-22 RX ADMIN — INSULIN LISPRO 3 UNITS: 100 INJECTION, SOLUTION INTRAVENOUS; SUBCUTANEOUS at 17:19

## 2022-07-22 RX ADMIN — GUAIFENESIN 600 MG: 600 TABLET, EXTENDED RELEASE ORAL at 08:13

## 2022-07-22 RX ADMIN — INSULIN GLARGINE-YFGN 10 UNITS: 100 INJECTION, SOLUTION SUBCUTANEOUS at 09:55

## 2022-07-22 RX ADMIN — LEVOTHYROXINE SODIUM 75 MCG: 0.07 TABLET ORAL at 06:02

## 2022-07-22 RX ADMIN — GUAIFENESIN 600 MG: 600 TABLET, EXTENDED RELEASE ORAL at 21:43

## 2022-07-22 RX ADMIN — LISINOPRIL 20 MG: 20 TABLET ORAL at 21:44

## 2022-07-22 RX ADMIN — LISINOPRIL 20 MG: 20 TABLET ORAL at 08:12

## 2022-07-22 RX ADMIN — PANTOPRAZOLE SODIUM 40 MG: 40 TABLET, DELAYED RELEASE ORAL at 06:03

## 2022-07-22 RX ADMIN — INSULIN GLARGINE-YFGN 10 UNITS: 100 INJECTION, SOLUTION SUBCUTANEOUS at 22:03

## 2022-07-22 RX ADMIN — AMLODIPINE BESYLATE 5 MG: 5 TABLET ORAL at 08:12

## 2022-07-22 RX ADMIN — INSULIN LISPRO 4 UNITS: 100 INJECTION, SOLUTION INTRAVENOUS; SUBCUTANEOUS at 12:04

## 2022-07-23 LAB
ALBUMIN SERPL-MCNC: 3.7 G/DL (ref 3.5–5.2)
ANION GAP SERPL CALCULATED.3IONS-SCNC: 10.4 MMOL/L (ref 5–15)
BUN SERPL-MCNC: 22 MG/DL (ref 8–23)
BUN/CREAT SERPL: 13 (ref 7–25)
CALCIUM SPEC-SCNC: 9 MG/DL (ref 8.6–10.5)
CHLORIDE SERPL-SCNC: 107 MMOL/L (ref 98–107)
CO2 SERPL-SCNC: 22.6 MMOL/L (ref 22–29)
CREAT SERPL-MCNC: 1.69 MG/DL (ref 0.76–1.27)
EGFRCR SERPLBLD CKD-EPI 2021: 39.1 ML/MIN/1.73
GLUCOSE BLDC GLUCOMTR-MCNC: 115 MG/DL (ref 70–130)
GLUCOSE BLDC GLUCOMTR-MCNC: 124 MG/DL (ref 70–130)
GLUCOSE BLDC GLUCOMTR-MCNC: 199 MG/DL (ref 70–130)
GLUCOSE BLDC GLUCOMTR-MCNC: 218 MG/DL (ref 70–130)
GLUCOSE BLDC GLUCOMTR-MCNC: 241 MG/DL (ref 70–130)
GLUCOSE BLDC GLUCOMTR-MCNC: 83 MG/DL (ref 70–130)
GLUCOSE SERPL-MCNC: 119 MG/DL (ref 65–99)
PHOSPHATE SERPL-MCNC: 3 MG/DL (ref 2.5–4.5)
POTASSIUM SERPL-SCNC: 4.2 MMOL/L (ref 3.5–5.2)
SODIUM SERPL-SCNC: 140 MMOL/L (ref 136–145)

## 2022-07-23 PROCEDURE — 63710000001 INSULIN LISPRO (HUMAN) PER 5 UNITS: Performed by: NURSE PRACTITIONER

## 2022-07-23 PROCEDURE — 97530 THERAPEUTIC ACTIVITIES: CPT | Performed by: PHYSICAL THERAPIST

## 2022-07-23 PROCEDURE — 80069 RENAL FUNCTION PANEL: CPT | Performed by: NEUROLOGICAL SURGERY

## 2022-07-23 PROCEDURE — 25010000002 LEVETRIRACETAM PER 10 MG: Performed by: NEUROLOGICAL SURGERY

## 2022-07-23 PROCEDURE — 99024 POSTOP FOLLOW-UP VISIT: CPT | Performed by: PHYSICIAN ASSISTANT

## 2022-07-23 PROCEDURE — 82962 GLUCOSE BLOOD TEST: CPT

## 2022-07-23 RX ORDER — LEVETIRACETAM 500 MG/5ML
500 INJECTION, SOLUTION, CONCENTRATE INTRAVENOUS EVERY 12 HOURS SCHEDULED
Status: DISCONTINUED | OUTPATIENT
Start: 2022-07-23 | End: 2022-07-24

## 2022-07-23 RX ADMIN — AMLODIPINE BESYLATE 5 MG: 5 TABLET ORAL at 09:07

## 2022-07-23 RX ADMIN — INSULIN LISPRO 5 UNITS: 100 INJECTION, SOLUTION INTRAVENOUS; SUBCUTANEOUS at 18:00

## 2022-07-23 RX ADMIN — ACETAMINOPHEN 650 MG: 325 TABLET, FILM COATED ORAL at 15:50

## 2022-07-23 RX ADMIN — PANTOPRAZOLE SODIUM 40 MG: 40 TABLET, DELAYED RELEASE ORAL at 06:33

## 2022-07-23 RX ADMIN — GUAIFENESIN 600 MG: 600 TABLET, EXTENDED RELEASE ORAL at 09:07

## 2022-07-23 RX ADMIN — INSULIN GLARGINE-YFGN 10 UNITS: 100 INJECTION, SOLUTION SUBCUTANEOUS at 09:07

## 2022-07-23 RX ADMIN — LEVETIRACETAM 500 MG: 100 INJECTION INTRAVENOUS at 20:52

## 2022-07-23 RX ADMIN — LISINOPRIL 20 MG: 20 TABLET ORAL at 09:07

## 2022-07-23 RX ADMIN — LEVOTHYROXINE SODIUM 75 MCG: 0.07 TABLET ORAL at 06:33

## 2022-07-23 RX ADMIN — INSULIN LISPRO 3 UNITS: 100 INJECTION, SOLUTION INTRAVENOUS; SUBCUTANEOUS at 14:48

## 2022-07-24 ENCOUNTER — APPOINTMENT (OUTPATIENT)
Dept: CT IMAGING | Facility: HOSPITAL | Age: 87
End: 2022-07-24

## 2022-07-24 ENCOUNTER — APPOINTMENT (OUTPATIENT)
Dept: GENERAL RADIOLOGY | Facility: HOSPITAL | Age: 87
End: 2022-07-24

## 2022-07-24 LAB
ALBUMIN SERPL-MCNC: 3.7 G/DL (ref 3.5–5.2)
ALBUMIN SERPL-MCNC: 3.7 G/DL (ref 3.5–5.2)
ALBUMIN/GLOB SERPL: 1.6 G/DL
ALP SERPL-CCNC: 95 U/L (ref 39–117)
ALT SERPL W P-5'-P-CCNC: <5 U/L (ref 1–41)
ANION GAP SERPL CALCULATED.3IONS-SCNC: 13.3 MMOL/L (ref 5–15)
ANION GAP SERPL CALCULATED.3IONS-SCNC: 8.3 MMOL/L (ref 5–15)
AST SERPL-CCNC: 11 U/L (ref 1–40)
BILIRUB SERPL-MCNC: 0.3 MG/DL (ref 0–1.2)
BUN SERPL-MCNC: 26 MG/DL (ref 8–23)
BUN SERPL-MCNC: 26 MG/DL (ref 8–23)
BUN/CREAT SERPL: 14.9 (ref 7–25)
BUN/CREAT SERPL: 15.3 (ref 7–25)
CALCIUM SPEC-SCNC: 9.1 MG/DL (ref 8.6–10.5)
CALCIUM SPEC-SCNC: 9.2 MG/DL (ref 8.6–10.5)
CHLORIDE SERPL-SCNC: 103 MMOL/L (ref 98–107)
CHLORIDE SERPL-SCNC: 107 MMOL/L (ref 98–107)
CO2 SERPL-SCNC: 22.7 MMOL/L (ref 22–29)
CO2 SERPL-SCNC: 23.7 MMOL/L (ref 22–29)
CREAT SERPL-MCNC: 1.7 MG/DL (ref 0.76–1.27)
CREAT SERPL-MCNC: 1.74 MG/DL (ref 0.76–1.27)
DEPRECATED RDW RBC AUTO: 37.8 FL (ref 37–54)
EGFRCR SERPLBLD CKD-EPI 2021: 37.7 ML/MIN/1.73
EGFRCR SERPLBLD CKD-EPI 2021: 38.8 ML/MIN/1.73
ERYTHROCYTE [DISTWIDTH] IN BLOOD BY AUTOMATED COUNT: 12.2 % (ref 12.3–15.4)
GLOBULIN UR ELPH-MCNC: 2.3 GM/DL
GLUCOSE BLDC GLUCOMTR-MCNC: 109 MG/DL (ref 70–130)
GLUCOSE BLDC GLUCOMTR-MCNC: 118 MG/DL (ref 70–130)
GLUCOSE BLDC GLUCOMTR-MCNC: 125 MG/DL (ref 70–130)
GLUCOSE BLDC GLUCOMTR-MCNC: 208 MG/DL (ref 70–130)
GLUCOSE BLDC GLUCOMTR-MCNC: 211 MG/DL (ref 70–130)
GLUCOSE BLDC GLUCOMTR-MCNC: 92 MG/DL (ref 70–130)
GLUCOSE SERPL-MCNC: 161 MG/DL (ref 65–99)
GLUCOSE SERPL-MCNC: 162 MG/DL (ref 65–99)
HCT VFR BLD AUTO: 33.6 % (ref 37.5–51)
HGB BLD-MCNC: 11.4 G/DL (ref 13–17.7)
MCH RBC QN AUTO: 29.5 PG (ref 26.6–33)
MCHC RBC AUTO-ENTMCNC: 33.9 G/DL (ref 31.5–35.7)
MCV RBC AUTO: 87 FL (ref 79–97)
PHOSPHATE SERPL-MCNC: 3.3 MG/DL (ref 2.5–4.5)
PLATELET # BLD AUTO: 257 10*3/MM3 (ref 140–450)
PMV BLD AUTO: 9.4 FL (ref 6–12)
POTASSIUM SERPL-SCNC: 4.2 MMOL/L (ref 3.5–5.2)
POTASSIUM SERPL-SCNC: 4.3 MMOL/L (ref 3.5–5.2)
PROT SERPL-MCNC: 6 G/DL (ref 6–8.5)
RBC # BLD AUTO: 3.86 10*6/MM3 (ref 4.14–5.8)
SODIUM SERPL-SCNC: 139 MMOL/L (ref 136–145)
SODIUM SERPL-SCNC: 139 MMOL/L (ref 136–145)
WBC NRBC COR # BLD: 6.39 10*3/MM3 (ref 3.4–10.8)

## 2022-07-24 PROCEDURE — 99024 POSTOP FOLLOW-UP VISIT: CPT | Performed by: PHYSICIAN ASSISTANT

## 2022-07-24 PROCEDURE — 84100 ASSAY OF PHOSPHORUS: CPT | Performed by: INTERNAL MEDICINE

## 2022-07-24 PROCEDURE — 63710000001 INSULIN LISPRO (HUMAN) PER 5 UNITS: Performed by: NURSE PRACTITIONER

## 2022-07-24 PROCEDURE — 85027 COMPLETE CBC AUTOMATED: CPT | Performed by: INTERNAL MEDICINE

## 2022-07-24 PROCEDURE — 82962 GLUCOSE BLOOD TEST: CPT

## 2022-07-24 PROCEDURE — 70450 CT HEAD/BRAIN W/O DYE: CPT

## 2022-07-24 PROCEDURE — 71046 X-RAY EXAM CHEST 2 VIEWS: CPT

## 2022-07-24 PROCEDURE — 80053 COMPREHEN METABOLIC PANEL: CPT | Performed by: INTERNAL MEDICINE

## 2022-07-24 PROCEDURE — 25010000002 LEVETRIRACETAM PER 10 MG: Performed by: NEUROLOGICAL SURGERY

## 2022-07-24 PROCEDURE — 99222 1ST HOSP IP/OBS MODERATE 55: CPT | Performed by: PSYCHIATRY & NEUROLOGY

## 2022-07-24 RX ADMIN — LISINOPRIL 20 MG: 20 TABLET ORAL at 22:10

## 2022-07-24 RX ADMIN — LEVETIRACETAM 500 MG: 100 INJECTION INTRAVENOUS at 09:53

## 2022-07-24 RX ADMIN — GUAIFENESIN 600 MG: 600 TABLET, EXTENDED RELEASE ORAL at 09:50

## 2022-07-24 RX ADMIN — INSULIN GLARGINE-YFGN 10 UNITS: 100 INJECTION, SOLUTION SUBCUTANEOUS at 09:52

## 2022-07-24 RX ADMIN — INSULIN GLARGINE-YFGN 10 UNITS: 100 INJECTION, SOLUTION SUBCUTANEOUS at 22:19

## 2022-07-24 RX ADMIN — LEVOTHYROXINE SODIUM 75 MCG: 0.07 TABLET ORAL at 07:01

## 2022-07-24 RX ADMIN — GUAIFENESIN 600 MG: 600 TABLET, EXTENDED RELEASE ORAL at 22:10

## 2022-07-24 RX ADMIN — INSULIN LISPRO 5 UNITS: 100 INJECTION, SOLUTION INTRAVENOUS; SUBCUTANEOUS at 12:23

## 2022-07-24 RX ADMIN — AMLODIPINE BESYLATE 5 MG: 5 TABLET ORAL at 09:50

## 2022-07-24 RX ADMIN — PANTOPRAZOLE SODIUM 40 MG: 40 TABLET, DELAYED RELEASE ORAL at 07:01

## 2022-07-24 RX ADMIN — LISINOPRIL 20 MG: 20 TABLET ORAL at 09:50

## 2022-07-25 ENCOUNTER — TELEPHONE (OUTPATIENT)
Dept: NEUROSURGERY | Facility: CLINIC | Age: 87
End: 2022-07-25

## 2022-07-25 ENCOUNTER — APPOINTMENT (OUTPATIENT)
Dept: MRI IMAGING | Facility: HOSPITAL | Age: 87
End: 2022-07-25

## 2022-07-25 LAB
ALBUMIN SERPL-MCNC: 3.9 G/DL (ref 3.5–5.2)
ANION GAP SERPL CALCULATED.3IONS-SCNC: 9.7 MMOL/L (ref 5–15)
BUN SERPL-MCNC: 26 MG/DL (ref 8–23)
BUN/CREAT SERPL: 15.5 (ref 7–25)
CALCIUM SPEC-SCNC: 9.3 MG/DL (ref 8.6–10.5)
CHLORIDE SERPL-SCNC: 107 MMOL/L (ref 98–107)
CO2 SERPL-SCNC: 24.3 MMOL/L (ref 22–29)
CREAT SERPL-MCNC: 1.68 MG/DL (ref 0.76–1.27)
DEPRECATED RDW RBC AUTO: 40.5 FL (ref 37–54)
EGFRCR SERPLBLD CKD-EPI 2021: 39.3 ML/MIN/1.73
ERYTHROCYTE [DISTWIDTH] IN BLOOD BY AUTOMATED COUNT: 12.4 % (ref 12.3–15.4)
FOLATE SERPL-MCNC: 12.2 NG/ML (ref 4.78–24.2)
GLUCOSE BLDC GLUCOMTR-MCNC: 123 MG/DL (ref 70–130)
GLUCOSE BLDC GLUCOMTR-MCNC: 123 MG/DL (ref 70–130)
GLUCOSE BLDC GLUCOMTR-MCNC: 124 MG/DL (ref 70–130)
GLUCOSE BLDC GLUCOMTR-MCNC: 124 MG/DL (ref 70–130)
GLUCOSE BLDC GLUCOMTR-MCNC: 96 MG/DL (ref 70–130)
GLUCOSE SERPL-MCNC: 120 MG/DL (ref 65–99)
HCT VFR BLD AUTO: 38 % (ref 37.5–51)
HGB BLD-MCNC: 12.4 G/DL (ref 13–17.7)
MCH RBC QN AUTO: 29.1 PG (ref 26.6–33)
MCHC RBC AUTO-ENTMCNC: 32.6 G/DL (ref 31.5–35.7)
MCV RBC AUTO: 89.2 FL (ref 79–97)
PHOSPHATE SERPL-MCNC: 3.2 MG/DL (ref 2.5–4.5)
PLATELET # BLD AUTO: 265 10*3/MM3 (ref 140–450)
PMV BLD AUTO: 9.6 FL (ref 6–12)
POTASSIUM SERPL-SCNC: 4.1 MMOL/L (ref 3.5–5.2)
RBC # BLD AUTO: 4.26 10*6/MM3 (ref 4.14–5.8)
SODIUM SERPL-SCNC: 141 MMOL/L (ref 136–145)
VIT B12 BLD-MCNC: 694 PG/ML (ref 211–946)
WBC NRBC COR # BLD: 6.29 10*3/MM3 (ref 3.4–10.8)

## 2022-07-25 PROCEDURE — 85027 COMPLETE CBC AUTOMATED: CPT | Performed by: STUDENT IN AN ORGANIZED HEALTH CARE EDUCATION/TRAINING PROGRAM

## 2022-07-25 PROCEDURE — 97530 THERAPEUTIC ACTIVITIES: CPT

## 2022-07-25 PROCEDURE — 70551 MRI BRAIN STEM W/O DYE: CPT

## 2022-07-25 PROCEDURE — 82746 ASSAY OF FOLIC ACID SERUM: CPT | Performed by: PSYCHIATRY & NEUROLOGY

## 2022-07-25 PROCEDURE — 99232 SBSQ HOSP IP/OBS MODERATE 35: CPT | Performed by: PSYCHIATRY & NEUROLOGY

## 2022-07-25 PROCEDURE — 82607 VITAMIN B-12: CPT | Performed by: PSYCHIATRY & NEUROLOGY

## 2022-07-25 PROCEDURE — 99024 POSTOP FOLLOW-UP VISIT: CPT | Performed by: PHYSICIAN ASSISTANT

## 2022-07-25 PROCEDURE — 82962 GLUCOSE BLOOD TEST: CPT

## 2022-07-25 PROCEDURE — 80069 RENAL FUNCTION PANEL: CPT | Performed by: NEUROLOGICAL SURGERY

## 2022-07-25 RX ADMIN — LEVOTHYROXINE SODIUM 75 MCG: 0.07 TABLET ORAL at 06:12

## 2022-07-25 RX ADMIN — INSULIN GLARGINE-YFGN 10 UNITS: 100 INJECTION, SOLUTION SUBCUTANEOUS at 08:57

## 2022-07-25 RX ADMIN — PANTOPRAZOLE SODIUM 40 MG: 40 TABLET, DELAYED RELEASE ORAL at 06:12

## 2022-07-25 RX ADMIN — GUAIFENESIN 600 MG: 600 TABLET, EXTENDED RELEASE ORAL at 08:57

## 2022-07-25 RX ADMIN — AMLODIPINE BESYLATE 5 MG: 5 TABLET ORAL at 14:54

## 2022-07-25 RX ADMIN — GUAIFENESIN 600 MG: 600 TABLET, EXTENDED RELEASE ORAL at 20:34

## 2022-07-25 RX ADMIN — INSULIN GLARGINE-YFGN 10 UNITS: 100 INJECTION, SOLUTION SUBCUTANEOUS at 20:34

## 2022-07-25 NOTE — TELEPHONE ENCOUNTER
PATIENT IS CURRENTLY IN THE HOSPITAL.  HE WILL NOT BE ABLE TO KEEP THIS APPT.  NIECE (MITCH) WOULD LIKE SOMEONE TO CALL HER SO SHE CAN DISCUSS HIS CONDITION    [phone]

## 2022-07-25 NOTE — TELEPHONE ENCOUNTER
"Spoke with Geetha and informed her that she would need to call his nurse at [facility] in order to speak with one of our APC\"s about him.  She voiced understanding.  "

## 2022-07-26 ENCOUNTER — APPOINTMENT (OUTPATIENT)
Dept: NEUROLOGY | Facility: HOSPITAL | Age: 87
End: 2022-07-26

## 2022-07-26 LAB
ALBUMIN SERPL-MCNC: 3.7 G/DL (ref 3.5–5.2)
ANION GAP SERPL CALCULATED.3IONS-SCNC: 12 MMOL/L (ref 5–15)
BUN SERPL-MCNC: 25 MG/DL (ref 8–23)
BUN/CREAT SERPL: 14.4 (ref 7–25)
CALCIUM SPEC-SCNC: 9.4 MG/DL (ref 8.6–10.5)
CHLORIDE SERPL-SCNC: 108 MMOL/L (ref 98–107)
CO2 SERPL-SCNC: 23 MMOL/L (ref 22–29)
CREAT SERPL-MCNC: 1.74 MG/DL (ref 0.76–1.27)
EGFRCR SERPLBLD CKD-EPI 2021: 37.7 ML/MIN/1.73
GLUCOSE BLDC GLUCOMTR-MCNC: 118 MG/DL (ref 70–130)
GLUCOSE BLDC GLUCOMTR-MCNC: 126 MG/DL (ref 70–130)
GLUCOSE BLDC GLUCOMTR-MCNC: 174 MG/DL (ref 70–130)
GLUCOSE BLDC GLUCOMTR-MCNC: 220 MG/DL (ref 70–130)
GLUCOSE BLDC GLUCOMTR-MCNC: 227 MG/DL (ref 70–130)
GLUCOSE SERPL-MCNC: 70 MG/DL (ref 65–99)
PHOSPHATE SERPL-MCNC: 3.6 MG/DL (ref 2.5–4.5)
POTASSIUM SERPL-SCNC: 4.2 MMOL/L (ref 3.5–5.2)
SODIUM SERPL-SCNC: 143 MMOL/L (ref 136–145)

## 2022-07-26 PROCEDURE — 99024 POSTOP FOLLOW-UP VISIT: CPT | Performed by: NURSE PRACTITIONER

## 2022-07-26 PROCEDURE — 80069 RENAL FUNCTION PANEL: CPT | Performed by: NEUROLOGICAL SURGERY

## 2022-07-26 PROCEDURE — 97530 THERAPEUTIC ACTIVITIES: CPT

## 2022-07-26 PROCEDURE — 82962 GLUCOSE BLOOD TEST: CPT

## 2022-07-26 PROCEDURE — 99231 SBSQ HOSP IP/OBS SF/LOW 25: CPT | Performed by: PSYCHIATRY & NEUROLOGY

## 2022-07-26 PROCEDURE — 92526 ORAL FUNCTION THERAPY: CPT

## 2022-07-26 PROCEDURE — 25010000002 HEPARIN (PORCINE) PER 1000 UNITS: Performed by: NURSE PRACTITIONER

## 2022-07-26 PROCEDURE — 95816 EEG AWAKE AND DROWSY: CPT

## 2022-07-26 PROCEDURE — 63710000001 INSULIN LISPRO (HUMAN) PER 5 UNITS: Performed by: NURSE PRACTITIONER

## 2022-07-26 PROCEDURE — 95816 EEG AWAKE AND DROWSY: CPT | Performed by: PSYCHIATRY & NEUROLOGY

## 2022-07-26 RX ORDER — HEPARIN SODIUM 5000 [USP'U]/ML
5000 INJECTION, SOLUTION INTRAVENOUS; SUBCUTANEOUS EVERY 8 HOURS SCHEDULED
Status: DISCONTINUED | OUTPATIENT
Start: 2022-07-26 | End: 2022-08-04 | Stop reason: HOSPADM

## 2022-07-26 RX ADMIN — GUAIFENESIN 600 MG: 600 TABLET, EXTENDED RELEASE ORAL at 09:33

## 2022-07-26 RX ADMIN — INSULIN LISPRO 5 UNITS: 100 INJECTION, SOLUTION INTRAVENOUS; SUBCUTANEOUS at 11:30

## 2022-07-26 RX ADMIN — PANTOPRAZOLE SODIUM 40 MG: 40 TABLET, DELAYED RELEASE ORAL at 09:33

## 2022-07-26 RX ADMIN — INSULIN GLARGINE-YFGN 10 UNITS: 100 INJECTION, SOLUTION SUBCUTANEOUS at 09:34

## 2022-07-26 RX ADMIN — INSULIN GLARGINE-YFGN 10 UNITS: 100 INJECTION, SOLUTION SUBCUTANEOUS at 20:34

## 2022-07-26 RX ADMIN — LEVOTHYROXINE SODIUM 75 MCG: 0.07 TABLET ORAL at 05:07

## 2022-07-26 RX ADMIN — HEPARIN SODIUM 5000 UNITS: 5000 INJECTION INTRAVENOUS; SUBCUTANEOUS at 14:01

## 2022-07-26 RX ADMIN — HEPARIN SODIUM 5000 UNITS: 5000 INJECTION INTRAVENOUS; SUBCUTANEOUS at 21:56

## 2022-07-26 RX ADMIN — AMLODIPINE BESYLATE 5 MG: 5 TABLET ORAL at 09:33

## 2022-07-26 RX ADMIN — GUAIFENESIN 600 MG: 600 TABLET, EXTENDED RELEASE ORAL at 20:33

## 2022-07-26 RX ADMIN — INSULIN LISPRO 3 UNITS: 100 INJECTION, SOLUTION INTRAVENOUS; SUBCUTANEOUS at 17:23

## 2022-07-27 LAB
ALBUMIN SERPL-MCNC: 3.5 G/DL (ref 3.5–5.2)
ANION GAP SERPL CALCULATED.3IONS-SCNC: 11 MMOL/L (ref 5–15)
BUN SERPL-MCNC: 30 MG/DL (ref 8–23)
BUN/CREAT SERPL: 14.8 (ref 7–25)
CALCIUM SPEC-SCNC: 9.1 MG/DL (ref 8.6–10.5)
CHLORIDE SERPL-SCNC: 108 MMOL/L (ref 98–107)
CO2 SERPL-SCNC: 22 MMOL/L (ref 22–29)
CREAT SERPL-MCNC: 2.03 MG/DL (ref 0.76–1.27)
EGFRCR SERPLBLD CKD-EPI 2021: 31.3 ML/MIN/1.73
GLUCOSE BLDC GLUCOMTR-MCNC: 110 MG/DL (ref 70–130)
GLUCOSE BLDC GLUCOMTR-MCNC: 168 MG/DL (ref 70–130)
GLUCOSE BLDC GLUCOMTR-MCNC: 254 MG/DL (ref 70–130)
GLUCOSE SERPL-MCNC: 113 MG/DL (ref 65–99)
PHOSPHATE SERPL-MCNC: 3.3 MG/DL (ref 2.5–4.5)
POTASSIUM SERPL-SCNC: 4.1 MMOL/L (ref 3.5–5.2)
SODIUM SERPL-SCNC: 141 MMOL/L (ref 136–145)

## 2022-07-27 PROCEDURE — 82962 GLUCOSE BLOOD TEST: CPT

## 2022-07-27 PROCEDURE — 97530 THERAPEUTIC ACTIVITIES: CPT

## 2022-07-27 PROCEDURE — 99024 POSTOP FOLLOW-UP VISIT: CPT | Performed by: NURSE PRACTITIONER

## 2022-07-27 PROCEDURE — 80069 RENAL FUNCTION PANEL: CPT | Performed by: NURSE PRACTITIONER

## 2022-07-27 PROCEDURE — 63710000001 INSULIN LISPRO (HUMAN) PER 5 UNITS: Performed by: NURSE PRACTITIONER

## 2022-07-27 PROCEDURE — 99231 SBSQ HOSP IP/OBS SF/LOW 25: CPT | Performed by: PSYCHIATRY & NEUROLOGY

## 2022-07-27 PROCEDURE — 25010000002 HEPARIN (PORCINE) PER 1000 UNITS: Performed by: NURSE PRACTITIONER

## 2022-07-27 RX ORDER — DIVALPROEX SODIUM 500 MG/1
500 TABLET, EXTENDED RELEASE ORAL NIGHTLY
Status: DISCONTINUED | OUTPATIENT
Start: 2022-07-27 | End: 2022-07-28

## 2022-07-27 RX ORDER — AMLODIPINE BESYLATE 2.5 MG/1
2.5 TABLET ORAL
Status: DISCONTINUED | OUTPATIENT
Start: 2022-07-28 | End: 2022-08-04 | Stop reason: HOSPADM

## 2022-07-27 RX ADMIN — PANTOPRAZOLE SODIUM 40 MG: 40 TABLET, DELAYED RELEASE ORAL at 06:25

## 2022-07-27 RX ADMIN — HEPARIN SODIUM 5000 UNITS: 5000 INJECTION INTRAVENOUS; SUBCUTANEOUS at 06:25

## 2022-07-27 RX ADMIN — AMLODIPINE BESYLATE 5 MG: 5 TABLET ORAL at 09:12

## 2022-07-27 RX ADMIN — INSULIN GLARGINE-YFGN 10 UNITS: 100 INJECTION, SOLUTION SUBCUTANEOUS at 21:29

## 2022-07-27 RX ADMIN — GUAIFENESIN 600 MG: 600 TABLET, EXTENDED RELEASE ORAL at 09:12

## 2022-07-27 RX ADMIN — LEVOTHYROXINE SODIUM 75 MCG: 0.07 TABLET ORAL at 06:25

## 2022-07-27 RX ADMIN — HEPARIN SODIUM 5000 UNITS: 5000 INJECTION INTRAVENOUS; SUBCUTANEOUS at 21:28

## 2022-07-27 RX ADMIN — HEPARIN SODIUM 5000 UNITS: 5000 INJECTION INTRAVENOUS; SUBCUTANEOUS at 15:18

## 2022-07-27 RX ADMIN — INSULIN GLARGINE-YFGN 10 UNITS: 100 INJECTION, SOLUTION SUBCUTANEOUS at 09:10

## 2022-07-27 RX ADMIN — INSULIN LISPRO 8 UNITS: 100 INJECTION, SOLUTION INTRAVENOUS; SUBCUTANEOUS at 17:38

## 2022-07-27 RX ADMIN — SODIUM CHLORIDE 500 ML: 9 INJECTION, SOLUTION INTRAVENOUS at 12:05

## 2022-07-27 RX ADMIN — ACETAMINOPHEN 650 MG: 325 TABLET, FILM COATED ORAL at 12:31

## 2022-07-27 RX ADMIN — INSULIN LISPRO 3 UNITS: 100 INJECTION, SOLUTION INTRAVENOUS; SUBCUTANEOUS at 12:07

## 2022-07-28 LAB
ALBUMIN SERPL-MCNC: 3.5 G/DL (ref 3.5–5.2)
ANION GAP SERPL CALCULATED.3IONS-SCNC: 11.9 MMOL/L (ref 5–15)
BUN SERPL-MCNC: 28 MG/DL (ref 8–23)
BUN/CREAT SERPL: 15.8 (ref 7–25)
CALCIUM SPEC-SCNC: 8.9 MG/DL (ref 8.6–10.5)
CHLORIDE SERPL-SCNC: 109 MMOL/L (ref 98–107)
CO2 SERPL-SCNC: 22.1 MMOL/L (ref 22–29)
CREAT SERPL-MCNC: 1.77 MG/DL (ref 0.76–1.27)
EGFRCR SERPLBLD CKD-EPI 2021: 36.9 ML/MIN/1.73
GLUCOSE BLDC GLUCOMTR-MCNC: 105 MG/DL (ref 70–130)
GLUCOSE BLDC GLUCOMTR-MCNC: 127 MG/DL (ref 70–130)
GLUCOSE BLDC GLUCOMTR-MCNC: 299 MG/DL (ref 70–130)
GLUCOSE SERPL-MCNC: 104 MG/DL (ref 65–99)
PHOSPHATE SERPL-MCNC: 3.2 MG/DL (ref 2.5–4.5)
POTASSIUM SERPL-SCNC: 4.5 MMOL/L (ref 3.5–5.2)
SODIUM SERPL-SCNC: 143 MMOL/L (ref 136–145)

## 2022-07-28 PROCEDURE — 63710000001 INSULIN LISPRO (HUMAN) PER 5 UNITS: Performed by: NURSE PRACTITIONER

## 2022-07-28 PROCEDURE — 80069 RENAL FUNCTION PANEL: CPT | Performed by: NURSE PRACTITIONER

## 2022-07-28 PROCEDURE — 97110 THERAPEUTIC EXERCISES: CPT

## 2022-07-28 PROCEDURE — 25010000002 HEPARIN (PORCINE) PER 1000 UNITS: Performed by: NURSE PRACTITIONER

## 2022-07-28 PROCEDURE — 99024 POSTOP FOLLOW-UP VISIT: CPT | Performed by: NURSE PRACTITIONER

## 2022-07-28 PROCEDURE — 97530 THERAPEUTIC ACTIVITIES: CPT

## 2022-07-28 PROCEDURE — 82962 GLUCOSE BLOOD TEST: CPT

## 2022-07-28 RX ORDER — DIVALPROEX SODIUM 125 MG/1
250 CAPSULE, COATED PELLETS ORAL EVERY 12 HOURS SCHEDULED
Status: DISCONTINUED | OUTPATIENT
Start: 2022-07-28 | End: 2022-08-04 | Stop reason: HOSPADM

## 2022-07-28 RX ADMIN — DIVALPROEX SODIUM 250 MG: 125 CAPSULE, COATED PELLETS ORAL at 03:27

## 2022-07-28 RX ADMIN — HEPARIN SODIUM 5000 UNITS: 5000 INJECTION INTRAVENOUS; SUBCUTANEOUS at 21:08

## 2022-07-28 RX ADMIN — AMLODIPINE BESYLATE 2.5 MG: 2.5 TABLET ORAL at 09:13

## 2022-07-28 RX ADMIN — HEPARIN SODIUM 5000 UNITS: 5000 INJECTION INTRAVENOUS; SUBCUTANEOUS at 14:22

## 2022-07-28 RX ADMIN — INSULIN GLARGINE-YFGN 10 UNITS: 100 INJECTION, SOLUTION SUBCUTANEOUS at 09:25

## 2022-07-28 RX ADMIN — HEPARIN SODIUM 5000 UNITS: 5000 INJECTION INTRAVENOUS; SUBCUTANEOUS at 06:13

## 2022-07-28 RX ADMIN — PANTOPRAZOLE SODIUM 40 MG: 40 TABLET, DELAYED RELEASE ORAL at 06:50

## 2022-07-28 RX ADMIN — LEVOTHYROXINE SODIUM 75 MCG: 0.07 TABLET ORAL at 06:13

## 2022-07-28 RX ADMIN — INSULIN LISPRO 8 UNITS: 100 INJECTION, SOLUTION INTRAVENOUS; SUBCUTANEOUS at 14:22

## 2022-07-28 RX ADMIN — GUAIFENESIN 600 MG: 600 TABLET, EXTENDED RELEASE ORAL at 09:14

## 2022-07-28 RX ADMIN — INSULIN GLARGINE-YFGN 10 UNITS: 100 INJECTION, SOLUTION SUBCUTANEOUS at 21:56

## 2022-07-28 RX ADMIN — GUAIFENESIN 600 MG: 600 TABLET, EXTENDED RELEASE ORAL at 21:08

## 2022-07-29 LAB
ALBUMIN SERPL-MCNC: 3.7 G/DL (ref 3.5–5.2)
ANION GAP SERPL CALCULATED.3IONS-SCNC: 9.2 MMOL/L (ref 5–15)
BUN SERPL-MCNC: 26 MG/DL (ref 8–23)
BUN/CREAT SERPL: 16.7 (ref 7–25)
CALCIUM SPEC-SCNC: 8.9 MG/DL (ref 8.6–10.5)
CHLORIDE SERPL-SCNC: 105 MMOL/L (ref 98–107)
CO2 SERPL-SCNC: 24.8 MMOL/L (ref 22–29)
CREAT SERPL-MCNC: 1.56 MG/DL (ref 0.76–1.27)
EGFRCR SERPLBLD CKD-EPI 2021: 43 ML/MIN/1.73
GLUCOSE BLDC GLUCOMTR-MCNC: 142 MG/DL (ref 70–130)
GLUCOSE BLDC GLUCOMTR-MCNC: 192 MG/DL (ref 70–130)
GLUCOSE BLDC GLUCOMTR-MCNC: 235 MG/DL (ref 70–130)
GLUCOSE SERPL-MCNC: 193 MG/DL (ref 65–99)
PHOSPHATE SERPL-MCNC: 2.9 MG/DL (ref 2.5–4.5)
POTASSIUM SERPL-SCNC: 4.5 MMOL/L (ref 3.5–5.2)
SODIUM SERPL-SCNC: 139 MMOL/L (ref 136–145)

## 2022-07-29 PROCEDURE — 82962 GLUCOSE BLOOD TEST: CPT

## 2022-07-29 PROCEDURE — 99024 POSTOP FOLLOW-UP VISIT: CPT | Performed by: NURSE PRACTITIONER

## 2022-07-29 PROCEDURE — 80069 RENAL FUNCTION PANEL: CPT | Performed by: NURSE PRACTITIONER

## 2022-07-29 PROCEDURE — 25010000002 HEPARIN (PORCINE) PER 1000 UNITS: Performed by: NURSE PRACTITIONER

## 2022-07-29 PROCEDURE — 63710000001 INSULIN LISPRO (HUMAN) PER 5 UNITS: Performed by: NURSE PRACTITIONER

## 2022-07-29 PROCEDURE — 97110 THERAPEUTIC EXERCISES: CPT

## 2022-07-29 RX ADMIN — AMLODIPINE BESYLATE 2.5 MG: 2.5 TABLET ORAL at 08:50

## 2022-07-29 RX ADMIN — PANTOPRAZOLE SODIUM 40 MG: 40 TABLET, DELAYED RELEASE ORAL at 06:46

## 2022-07-29 RX ADMIN — DIVALPROEX SODIUM 250 MG: 125 CAPSULE, COATED PELLETS ORAL at 00:44

## 2022-07-29 RX ADMIN — LEVOTHYROXINE SODIUM 75 MCG: 0.07 TABLET ORAL at 06:46

## 2022-07-29 RX ADMIN — HEPARIN SODIUM 5000 UNITS: 5000 INJECTION INTRAVENOUS; SUBCUTANEOUS at 21:30

## 2022-07-29 RX ADMIN — INSULIN LISPRO 2 UNITS: 100 INJECTION, SOLUTION INTRAVENOUS; SUBCUTANEOUS at 17:27

## 2022-07-29 RX ADMIN — GUAIFENESIN 600 MG: 600 TABLET, EXTENDED RELEASE ORAL at 08:49

## 2022-07-29 RX ADMIN — INSULIN GLARGINE-YFGN 10 UNITS: 100 INJECTION, SOLUTION SUBCUTANEOUS at 21:32

## 2022-07-29 RX ADMIN — INSULIN LISPRO 2 UNITS: 100 INJECTION, SOLUTION INTRAVENOUS; SUBCUTANEOUS at 12:51

## 2022-07-29 RX ADMIN — DIVALPROEX SODIUM 250 MG: 125 CAPSULE, COATED PELLETS ORAL at 08:50

## 2022-07-29 RX ADMIN — GUAIFENESIN 600 MG: 600 TABLET, EXTENDED RELEASE ORAL at 21:29

## 2022-07-29 RX ADMIN — HEPARIN SODIUM 5000 UNITS: 5000 INJECTION INTRAVENOUS; SUBCUTANEOUS at 14:23

## 2022-07-29 RX ADMIN — DIVALPROEX SODIUM 250 MG: 125 CAPSULE, COATED PELLETS ORAL at 21:29

## 2022-07-29 RX ADMIN — HEPARIN SODIUM 5000 UNITS: 5000 INJECTION INTRAVENOUS; SUBCUTANEOUS at 06:46

## 2022-07-29 RX ADMIN — INSULIN GLARGINE-YFGN 10 UNITS: 100 INJECTION, SOLUTION SUBCUTANEOUS at 08:51

## 2022-07-30 LAB
ALBUMIN SERPL-MCNC: 3.6 G/DL (ref 3.5–5.2)
ANION GAP SERPL CALCULATED.3IONS-SCNC: 8 MMOL/L (ref 5–15)
BUN SERPL-MCNC: 29 MG/DL (ref 8–23)
BUN/CREAT SERPL: 16.6 (ref 7–25)
CALCIUM SPEC-SCNC: 9.1 MG/DL (ref 8.6–10.5)
CHLORIDE SERPL-SCNC: 106 MMOL/L (ref 98–107)
CO2 SERPL-SCNC: 20 MMOL/L (ref 22–29)
CREAT SERPL-MCNC: 1.75 MG/DL (ref 0.76–1.27)
EGFRCR SERPLBLD CKD-EPI 2021: 37.4 ML/MIN/1.73
GLUCOSE BLDC GLUCOMTR-MCNC: 142 MG/DL (ref 70–130)
GLUCOSE BLDC GLUCOMTR-MCNC: 183 MG/DL (ref 70–130)
GLUCOSE BLDC GLUCOMTR-MCNC: 192 MG/DL (ref 70–130)
GLUCOSE BLDC GLUCOMTR-MCNC: 208 MG/DL (ref 70–130)
GLUCOSE SERPL-MCNC: 216 MG/DL (ref 65–99)
PHOSPHATE SERPL-MCNC: 2.4 MG/DL (ref 2.5–4.5)
POTASSIUM SERPL-SCNC: 4.9 MMOL/L (ref 3.5–5.2)
SODIUM SERPL-SCNC: 134 MMOL/L (ref 136–145)

## 2022-07-30 PROCEDURE — 80069 RENAL FUNCTION PANEL: CPT | Performed by: NURSE PRACTITIONER

## 2022-07-30 PROCEDURE — 99024 POSTOP FOLLOW-UP VISIT: CPT

## 2022-07-30 PROCEDURE — 63710000001 INSULIN LISPRO (HUMAN) PER 5 UNITS: Performed by: NURSE PRACTITIONER

## 2022-07-30 PROCEDURE — 82962 GLUCOSE BLOOD TEST: CPT

## 2022-07-30 PROCEDURE — 25010000002 HEPARIN (PORCINE) PER 1000 UNITS: Performed by: NURSE PRACTITIONER

## 2022-07-30 RX ADMIN — HEPARIN SODIUM 5000 UNITS: 5000 INJECTION INTRAVENOUS; SUBCUTANEOUS at 14:31

## 2022-07-30 RX ADMIN — HEPARIN SODIUM 5000 UNITS: 5000 INJECTION INTRAVENOUS; SUBCUTANEOUS at 06:25

## 2022-07-30 RX ADMIN — DIVALPROEX SODIUM 250 MG: 125 CAPSULE, COATED PELLETS ORAL at 08:55

## 2022-07-30 RX ADMIN — LEVOTHYROXINE SODIUM 75 MCG: 0.07 TABLET ORAL at 06:25

## 2022-07-30 RX ADMIN — INSULIN GLARGINE-YFGN 12 UNITS: 100 INJECTION, SOLUTION SUBCUTANEOUS at 22:05

## 2022-07-30 RX ADMIN — PANTOPRAZOLE SODIUM 40 MG: 40 TABLET, DELAYED RELEASE ORAL at 06:25

## 2022-07-30 RX ADMIN — INSULIN LISPRO 3 UNITS: 100 INJECTION, SOLUTION INTRAVENOUS; SUBCUTANEOUS at 17:13

## 2022-07-30 RX ADMIN — INSULIN GLARGINE-YFGN 10 UNITS: 100 INJECTION, SOLUTION SUBCUTANEOUS at 08:55

## 2022-07-30 RX ADMIN — HEPARIN SODIUM 5000 UNITS: 5000 INJECTION INTRAVENOUS; SUBCUTANEOUS at 22:02

## 2022-07-30 RX ADMIN — AMLODIPINE BESYLATE 2.5 MG: 2.5 TABLET ORAL at 08:55

## 2022-07-30 RX ADMIN — GUAIFENESIN 600 MG: 600 TABLET, EXTENDED RELEASE ORAL at 08:55

## 2022-07-30 RX ADMIN — DIVALPROEX SODIUM 250 MG: 125 CAPSULE, COATED PELLETS ORAL at 22:02

## 2022-07-30 RX ADMIN — GUAIFENESIN 600 MG: 600 TABLET, EXTENDED RELEASE ORAL at 22:02

## 2022-07-30 RX ADMIN — INSULIN LISPRO 3 UNITS: 100 INJECTION, SOLUTION INTRAVENOUS; SUBCUTANEOUS at 08:55

## 2022-07-31 ENCOUNTER — APPOINTMENT (OUTPATIENT)
Dept: CT IMAGING | Facility: HOSPITAL | Age: 87
End: 2022-07-31

## 2022-07-31 LAB
ALBUMIN SERPL-MCNC: 3.8 G/DL (ref 3.5–5.2)
ANION GAP SERPL CALCULATED.3IONS-SCNC: 11.9 MMOL/L (ref 5–15)
BUN SERPL-MCNC: 40 MG/DL (ref 8–23)
BUN/CREAT SERPL: 22.1 (ref 7–25)
CALCIUM SPEC-SCNC: 9.4 MG/DL (ref 8.6–10.5)
CHLORIDE SERPL-SCNC: 107 MMOL/L (ref 98–107)
CO2 SERPL-SCNC: 19.1 MMOL/L (ref 22–29)
CREAT SERPL-MCNC: 1.81 MG/DL (ref 0.76–1.27)
EGFRCR SERPLBLD CKD-EPI 2021: 36 ML/MIN/1.73
GLUCOSE BLDC GLUCOMTR-MCNC: 120 MG/DL (ref 70–130)
GLUCOSE BLDC GLUCOMTR-MCNC: 159 MG/DL (ref 70–130)
GLUCOSE BLDC GLUCOMTR-MCNC: 190 MG/DL (ref 70–130)
GLUCOSE BLDC GLUCOMTR-MCNC: 198 MG/DL (ref 70–130)
GLUCOSE BLDC GLUCOMTR-MCNC: 246 MG/DL (ref 70–130)
GLUCOSE SERPL-MCNC: 140 MG/DL (ref 65–99)
PHOSPHATE SERPL-MCNC: 3.1 MG/DL (ref 2.5–4.5)
POTASSIUM SERPL-SCNC: 4.9 MMOL/L (ref 3.5–5.2)
SODIUM SERPL-SCNC: 138 MMOL/L (ref 136–145)

## 2022-07-31 PROCEDURE — 70450 CT HEAD/BRAIN W/O DYE: CPT

## 2022-07-31 PROCEDURE — 82962 GLUCOSE BLOOD TEST: CPT

## 2022-07-31 PROCEDURE — 25010000002 HEPARIN (PORCINE) PER 1000 UNITS: Performed by: NURSE PRACTITIONER

## 2022-07-31 PROCEDURE — 63710000001 INSULIN LISPRO (HUMAN) PER 5 UNITS: Performed by: NURSE PRACTITIONER

## 2022-07-31 PROCEDURE — 80069 RENAL FUNCTION PANEL: CPT | Performed by: NURSE PRACTITIONER

## 2022-07-31 PROCEDURE — 99024 POSTOP FOLLOW-UP VISIT: CPT

## 2022-07-31 RX ADMIN — HEPARIN SODIUM 5000 UNITS: 5000 INJECTION INTRAVENOUS; SUBCUTANEOUS at 13:29

## 2022-07-31 RX ADMIN — GUAIFENESIN 600 MG: 600 TABLET, EXTENDED RELEASE ORAL at 21:26

## 2022-07-31 RX ADMIN — INSULIN LISPRO 2 UNITS: 100 INJECTION, SOLUTION INTRAVENOUS; SUBCUTANEOUS at 17:27

## 2022-07-31 RX ADMIN — DIVALPROEX SODIUM 250 MG: 125 CAPSULE, COATED PELLETS ORAL at 21:26

## 2022-07-31 RX ADMIN — INSULIN GLARGINE-YFGN 12 UNITS: 100 INJECTION, SOLUTION SUBCUTANEOUS at 21:26

## 2022-07-31 RX ADMIN — HEPARIN SODIUM 5000 UNITS: 5000 INJECTION INTRAVENOUS; SUBCUTANEOUS at 21:26

## 2022-07-31 RX ADMIN — HEPARIN SODIUM 5000 UNITS: 5000 INJECTION INTRAVENOUS; SUBCUTANEOUS at 05:58

## 2022-07-31 RX ADMIN — AMLODIPINE BESYLATE 2.5 MG: 2.5 TABLET ORAL at 08:52

## 2022-07-31 RX ADMIN — PANTOPRAZOLE SODIUM 40 MG: 40 TABLET, DELAYED RELEASE ORAL at 05:58

## 2022-07-31 RX ADMIN — INSULIN LISPRO 2 UNITS: 100 INJECTION, SOLUTION INTRAVENOUS; SUBCUTANEOUS at 13:29

## 2022-07-31 RX ADMIN — ACETAMINOPHEN 650 MG: 325 TABLET, FILM COATED ORAL at 21:41

## 2022-07-31 RX ADMIN — LEVOTHYROXINE SODIUM 75 MCG: 0.07 TABLET ORAL at 05:58

## 2022-07-31 RX ADMIN — INSULIN GLARGINE-YFGN 12 UNITS: 100 INJECTION, SOLUTION SUBCUTANEOUS at 08:52

## 2022-07-31 RX ADMIN — DIVALPROEX SODIUM 250 MG: 125 CAPSULE, COATED PELLETS ORAL at 08:52

## 2022-07-31 RX ADMIN — GUAIFENESIN 600 MG: 600 TABLET, EXTENDED RELEASE ORAL at 08:52

## 2022-08-01 ENCOUNTER — APPOINTMENT (OUTPATIENT)
Dept: NEUROLOGY | Facility: HOSPITAL | Age: 87
End: 2022-08-01

## 2022-08-01 LAB
ALBUMIN SERPL-MCNC: 3.8 G/DL (ref 3.5–5.2)
ANION GAP SERPL CALCULATED.3IONS-SCNC: 12 MMOL/L (ref 5–15)
BILIRUB UR QL STRIP: NEGATIVE
BUN SERPL-MCNC: 40 MG/DL (ref 8–23)
BUN/CREAT SERPL: 20.9 (ref 7–25)
CALCIUM SPEC-SCNC: 9.2 MG/DL (ref 8.6–10.5)
CHLORIDE SERPL-SCNC: 108 MMOL/L (ref 98–107)
CLARITY UR: CLEAR
CO2 SERPL-SCNC: 18 MMOL/L (ref 22–29)
COLOR UR: YELLOW
CREAT SERPL-MCNC: 1.91 MG/DL (ref 0.76–1.27)
EGFRCR SERPLBLD CKD-EPI 2021: 33.7 ML/MIN/1.73
GLUCOSE BLDC GLUCOMTR-MCNC: 124 MG/DL (ref 70–130)
GLUCOSE BLDC GLUCOMTR-MCNC: 135 MG/DL (ref 70–130)
GLUCOSE BLDC GLUCOMTR-MCNC: 148 MG/DL (ref 70–130)
GLUCOSE BLDC GLUCOMTR-MCNC: 271 MG/DL (ref 70–130)
GLUCOSE SERPL-MCNC: 127 MG/DL (ref 65–99)
GLUCOSE UR STRIP-MCNC: NEGATIVE MG/DL
HGB UR QL STRIP.AUTO: NEGATIVE
KETONES UR QL STRIP: NEGATIVE
LEUKOCYTE ESTERASE UR QL STRIP.AUTO: NEGATIVE
NITRITE UR QL STRIP: NEGATIVE
PH UR STRIP.AUTO: 5.5 [PH] (ref 5–8)
PHOSPHATE SERPL-MCNC: 3.7 MG/DL (ref 2.5–4.5)
POTASSIUM SERPL-SCNC: 4.8 MMOL/L (ref 3.5–5.2)
PROT UR QL STRIP: NEGATIVE
SODIUM SERPL-SCNC: 138 MMOL/L (ref 136–145)
SP GR UR STRIP: 1.02 (ref 1–1.03)
UROBILINOGEN UR QL STRIP: NORMAL
VALPROATE SERPL-MCNC: 31 MCG/ML (ref 50–125)

## 2022-08-01 PROCEDURE — 80069 RENAL FUNCTION PANEL: CPT | Performed by: NURSE PRACTITIONER

## 2022-08-01 PROCEDURE — 81003 URINALYSIS AUTO W/O SCOPE: CPT | Performed by: STUDENT IN AN ORGANIZED HEALTH CARE EDUCATION/TRAINING PROGRAM

## 2022-08-01 PROCEDURE — 80164 ASSAY DIPROPYLACETIC ACD TOT: CPT | Performed by: PSYCHIATRY & NEUROLOGY

## 2022-08-01 PROCEDURE — 99024 POSTOP FOLLOW-UP VISIT: CPT | Performed by: NURSE PRACTITIONER

## 2022-08-01 PROCEDURE — 82962 GLUCOSE BLOOD TEST: CPT

## 2022-08-01 PROCEDURE — 97110 THERAPEUTIC EXERCISES: CPT

## 2022-08-01 PROCEDURE — 25010000002 HEPARIN (PORCINE) PER 1000 UNITS: Performed by: NURSE PRACTITIONER

## 2022-08-01 RX ADMIN — GUAIFENESIN 600 MG: 600 TABLET, EXTENDED RELEASE ORAL at 08:37

## 2022-08-01 RX ADMIN — HEPARIN SODIUM 5000 UNITS: 5000 INJECTION INTRAVENOUS; SUBCUTANEOUS at 16:29

## 2022-08-01 RX ADMIN — DIVALPROEX SODIUM 250 MG: 125 CAPSULE, COATED PELLETS ORAL at 08:37

## 2022-08-01 RX ADMIN — HEPARIN SODIUM 5000 UNITS: 5000 INJECTION INTRAVENOUS; SUBCUTANEOUS at 21:23

## 2022-08-01 RX ADMIN — HEPARIN SODIUM 5000 UNITS: 5000 INJECTION INTRAVENOUS; SUBCUTANEOUS at 06:20

## 2022-08-01 RX ADMIN — AMLODIPINE BESYLATE 2.5 MG: 2.5 TABLET ORAL at 08:37

## 2022-08-01 RX ADMIN — LEVOTHYROXINE SODIUM 75 MCG: 0.07 TABLET ORAL at 06:20

## 2022-08-01 RX ADMIN — DIVALPROEX SODIUM 250 MG: 125 CAPSULE, COATED PELLETS ORAL at 21:23

## 2022-08-01 RX ADMIN — GUAIFENESIN 600 MG: 600 TABLET, EXTENDED RELEASE ORAL at 21:23

## 2022-08-01 RX ADMIN — INSULIN GLARGINE-YFGN 12 UNITS: 100 INJECTION, SOLUTION SUBCUTANEOUS at 08:38

## 2022-08-01 RX ADMIN — INSULIN GLARGINE-YFGN 12 UNITS: 100 INJECTION, SOLUTION SUBCUTANEOUS at 21:23

## 2022-08-01 RX ADMIN — PANTOPRAZOLE SODIUM 40 MG: 40 TABLET, DELAYED RELEASE ORAL at 06:20

## 2022-08-01 NOTE — PLAN OF CARE
Goal Outcome Evaluation: Patient up in chair all shift, confused at beginning of shift and late afternoon not complaints of pain or discomfort patient continues to have cough is taking mucinex will continue to monitor

## 2022-08-01 NOTE — THERAPY TREATMENT NOTE
Patient Name: Reece Stephen  : 1935    MRN: [id number]                              Today's Date: 2022       Admit Date: 2022    Visit Dx:     ICD-10-CM ICD-9-CM   1. SDH (subdural hematoma) (HCC)  S06.5X9A 432.1   2. Subdural hematoma (HCC)  S06.5X9A 432.1     Patient Active Problem List   Diagnosis   • History of rectal cancer   • Lung nodule, multiple   • History of DVT (deep vein thrombosis)   • Transient cerebral ischemia   • Hyperkalemia   • Hyperosmolar non-ketotic state in patient with type 2 diabetes mellitus (HCC)   • Type 2 diabetes mellitus with complication (HCC)   • Hyponatremia with extracellular fluid depletion   • Hypothyroidism   • Hyperlipidemia   • Diabetic polyneuropathy associated with type 2 diabetes mellitus (HCC)   • Chronic kidney disease, stage 3 (moderate) (CMS/HCC)   • Non compliance w medication regimen   • Essential hypertension   • Upper gastrointestinal hemorrhage   • Osteoarthritis   • Gastroesophageal reflux disease   • Deep vein thrombosis (HCC)   • Colitis due to Clostridium difficile   • Burn injury   • Anemia   • Cerebrovascular accident (CVA) (HCC)   • Other injury of unspecified body region, initial encounter   • Chronic pain of both knees   • Diabetic neuropathy (HCC)   • Altered mental status   • Fall   • Cerebral contusion (HCC)   • Embolic stroke (HCC)   • Acute weakness   • Subdural hematoma (HCC)   • SDH (subdural hematoma) (HCC)   • Surgery, elective     Past Medical History:   Diagnosis Date   • Anemia    • Arthritis    • Clostridium difficile infection 10/2014   • Colostomy present (HCC)    • Diabetes mellitus (HCC)     TYPE 2, IDDM   • Disease of thyroid gland     ACQUIRED HYPOTHYROIDISM   • Duodenal ulcer 2014   • DVT (deep venous thrombosis) (HCC) 2015    PORT ASSOCIATED OF LEFT UPPER EXTREMITY   • Electrocution     HAD TO HAVE PARTIAL AMPUTATION OF LEFT FOOT   • GERD (gastroesophageal reflux disease)    • Hearing loss    • Hemorrhagic  shock (HCC) 2015    SECONDARY TO BLEEDING DUODENAL ULCER, ADMITTED TO [facility]   • Histiocytoma    • History of blood transfusion 2015    D/T BLEEDING DUODENAL ULCER   • History of chemotherapy     FOLLOWED BY DR. NIGEL NOBLE   • History of radiation therapy 2014    FOLLOWED BY DR. RJ HUI   • Hypercholesterolemia    • Hyperkalemia    • Hypertension    • Hypoglycemia 01/04/2018    SEEN AT [facility] ER   • Incontinent of urine    • Mixed hyperlipidemia    • Multiple lung nodules    • Neuropathy    • OA (osteoarthritis)    • ANNE MARIE (obstructive sleep apnea)     NO CPAP   • Peptic ulceration 2014    Duodenal ulcer   • Pneumonia 03/29/2013    ADMITTED TO [facility]-DOUBLE PNEUMONIA   • Rectal cancer (HCC) 06/2014    T3N1M0, S/P CHEMO, XRT, AND RESECTION, FOLLOWED BY DR. TRACE HERRING   • Stage 3 chronic kidney disease (HCC)     DR. FREDDIE GONZALES   • Stroke (HCC) 11/28/2017    TIA, ADMITTED TO [facility]   • Stroke (HCC) 11/17/2019    CVA, ADMITTED TO [facility]   • Subdural hematoma (HCC)     INCREASE IN HEARING LOSS, BALANCE PROBLEMS, SPEECH SLOWED, MEMORY DEFICIT   • Vitamin D deficiency      Past Surgical History:   Procedure Laterality Date   • AMPUTATION FOOT Left 1971    PARTIAL DUE TO AN ELECTRICAL SHOCK INJURY   • ARM TENDON REPAIR Right     DONE BY DR. OLEARY AND KLEINERT   • COLON RESECTION N/A 11/14/2014    LOW ANTERIOR RESECTION WITH CREATION OF COLOSTOMY, DR. TRACE HERRING AT [facility]   • COLONOSCOPY W/ BIOPSIES N/A 06/26/2014    ULCERATED 5CM MASS IN RECTUM, PATH: MODERATELY DIFFERENTIATED ADENOCARCINOMA, MULTIPLE DIVERTICULA IN SIGMOID, DR. LAUREN JAMES AT [facility]   • COLONOSCOPY W/ BIOPSIES N/A 07/09/2014    RECTAL MASS: INVASIVE MODERATLEY DIFFERIENTIATED ADENOCARCINOMA, AREA TATTOOED, DR. TRAEC HERRING AT [facility]   • EMBOLIZATION CEREBRAL Left 7/12/2022    Procedure: LEFT KWABENA HOLE FOR EVACUATION OF SUBDURAL HEMATOMA;  Surgeon: Ciaran Reyes MD;  Location: [facility] MAIN OR;  Service: Neurosurgery;  Laterality: Left;   •  EMBOLIZATION CEREBRAL N/A 7/21/2022    Procedure: CEREBRAL ANGIOGRAM with MMA embolization;  Surgeon: Ciaran Reyes MD;  Location: [facility] OR 18/19;  Service: Neurosurgery;  Laterality: N/A;   • ENDOSCOPY  02/09/2015   • ENDOSCOPY N/A 02/09/2015    RESOLVED DUODENAL ULCER, EGD WNL, DR. FREDDIE MALCOLM AT [facility]   • FLEXIBLE SIGMOIDOSCOPY N/A 10/06/2014    MALIGNANT PARTIALLY OBSTRUCTING TUMOR IN MID RECTUM, DR. DELTA HERRING AT [facility]   • PORTACATH PLACEMENT Left 07/24/2014    LEFT SUBCLAVIAN, DR. KWAKU TAY AT [facility]   • RECTAL ULTRASOUND N/A 07/09/2014    ENDORECTAL US FOR CANCER STAGING, T3N1 RECTAL CANCER AT 7 CM, DR. TRACE HERRING AT [facility]   • TRACHEAL SURGERY N/A 08/31/2014    ENDOTRACHEAL INTUBATION, DR. ALMA ROSA HAGAN AT [facility]   • VENOUS ACCESS DEVICE (PORT) REMOVAL Left 03/12/2015    LEFT SUBCLAVIAN, DR. KWAKU TAY AT [facility]      General Information     Row Name 08/01/22 1547          Physical Therapy Time and Intention    Document Type therapy note (daily note)  -     Mode of Treatment individual therapy;physical therapy  -     Row Name 08/01/22 1547          General Information    Patient Profile Reviewed yes  -     Existing Precautions/Restrictions fall  -     Row Name 08/01/22 1547          Cognition    Orientation Status (Cognition) disoriented to;person  also [place], and one hearing aid missing since [facility] already reported  -     Row Name 08/01/22 1547          Safety Issues, Functional Mobility    Safety Issues Affecting Function (Mobility) impulsivity;insight into deficits/self-awareness;judgment;positioning of assistive device;problem-solving;safety precaution awareness  -     Impairments Affecting Function (Mobility) balance;coordination;endurance/activity tolerance;strength  -     Cognitive Impairments, Mobility Safety/Performance impulsivity;insight into deficits/self-awareness;judgment;problem-solving/reasoning;safety precaution awareness  -           User Key  (r) = Recorded By, (t) =  Taken By, (c) = Cosigned By    Initials Name Provider Type    Kim Mattson PTA Physical Therapist Assistant               Mobility     Row Name 08/01/22 1549          Bed Mobility    Supine-Sit [place] (Bed Mobility) minimum assist (75% patient effort)  -     Sit-Supine [place] (Bed Mobility) not tested  -     Assistive Device (Bed Mobility) bed rails;draw sheet;head of bed elevated  -     Row Name 08/01/22 1549          Sit-Stand Transfer    Sit-Stand [place] (Transfers) minimum assist (75% patient effort);contact guard;verbal cues;nonverbal cues (demo/gesture)  cues to slow pace and for safe hand placement  -     Assistive Device (Sit-Stand Transfers) walker, front-wheeled  -     Comment, (Sit-Stand Transfer) x2, 2nd attempt was for pt to get centered safely in rwx  -     Row Name 08/01/22 1549          Gait/Stairs (Locomotion)    [place] Level (Gait) contact guard;verbal cues;nonverbal cues (demo/gesture)  -     Assistive Device (Gait) walker, front-wheeled  -     Distance in Feet (Gait) 15ft, pt c/o fatigue, assisted to guide rwx away from objects in room  -     Deviations/Abnormal Patterns (Gait) polo decreased;festinating/shuffling  -     Bilateral Gait Deviations forward flexed posture  -     Comment, (Gait/Stairs) pt initially declined amb due to fatigue, but then agreed to only stay in room, had to encourage sitting in chair  -           User Key  (r) = Recorded By, (t) = Taken By, (c) = Cosigned By    Initials Name Provider Type    Kim Mattson PTA Physical Therapist Assistant               Obj/Interventions    No documentation.                Goals/Plan    No documentation.                Clinical Impression     Row Name 08/01/22 1556          Pain    Pretreatment Pain Rating 8/10  -JM     Posttreatment Pain Rating 8/10  -JM     Pain Location - head  -     Pre/Posttreatment Pain Comment no c/o dizziness, just HA  -     Pain Intervention(s)  Repositioned;Rest  -     Row Name 08/01/22 1556          Plan of Care Review    Plan of Care Reviewed With patient  -     Outcome Evaluation Pt c/o fatigue and HA, had to encourage to amb today; pleasant and agreeable once OOB, but declined any further than 15ft of amb; pt plans SNU at [place] when medically cleared, will incr amb dist as able  -     Row Name 08/01/22 1556          Therapy Assessment/Plan (PT)    Rehab Potential (PT) good, to achieve stated therapy goals  -     Criteria for Skilled Interventions Met (PT) yes  -     Row Name 08/01/22 1556          Positioning and Restraints    Pre-Treatment Position in bed  -     Post Treatment Position chair  -JM     In Chair reclined;call light within reach;encouraged to call for assist;exit alarm on;notified nsg  NA called to change bed due to coffee spill upon entry, alerted her he was up w/alarm activated  -           User Key  (r) = Recorded By, (t) = Taken By, (c) = Cosigned By    Initials Name Provider Type    Kim Mattson PTA Physical Therapist Assistant               Outcome Measures     Row Name 08/01/22 1601          How much help from another person do you currently need...    Turning from your back to your side while in flat bed without using bedrails? 3  -JM     Moving from lying on back to sitting on the side of a flat bed without bedrails? 3  -JM     Moving to and from a bed to a chair (including a wheelchair)? 3  -JM     Standing up from a chair using your arms (e.g., wheelchair, bedside chair)? 3  -JM     Climbing 3-5 steps with a railing? 1  -JM     To walk in hospital room? 3  -JM     AM-PAC 6 Clicks Score (PT) 16  -JM     Highest level of mobility 5 --> Static standing  -           User Key  (r) = Recorded By, (t) = Taken By, (c) = Cosigned By    Initials Name Provider Type    Kim Mattson PTA Physical Therapist Assistant                             Physical Therapy Education                 Title: PT OT SLP Therapies  (In Progress)     Topic: Physical Therapy (Done)     Point: Mobility training (Done)     Learning Progress Summary           Patient Acceptance, E,TB,D, VU,NR by VISHAL at 8/1/2022 1602    Acceptance, E,D, VU,NR by VISHAL at 7/29/2022 1709    Eager, E, NR by CLEMENTE at 7/29/2022 1538    Acceptance, E,D, NR by JM at 7/28/2022 1321    Acceptance, E,TB, NR by CB at 7/27/2022 1528    Acceptance, E,TB, VU,NR by CB at 7/26/2022 1549    Acceptance, E,TB,D, NR by CB at 7/25/2022 1550    Acceptance, E,D, NR by ALEXANDR at 7/23/2022 1434    Acceptance, E, NR by JACOBO at 7/22/2022 1326    Acceptance, E,D, NR by VISHAL at 7/21/2022 1723    Acceptance, E,D, NR by VISHAL at 7/20/2022 1712   Family Acceptance, E,D, VU,NR by VISHAL at 7/29/2022 1709    Acceptance, E,D, NR by VISHAL at 7/21/2022 1723                   Point: Home exercise program (Done)     Learning Progress Summary           Patient Acceptance, E,TB,D, VU,NR by VISHAL at 8/1/2022 1602    Acceptance, E,D, VU,NR by VISHAL at 7/29/2022 1709    Acceptance, E,D, NR by VISHAL at 7/28/2022 1321    Acceptance, E,D, NR by ALEXANDR at 7/23/2022 1434    Acceptance, E,D, NR by VISHAL at 7/21/2022 1723    Acceptance, E,D, NR by VISHAL at 7/20/2022 1712   Family Acceptance, E,D, VU,NR by VISHAL at 7/29/2022 1709    Acceptance, E,D, NR by VISHAL at 7/21/2022 1723                   Point: Body mechanics (Done)     Learning Progress Summary           Patient Acceptance, E,TB,D, VU,NR by VISHAL at 8/1/2022 1602    Acceptance, E,D, VU,NR by VISHAL at 7/29/2022 1709    Eager, E, NR by CLEMENTE at 7/29/2022 1538    Acceptance, E,D, NR by JM at 7/28/2022 1321    Acceptance, E,TB, NR by CB at 7/27/2022 1528    Acceptance, E,TB, VU,NR by CB at 7/26/2022 1549    Acceptance, E,TB,D, NR by CB at 7/25/2022 1550    Acceptance, E,D, NR by ALEXANDR at 7/23/2022 1434    Acceptance, E,D, NR by JM at 7/21/2022 1723    Acceptance, E,D, NR by JM at 7/20/2022 1712   Family Acceptance, E,D, VU,NR by JM at 7/29/2022 1709    Acceptance, E,D, NR by JM at 7/21/2022 1723                   Point:  Precautions (Done)     Learning Progress Summary           Patient Acceptance, E,TB,D, VU,NR by  at 8/1/2022 1602    Acceptance, E,D, VU,NR by  at 7/29/2022 1709    Acceptance, E, NR by  at 7/28/2022 1817    Acceptance, E,D, NR by  at 7/28/2022 1321    Acceptance, E,D, NR by  at 7/23/2022 1434    Acceptance, E,D, NR by  at 7/21/2022 1723    Acceptance, E,D, NR by  at 7/20/2022 1712   Family Acceptance, E,D, VU,NR by  at 7/29/2022 1709    Acceptance, E,D, NR by  at 7/21/2022 1723                               User Key     Initials Effective Dates Name Provider Type Discipline    EM 06/16/21 -  Whitley Bone, PT Physical Therapist PT     03/07/18 -  Kim Coleman PTA Physical Therapist Assistant PT     07/02/20 -  Chetan Carr, PT DPT Physical Therapist PT     10/22/21 -  lEsa Jennings, PT Physical Therapist PT     07/13/22 -  Cesilia Milian, RN Registered Nurse Nurse              PT Recommendation and Plan     Plan of Care Reviewed With: patient  Progress: improving  Outcome Evaluation: Pt c/o fatigue and HA, had to encourage to amb today; pleasant and agreeable once OOB, but declined any further than 15ft of amb; pt plans SNU at [place] when medically cleared, will incr amb dist as able     Time Calculation:    PT Charges     Row Name 08/01/22 1604             Time Calculation    Start Time 1100  -      Stop Time 1120  -      Time Calculation (min) 20 min  -      PT Received On 08/01/22  -      PT - Next Appointment 08/02/22  -            User Key  (r) = Recorded By, (t) = Taken By, (c) = Cosigned By    Initials Name Provider Type     Kim Coleman PTA Physical Therapist Assistant              Therapy Charges for Today     Code Description Service Date Service Provider Modifiers Qty    [id number] HC PT THER PROC EA 15 MIN 8/1/2022 Kim Coleman PTA GP 1          PT G-Codes  Outcome Measure Options: AM-PAC 6 Clicks Daily Activity (OT)  AM-PAC 6 Clicks Score  (PT): 16  AM-PAC 6 Clicks Score (OT): 11  Modified [place] Scale: 4 - Moderately severe disability.  Unable to walk without assistance, and unable to attend to own bodily needs without assistance.    Kim Coleman, PTA  8/1/2022

## 2022-08-01 NOTE — PROGRESS NOTES
Name: Reece Stephen ADMIT: 2022   : 1935  PCP: Ana María Atkinson MD    MRN: [id number] LOS: 19 days   AGE/SEX: 86 y.o. male  ROOM: [facility]     Subjective   Subjective     Overnight, pt was found leaning forward in his chair unable to speak and drooling and apparently weak on the right side. Neurology ordered a head CT which was unchanged from prior. He currently appears a little more confused than yesterday to me. He says to me that he doesn't feel good, but is unable to elaborate further.       Objective   Objective   Vital Signs  Temp:  [97.7 °F (36.5 °C)-97.9 °F (36.6 °C)] 97.9 °F (36.6 °C)  Heart Rate:  [58-77] 59  Resp:  [16-18] 18  BP: (117-136)/(64-90) 119/64  SpO2:  [96 %-100 %] 98 %  on   ;   Device (Oxygen Therapy): room air  Body mass index is 23.63 kg/m².  Physical Exam  Constitutional:       General: He is not in acute distress.     Appearance: He is not toxic-appearing.   Cardiovascular:      Rate and Rhythm: Normal rate and regular rhythm.      Heart sounds: Normal heart sounds.   Pulmonary:      Effort: Pulmonary effort is normal.      Breath sounds: Normal breath sounds.   Abdominal:      General: Bowel sounds are normal.      Palpations: Abdomen is soft.   Musculoskeletal:         General: No tenderness.      Right lower leg: No edema.      Left lower leg: No edema.   Neurological:      Mental Status: He is alert.         Results Review     I reviewed the patient's new clinical results.      Results from last 7 days   Lab Units 22  0711 22  0539 22  0510 22  0559   SODIUM mmol/L 138 138 134* 139   POTASSIUM mmol/L 4.8 4.9 4.9 4.5   CHLORIDE mmol/L 108* 107 106 105   CO2 mmol/L 18.0* 19.1* 20.0* 24.8   BUN mg/dL 40* 40* 29* 26*   CREATININE mg/dL 1.91* 1.81* 1.75* 1.56*   GLUCOSE mg/dL 127* 140* 216* 193*   Estimated Creatinine Clearance: 27.7 mL/min (A) (by C-G formula based on SCr of 1.91 mg/dL (H)).  Results from last 7 days   Lab Units 22  9171  07/31/22  0539 07/30/22  0510 07/29/22  0559   ALBUMIN g/dL 3.80 3.80 3.60 3.70     Results from last 7 days   Lab Units 08/01/22  0711 07/31/22  0539 07/30/22  0510 07/29/22  0559   CALCIUM mg/dL 9.2 9.4 9.1 8.9   ALBUMIN g/dL 3.80 3.80 3.60 3.70   PHOSPHORUS mg/dL 3.7 3.1 2.4* 2.9       COVID19   Date Value Ref Range Status   07/20/2022 Not Detected Not Detected - Ref. Range Final   07/11/2022 Not Detected Not Detected - Ref. Range Final   06/06/2022 Not Detected Not Detected - Ref. Range Final   05/18/2022 Not Detected Not Detected - Ref. Range Final     Glucose   Date/Time Value Ref Range Status   08/01/2022 1154 135 (H) 70 - 130 mg/dL Final     Comment:     Meter: [id number] : [id number] [facility]   08/01/2022 0731 124 70 - 130 mg/dL Final     Comment:     Meter: [id number] : [id number] [facility]   07/31/2022 2114 246 (H) 70 - 130 mg/dL Final     Comment:     Meter: [id number] : [id number] Gallego Marricuca [facility]   07/31/2022 1753 190 (H) 70 - 130 mg/dL Final     Comment:     Meter: [id number] : [id number] Mohr Katherine    07/31/2022 1723 159 (H) 70 - 130 mg/dL Final     Comment:     Meter: [id number] : MARY Lomas RN   07/31/2022 1206 198 (H) 70 - 130 mg/dL Final     Comment:     Meter: [id number] : [id number] Mohr Katherine [place]   07/31/2022 0821 120 70 - 130 mg/dL Final     Comment:     Meter: [id number] : [id number] Mohr Katherine US       CT Head Without Contrast  Narrative: CT HEAD WITHOUT CONTRAST     HISTORY: Status post evacuation of a left-sided subdural, aphasic.     COMPARISON: Comparison is made to multiple prior CT examinations of the  brain with the most recent examination being the CT examination of  07/24/2022. The most recent study was an MRI examination of 07/25/2022.     FINDINGS: Left frontal and parietal lulu holes are noted. A low  attenuation subdural fluid collection is appreciated anterior lateral,  lateral and superior lateral to  the left frontal lobe. The subdural  collection measures approximately 9 mm in thickness at a point just  inferior to the frontal lulu hole. It measured 13 mm in thickness on  07/24/2022. The largest collection is anterior lateral to the left  frontal lobe more anteriorly and this component measures approximately  11 mm in thickness, previously 13 mm. Mild pneumocephaly is appreciated.  There is no evidence of new hemorrhage or of a focal area of decreased  attenuation to suggest acute infarction. Confluent areas of decreased  attenuation involving the white matter of the cerebral hemispheres are  noted bilaterally consistent with extensive small vessel ischemic  disease. There is approximately 4 mm of midline shift to the right.     Impression: Mildly heterogeneous subdural overlying the left frontal  lobe which is slightly smaller in size as compared to 07/24/2022. There  is no evidence of new hemorrhage. There is approximately 4 mm of midline  shift to the right, unchanged. Extensive small vessel ischemic disease  is noted. No focal area of decreased attenuation to suggest acute  infarction is identified. Further evaluation could be performed with an  MRI examination of the brain as indicated.      The above information was called to the patient's nurse at the time of  dictation. The patient's nurse is to immediately relay the information  to the clinical service.     Radiation dose reduction techniques were utilized, including automated  exposure control and exposure modulation based on body size.     This report was finalized on 8/1/2022 7:23 AM by Dr. Malcolm Guillaume M.D.       Scheduled Medications  amLODIPine, 2.5 mg, Oral, Q24H  Divalproex Sodium, 250 mg, Oral, Q12H  guaiFENesin, 600 mg, Oral, Q12H  heparin (porcine), 5,000 Units, Subcutaneous, Q8H  insulin glargine, 12 Units, Subcutaneous, Q12H  insulin lispro, 0-14 Units, Subcutaneous, TID AC  levothyroxine, 75 mcg, Oral, Q AM  pantoprazole, 40 mg, Oral,  QAM    Infusions   Diet  Diet Soft Texture; Whole Foods; Thin; Cardiac, Consistent Carbohydrate       Assessment/Plan     Active Hospital Problems    Diagnosis  POA   • **Subdural hematoma (HCC) [S06.5X9A]  Yes   • Surgery, elective [Z41.9]  Not Applicable   • SDH (subdural hematoma) (HCC) [S06.5X9A]  Yes   • Gastroesophageal reflux disease [K21.9]  Yes   • Essential hypertension [I10]  Yes   • Chronic kidney disease, stage 3 (moderate) (CMS/HCC) [N18.30]  Yes   • Type 2 diabetes mellitus with complication (HCC) [E11.8]  Yes      Resolved Hospital Problems   No resolved problems to display.       86 y.o. male admitted with Subdural hematoma (HCC).    Subdural hematoma-s/p left sided lulu holes on 7/12/22 and embolization of the left middle meningeal arteries on 7/21/22 by Dr. Reyes. On Depakote for seizure ppx per neurology recommendations.   Encephalopathy most likely related to the above-improved since admission, but last night there was concern for acute worsening and a head CT was ordered that did not show any changes. I don't really have much suspicion for an infection with normal vital signs, but no cbc was done today and the patient's daughter asks that we check a u/a, so we will. Will check a cbc in the morning.  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  Creatinine has been fluctuating, but appears to be around his baseline   Hypothyroidism-synthroid  Dm2-blood glucose at goal  HTN-adequate control acutely  GERD-PPI  Heparin SC for DVT prophylaxis.  Full code.  Discussed with patient and nursing staff.  Anticipate discharge to SNU facility       Timi Morrison MD  [facility]  08/01/22  16:31 EDT    I wore protective equipment throughout this patient encounter including a face mask, gloves and protective eyewear.  Hand hygiene was performed before donning protective equipment and after removal when leaving the room.

## 2022-08-01 NOTE — PLAN OF CARE
Goal Outcome Evaluation:  Plan of Care Reviewed With: patient        Progress: improving  Outcome Evaluation: Pt c/o fatigue and HA, had to encourage to amb today; pleasant and agreeable once OOB, but declined any further than 15ft of amb; pt plans SNU at [place] when medically cleared, will incr amb dist as able    Patient was not wearing a face mask during this therapy encounter. Therapist used appropriate personal protective equipment including eye protection, mask, and gloves.  Mask used was standard procedure mask. Appropriate PPE was worn during the entire therapy session. Hand hygiene was completed before and after therapy session. Patient is not in enhanced droplet precautions.

## 2022-08-01 NOTE — CASE MANAGEMENT/SOCIAL WORK
Continued Stay Note  [facility]     Patient Name: Reece Stephen  MRN: [id number]  Today's Date: 8/1/2022    Admit Date: 7/12/2022     Discharge Plan     Row Name 08/01/22 1108       Plan    Plan SNF referrals pending    Patient/Family in Agreement with Plan yes    Plan Comments CSW spoke to [facility] who states they do not have a bed today. [facility] stating they are waiting back to hear from family regarding financial information before they are able to accept. CSW spoke to patient's niece/POA Geetha regarding dc plan. Plan is SNF (referrals pending). Niece/POA is requesting we look into other facilities on the list ([facility], [facility], etc[facility] Niece/POA states the plan is short term rehab with the goal of returning to [facility]. CSW spoke to [facility] who states they cannot accept due to having no memory care beds available. Vj/ADRIANNA states patient does not need a memory care bed due to no diagnosis of dementia. [facility] states they cannot accept there due to patient coming from a memory care and being disoriented at this time - they would need a memory care unit to be able to accept. CSW spoke to Elyse meredith who stated patient would need to go to a memory care bed due to him living in a memory care unit. [facility] is having director of nursing look over patient to see if they can accept. Elyse Meredith to call family and discuss details with them. CCP to follow up with [facility] to see if they can accept and when they will have a bed available. SAHKIR, CSW               Discharge Codes    No documentation.               Expected Discharge Date and Time     Expected Discharge Date Expected Discharge Time    Aug 1, 2022             PURNIMA ARIAS

## 2022-08-01 NOTE — PROGRESS NOTES
[facility]     Progress Note    Patient Name: Reece Stephen  : 1935  MRN: [id number]  Primary Care Physician:  Ana María Atkinson MD  Date of admission: 2022    Subjective   Subjective     Chief Complaint: ckd III    History of Present Illness    Patient with ckd III and SAH    Review of Systems      Objective   Objective     Vitals:   Temp:  [97.7 °F (36.5 °C)-97.9 °F (36.6 °C)] 97.9 °F (36.6 °C)  Heart Rate:  [58-77] 60  Resp:  [16] 16  BP: (103-137)/(69-90) 128/70    Physical Exam  General Appearance:  In no acute distress.    HEENT: Normal HEENT exam.     Extremities: .  There is no deformity, effusion or dependent edema.    Neurological: Patient is alert        Result Review    Result Review:  I have personally reviewed the results from the time of this admission to 2022 08:28 EDT and agree with these findings:  []  Laboratory list / accordion  []  Microbiology  []  Radiology  []  EKG/Telemetry   []  Cardiology/Vascular   []  Pathology  []  Old records  []  Other:  Most notable findings include:       Assessment & Plan   Assessment / Plan     Brief Patient Summary:  Reece Stephen is a 86 y.o. 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 Eating lunch, in no distress. Labs and chart reviewed.  Renal function near baseline. Will continue current    Willa Martin MD

## 2022-08-01 NOTE — PROGRESS NOTES
NEUROSURGERY POST OP PROGRESS NOTE     LOS: 19 days   Patient Care Team:  Ana María Atkinson MD as PCP - General (Internal Medicine)  Jose Ochoa MD as Consulting Physician (Hematology and Oncology)  Armin Hurtado MD as Referring Physician (Colon and Rectal Surgery)    Chief Complaint:     POD 11 left MMA  POD 19 left lulu hole for evacuation of SDH    Subjective       Interval History: In the midst of a coughing episode. Had difficulty with responding to questions or follow commands afterwards. No physical signs of seizure activity. Reassessed patient 15 minutes later and back to baseline. Ambulated 15 ft with PT today.     History taken from: patient chart family RN    Objective        Vital Signs  Temp:  [97.7 °F (36.5 °C)-97.9 °F (36.6 °C)] 97.9 °F (36.6 °C)  Heart Rate:  [58-77] 59  Resp:  [16-18] 18  BP: (117-136)/(64-90) 119/64     E - 4, V - 5, M - 6  AA&O x 3. Face symmetric. PERRL.  Tongue midline.   Followed commands x 4 extremities.   Left cranial incision healing well. Sutures out.        Results Review:     I reviewed the patient's new clinical results.  Discussed yesterday's head CT with Dr. Reyes.     CT head yesterday, 7/31 stable. No new acute interval change.     EEG 07/26/2022 - negative for epileptiform activity.      Results from last 7 days   Lab Units 08/01/22  0711 07/31/22  0539 07/30/22  0510   SODIUM mmol/L 138 138 134*   POTASSIUM mmol/L 4.8 4.9 4.9   CHLORIDE mmol/L 108* 107 106   CO2 mmol/L 18.0* 19.1* 20.0*   BUN mg/dL 40* 40* 29*   CREATININE mg/dL 1.91* 1.81* 1.75*   GLUCOSE mg/dL 127* 140* 216*   CALCIUM mg/dL 9.2 9.4 9.1         Assessment & Plan       Subdural hematoma (HCC)    Type 2 diabetes mellitus with complication (HCC)    Chronic kidney disease, stage 3 (moderate) (CMS/HCC)    Essential hypertension    Gastroesophageal reflux disease    SDH (subdural hematoma) (HCC)    Surgery, elective    Spoke with  regarding above exam findings and recent CT head  findings. Patient cleared for transfer to rehab anytime from Neurosurgery standpoint. He was to be seen in office today but since we saw him today while still in hospital, Dr. Reyes will not need to see him the office again until 4-6 weeks from now with repeat head CT. Message sent to our office to arrange.   Continue to mobilize. Continue PT. Consult OT.    We will await word on on accepting rehab facility for discharge.     Randee Paredes, CARITO  08/01/22  14:33 EDT

## 2022-08-01 NOTE — PLAN OF CARE
Goal Outcome Evaluation:  Plan of Care Reviewed With: patient        Progress: no change  Outcome Evaluation: No acute events this shift. VSS. Remains confused. Incontinence care and turns. Await dc plan.

## 2022-08-02 ENCOUNTER — APPOINTMENT (OUTPATIENT)
Dept: NEUROLOGY | Facility: HOSPITAL | Age: 87
End: 2022-08-02

## 2022-08-02 ENCOUNTER — TELEPHONE (OUTPATIENT)
Dept: NEUROSURGERY | Facility: CLINIC | Age: 87
End: 2022-08-02

## 2022-08-02 DIAGNOSIS — S06.5XAA SUBDURAL HEMATOMA: Primary | ICD-10-CM

## 2022-08-02 LAB
ALBUMIN SERPL-MCNC: 3.7 G/DL (ref 3.5–5.2)
ANION GAP SERPL CALCULATED.3IONS-SCNC: 14.4 MMOL/L (ref 5–15)
BASOPHILS # BLD AUTO: 0.08 10*3/MM3 (ref 0–0.2)
BASOPHILS NFR BLD AUTO: 1.2 % (ref 0–1.5)
BUN SERPL-MCNC: 42 MG/DL (ref 8–23)
BUN/CREAT SERPL: 22.1 (ref 7–25)
CALCIUM SPEC-SCNC: 9 MG/DL (ref 8.6–10.5)
CHLORIDE SERPL-SCNC: 108 MMOL/L (ref 98–107)
CO2 SERPL-SCNC: 16.6 MMOL/L (ref 22–29)
CREAT SERPL-MCNC: 1.9 MG/DL (ref 0.76–1.27)
DEPRECATED RDW RBC AUTO: 42 FL (ref 37–54)
EGFRCR SERPLBLD CKD-EPI 2021: 33.9 ML/MIN/1.73
EOSINOPHIL # BLD AUTO: 0.35 10*3/MM3 (ref 0–0.4)
EOSINOPHIL NFR BLD AUTO: 5.4 % (ref 0.3–6.2)
ERYTHROCYTE [DISTWIDTH] IN BLOOD BY AUTOMATED COUNT: 12.9 % (ref 12.3–15.4)
GLUCOSE BLDC GLUCOMTR-MCNC: 133 MG/DL (ref 70–130)
GLUCOSE BLDC GLUCOMTR-MCNC: 153 MG/DL (ref 70–130)
GLUCOSE BLDC GLUCOMTR-MCNC: 209 MG/DL (ref 70–130)
GLUCOSE BLDC GLUCOMTR-MCNC: 85 MG/DL (ref 70–130)
GLUCOSE SERPL-MCNC: 98 MG/DL (ref 65–99)
HCT VFR BLD AUTO: 38.1 % (ref 37.5–51)
HGB BLD-MCNC: 12.4 G/DL (ref 13–17.7)
IMM GRANULOCYTES # BLD AUTO: 0.19 10*3/MM3 (ref 0–0.05)
IMM GRANULOCYTES NFR BLD AUTO: 2.9 % (ref 0–0.5)
LYMPHOCYTES # BLD AUTO: 1.75 10*3/MM3 (ref 0.7–3.1)
LYMPHOCYTES NFR BLD AUTO: 27 % (ref 19.6–45.3)
MCH RBC QN AUTO: 29 PG (ref 26.6–33)
MCHC RBC AUTO-ENTMCNC: 32.5 G/DL (ref 31.5–35.7)
MCV RBC AUTO: 89.2 FL (ref 79–97)
MONOCYTES # BLD AUTO: 0.68 10*3/MM3 (ref 0.1–0.9)
MONOCYTES NFR BLD AUTO: 10.5 % (ref 5–12)
NEUTROPHILS NFR BLD AUTO: 3.44 10*3/MM3 (ref 1.7–7)
NEUTROPHILS NFR BLD AUTO: 53 % (ref 42.7–76)
NRBC BLD AUTO-RTO: 0 /100 WBC (ref 0–0.2)
PHOSPHATE SERPL-MCNC: 3.8 MG/DL (ref 2.5–4.5)
PLATELET # BLD AUTO: 315 10*3/MM3 (ref 140–450)
PMV BLD AUTO: 9.4 FL (ref 6–12)
POTASSIUM SERPL-SCNC: 4.5 MMOL/L (ref 3.5–5.2)
RBC # BLD AUTO: 4.27 10*6/MM3 (ref 4.14–5.8)
SODIUM SERPL-SCNC: 139 MMOL/L (ref 136–145)
WBC NRBC COR # BLD: 6.49 10*3/MM3 (ref 3.4–10.8)

## 2022-08-02 PROCEDURE — 63710000001 INSULIN LISPRO (HUMAN) PER 5 UNITS: Performed by: NURSE PRACTITIONER

## 2022-08-02 PROCEDURE — 82962 GLUCOSE BLOOD TEST: CPT

## 2022-08-02 PROCEDURE — 80069 RENAL FUNCTION PANEL: CPT | Performed by: NURSE PRACTITIONER

## 2022-08-02 PROCEDURE — 25010000002 HEPARIN (PORCINE) PER 1000 UNITS: Performed by: NURSE PRACTITIONER

## 2022-08-02 PROCEDURE — 95819 EEG AWAKE AND ASLEEP: CPT

## 2022-08-02 PROCEDURE — 95819 EEG AWAKE AND ASLEEP: CPT | Performed by: PSYCHIATRY & NEUROLOGY

## 2022-08-02 PROCEDURE — 85025 COMPLETE CBC W/AUTO DIFF WBC: CPT | Performed by: STUDENT IN AN ORGANIZED HEALTH CARE EDUCATION/TRAINING PROGRAM

## 2022-08-02 PROCEDURE — 97530 THERAPEUTIC ACTIVITIES: CPT

## 2022-08-02 PROCEDURE — 99024 POSTOP FOLLOW-UP VISIT: CPT | Performed by: NURSE PRACTITIONER

## 2022-08-02 RX ADMIN — INSULIN GLARGINE-YFGN 12 UNITS: 100 INJECTION, SOLUTION SUBCUTANEOUS at 08:08

## 2022-08-02 RX ADMIN — HEPARIN SODIUM 5000 UNITS: 5000 INJECTION INTRAVENOUS; SUBCUTANEOUS at 21:50

## 2022-08-02 RX ADMIN — LEVOTHYROXINE SODIUM 75 MCG: 0.07 TABLET ORAL at 06:35

## 2022-08-02 RX ADMIN — DIVALPROEX SODIUM 250 MG: 125 CAPSULE, COATED PELLETS ORAL at 21:50

## 2022-08-02 RX ADMIN — DIVALPROEX SODIUM 250 MG: 125 CAPSULE, COATED PELLETS ORAL at 08:11

## 2022-08-02 RX ADMIN — GUAIFENESIN 600 MG: 600 TABLET, EXTENDED RELEASE ORAL at 08:11

## 2022-08-02 RX ADMIN — AMLODIPINE BESYLATE 2.5 MG: 2.5 TABLET ORAL at 08:11

## 2022-08-02 RX ADMIN — GUAIFENESIN 600 MG: 600 TABLET, EXTENDED RELEASE ORAL at 21:50

## 2022-08-02 RX ADMIN — INSULIN LISPRO 5 UNITS: 100 INJECTION, SOLUTION INTRAVENOUS; SUBCUTANEOUS at 12:28

## 2022-08-02 RX ADMIN — HEPARIN SODIUM 5000 UNITS: 5000 INJECTION INTRAVENOUS; SUBCUTANEOUS at 06:35

## 2022-08-02 RX ADMIN — INSULIN GLARGINE-YFGN 12 UNITS: 100 INJECTION, SOLUTION SUBCUTANEOUS at 21:50

## 2022-08-02 RX ADMIN — PANTOPRAZOLE SODIUM 40 MG: 40 TABLET, DELAYED RELEASE ORAL at 06:35

## 2022-08-02 RX ADMIN — INSULIN LISPRO 2 UNITS: 100 INJECTION, SOLUTION INTRAVENOUS; SUBCUTANEOUS at 17:06

## 2022-08-02 RX ADMIN — HEPARIN SODIUM 5000 UNITS: 5000 INJECTION INTRAVENOUS; SUBCUTANEOUS at 14:46

## 2022-08-02 NOTE — PROGRESS NOTES
Name: Reece Stephen ADMIT: 2022   : 1935  PCP: Ana María Atkinson MD    MRN: [id number] LOS: 20 days   AGE/SEX: 86 y.o. male  ROOM: [facility]     Subjective   Subjective     No events overnight. No complaints. Urinalysis yesterday and cbc today were both unremarkable. His mentation has improved and he reports that he feels better.        Objective   Objective   Vital Signs  Temp:  [97.8 °F (36.6 °C)-98.2 °F (36.8 °C)] 98 °F (36.7 °C)  Heart Rate:  [64-75] 64  Resp:  [16-20] 18  BP: (102-132)/(66-85) 119/85  SpO2:  [92 %-97 %] 97 %  on   ;   Device (Oxygen Therapy): room air  Body mass index is 23.63 kg/m².  Physical Exam  Constitutional:       General: He is not in acute distress.     Appearance: He is not toxic-appearing.   Cardiovascular:      Rate and Rhythm: Normal rate and regular rhythm.      Heart sounds: Normal heart sounds.   Pulmonary:      Effort: Pulmonary effort is normal.      Breath sounds: Normal breath sounds.   Abdominal:      General: Bowel sounds are normal.      Palpations: Abdomen is soft.   Musculoskeletal:         General: No tenderness.      Right lower leg: No edema.      Left lower leg: No edema.   Neurological:      Mental Status: He is alert.      Comments: Word finding difficulty         Results Review     I reviewed the patient's new clinical results.  Results from last 7 days   Lab Units 22  0639   WBC 10*3/mm3 6.49   HEMOGLOBIN g/dL 12.4*   PLATELETS 10*3/mm3 315     Results from last 7 days   Lab Units 22  0639 22  0711 22  0539 22  0510   SODIUM mmol/L 139 138 138 134*   POTASSIUM mmol/L 4.5 4.8 4.9 4.9   CHLORIDE mmol/L 108* 108* 107 106   CO2 mmol/L 16.6* 18.0* 19.1* 20.0*   BUN mg/dL 42* 40* 40* 29*   CREATININE mg/dL 1.90* 1.91* 1.81* 1.75*   GLUCOSE mg/dL 98 127* 140* 216*   Estimated Creatinine Clearance: 27.8 mL/min (A) (by C-G formula based on SCr of 1.9 mg/dL (H)).  Results from last 7 days   Lab Units 22  0639 22  0789  07/31/22  0539 07/30/22  0510   ALBUMIN g/dL 3.70 3.80 3.80 3.60     Results from last 7 days   Lab Units 08/02/22  0639 08/01/22  0711 07/31/22  0539 07/30/22  0510   CALCIUM mg/dL 9.0 9.2 9.4 9.1   ALBUMIN g/dL 3.70 3.80 3.80 3.60   PHOSPHORUS mg/dL 3.8 3.7 3.1 2.4*       COVID19   Date Value Ref Range Status   07/20/2022 Not Detected Not Detected - Ref. Range Final   07/11/2022 Not Detected Not Detected - Ref. Range Final   06/06/2022 Not Detected Not Detected - Ref. Range Final   05/18/2022 Not Detected Not Detected - Ref. Range Final     Glucose   Date/Time Value Ref Range Status   08/02/2022 1136 209 (H) 70 - 130 mg/dL Final     Comment:     Meter: [id number] : [id number] Hatchett Sherjoie DOE   08/02/2022 0745 85 70 - 130 mg/dL Final     Comment:     Meter: [id number] : [id number] Hatchettamber Castillolisandro NA   08/01/2022 2111 271 (H) 70 - 130 mg/dL Final     Comment:     Meter: [id number] : [id number] Don Barragan  A   08/01/2022 1727 148 (H) 70 - 130 mg/dL Final     Comment:     Meter: [id number] : MARY Lomas RN   08/01/2022 1154 135 (H) 70 - 130 mg/dL Final     Comment:     Meter: [id number] : [id number] Don Faustinemilia  A   08/01/2022 0731 124 70 - 130 mg/dL Final     Comment:     Meter: [id number] : [id number] Don SZYMANSKIA   07/31/2022 2114 246 (H) 70 - 130 mg/dL Final     Comment:     Meter: [id number] : [id number] Julián Cuellar [facility]       CT Head Without Contrast  Narrative: CT HEAD WITHOUT CONTRAST     HISTORY: Status post evacuation of a left-sided subdural, aphasic.     COMPARISON: Comparison is made to multiple prior CT examinations of the  brain with the most recent examination being the CT examination of  07/24/2022. The most recent study was an MRI examination of 07/25/2022.     FINDINGS: Left frontal and parietal lulu holes are noted. A low  attenuation subdural fluid collection is appreciated anterior lateral,  lateral and superior lateral to  the left frontal lobe. The subdural  collection measures approximately 9 mm in thickness at a point just  inferior to the frontal lulu hole. It measured 13 mm in thickness on  07/24/2022. The largest collection is anterior lateral to the left  frontal lobe more anteriorly and this component measures approximately  11 mm in thickness, previously 13 mm. Mild pneumocephaly is appreciated.  There is no evidence of new hemorrhage or of a focal area of decreased  attenuation to suggest acute infarction. Confluent areas of decreased  attenuation involving the white matter of the cerebral hemispheres are  noted bilaterally consistent with extensive small vessel ischemic  disease. There is approximately 4 mm of midline shift to the right.     Impression: Mildly heterogeneous subdural overlying the left frontal  lobe which is slightly smaller in size as compared to 07/24/2022. There  is no evidence of new hemorrhage. There is approximately 4 mm of midline  shift to the right, unchanged. Extensive small vessel ischemic disease  is noted. No focal area of decreased attenuation to suggest acute  infarction is identified. Further evaluation could be performed with an  MRI examination of the brain as indicated.      The above information was called to the patient's nurse at the time of  dictation. The patient's nurse is to immediately relay the information  to the clinical service.     Radiation dose reduction techniques were utilized, including automated  exposure control and exposure modulation based on body size.     This report was finalized on 8/1/2022 7:23 AM by Dr. Malcolm Guillaume M.D.       Scheduled Medications  amLODIPine, 2.5 mg, Oral, Q24H  Divalproex Sodium, 250 mg, Oral, Q12H  guaiFENesin, 600 mg, Oral, Q12H  heparin (porcine), 5,000 Units, Subcutaneous, Q8H  insulin glargine, 12 Units, Subcutaneous, Q12H  insulin lispro, 0-14 Units, Subcutaneous, TID AC  levothyroxine, 75 mcg, Oral, Q AM  pantoprazole, 40 mg, Oral,  QAM    Infusions   Diet  Diet Soft Texture; Whole Foods; Thin; Cardiac, Consistent Carbohydrate       Assessment/Plan     Active Hospital Problems    Diagnosis  POA   • **Subdural hematoma (HCC) [S06.5X9A]  Yes   • Surgery, elective [Z41.9]  Not Applicable   • SDH (subdural hematoma) (HCC) [S06.5X9A]  Yes   • Gastroesophageal reflux disease [K21.9]  Yes   • Essential hypertension [I10]  Yes   • Chronic kidney disease, stage 3 (moderate) (CMS/HCC) [N18.30]  Yes   • Type 2 diabetes mellitus with complication (HCC) [E11.8]  Yes      Resolved Hospital Problems   No resolved problems to display.       86 y.o. male admitted with Subdural hematoma (HCC).    Subdural hematoma-s/p left sided lulu holes on 7/12/22 and embolization of the left middle meningeal arteries on 7/21/22 by Dr. Reyes. On Depakote for seizure ppx per neurology recommendations.   Encephalopathy most likely related to the above-improved since admission until a couple of nights ago when he was less responsive. Head CT at the time did not show any changes. This family requested a u/a which was normal. His CBC does not show any leukocytosis. He is improved today.  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  Creatinine has been fluctuating, but is stable today at 1.9 which is likely his baseline.   Hypothyroidism-synthroid  Dm2-a1c 9.9 in June. Currently on insulin. blood glucose at goal. Would not make any adjustments.  HTN-adequate control acutely  GERD-PPI  Heparin SC for DVT prophylaxis.  Full code.  Discussed with patient.  Anticipate discharge to SNU facility whenever deemed appropriate by primary. No objection to discharge from my perspective      Timi Morrison MD  [facility]  08/02/22  14:44 EDT    I wore protective equipment throughout this patient encounter including a face mask, gloves and protective eyewear.  Hand hygiene was performed before donning protective equipment and after removal when leaving the room.

## 2022-08-02 NOTE — CASE MANAGEMENT/SOCIAL WORK
Continued Stay Note  [facility]     Patient Name: Reece Stephen  MRN: [id number]  Today's Date: 8/2/2022    Admit Date: 7/12/2022     Discharge Plan     Row Name 08/02/22 0953       Plan    Plan SNF referrals pending    Plan Comments CSW spoke to Rajesh/[facility] who stated they are reaching out to the niece/POA Geetha Becker today to see if she wants to tour the building. [facility] has a semi-private room in their memory care unit. Additional SNF referrals placed. SHAKIR, PURNIMA               Discharge Codes    No documentation.               Expected Discharge Date and Time     Expected Discharge Date Expected Discharge Time    Aug 2, 2022             PURNIMA ARIAS

## 2022-08-02 NOTE — PROGRESS NOTES
[facility] NEUROSURGERY PROGRESS NOTE    PATIENT IDENTIFICATION:   Name:  Reece Stephen      MRN:  [id number]     86 y.o.  male               CC: POD #12 left MMA, POD 19 lulu hole for evacuation of SDH      Subjective     Interval History:  No events reported overnight.  Moving all extremities well.  Denies ha/, vision changes, confused.      ROS:  Constitutional: No fever, chills  HEENT: No headache, no vision changes, hearing difficulties  GI: No nausea, vomiting, no swallow difficulties  Neuro: No numbness, tingling, or weakness, no speech difficulties, balance difficulties    Objective     Vital signs in last 24 hours:  Temp:  [97.8 °F (36.6 °C)-98.2 °F (36.8 °C)] 98 °F (36.7 °C)  Heart Rate:  [64-75] 64  Resp:  [16-20] 18  BP: (102-132)/(66-85) 119/85    Intake/Output this shift:  I/O this shift:  In: -   Out: 400 [Stool:400]    Intake/Output last 3 shifts:  I/O last 3 completed shifts:  In: 520 [P.O.:520]  Out: 600 [Urine:550; Stool:50]    LABS:  Results from last 7 days   Lab Units 08/02/22  0639   WBC 10*3/mm3 6.49   HEMOGLOBIN g/dL 12.4*   HEMATOCRIT % 38.1   PLATELETS 10*3/mm3 315     Results from last 7 days   Lab Units 08/02/22  0639 08/01/22  0711 07/31/22  0539   SODIUM mmol/L 139 138 138   POTASSIUM mmol/L 4.5 4.8 4.9   CHLORIDE mmol/L 108* 108* 107   CO2 mmol/L 16.6* 18.0* 19.1*   BUN mg/dL 42* 40* 40*   CREATININE mg/dL 1.90* 1.91* 1.81*   CALCIUM mg/dL 9.0 9.2 9.4   GLUCOSE mg/dL 98 127* 140*         IMAGING STUDIES:  No new imaging    Meds reviewed/changed: Yes    Current Facility-Administered Medications:   •  acetaminophen (TYLENOL) tablet 650 mg, 650 mg, Oral, Q6H PRN, Violet Sotelo, APRN, 650 mg at 07/31/22 2141  •  amLODIPine (NORVASC) tablet 2.5 mg, 2.5 mg, Oral, Q24H, Derek Jones, APRN, 2.5 mg at 08/02/22 0811  •  benzonatate (TESSALON) capsule 100 mg, 100 mg, Oral, TID PRN, Violet Sotelo, APRN, 100 mg at 07/19/22 0912  •  dextrose (D50W) (25 g/50 mL) IV injection 25 g, 25 g,  Intravenous, Q15 Min PRN, Violet Sotelo APRN  •  dextrose (GLUTOSE) oral gel 15 g, 15 g, Oral, Q15 Min PRN, Violet Sotelo APRN  •  Divalproex Sodium (DEPAKOTE SPRINKLE) capsule 250 mg, 250 mg, Oral, Q12H, Melinda Duke APRN, 250 mg at 08/02/22 0811  •  docusate sodium (COLACE) capsule 100 mg, 100 mg, Oral, BID PRN, Violet Sotelo APRN  •  glucagon (human recombinant) (GLUCAGEN DIAGNOSTIC) injection 1 mg, 1 mg, Intramuscular, Q15 Min PRN, Violet Sotelo APRN  •  guaiFENesin (MUCINEX) 12 hr tablet 600 mg, 600 mg, Oral, Q12H, Violet Sotelo APRN, 600 mg at 08/02/22 0811  •  heparin (porcine) 5000 UNIT/ML injection 5,000 Units, 5,000 Units, Subcutaneous, Q8H, Violet Sotelo APRN, 5,000 Units at 08/02/22 1446  •  hydrALAZINE (APRESOLINE) injection 20 mg, 20 mg, Intravenous, Q6H PRN, Violet Sotelo APRN  •  insulin glargine (LANTUS, SEMGLEE) injection 12 Units, 12 Units, Subcutaneous, Q12H, Timi Morrison MD, 12 Units at 08/02/22 0808  •  insulin lispro (ADMELOG) injection 0-14 Units, 0-14 Units, Subcutaneous, TID AC, Violet Sotelo APRN, 5 Units at 08/02/22 1228  •  levothyroxine (SYNTHROID, LEVOTHROID) tablet 75 mcg, 75 mcg, Oral, Q AM, Violet Sotelo APRN, 75 mcg at 08/02/22 0635  •  nitroglycerin (NITROSTAT) SL tablet 0.4 mg, 0.4 mg, Sublingual, Q5 Min PRN, Violet Sotelo APRN  •  ondansetron (ZOFRAN) tablet 4 mg, 4 mg, Oral, Q6H PRN **OR** ondansetron (ZOFRAN) injection 4 mg, 4 mg, Intravenous, Q6H PRN, Violet Sotelo APRN, 4 mg at 07/21/22 2310  •  pantoprazole (PROTONIX) EC tablet 40 mg, 40 mg, Oral, QAM, Violet Sotelo APRN, 40 mg at 08/02/22 0635  •  potassium chloride (K-DUR,KLOR-CON) ER tablet 40 mEq, 40 mEq, Oral, PRN **OR** potassium chloride (KLOR-CON) packet 40 mEq, 40 mEq, Oral, PRN **OR** potassium chloride 10 mEq in 100 mL IVPB, 10 mEq, Intravenous, Q1H PRN, Violet Sotelo APRN  •  sennosides-docusate (PERICOLACE) 8.6-50 MG per tablet 1 tablet,  "1 tablet, Oral, Nightly PRN, Violet Sotelo, APRN      Physical Exam:    General:   Awake, alert, oriented x2. Speech clear with no aphasia  HEENT:    Left sided lulu holes, healing well, 2 sutures intact  Neck:    supple  CN III IV VI: Extraocular movements are full , PERRL   CN V: Normal facial sensation   CN VII: Slight left sided facial weakness  Motor: Normal muscle strength, bulk and tone in upper and lower extremities.  No fasciculations, rigidity, spasticity, or abnormal movements.  Sensation: Normal to light touch; no extinction  Station and Gait: Ambulating with PT, approx 15\" with walker  Coordination: Finger-to-nose and heel-to-shin test shows no dysmetria.    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 POD# 20 left lulu hole for evacuation SDH.  Planned MMA procedure.  POD#12 Left MMA    PLAN:   S/w melody Iniguez, re:  Placement, and she states that she has two facilities to look at tomorrow, before she makes her decision.    EEG results are pending      Remove sutures from left sided lulu hole incisions    I discussed the patient's findings and my recommendations with patient, family, nursing staff and Dr. Reyes       LOS: 20 days       Maritza Gutierrez, APRN  8/2/2022  15:14 EDT    \"Dictated utilizing Dragon dictation\".      "

## 2022-08-02 NOTE — PLAN OF CARE
Goal Outcome Evaluation:  Plan of Care Reviewed With: patient        Progress: improving  Outcome Evaluation: Pt agreeable to OT session this afternoon and completes BUE exercises in chair. Pt performs STS with CGA and weight-shifts laterally at rwx x10 reps. Pt walks 25' in room then reports fatigue and moves to sit EOB then supine. Pt demonstrating increased activity tolerance today however would still benefit from SNF d/c and continued OT services.    OT wore appropriate PPE and completed hand hygiene.

## 2022-08-02 NOTE — CASE MANAGEMENT/SOCIAL WORK
Continued Stay Note  [facility]     Patient Name: Reece Stephen  MRN: [id number]  Today's Date: 8/2/2022    Admit Date: 7/12/2022     Discharge Plan     Row Name 08/02/22 1537       Plan    Plan SNF referrals pending    Plan Comments Plan for niece to [facility] tomorrow. Incoming call form [facility] who states they are following but they have a few more questions for the family. Per [facility], they could have a bed as soon as tomorrow if they get everything worked out. CSW left [facility] for patient's niece to discuss [facility] and provide her [facility] contact information. PURNIMA SCHILLING                          Row Name 08/02/22 1240       Plan    Plan SNF referrals pending    Plan Comments Incoming call from patient's POA/Niece Geetha who states she is going to [facility] tomorrow. POA requesting re-referral to [facility]. CCP manager Sun CLARK notified of d/c barriers and called and spoke with patient's niece.               Discharge Codes    No documentation.               Expected Discharge Date and Time     Expected Discharge Date Expected Discharge Time    Aug 2, 2022             PURNIMA ARIAS

## 2022-08-02 NOTE — TELEPHONE ENCOUNTER
I spoke to patients niece about follow up appointment information. She states he will probably be in a facility. I let here know that this information will be on his discharge paperwork for when he is discharged. CT and follow up with Dr. Reyes has been scheduled .

## 2022-08-02 NOTE — PLAN OF CARE
Goal Outcome Evaluation:  Plan of Care Reviewed With: patient        Progress: improving  Outcome Evaluation: No complaints overnight. Rested comfortably. Less confused this shift. VSS. Awaiting placement.

## 2022-08-02 NOTE — TELEPHONE ENCOUNTER
----- Message from Christiane Craig sent at 8/2/2022  8:18 AM EDT -----  Regarding: FW: please schedule    ----- Message -----  From: Randee Paredes APRN  Sent: 8/1/2022   7:43 PM EDT  To: Christiane Craig  Subject: please schedule                                  Please arrange follow up for this patient with Dr. Reyes in 4-6 weeks with a repeat head CT no contast - for SDH. He is a post op lulu hole and MMA. You will most likely need to contact his niece who is listed in contacts with appointments    Thanks!

## 2022-08-02 NOTE — PROGRESS NOTES
[facility]     Progress Note    Patient Name: Reece Stephen  : 1935  MRN: [id number]  Primary Care Physician:  Ana María Atkinson MD  Date of admission: 2022    Subjective   Subjective     Chief Complaint: ckd III    History of Present Illness    Patient with ckd III and SAH    Review of Systems      Objective   Objective     Vitals:   Temp:  [97.7 °F (36.5 °C)-98.1 °F (36.7 °C)] 97.9 °F (36.6 °C)  Heart Rate:  [59-75] 66  Resp:  [18-20] 18  BP: (118-132)/(64-75) 118/75    Physical Exam  General Appearance:  In no acute distress.    HEENT: Normal HEENT exam.        Neurological: Patient is asleep      Result Review    Result Review:  I have personally reviewed the results from the time of this admission to 2022 07:44 EDT and agree with these findings:  []  Laboratory list / accordion  []  Microbiology  []  Radiology  []  EKG/Telemetry   []  Cardiology/Vascular   []  Pathology  []  Old records  []  Other:  Most notable findings include:       Assessment & Plan   Assessment / Plan     Brief Patient Summary:  Reece Stephen is a 86 y.o. 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 Asleep in no distress. Labs and chart reviewed. Renal function stable with cr of 1.9 which is likely baseline. Will  follow    Willa Martin MD

## 2022-08-03 LAB
ALBUMIN SERPL-MCNC: 3.6 G/DL (ref 3.5–5.2)
ANION GAP SERPL CALCULATED.3IONS-SCNC: 14 MMOL/L (ref 5–15)
BUN SERPL-MCNC: 40 MG/DL (ref 8–23)
BUN/CREAT SERPL: 21.7 (ref 7–25)
CALCIUM SPEC-SCNC: 9.1 MG/DL (ref 8.6–10.5)
CHLORIDE SERPL-SCNC: 109 MMOL/L (ref 98–107)
CO2 SERPL-SCNC: 18 MMOL/L (ref 22–29)
CREAT SERPL-MCNC: 1.84 MG/DL (ref 0.76–1.27)
EGFRCR SERPLBLD CKD-EPI 2021: 35.3 ML/MIN/1.73
GLUCOSE BLDC GLUCOMTR-MCNC: 100 MG/DL (ref 70–130)
GLUCOSE BLDC GLUCOMTR-MCNC: 347 MG/DL (ref 70–130)
GLUCOSE BLDC GLUCOMTR-MCNC: 98 MG/DL (ref 70–130)
GLUCOSE SERPL-MCNC: 90 MG/DL (ref 65–99)
PHOSPHATE SERPL-MCNC: 3.6 MG/DL (ref 2.5–4.5)
POTASSIUM SERPL-SCNC: 4.5 MMOL/L (ref 3.5–5.2)
SODIUM SERPL-SCNC: 141 MMOL/L (ref 136–145)

## 2022-08-03 PROCEDURE — 80069 RENAL FUNCTION PANEL: CPT | Performed by: NURSE PRACTITIONER

## 2022-08-03 PROCEDURE — 92526 ORAL FUNCTION THERAPY: CPT

## 2022-08-03 PROCEDURE — 82962 GLUCOSE BLOOD TEST: CPT

## 2022-08-03 PROCEDURE — 97110 THERAPEUTIC EXERCISES: CPT

## 2022-08-03 PROCEDURE — 97535 SELF CARE MNGMENT TRAINING: CPT | Performed by: OCCUPATIONAL THERAPIST

## 2022-08-03 PROCEDURE — 63710000001 INSULIN LISPRO (HUMAN) PER 5 UNITS: Performed by: NURSE PRACTITIONER

## 2022-08-03 PROCEDURE — 99024 POSTOP FOLLOW-UP VISIT: CPT | Performed by: NURSE PRACTITIONER

## 2022-08-03 PROCEDURE — 25010000002 HEPARIN (PORCINE) PER 1000 UNITS: Performed by: NURSE PRACTITIONER

## 2022-08-03 RX ADMIN — HEPARIN SODIUM 5000 UNITS: 5000 INJECTION INTRAVENOUS; SUBCUTANEOUS at 05:26

## 2022-08-03 RX ADMIN — AMLODIPINE BESYLATE 2.5 MG: 2.5 TABLET ORAL at 08:35

## 2022-08-03 RX ADMIN — DIVALPROEX SODIUM 250 MG: 125 CAPSULE, COATED PELLETS ORAL at 08:35

## 2022-08-03 RX ADMIN — PANTOPRAZOLE SODIUM 40 MG: 40 TABLET, DELAYED RELEASE ORAL at 06:06

## 2022-08-03 RX ADMIN — HEPARIN SODIUM 5000 UNITS: 5000 INJECTION INTRAVENOUS; SUBCUTANEOUS at 22:45

## 2022-08-03 RX ADMIN — HEPARIN SODIUM 5000 UNITS: 5000 INJECTION INTRAVENOUS; SUBCUTANEOUS at 14:47

## 2022-08-03 RX ADMIN — DIVALPROEX SODIUM 250 MG: 125 CAPSULE, COATED PELLETS ORAL at 20:29

## 2022-08-03 RX ADMIN — LEVOTHYROXINE SODIUM 75 MCG: 0.07 TABLET ORAL at 05:26

## 2022-08-03 RX ADMIN — INSULIN GLARGINE-YFGN 12 UNITS: 100 INJECTION, SOLUTION SUBCUTANEOUS at 08:35

## 2022-08-03 RX ADMIN — GUAIFENESIN 600 MG: 600 TABLET, EXTENDED RELEASE ORAL at 20:29

## 2022-08-03 RX ADMIN — INSULIN LISPRO 10 UNITS: 100 INJECTION, SOLUTION INTRAVENOUS; SUBCUTANEOUS at 20:30

## 2022-08-03 RX ADMIN — INSULIN GLARGINE-YFGN 12 UNITS: 100 INJECTION, SOLUTION SUBCUTANEOUS at 20:29

## 2022-08-03 RX ADMIN — GUAIFENESIN 600 MG: 600 TABLET, EXTENDED RELEASE ORAL at 08:35

## 2022-08-03 NOTE — PLAN OF CARE
Problem: Adult Inpatient Plan of Care  Goal: Optimal Comfort and Wellbeing  Outcome: Ongoing, Progressing  Intervention: Monitor Pain and Promote Comfort  Recent Flowsheet Documentation  Taken 8/2/2022 1930 by Irene Cruz RN  Pain Management Interventions:   quiet environment facilitated   relaxation techniques promoted   pillow support provided   position adjusted  Intervention: Provide Person-Centered Care  Recent Flowsheet Documentation  Taken 8/2/2022 1930 by Irene Cruz RN  Trust Relationship/Rapport:   care explained   thoughts/feelings acknowledged   reassurance provided     Problem: Fall Injury Risk  Goal: Absence of Fall and Fall-Related Injury  Outcome: Ongoing, Progressing  Intervention: Identify and Manage Contributors  Recent Flowsheet Documentation  Taken 8/2/2022 1930 by Irene Cruz RN  Medication Review/Management: medications reviewed  Intervention: Promote Injury-Free Environment  Recent Flowsheet Documentation  Taken 8/2/2022 1930 by Irene Cruz RN  Safety Promotion/Fall Prevention:   activity supervised   assistive device/personal items within reach   clutter free environment maintained   safety round/check completed   room organization consistent   Goal Outcome Evaluation:  Plan of Care Reviewed With: patient        Progress: improving

## 2022-08-03 NOTE — THERAPY TREATMENT NOTE
Patient Name: Reece Stephen  : 1935    MRN: [id number]                              Today's Date: 8/3/2022       Admit Date: 2022    Visit Dx:     ICD-10-CM ICD-9-CM   1. SDH (subdural hematoma) (HCC)  S06.5X9A 432.1   2. Subdural hematoma (HCC)  S06.5X9A 432.1     Patient Active Problem List   Diagnosis   • History of rectal cancer   • Lung nodule, multiple   • History of DVT (deep vein thrombosis)   • Transient cerebral ischemia   • Hyperkalemia   • Hyperosmolar non-ketotic state in patient with type 2 diabetes mellitus (HCC)   • Type 2 diabetes mellitus with complication (HCC)   • Hyponatremia with extracellular fluid depletion   • Hypothyroidism   • Hyperlipidemia   • Diabetic polyneuropathy associated with type 2 diabetes mellitus (HCC)   • Chronic kidney disease, stage 3 (moderate) (CMS/HCC)   • Non compliance w medication regimen   • Essential hypertension   • Upper gastrointestinal hemorrhage   • Osteoarthritis   • Gastroesophageal reflux disease   • Deep vein thrombosis (HCC)   • Colitis due to Clostridium difficile   • Burn injury   • Anemia   • Cerebrovascular accident (CVA) (HCC)   • Other injury of unspecified body region, initial encounter   • Chronic pain of both knees   • Diabetic neuropathy (HCC)   • Altered mental status   • Fall   • Cerebral contusion (HCC)   • Embolic stroke (HCC)   • Acute weakness   • Subdural hematoma (HCC)   • SDH (subdural hematoma) (HCC)   • Surgery, elective     Past Medical History:   Diagnosis Date   • Anemia    • Arthritis    • Clostridium difficile infection 10/2014   • Colostomy present (HCC)    • Diabetes mellitus (HCC)     TYPE 2, IDDM   • Disease of thyroid gland     ACQUIRED HYPOTHYROIDISM   • Duodenal ulcer 2014   • DVT (deep venous thrombosis) (HCC) 2015    PORT ASSOCIATED OF LEFT UPPER EXTREMITY   • Electrocution     HAD TO HAVE PARTIAL AMPUTATION OF LEFT FOOT   • GERD (gastroesophageal reflux disease)    • Hearing loss    • Hemorrhagic  shock (HCC) 2015    SECONDARY TO BLEEDING DUODENAL ULCER, ADMITTED TO [facility]   • Histiocytoma    • History of blood transfusion 2015    D/T BLEEDING DUODENAL ULCER   • History of chemotherapy     FOLLOWED BY DR. NIGEL NOBLE   • History of radiation therapy 2014    FOLLOWED BY DR. RJ HUI   • Hypercholesterolemia    • Hyperkalemia    • Hypertension    • Hypoglycemia 01/04/2018    SEEN AT [facility] ER   • Incontinent of urine    • Mixed hyperlipidemia    • Multiple lung nodules    • Neuropathy    • OA (osteoarthritis)    • ANNE MARIE (obstructive sleep apnea)     NO CPAP   • Peptic ulceration 2014    Duodenal ulcer   • Pneumonia 03/29/2013    ADMITTED TO [facility]-DOUBLE PNEUMONIA   • Rectal cancer (HCC) 06/2014    T3N1M0, S/P CHEMO, XRT, AND RESECTION, FOLLOWED BY DR. TRACE HERRING   • Stage 3 chronic kidney disease (HCC)     DR. FREDDIE GONZALES   • Stroke (HCC) 11/28/2017    TIA, ADMITTED TO [facility]   • Stroke (HCC) 11/17/2019    CVA, ADMITTED TO [facility]   • Subdural hematoma (HCC)     INCREASE IN HEARING LOSS, BALANCE PROBLEMS, SPEECH SLOWED, MEMORY DEFICIT   • Vitamin D deficiency      Past Surgical History:   Procedure Laterality Date   • AMPUTATION FOOT Left 1971    PARTIAL DUE TO AN ELECTRICAL SHOCK INJURY   • ARM TENDON REPAIR Right     DONE BY DR. OLEARY AND KLEINERT   • COLON RESECTION N/A 11/14/2014    LOW ANTERIOR RESECTION WITH CREATION OF COLOSTOMY, DR. TRACE HERRING AT [facility]   • COLONOSCOPY W/ BIOPSIES N/A 06/26/2014    ULCERATED 5CM MASS IN RECTUM, PATH: MODERATELY DIFFERENTIATED ADENOCARCINOMA, MULTIPLE DIVERTICULA IN SIGMOID, DR. LAUREN JAMES AT [facility]   • COLONOSCOPY W/ BIOPSIES N/A 07/09/2014    RECTAL MASS: INVASIVE MODERATLEY DIFFERIENTIATED ADENOCARCINOMA, AREA TATTOOED, DR. TRACE HERRING AT [facility]   • EMBOLIZATION CEREBRAL Left 7/12/2022    Procedure: LEFT KWABENA HOLE FOR EVACUATION OF SUBDURAL HEMATOMA;  Surgeon: Ciaran Reyes MD;  Location: [facility] MAIN OR;  Service: Neurosurgery;  Laterality: Left;   •  EMBOLIZATION CEREBRAL N/A 7/21/2022    Procedure: CEREBRAL ANGIOGRAM with MMA embolization;  Surgeon: Ciaran Reyes MD;  Location: [facility] OR 18/19;  Service: Neurosurgery;  Laterality: N/A;   • ENDOSCOPY  02/09/2015   • ENDOSCOPY N/A 02/09/2015    RESOLVED DUODENAL ULCER, EGD WNL, DR. FREDDIE MALCOLM AT [facility]   • FLEXIBLE SIGMOIDOSCOPY N/A 10/06/2014    MALIGNANT PARTIALLY OBSTRUCTING TUMOR IN MID RECTUM, DR. DELTA HERRING AT [facility]   • PORTACATH PLACEMENT Left 07/24/2014    LEFT SUBCLAVIAN, DR. KWAKU TAY AT [facility]   • RECTAL ULTRASOUND N/A 07/09/2014    ENDORECTAL US FOR CANCER STAGING, T3N1 RECTAL CANCER AT 7 CM, DR. TRACE HERRING AT [facility]   • TRACHEAL SURGERY N/A 08/31/2014    ENDOTRACHEAL INTUBATION, DR. ALMA ROSA HAGAN AT [facility]   • VENOUS ACCESS DEVICE (PORT) REMOVAL Left 03/12/2015    LEFT SUBCLAVIAN, DR. KWAKU TAY AT [facility]      General Information     Row Name 08/03/22 1718          Physical Therapy Time and Intention    Document Type therapy note (daily note)  -     Mode of Treatment individual therapy;physical therapy  -     Row Name 08/03/22 1718          General Information    Patient Profile Reviewed yes  -     Existing Precautions/Restrictions fall  -     Row Name 08/03/22 1718          Cognition    Orientation Status (Cognition) oriented x 3  knew hospital , but not the name  -     Row Name 08/03/22 1718          Safety Issues, Functional Mobility    Safety Issues Affecting Function (Mobility) impulsivity;awareness of need for assistance;insight into deficits/self-awareness;judgment;positioning of assistive device;problem-solving;safety precaution awareness;safety precautions follow-through/compliance;sequencing abilities  -     Impairments Affecting Function (Mobility) balance;endurance/activity tolerance;cognition;coordination;strength  -     Cognitive Impairments, Mobility Safety/Performance attention;awareness, need for assistance;impulsivity;insight into  deficits/self-awareness;judgment;safety precaution awareness;safety precaution follow-through;problem-solving/reasoning;sequencing abilities  -     Comment, Safety Issues/Impairments (Mobility) pt unsteady this session due to incr impulsivity, educ offered throughout session, [place] not helping his attention deficit-he's afraid of losing other hearing aid so not wearing  -           User Key  (r) = Recorded By, (t) = Taken By, (c) = Cosigned By    Initials Name Provider Type    Kim Mattson PTA Physical Therapist Assistant               Mobility     Row Name 08/03/22 1722          Bed Mobility    Sit-Supine [place] (Bed Mobility) contact guard;verbal cues  -     Assistive Device (Bed Mobility) bed rails  -     Row Name 08/03/22 1722          Sit-Stand Transfer    Sit-Stand [place] (Transfers) 2 person assist;contact guard;minimum assist (75% patient effort);verbal cues;nonverbal cues (demo/gesture)  -     Assistive Device (Sit-Stand Transfers) walker, front-wheeled  -     Comment, (Sit-Stand Transfer) STS attempted x3 due to trying to get wires untangled and asked pt to wait and he did not, also feel pt cannot hear our directions due to not wearing even the 1 hearing aid  -     Row Name 08/03/22 1722          Gait/Stairs (Locomotion)    Benton Level (Gait) 2 person assist;contact guard;verbal cues;nonverbal cues (demo/gesture)  -     Assistive Device (Gait) walker, front-wheeled  -     Distance in Feet (Gait) 40ft, pt req more assist to guide rwx, incr impulsivity , req 2 for safety, encouragement to walk today  -     Deviations/Abnormal Patterns (Gait) polo decreased;festinating/shuffling;ataxic;base of support, narrow;stride length decreased;weight shifting decreased  -     Bilateral Gait Deviations forward flexed posture  -     Comment, (Gait/Stairs) once pt was asked to be less impulsive, he seemed to need more assist to complete tasks, unsure if on purpose or just  fatigued ; had spilled lunch on floor and was focused on getting that cleaned up and not on walking  -           User Key  (r) = Recorded By, (t) = Taken By, (c) = Cosigned By    Initials Name Provider Type    Kim Mattson PTA Physical Therapist Assistant               Obj/Interventions    No documentation.                Goals/Plan    No documentation.                Clinical Impression     [facility] Name 08/03/22 1727          Pain    Pretreatment Pain Rating 0/10 - no pain  -     Posttreatment Pain Rating 0/10 - no pain  -[facility] Name 08/03/22 1727          Plan of Care Review    Plan of Care Reviewed With patient  -     Outcome Evaluation Pt agreed to amb w/PT but req more assist for amb 40ft, unsure if just fatigued or upset that we educ him on safety w/his impulsivity today; pt declined sitting in chair any longer and assisted back to bed after amb, plans SNU possibly 8/4  -[facility] Name 08/03/22 1727          Therapy Assessment/Plan (PT)    Rehab Potential (PT) good, to achieve stated therapy goals  -     Criteria for Skilled Interventions Met (PT) yes  -[facility] Name 08/03/22 1727          Vital Signs    O2 Delivery Pre Treatment room air  -[facility] Name 08/03/22 1727          Positioning and Restraints    Pre-Treatment Position sitting in chair/recliner  -     Post Treatment Position bed  -     In Bed supine;call light within reach;encouraged to call for assist;exit alarm on;notified nsg;side rails up x3  -           User Key  (r) = Recorded By, (t) = Taken By, (c) = Cosigned By    Initials Name Provider Type    Kim Mattson PTA Physical Therapist Assistant               Outcome Measures     Row Name 08/03/22 1729          How much help from another person do you currently need...    Turning from your back to your side while in flat bed without using bedrails? 3  -JM     Moving from lying on back to sitting on the side of a flat bed without bedrails? 3  -JM     Moving to and  from a bed to a chair (including a wheelchair)? 3  -JM     Standing up from a chair using your arms (e.g., wheelchair, bedside chair)? 3  -JM     Climbing 3-5 steps with a railing? 1  -JM     To walk in hospital room? 3  -JM     AM-PAC 6 Clicks Score (PT) 16  -JM     Highest level of mobility 5 --> Static standing  -JM           User Key  (r) = Recorded By, (t) = Taken By, (c) = Cosigned By    Initials Name Provider Type    Kim Mattson PTA Physical Therapist Assistant                             Physical Therapy Education                 Title: PT OT SLP Therapies (In Progress)     Topic: Physical Therapy (In Progress)     Point: Mobility training (In Progress)     Learning Progress Summary           Patient Acceptance, E,D, NR by VISHAL at 8/3/2022 1730    Acceptance, E,TB,D, VU,NR by VISHAL at 8/1/2022 1602    Acceptance, E,D, VU,NR by VISHAL at 7/29/2022 1709    Eager, E, NR by BW at 7/29/2022 1538    Acceptance, E,D, NR by JM at 7/28/2022 1321    Acceptance, E,TB, NR by CB at 7/27/2022 1528    Acceptance, E,TB, VU,NR by CB at 7/26/2022 1549    Acceptance, E,TB,D, NR by CB at 7/25/2022 1550    Acceptance, E,D, NR by LC at 7/23/2022 1434    Acceptance, E, NR by EM at 7/22/2022 1326    Acceptance, E,D, NR by VISHAL at 7/21/2022 1723    Acceptance, E,D, NR by VISHAL at 7/20/2022 1712   Family Acceptance, E,D, VU,NR by VISHAL at 7/29/2022 1709    Acceptance, E,D, NR by JM at 7/21/2022 1723                   Point: Home exercise program (In Progress)     Learning Progress Summary           Patient Acceptance, E,D, NR by VISHAL at 8/3/2022 1730    Acceptance, E,TB,D, VU,NR by VISHAL at 8/1/2022 1602    Acceptance, E,D, VU,NR by VISHAL at 7/29/2022 1709    Acceptance, E,D, NR by VISHAL at 7/28/2022 1321    Acceptance, E,D, NR by ALEXANDR at 7/23/2022 1434    Acceptance, E,D, NR by VISHAL at 7/21/2022 1723    Acceptance, E,D, NR by VISHAL at 7/20/2022 1712   Family Acceptance, E,D, VU,NR by VISHAL at 7/29/2022 1709    Acceptance, E,D, NR by VISHAL at 7/21/2022 1723                    Point: Body mechanics (In Progress)     Learning Progress Summary           Patient Acceptance, E,D, NR by VISHAL at 8/3/2022 1730    Acceptance, E,TB,D, VU,NR by VISHAL at 8/1/2022 1602    Acceptance, E,D, VU,NR by VISHAL at 7/29/2022 1709    Eager, E, NR by CLEMENTE at 7/29/2022 1538    Acceptance, E,D, NR by VISHAL at 7/28/2022 1321    Acceptance, E,TB, NR by CB at 7/27/2022 1528    Acceptance, E,TB, VU,NR by CB at 7/26/2022 1549    Acceptance, E,TB,D, NR by JANI at 7/25/2022 1550    Acceptance, E,D, NR by ALEXANDR at 7/23/2022 1434    Acceptance, E,D, NR by VISHAL at 7/21/2022 1723    Acceptance, E,D, NR by VISHAL at 7/20/2022 1712   Family Acceptance, E,D, VU,NR by VISHAL at 7/29/2022 1709    Acceptance, E,D, NR by VISHAL at 7/21/2022 1723                   Point: Precautions (In Progress)     Learning Progress Summary           Patient Acceptance, E,D, NR by VISHAL at 8/3/2022 1730    Acceptance, E,TB,D, VU,NR by VISHAL at 8/1/2022 1602    Acceptance, E,D, VU,NR by VISHAL at 7/29/2022 1709    Acceptance, E, NR by CLEMENTE at 7/28/2022 1817    Acceptance, E,D, NR by VISHAL at 7/28/2022 1321    Acceptance, E,D, NR by ALEXANDR at 7/23/2022 1434    Acceptance, E,D, NR by VISHAL at 7/21/2022 1723    Acceptance, E,D, NR by VISHAL at 7/20/2022 1712   Family Acceptance, E,D, VU,NR by VISHAL at 7/29/2022 1709    Acceptance, E,D, NR by VISHAL at 7/21/2022 1723                               User Key     Initials Effective Dates Name Provider Type Discipline    EM 06/16/21 -  Whitley Bone PT Physical Therapist PT    VISHAL 03/07/18 -  Coleman, Kim, PTA Physical Therapist Assistant PT    LC 07/02/20 -  Chetan Carr, PT DPT Physical Therapist PT    CB 10/22/21 -  Elsa Jennings, PT Physical Therapist PT    BW 07/13/22 -  Cesilia Milian, RN Registered Nurse Nurse              PT Recommendation and Plan     Plan of Care Reviewed With: patient  Progress: improving  Outcome Evaluation: Pt agreed to amb w/PT but req more assist for amb 40ft, unsure if just fatigued or upset that we educ him on  safety w/his impulsivity today; pt declined sitting in chair any longer and assisted back to bed after amb, plans SNU possibly 8/4     Time Calculation:    PT Charges     Row Name 08/03/22 1731             Time Calculation    Start Time 1454  -VISHAL      Stop Time 1518  -VISHAL      Time Calculation (min) 24 min  -VISHAL      PT Received On 08/03/22  -VISHAL      PT - Next Appointment 08/04/22  -VISHAL            User Key  (r) = Recorded By, (t) = Taken By, (c) = Cosigned By    Initials Name Provider Type    Kim Mattson PTA Physical Therapist Assistant              Therapy Charges for Today     Code Description Service Date Service Provider Modifiers Qty    41495807916 HC PT THER PROC EA 15 MIN 8/3/2022 Kim Coleman PTA GP 2    17794310853 HC PT THER SUPP EA 15 MIN 8/3/2022 Kim Coleman PTA GP 1          PT G-Codes  Outcome Measure Options: AM-PAC 6 Clicks Daily Activity (OT)  AM-PAC 6 Clicks Score (PT): 16  AM-PAC 6 Clicks Score (OT): 16  Modified [place] Scale: 4 - Moderately severe disability.  Unable to walk without assistance, and unable to attend to own bodily needs without assistance.    Kim Coleman PTA  8/3/2022

## 2022-08-03 NOTE — THERAPY TREATMENT NOTE
Acute Care - Speech Language Pathology   Swallow Treatment Note [facility]     Patient Name: Reece Stephen  : 1935  MRN: [id number]  Today's Date: 8/3/2022               Admit Date: 2022    Visit Dx:     ICD-10-CM ICD-9-CM   1. SDH (subdural hematoma) (HCC)  S06.5X9A 432.1   2. Subdural hematoma (HCC)  S06.5X9A 432.1     Patient Active Problem List   Diagnosis   • History of rectal cancer   • Lung nodule, multiple   • History of DVT (deep vein thrombosis)   • Transient cerebral ischemia   • Hyperkalemia   • Hyperosmolar non-ketotic state in patient with type 2 diabetes mellitus (HCC)   • Type 2 diabetes mellitus with complication (HCC)   • Hyponatremia with extracellular fluid depletion   • Hypothyroidism   • Hyperlipidemia   • Diabetic polyneuropathy associated with type 2 diabetes mellitus (HCC)   • Chronic kidney disease, stage 3 (moderate) (CMS/HCC)   • Non compliance w medication regimen   • Essential hypertension   • Upper gastrointestinal hemorrhage   • Osteoarthritis   • Gastroesophageal reflux disease   • Deep vein thrombosis (HCC)   • Colitis due to Clostridium difficile   • Burn injury   • Anemia   • Cerebrovascular accident (CVA) ([facility])   • Other injury of unspecified body region, initial encounter   • Chronic pain of both knees   • Diabetic neuropathy (HCC)   • Altered mental status   • Fall   • Cerebral contusion (HCC)   • Embolic stroke ([facility])   • Acute weakness   • Subdural hematoma (HCC)   • SDH (subdural hematoma) (HCC)   • Surgery, elective     Past Medical History:   Diagnosis Date   • Anemia    • Arthritis    • Clostridium difficile infection 10/2014   • Colostomy present ([facility])    • Diabetes mellitus (HCC)     TYPE 2, IDDM   • Disease of thyroid gland     ACQUIRED HYPOTHYROIDISM   • Duodenal ulcer 2014   • DVT (deep venous thrombosis) (HCC) 2015    PORT ASSOCIATED OF LEFT UPPER EXTREMITY   • Electrocution     HAD TO HAVE PARTIAL AMPUTATION OF LEFT FOOT   • GERD  (gastroesophageal reflux disease)    • Hearing loss    • Hemorrhagic shock (HCC) 2015    SECONDARY TO BLEEDING DUODENAL ULCER, ADMITTED TO [facility]   • Histiocytoma    • History of blood transfusion 2015    D/T BLEEDING DUODENAL ULCER   • History of chemotherapy     FOLLOWED BY DR. NIGEL NOBLE   • History of radiation therapy 2014    FOLLOWED BY DR. RJ HUI   • Hypercholesterolemia    • Hyperkalemia    • Hypertension    • Hypoglycemia 01/04/2018    SEEN AT [facility] ER   • Incontinent of urine    • Mixed hyperlipidemia    • Multiple lung nodules    • Neuropathy    • OA (osteoarthritis)    • ANNE MARIE (obstructive sleep apnea)     NO CPAP   • Peptic ulceration 2014    Duodenal ulcer   • Pneumonia 03/29/2013    ADMITTED TO [facility]-DOUBLE PNEUMONIA   • Rectal cancer (HCC) 06/2014    T3N1M0, S/P CHEMO, XRT, AND RESECTION, FOLLOWED BY DR. TRACE HERRING   • Stage 3 chronic kidney disease (HCC)     DR. FREDDIE GONZALES   • Stroke (HCC) 11/28/2017    TIA, ADMITTED TO [facility]   • Stroke (HCC) 11/17/2019    CVA, ADMITTED TO [facility]   • Subdural hematoma (HCC)     INCREASE IN HEARING LOSS, BALANCE PROBLEMS, SPEECH SLOWED, MEMORY DEFICIT   • Vitamin D deficiency      Past Surgical History:   Procedure Laterality Date   • AMPUTATION FOOT Left 1971    PARTIAL DUE TO AN ELECTRICAL SHOCK INJURY   • ARM TENDON REPAIR Right     DONE BY DR. OLEARY AND KLEINERT   • COLON RESECTION N/A 11/14/2014    LOW ANTERIOR RESECTION WITH CREATION OF COLOSTOMY, DR. TRACE HERRING AT [facility]   • COLONOSCOPY W/ BIOPSIES N/A 06/26/2014    ULCERATED 5CM MASS IN RECTUM, PATH: MODERATELY DIFFERENTIATED ADENOCARCINOMA, MULTIPLE DIVERTICULA IN SIGMOID, DR. LAUREN JAMES AT [facility]   • COLONOSCOPY W/ BIOPSIES N/A 07/09/2014    RECTAL MASS: INVASIVE MODERATLEY DIFFERIENTIATED ADENOCARCINOMA, AREA TATTOOED, DR. TRACE HERRING AT [facility]   • EMBOLIZATION CEREBRAL Left 7/12/2022    Procedure: LEFT KWABENA HOLE FOR EVACUATION OF SUBDURAL HEMATOMA;  Surgeon: Ciaran Reyes MD;   Location:  [facility];  Service: Neurosurgery;  Laterality: Left;   • EMBOLIZATION CEREBRAL N/A 7/21/2022    Procedure: CEREBRAL ANGIOGRAM with MMA embolization;  Surgeon: Ciaran Reyes MD;  Location: [facility] HYBRID OR 18/19;  Service: Neurosurgery;  Laterality: N/A;   • ENDOSCOPY  02/09/2015   • ENDOSCOPY N/A 02/09/2015    RESOLVED DUODENAL ULCER, EGD WNL, DR. FREDDIE MALCOLM AT [facility]   • FLEXIBLE SIGMOIDOSCOPY N/A 10/06/2014    MALIGNANT PARTIALLY OBSTRUCTING TUMOR IN MID RECTUM, DR. DELTA HERRING AT [facility]   • PORTACATH PLACEMENT Left 07/24/2014    LEFT SUBCLAVIAN, DR. KWAKU TAY AT [facility]   • RECTAL ULTRASOUND N/A 07/09/2014    ENDORECTAL US FOR CANCER STAGING, T3N1 RECTAL CANCER AT 7 CM, DR. TRACE HERRING AT [facility]   • TRACHEAL SURGERY N/A 08/31/2014    ENDOTRACHEAL INTUBATION, DR. ALMA ROSA HAGAN AT [facility]   • VENOUS ACCESS DEVICE (PORT) REMOVAL Left 03/12/2015    LEFT SUBCLAVIAN, DR. KWAKU TAY AT [facility]       SLP Recommendation and Plan                                                                                 EDUCATION  The patient has been educated in the following areas:   Cognitive Impairment.        SLP GOALS     Row Name 08/03/22 1400             (STG) Patient will tolerate trials of    Progress/Outcomes (Tolerate trials) continuing progress toward goal  -TH      Comment (Tolerate trials) Baseline cough noted upon SLP arrival. Completed cracker and thin liquid trials which patient tolerated without s/sx of aspiration and no oral residue was observed. Although, prolonged mastication noted as well as impulsivity (putting the whole cracker in his mouth at one time). Delayed cough observed, but did not appear related to PO trials.At this time recommend continuation of mechanical soft diet and thin liquids.  -TH              Memory Skills Goal 1 (SLP)    Barriers (Memory Skills Goal 1, SLP) hard of hearing  -TH      Comment (Memory Skills Goal 1, SLP) Attempted prospective memory task, however, patient closed his  eyes and stopped answering SLP; appeared to not want to participate during this time. Will attempt again on later date.  -TH            User Key  (r) = Recorded By, (t) = Taken By, (c) = Cosigned By    Initials Name Provider Type    Andreia Vazquez Speech and Language Pathologist                   Time Calculation:    Time Calculation- SLP     Row Name 08/03/22 1420             Time Calculation- SLP    SLP Start Time 1400  -TH      SLP Received On 08/03/22  -TH            User Key  (r) = Recorded By, (t) = Taken By, (c) = Cosigned By    Initials Name Provider Type    Andreia Vazquez Speech and Language Pathologist                Therapy Charges for Today     Code Description Service Date Service Provider Modifiers Qty    [id number] HC ST TREATMENT SWALLOW 2 8/3/2022 Andreia Duarte GN 1               Andreia Duarte  8/3/2022

## 2022-08-03 NOTE — PROGRESS NOTES
Name: Reece Stephen ADMIT: 2022   : 1935  PCP: Ana María Atkinson MD    MRN: [id number] LOS: 21 days   AGE/SEX: 86 y.o. male  ROOM: [facility]     Subjective   Subjective     Doing well this morning.  Has no complaints. Creatinine is stable.       Objective   Objective   Vital Signs  Temp:  [97.6 °F (36.4 °C)-98.2 °F (36.8 °C)] 98 °F (36.7 °C)  Heart Rate:  [59-74] 74  Resp:  [16-18] 18  BP: (102-136)/(63-88) 112/66  SpO2:  [93 %-98 %] 96 %  on   ;   Device (Oxygen Therapy): room air  Body mass index is 23.63 kg/m².  Physical Exam  Constitutional:       General: He is not in acute distress.     Appearance: He is not toxic-appearing.   Cardiovascular:      Rate and Rhythm: Normal rate and regular rhythm.      Heart sounds: Normal heart sounds.   Pulmonary:      Effort: Pulmonary effort is normal.      Breath sounds: Normal breath sounds.   Abdominal:      General: Bowel sounds are normal.      Palpations: Abdomen is soft.   Musculoskeletal:         General: No tenderness.      Right lower leg: No edema.      Left lower leg: No edema.   Neurological:      Mental Status: He is alert.      Comments: Word finding difficulty         Results Review     I reviewed the patient's new clinical results.  Results from last 7 days   Lab Units 22  0639   WBC 10*3/mm3 6.49   HEMOGLOBIN g/dL 12.4*   PLATELETS 10*3/mm3 315     Results from last 7 days   Lab Units 22  0714 22  0639 22  0711 22  0539   SODIUM mmol/L 141 139 138 138   POTASSIUM mmol/L 4.5 4.5 4.8 4.9   CHLORIDE mmol/L 109* 108* 108* 107   CO2 mmol/L 18.0* 16.6* 18.0* 19.1*   BUN mg/dL 40* 42* 40* 40*   CREATININE mg/dL 1.84* 1.90* 1.91* 1.81*   GLUCOSE mg/dL 90 98 127* 140*   Estimated Creatinine Clearance: 28.7 mL/min (A) (by C-G formula based on SCr of 1.84 mg/dL (H)).  Results from last 7 days   Lab Units 22  0714 22  0639 22  0711 22  0539   ALBUMIN g/dL 3.60 3.70 3.80 3.80     Results from last 7 days    Lab Units 08/03/22  0714 08/02/22  0639 08/01/22  0711 07/31/22  0539   CALCIUM mg/dL 9.1 9.0 9.2 9.4   ALBUMIN g/dL 3.60 3.70 3.80 3.80   PHOSPHORUS mg/dL 3.6 3.8 3.7 3.1       COVID19   Date Value Ref Range Status   07/20/2022 Not Detected Not Detected - Ref. Range Final   07/11/2022 Not Detected Not Detected - Ref. Range Final   06/06/2022 Not Detected Not Detected - Ref. Range Final   05/18/2022 Not Detected Not Detected - Ref. Range Final     Glucose   Date/Time Value Ref Range Status   08/03/2022 0734 100 70 - 130 mg/dL Final     Comment:     Meter: [id number] : [id number] Hatchett Sherrish NA   08/02/2022 2226 133 (H) 70 - 130 mg/dL Final     Comment:     Meter: [id number] : [id number] Webb Raquel NA   08/02/2022 1646 153 (H) 70 - 130 mg/dL Final     Comment:     Meter: [id number] : [id number] Hatchett Sherrish NA   08/02/2022 1136 209 (H) 70 - 130 mg/dL Final     Comment:     Meter: [id number] : [id number] Hatchett Sherrish NA   08/02/2022 0745 85 70 - 130 mg/dL Final     Comment:     Meter: [id number] : [id number] Hatchett Sherrish NA   08/01/2022 2111 271 (H) 70 - 130 mg/dL Final     Comment:     M[facility]: [id number] : [id number] [facility]   08/01/2022 1727 148 (H) 70 - 130 mg/dL Final     Comment:     Meter: [id number] : MARY Lomas RN       EEG  EEG Report          #    Indication: Syncope versus seizure    History: 86-year-old male with history of stroke and more recent subdural   hematoma with history of syncope versus seizure.  The study includes time   locked video.    Medical diagnoses: Stroke.  Embolic stroke.  Cerebral contusion, subdural   hematoma, diabetic neuropathy, hypertension, hyperlipidemia,   hypercholesterolemia, thyroid disease, lung nodules, sleep apnea, stage   III chronic kidney disease    No current facility-administered medications for this visit.     No current outpatient medications on file.     Facility-Administered  Medications Ordered in Other Visits   Medication Dose Route Frequency Provider Last Rate Last Admin   • acetaminophen (TYLENOL) tablet 650 mg  650 mg Oral Q6H PRN Violet Sotelo APRN   650 mg at 07/31/22 2141   • amLODIPine (NORVASC) tablet 2.5 mg  2.5 mg Oral Q24H Derek Jones APRN   2.5 mg at 08/02/22 0811   • benzonatate (TESSALON) capsule 100 mg  100 mg Oral TID PRN Violet Sotelo APRN   100 mg at 07/19/22 0912   • dextrose (D50W) (25 g/50 mL) IV injection 25 g  25 g Intravenous Q15 Min   PRN Violet Sotelo APRN       • dextrose (GLUTOSE) oral gel 15 g  15 g Oral Q15 Min PRN Violet Sotelo APRN       • Divalproex Sodium (DEPAKOTE SPRINKLE) capsule 250 mg  250 mg Oral Q12H   Melinda Duke APRN   250 mg at 08/02/22 0811   • docusate sodium (COLACE) capsule 100 mg  100 mg Oral BID PRN Violet Sotelo APRN       • glucagon (human recombinant) (GLUCAGEN DIAGNOSTIC) injection 1 mg  1 mg   Intramuscular Q15 Min PRN Violet Sotelo APRN       • guaiFENesin (MUCINEX) 12 hr tablet 600 mg  600 mg Oral Q12H Violet Sotelo APRN   600 mg at 08/02/22 0811   • heparin (porcine) 5000 UNIT/ML injection 5,000 Units  5,000 Units   Subcutaneous Q8H Violet Sotelo APRN   5,000 Units at 08/02/22 1446   • hydrALAZINE (APRESOLINE) injection 20 mg  20 mg Intravenous Q6H PRN   Violet Sotelo APRN       • insulin glargine (LANTUS, SEMGLEE) injection 12 Units  12 Units   Subcutaneous Q12H Timi Morrison MD   12 Units at 08/02/22 0808   • insulin lispro (ADMELOG) injection 0-14 Units  0-14 Units Subcutaneous   TID AC Violet Sotelo APRN   2 Units at 08/02/22 1706   • levothyroxine (SYNTHROID, LEVOTHROID) tablet 75 mcg  75 mcg Oral Q AM   Violet Sotelo APRN   75 mcg at 08/02/22 0635   • nitroglycerin (NITROSTAT) SL tablet 0.4 mg  0.4 mg Sublingual Q5 Min PRN   Violet Sotelo, CARITO       • ondansetron (ZOFRAN) tablet 4 mg  4 mg Oral Q6H PRN Violet Sotelo,   CARITO        Or    • ondansetron (ZOFRAN) injection 4 mg  4 mg Intravenous Q6H PRN Violet Sotelo APRN   4 mg at 07/21/22 2310   • pantoprazole (PROTONIX) EC tablet 40 mg  40 mg Oral QAM Violet Sotelo APRN   40 mg at 08/02/22 0635   • potassium chloride (K-DUR,KLOR-CON) ER tablet 40 mEq  40 mEq Oral PRN   Violet Sotelo APRN        Or   • potassium chloride (KLOR-CON) packet 40 mEq  40 mEq Oral PRN Violet Sotelo APRN        Or   • potassium chloride 10 mEq in 100 mL IVPB  10 mEq Intravenous Q1H PRN   Violet Sotelo APRN       • sennosides-docusate (PERICOLACE) 8.6-50 MG per tablet 1 tablet  1 tablet   Oral Nightly PRN Violet Sotelo APRN         Time of study: 27 minutes 10 seconds    Technical summary: The 10-20 system was used for electrode placement   Background: Background rhythm was composed of intermittent 7 Hz low to   moderate amplitude poorly regulated and poorly sustained posteriorly   dominant rhythm.  There are slower activities interspersed within the   background.     Sleep: The patient became drowsy as noted by attenuation of the   background rhythm and an increased proportion of slower activities were   seen.  Vertex sharp transients were seen without sleep spindles     Hyperventilation: Not obtained     Photic stimulation: Photic stimulation was performed at various flash   frequencies showing no significant change in the background activity     EKG: Regular rhythm in the 70s     Video: The video portion showed no unusual involuntary movements    Impression:  Abnormal EEG showing mild diffuse slowing in the background activity   consistent with a mild diffuse encephalopathy versus bihemispheric   structural lesions.  No epileptiform activities were seen.    Dictated utilizing Dragon dictation.     Scheduled Medications  amLODIPine, 2.5 mg, Oral, Q24H  Divalproex Sodium, 250 mg, Oral, Q12H  guaiFENesin, 600 mg, Oral, Q12H  heparin (porcine), 5,000 Units, Subcutaneous, Q8H  insulin  glargine, 12 Units, Subcutaneous, Q12H  insulin lispro, 0-14 Units, Subcutaneous, TID AC  levothyroxine, 75 mcg, Oral, Q AM  pantoprazole, 40 mg, Oral, QAM    Infusions   Diet  Diet Soft Texture; Whole Foods; Thin; Cardiac, Consistent Carbohydrate       Assessment/Plan     Active Hospital Problems    Diagnosis  POA   • **Subdural hematoma (HCC) [S06.5X9A]  Yes   • Surgery, elective [Z41.9]  Not Applicable   • SDH (subdural hematoma) (HCC) [S06.5X9A]  Yes   • Gastroesophageal reflux disease [K21.9]  Yes   • Essential hypertension [I10]  Yes   • Chronic kidney disease, stage 3 (moderate) (CMS/HCC) [N18.30]  Yes   • Type 2 diabetes mellitus with complication (HCC) [E11.8]  Yes      Resolved Hospital Problems   No resolved problems to display.       86 y.o. male admitted with Subdural hematoma (HCC).    · Subdural hematoma-s/p left sided lulu holes on 7/12/22 and embolization of the left middle meningeal arteries on 7/21/22 by Dr. Reyes. On Depakote for seizure ppx per neurology recommendations.   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 Creatinine is stabilized around 1.9 which is likely his baseline.  · Hypothyroidism-synthroid  · Dm2-a1c 9.9 in June. Currently on insulin. blood glucose at goal. Would not make any adjustments.  · HTN-adequate control acutely  · GERD-PPI  · Heparin SC for DVT prophylaxis.  · Full code.  · Discussed with patient and family. Discussed with the patient's niece Geetha Becker via phone.  · Anticipate discharge to SNU facility whenever deemed appropriate by primary. No objection to discharge from my perspective      Timi Morrison MD  [facility]  08/03/22  09:08 EDT    I wore protective equipment throughout this patient encounter including a face mask, gloves and protective eyewear.  Hand hygiene was performed before donning protective equipment and after removal when leaving the room.

## 2022-08-03 NOTE — NURSING NOTE
08/03/22 0936   Colostomy LLQ   Placement Date/Time: 01/01/14 0000   Location: LLQ   Stomal Appliance 1 piece   Stoma Appearance moist;red   Peristomal Assessment Clean;Intact   Accessories/Skin Care cleansed with water;skin barrier ring   Stoma Function stool   Stool Color brown   Stool Consistency soft   Treatment Bag change   WOCN: Ostomy appliance changed. Placed in coloplast 1 piece flat with adapt ring and barrier strips.  Slept thru pouch change. Sitting up in chair

## 2022-08-03 NOTE — CASE MANAGEMENT/SOCIAL WORK
Continued Stay Note  [facility]     Patient Name: Reece Stephen  MRN: [id number]  Today's Date: 8/3/2022    Admit Date: 7/12/2022     Discharge Plan     Row Name 08/03/22 0911       Plan    Plan [facility] tomorrow via family transport    Patient/Family in Agreement with Plan yes    Plan Comments CSW spoke to Luis A[facility] who stated they can accept and will have a bed for patient tomorrow. Patient will need a new covid test within 24hr of d/c. CSW spoke to patient's nijovita/ADRIANNA Becker over the phone. Nijovita is agreeable to [facility] and is agreeable to d/c tomorrow if MD d/c's. Chloejovita states she will be able to transport him to the facility tomorrow. Per request of melody, secure chat sent to attending MD requesting them to call her if they are able. CCP to follow for any further d/c needs. SHAKIR, CSW               Discharge Codes    No documentation.               Expected Discharge Date and Time     Expected Discharge Date Expected Discharge Time    Aug 3, 2022             PURNIMA ARIAS

## 2022-08-03 NOTE — PROGRESS NOTES
[facility]     Progress Note    Patient Name: Reece Stephen  : 1935  MRN: [id number]  Primary Care Physician:  Ana María Atkinson MD  Date of admission: 2022    Subjective   Subjective     Chief Complaint: ckd III    History of Present Illness    Patient with ckd III and SAH    Review of Systems      Objective   Objective     Vitals:   Temp:  [97.6 °F (36.4 °C)-98.2 °F (36.8 °C)] 97.7 °F (36.5 °C)  Heart Rate:  [59-74] 59  Resp:  [16-18] 18  BP: (102-136)/(63-88) 131/74    Physical Exam  General Appearance:  In no acute distress.    HEENT: Normal HEENT exam.        Neurological: Patient is awake,alert      Result Review    Result Review:  I have personally reviewed the results from the time of this admission to 8/3/2022 07:35 EDT and agree with these findings:  []  Laboratory list / accordion  []  Microbiology  []  Radiology  []  EKG/Telemetry   []  Cardiology/Vascular   []  Pathology  []  Old records  []  Other:  Most notable findings include:       Assessment & Plan   Assessment / Plan     Brief Patient Summary:  Reece Stephen is a 86 y.o. 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 Awake, alert. No complaints today. Chart and labs reviewed. Renal function stable and at likely baseline. Mild  acidosis improving. No need for bicarb. Will follow    Willa Martin MD

## 2022-08-03 NOTE — PLAN OF CARE
Goal Outcome Evaluation:  Plan of Care Reviewed With: patient        Progress: improving  Outcome Evaluation: Pt agreed to amb w/PT but req more assist for amb 40ft, unsure if just fatigued or upset that we educ him on safety w/his impulsivity today; pt declined sitting in chair any longer and assisted back to bed after amb, plans SNU possibly 8/4    Patient was intermittently wearing a face mask during this therapy encounter. Therapist used appropriate personal protective equipment including eye protection, mask, and gloves.  Mask used was standard procedure mask. Appropriate PPE was worn during the entire therapy session. Hand hygiene was completed before and after therapy session. Patient is not in enhanced droplet precautions.      Myla Massey, PT tech present

## 2022-08-03 NOTE — PROGRESS NOTES
NEUROSURGERY POST OP PROGRESS NOTE     LOS: 21 days   Patient Care Team:  Ana María Atkinson MD as PCP - General (Internal Medicine)  Jose Ochoa MD as Consulting Physician (Hematology and Oncology)  Armin Hurtado MD as Referring Physician (Colon and Rectal Surgery)    Subjective     Interval History: Currently sitting in chair. Fed self breakfast without difficulty. Ate well. Denies headache or weakness. Exhibited increased activity tolerance with therapy yesterday. Speech therapy following for cognition.     History taken from: patient chart    Objective      Vital Signs  Temp:  [97.6 °F (36.4 °C)-98.1 °F (36.7 °C)] 97.7 °F (36.5 °C)  Heart Rate:  [59-74] 69  Resp:  [16-18] 16  BP: (112-136)/(63-88) 124/82     Physical Exam:     AA&O x 3. Speech more clear.   Follows commands x 4 extremities.   No drift. No dysmetria.   Voiding per urinal.   No calf swelling or discomfort to bilateral calf palpation.        Results Review:     I reviewed the patient's new clinical results.    .  Results from last 7 days   Lab Units 08/03/22  0714 08/02/22  0639 08/01/22  0711   SODIUM mmol/L 141 139 138   POTASSIUM mmol/L 4.5 4.5 4.8   CHLORIDE mmol/L 109* 108* 108*   CO2 mmol/L 18.0* 16.6* 18.0*   BUN mg/dL 40* 42* 40*   CREATININE mg/dL 1.84* 1.90* 1.91*   GLUCOSE mg/dL 90 98 127*   CALCIUM mg/dL 9.1 9.0 9.2     .  Results from last 7 days   Lab Units 08/02/22  0639   WBC 10*3/mm3 6.49   HEMOGLOBIN g/dL 12.4*   HEMATOCRIT % 38.1   PLATELETS 10*3/mm3 315      Latest Reference Range & Units 08/01/22 23:35   Color, UA Yellow, Straw  Yellow   Appearance, UA Clear  Clear   Specific Gravity, UA 1.005 - 1.030  1.019   pH, UA 5.0 - 8.0  5.5   Glucose Negative  Negative   Ketones, UA Negative  Negative   Blood, UA Negative  Negative   Nitrite, UA Negative  Negative   Leukocytes, UA Negative  Negative   Protein, UA Negative  Negative   Bilirubin, UA Negative  Negative   Urobilinogen, UA 0.2 - 1.0 E.U./dL  0.2 E.U./dL       Assessment  & Plan       Subdural hematoma (HCC)    Type 2 diabetes mellitus with complication (HCC)    Chronic kidney disease, stage 3 (moderate) (CMS/HCC)    Essential hypertension    Gastroesophageal reflux disease    SDH (subdural hematoma) (HCC)    Surgery, elective      POD 13 cerebral angiogram with MMA embolization    POD  22 left lulu holes for evacuation of subdural hematoma    CKD - nephrology is following    Toxic metabolic encaphalopathy; improved       Doing well. Plan for transfer to  rehab tomorrow.     Randee Paredes, CARITO  08/03/22  15:29 EDT

## 2022-08-03 NOTE — THERAPY TREATMENT NOTE
Patient Name: Reece Stephen  : 1935    MRN: [id number]                              Today's Date: 8/3/2022       Admit Date: 2022    Visit Dx:     ICD-10-CM ICD-9-CM   1. SDH (subdural hematoma) (HCC)  S06.5X9A 432.1   2. Subdural hematoma (HCC)  S06.5X9A 432.1     Patient Active Problem List   Diagnosis   • History of rectal cancer   • Lung nodule, multiple   • History of DVT (deep vein thrombosis)   • Transient cerebral ischemia   • Hyperkalemia   • Hyperosmolar non-ketotic state in patient with type 2 diabetes mellitus (HCC)   • Type 2 diabetes mellitus with complication (HCC)   • Hyponatremia with extracellular fluid depletion   • Hypothyroidism   • Hyperlipidemia   • Diabetic polyneuropathy associated with type 2 diabetes mellitus (HCC)   • Chronic kidney disease, stage 3 (moderate) (CMS/HCC)   • Non compliance w medication regimen   • Essential hypertension   • Upper gastrointestinal hemorrhage   • Osteoarthritis   • Gastroesophageal reflux disease   • Deep vein thrombosis (HCC)   • Colitis due to Clostridium difficile   • Burn injury   • Anemia   • Cerebrovascular accident (CVA) (HCC)   • Other injury of unspecified body region, initial encounter   • Chronic pain of both knees   • Diabetic neuropathy (HCC)   • Altered mental status   • Fall   • Cerebral contusion (HCC)   • Embolic stroke (HCC)   • Acute weakness   • Subdural hematoma (HCC)   • SDH (subdural hematoma) (HCC)   • Surgery, elective     Past Medical History:   Diagnosis Date   • Anemia    • Arthritis    • Clostridium difficile infection 10/2014   • Colostomy present (HCC)    • Diabetes mellitus (HCC)     TYPE 2, IDDM   • Disease of thyroid gland     ACQUIRED HYPOTHYROIDISM   • Duodenal ulcer 2014   • DVT (deep venous thrombosis) (HCC) 2015    PORT ASSOCIATED OF LEFT UPPER EXTREMITY   • Electrocution     HAD TO HAVE PARTIAL AMPUTATION OF LEFT FOOT   • GERD (gastroesophageal reflux disease)    • Hearing loss    • Hemorrhagic  shock (HCC) 2015    SECONDARY TO BLEEDING DUODENAL ULCER, ADMITTED TO [facility]   • Histiocytoma    • History of blood transfusion 2015    D/T BLEEDING DUODENAL ULCER   • History of chemotherapy     FOLLOWED BY DR. NIGEL NOBLE   • History of radiation therapy 2014    FOLLOWED BY DR. RJ HUI   • Hypercholesterolemia    • Hyperkalemia    • Hypertension    • Hypoglycemia 01/04/2018    SEEN AT [facility] ER   • Incontinent of urine    • Mixed hyperlipidemia    • Multiple lung nodules    • Neuropathy    • OA (osteoarthritis)    • ANNE MARIE (obstructive sleep apnea)     NO CPAP   • Peptic ulceration 2014    Duodenal ulcer   • Pneumonia 03/29/2013    ADMITTED TO [facility]-DOUBLE PNEUMONIA   • Rectal cancer (HCC) 06/2014    T3N1M0, S/P CHEMO, XRT, AND RESECTION, FOLLOWED BY DR. TRACE HERRING   • Stage 3 chronic kidney disease (HCC)     DR. FREDDIE GONZALES   • Stroke (HCC) 11/28/2017    TIA, ADMITTED TO [facility]   • Stroke (HCC) 11/17/2019    CVA, ADMITTED TO [facility]   • Subdural hematoma (HCC)     INCREASE IN HEARING LOSS, BALANCE PROBLEMS, SPEECH SLOWED, MEMORY DEFICIT   • Vitamin D deficiency      Past Surgical History:   Procedure Laterality Date   • AMPUTATION FOOT Left 1971    PARTIAL DUE TO AN ELECTRICAL SHOCK INJURY   • ARM TENDON REPAIR Right     DONE BY DR. OLEARY AND KLEINERT   • COLON RESECTION N/A 11/14/2014    LOW ANTERIOR RESECTION WITH CREATION OF COLOSTOMY, DR. TRACE HRERING AT [facility]   • COLONOSCOPY W/ BIOPSIES N/A 06/26/2014    ULCERATED 5CM MASS IN RECTUM, PATH: MODERATELY DIFFERENTIATED ADENOCARCINOMA, MULTIPLE DIVERTICULA IN SIGMOID, DR. LAUREN JAMES AT [facility]   • COLONOSCOPY W/ BIOPSIES N/A 07/09/2014    RECTAL MASS: INVASIVE MODERATLEY DIFFERIENTIATED ADENOCARCINOMA, AREA TATTOOED, DR. TRACE HERRING AT [facility]   • EMBOLIZATION CEREBRAL Left 7/12/2022    Procedure: LEFT KWABENA HOLE FOR EVACUATION OF SUBDURAL HEMATOMA;  Surgeon: Ciaran Reyes MD;  Location: [facility] MAIN OR;  Service: Neurosurgery;  Laterality: Left;   •  EMBOLIZATION CEREBRAL N/A 7/21/2022    Procedure: CEREBRAL ANGIOGRAM with MMA embolization;  Surgeon: Ciaran Reyes MD;  Location: [facility] OR 18/19;  Service: Neurosurgery;  Laterality: N/A;   • ENDOSCOPY  02/09/2015   • ENDOSCOPY N/A 02/09/2015    RESOLVED DUODENAL ULCER, EGD WNL, DR. FREDDIE MALCOLM AT [facility]   • FLEXIBLE SIGMOIDOSCOPY N/A 10/06/2014    MALIGNANT PARTIALLY OBSTRUCTING TUMOR IN MID RECTUM, DR. DELTA HERRING AT [facility]   • PORTACATH PLACEMENT Left 07/24/2014    LEFT SUBCLAVIAN, DR. KWAKU TAY AT [facility]   • RECTAL ULTRASOUND N/A 07/09/2014    ENDORECTAL US FOR CANCER STAGING, T3N1 RECTAL CANCER AT 7 CM, DR. TRACE HERRING AT [facility]   • TRACHEAL SURGERY N/A 08/31/2014    ENDOTRACHEAL INTUBATION, DR. ALMA ROSA HAGAN AT [facility]   • VENOUS ACCESS DEVICE (PORT) REMOVAL Left 03/12/2015    LEFT SUBCLAVIAN, DR. KWAKU TAY AT [facility]      General Information     Row Name 08/03/22 1518          OT Time and Intention    Document Type therapy note (daily note)  -     Mode of Treatment occupational therapy;individual therapy  -     Row Name 08/03/22 1518          General Information    Existing Precautions/Restrictions fall  -     Row Name 08/03/22 1518          Cognition    Orientation Status (Cognition) oriented x 3  -     Row Name 08/03/22 1518          Safety Issues, Functional Mobility    Impairments Affecting Function (Mobility) balance;endurance/activity tolerance;cognition;strength  -           User Key  (r) = Recorded By, (t) = Taken By, (c) = Cosigned By    Initials Name Provider Type     Dona Long, OTR Occupational Therapist                 Mobility/ADL's     Row Name 08/03/22 1518          Bed Mobility    Comment, (Bed Mobility) NT UIC  -     Row Name 08/03/22 1518          Transfers    Comment, (Transfers) NT pt still working on fruit plate  -     Row Name 08/03/22 1518          Self-Feeding Assessment/Training    [place] Level (Feeding) feeding skills;finger foods;liquids to  "mouth;scoop food and bring to mouth;set up;supervision  -     Position (Self-Feeding) supported sitting  -     Comment, (Feeding) pt w food all over floor and coughing at times when eating  -           User Key  (r) = Recorded By, (t) = Taken By, (c) = Cosigned By    Initials Name Provider Type    Dona Chacon OTR Occupational Therapist               Obj/Interventions     Row Name 08/03/22 1520          Elbow/Forearm (Therapeutic Exercise)    Elbow/Forearm (Therapeutic Exercise) AROM (active range of motion)  -     Elbow/Forearm AROM (Therapeutic Exercise) right;left;flexion;extension;10 repetitions  -     Row Name 08/03/22 1520          Balance    Static Sitting Balance set-up;standby assist  -KP     Dynamic Sitting Balance set-up;standby assist  -KP     Balance Interventions sitting;static  -     Comment, Balance pt reaching and grasping items from floor. OT ed pt to not grab things from the floor that therapist can A him for safety reasons  -           User Key  (r) = Recorded By, (t) = Taken By, (c) = Cosigned By    Initials Name Provider Type    Dona Chacon OTR Occupational Therapist               Goals/Plan    No documentation.                Clinical Impression     Row Name 08/03/22 1521          Pain Assessment    Pretreatment Pain Rating 0/10 - no pain  -     Posttreatment Pain Rating 0/10 - no pain  -     Row Name 08/03/22 1521          Plan of Care Review    Plan of Care Reviewed With patient  -     Progress improving  -     Outcome Evaluation pt worked w OT in tx session today. pt completed ROM ex 10x1. pt completed feeding skills and required A to open packages, straw, and fruit plate. pt dropped fork a few times and required A. pt coughed when eating a couple times and pt states \" I am ok\" pt cont to benefit from OT to incr ADL, strength, balance, and tsf.  -     Row Name 08/03/22 1521          Positioning and Restraints    Pre-Treatment Position " sitting in chair/recliner  -KP     Post Treatment Position chair  -KP     In Chair sitting;call light within reach;encouraged to call for assist;exit alarm on  -KP           User Key  (r) = Recorded By, (t) = Taken By, (c) = Cosigned By    Initials Name Provider Type    Dona Chacon OTR Occupational Therapist               Outcome Measures     Row Name 08/03/22 1523          How much help from another is currently needed...    Putting on and taking off regular lower body clothing? 2  -KP     Bathing (including washing, rinsing, and drying) 2  -KP     Toileting (which includes using toilet bed pan or urinal) 3  -KP     Putting on and taking off regular upper body clothing 3  -KP     Taking care of personal grooming (such as brushing teeth) 3  -KP     Eating meals 3  -KP     AM-PAC 6 Clicks Score (OT) 16  -KP           User Key  (r) = Recorded By, (t) = Taken By, (c) = Cosigned By    Initials Name Provider Type    Dona Chacon OTR Occupational Therapist                Occupational Therapy Education                 Title: PT OT SLP Therapies (In Progress)     Topic: Occupational Therapy (In Progress)     Point: ADL training (In Progress)     Description:   Instruct learner(s) on proper safety adaptation and remediation techniques during self care or transfers.   Instruct in proper use of assistive devices.              Learning Progress Summary           Patient Acceptance, E,D, NR by VISHAL at 7/20/2022 1712    Acceptance, E, VU by  at 7/18/2022 1522    Comment: role of OT, safety in transfers, d/c recommendations, plan of care                   Point: Home exercise program (In Progress)     Description:   Instruct learner(s) on appropriate technique for monitoring, assisting and/or progressing therapeutic exercises/activities.              Learning Progress Summary           Patient Acceptance, E,D, NR by VISHAL at 7/20/2022 1712                   Point: Precautions (In Progress)      "Description:   Instruct learner(s) on prescribed precautions during self-care and functional transfers.              Learning Progress Summary           Patient Acceptance, E, NR by  at 7/31/2022 1442    Acceptance, E,D, NR by  at 7/20/2022 1712                   Point: Body mechanics (In Progress)     Description:   Instruct learner(s) on proper positioning and spine alignment during self-care, functional mobility activities and/or exercises.              Learning Progress Summary           Patient Acceptance, E, NR by  at 8/1/2022 1327    Acceptance, E,D, NR by  at 7/20/2022 1712                               User Key     Initials Effective Dates Name Provider Type Discipline     03/07/18 -  Kim Coleman PTA Physical Therapist Assistant PT     05/23/22 -  Haleigh Pena OT Student OT Student OT     07/13/22 -  Cesilia Milian RN Registered Nurse Nurse              OT Recommendation and Plan     Plan of Care Review  Plan of Care Reviewed With: patient  Progress: improving  Outcome Evaluation: pt worked w OT in tx session today. pt completed ROM ex 10x1. pt completed feeding skills and required A to open packages, straw, and fruit plate. pt dropped fork a few times and required A. pt coughed when eating a couple times and pt states \" I am ok\" pt cont to benefit from OT to incr ADL, strength, balance, and tsf.     Time Calculation:    Time Calculation- OT     Row Name 08/03/22 1523             Time Calculation- OT    OT Start Time 1414  -KP      OT Stop Time 1431  -KP      OT Time Calculation (min) 17 min  -KP      Total Timed Code Minutes- OT 17 minute(s)  -KP      OT Received On 08/03/22  -      OT - Next Appointment 08/04/22  -              Timed Charges    23710 - OT Self Care/Mgmt Minutes 17  -KP              Total Minutes    Timed Charges Total Minutes 17  -KP       Total Minutes 17  -KP            User Key  (r) = Recorded By, (t) = Taken By, (c) = Cosigned By    Initials Name Provider " Type     Dona Long, DOMINIK Occupational Therapist              Therapy Charges for Today     Code Description Service Date Service Provider Modifiers Qty    [id number] HC OT SELF CARE/MGMT/TRAIN EA 15 MIN 8/3/2022 Dona Long, DOMINIK GO 1               Dona Long, DOMINIK  8/3/2022

## 2022-08-03 NOTE — PLAN OF CARE
"Goal Outcome Evaluation:  Plan of Care Reviewed With: patient        Progress: improving  Outcome Evaluation: pt worked w OT in tx session today. pt completed ROM ex 10x1. pt completed feeding skills and required A to open packages, straw, and fruit plate. pt dropped fork a few times and required A. pt coughed when eating a couple times and pt states \" I am ok\" pt cont to benefit from OT to incr ADL, strength, balance, and tsf.  OT wore all PPE, washed hands before/after.  "

## 2022-08-03 NOTE — PLAN OF CARE
Problem: Fall Injury Risk  Goal: Absence of Fall and Fall-Related Injury  Intervention: Promote Injury-Free Environment  Recent Flowsheet Documentation  Taken 8/2/2022 1930 by Irene Cruz RN  Safety Promotion/Fall Prevention:   activity supervised   assistive device/personal items within reach   clutter free environment maintained   safety round/check completed   room organization consistent       Problem: Adult Inpatient Plan of Care  Goal: Plan of Care Review  Outcome: Ongoing, Progressing  Flowsheets  Taken 8/3/2022 0207 by Irene Cruz RN  Progress: improving  Taken 8/2/2022 1354 by Haleigh Pena OT Student  Plan of Care Reviewed With: patient     Intervention: Protect Dignity, Rights, and Personal Wellbeing  Recent Flowsheet Documentation  Taken 8/2/2022 1930 by Irene Cruz RN  Trust Relationship/Rapport:   care explained   thoughts/feelings acknowledged   reassurance provided  Intervention: Protect Skin and Joint Integrity  Recent Flowsheet Documentation  Taken 8/2/2022 1930 by Irene Cruz RN  Body Position: position changed independently  Range of Motion: active ROM (range of motion) encouraged       Problem: Fall Injury Risk  Goal: Absence of Fall and Fall-Related Injury  Outcome: Ongoing, Progressing  Intervention: Identify and Manage Contributors  Recent Flowsheet Documentation  Taken 8/2/2022 1930 by Irene Cruz RN  Medication Review/Management: medications reviewed  Intervention: Promote Injury-Free Environment  Recent Flowsheet Documentation  Taken 8/2/2022 1930 by Irene Cruz RN  Safety Promotion/Fall Prevention:   activity supervised   assistive device/personal items within reach   clutter free environment maintained   safety round/check completed   room organization consistent     Plan of Care Reviewed With: patient        Progress: improving

## 2022-08-04 VITALS
HEART RATE: 65 BPM | DIASTOLIC BLOOD PRESSURE: 63 MMHG | SYSTOLIC BLOOD PRESSURE: 126 MMHG | BODY MASS INDEX: 23.56 KG/M2 | WEIGHT: 155.42 LBS | HEIGHT: 68 IN | OXYGEN SATURATION: 93 % | RESPIRATION RATE: 18 BRPM | TEMPERATURE: 97.9 F

## 2022-08-04 LAB
ALBUMIN SERPL-MCNC: 4 G/DL (ref 3.5–5.2)
ANION GAP SERPL CALCULATED.3IONS-SCNC: 13.4 MMOL/L (ref 5–15)
BUN SERPL-MCNC: 45 MG/DL (ref 8–23)
BUN/CREAT SERPL: 24.3 (ref 7–25)
CALCIUM SPEC-SCNC: 9.5 MG/DL (ref 8.6–10.5)
CHLORIDE SERPL-SCNC: 112 MMOL/L (ref 98–107)
CO2 SERPL-SCNC: 19.6 MMOL/L (ref 22–29)
CREAT SERPL-MCNC: 1.85 MG/DL (ref 0.76–1.27)
EGFRCR SERPLBLD CKD-EPI 2021: 35 ML/MIN/1.73
GLUCOSE BLDC GLUCOMTR-MCNC: 126 MG/DL (ref 70–130)
GLUCOSE BLDC GLUCOMTR-MCNC: 159 MG/DL (ref 70–130)
GLUCOSE BLDC GLUCOMTR-MCNC: 224 MG/DL (ref 70–130)
GLUCOSE SERPL-MCNC: 138 MG/DL (ref 65–99)
PHOSPHATE SERPL-MCNC: 3.2 MG/DL (ref 2.5–4.5)
POTASSIUM SERPL-SCNC: 5.1 MMOL/L (ref 3.5–5.2)
SARS-COV-2 ORF1AB RESP QL NAA+PROBE: NOT DETECTED
SODIUM SERPL-SCNC: 145 MMOL/L (ref 136–145)

## 2022-08-04 PROCEDURE — 80069 RENAL FUNCTION PANEL: CPT | Performed by: NURSE PRACTITIONER

## 2022-08-04 PROCEDURE — U0004 COV-19 TEST NON-CDC HGH THRU: HCPCS | Performed by: STUDENT IN AN ORGANIZED HEALTH CARE EDUCATION/TRAINING PROGRAM

## 2022-08-04 PROCEDURE — 97110 THERAPEUTIC EXERCISES: CPT | Performed by: OCCUPATIONAL THERAPIST

## 2022-08-04 PROCEDURE — 25010000002 HEPARIN (PORCINE) PER 1000 UNITS: Performed by: NURSE PRACTITIONER

## 2022-08-04 PROCEDURE — 63710000001 INSULIN LISPRO (HUMAN) PER 5 UNITS: Performed by: NURSE PRACTITIONER

## 2022-08-04 PROCEDURE — U0005 INFEC AGEN DETEC AMPLI PROBE: HCPCS | Performed by: STUDENT IN AN ORGANIZED HEALTH CARE EDUCATION/TRAINING PROGRAM

## 2022-08-04 PROCEDURE — 82962 GLUCOSE BLOOD TEST: CPT

## 2022-08-04 RX ORDER — INSULIN LISPRO 100 [IU]/ML
0-14 INJECTION, SOLUTION INTRAVENOUS; SUBCUTANEOUS
Refills: 12
Start: 2022-08-05 | End: 2022-09-27 | Stop reason: HOSPADM

## 2022-08-04 RX ORDER — NICOTINE POLACRILEX 4 MG
15 LOZENGE BUCCAL
Start: 2022-08-04 | End: 2022-09-27 | Stop reason: HOSPADM

## 2022-08-04 RX ORDER — AMLODIPINE BESYLATE 2.5 MG/1
2.5 TABLET ORAL
Start: 2022-08-05 | End: 2022-09-27 | Stop reason: HOSPADM

## 2022-08-04 RX ORDER — NITROGLYCERIN 0.4 MG/1
0.4 TABLET SUBLINGUAL
Refills: 12
Start: 2022-08-04 | End: 2022-09-27 | Stop reason: HOSPADM

## 2022-08-04 RX ORDER — GUAIFENESIN 600 MG/1
600 TABLET, EXTENDED RELEASE ORAL EVERY 12 HOURS SCHEDULED
Start: 2022-08-04 | End: 2022-09-27 | Stop reason: HOSPADM

## 2022-08-04 RX ORDER — LEVOTHYROXINE SODIUM 0.07 MG/1
75 TABLET ORAL
Start: 2022-08-05

## 2022-08-04 RX ORDER — PANTOPRAZOLE SODIUM 40 MG/1
40 TABLET, DELAYED RELEASE ORAL EVERY MORNING
Start: 2022-08-05

## 2022-08-04 RX ORDER — ACETAMINOPHEN 325 MG/1
650 TABLET ORAL EVERY 6 HOURS PRN
Start: 2022-08-04

## 2022-08-04 RX ADMIN — HEPARIN SODIUM 5000 UNITS: 5000 INJECTION INTRAVENOUS; SUBCUTANEOUS at 05:39

## 2022-08-04 RX ADMIN — DIVALPROEX SODIUM 250 MG: 125 CAPSULE, COATED PELLETS ORAL at 08:51

## 2022-08-04 RX ADMIN — PANTOPRAZOLE SODIUM 40 MG: 40 TABLET, DELAYED RELEASE ORAL at 06:27

## 2022-08-04 RX ADMIN — LEVOTHYROXINE SODIUM 75 MCG: 0.07 TABLET ORAL at 05:39

## 2022-08-04 RX ADMIN — INSULIN LISPRO 5 UNITS: 100 INJECTION, SOLUTION INTRAVENOUS; SUBCUTANEOUS at 12:31

## 2022-08-04 RX ADMIN — INSULIN LISPRO 3 UNITS: 100 INJECTION, SOLUTION INTRAVENOUS; SUBCUTANEOUS at 17:12

## 2022-08-04 RX ADMIN — HEPARIN SODIUM 5000 UNITS: 5000 INJECTION INTRAVENOUS; SUBCUTANEOUS at 15:00

## 2022-08-04 RX ADMIN — INSULIN GLARGINE-YFGN 12 UNITS: 100 INJECTION, SOLUTION SUBCUTANEOUS at 08:51

## 2022-08-04 RX ADMIN — GUAIFENESIN 600 MG: 600 TABLET, EXTENDED RELEASE ORAL at 08:51

## 2022-08-04 NOTE — PLAN OF CARE
Problem: Fall Injury Risk  Goal: Absence of Fall and Fall-Related Injury  Intervention: Promote Injury-Free Environment  Recent Flowsheet Documentation  Taken 8/3/2022 1930 by Irene Cruz RN  Safety Promotion/Fall Prevention:   activity supervised   assistive device/personal items within reach   clutter free environment maintained   safety round/check completed   room organization consistent    Problem: Adult Inpatient Plan of Care  Goal: Absence of Hospital-Acquired Illness or Injury  Intervention: Prevent Infection  Recent Flowsheet Documentation  Taken 8/3/2022 1930 by Irene Cruz RN  Infection Prevention:   environmental surveillance performed   equipment surfaces disinfected   hand hygiene promoted   personal protective equipment utilized   rest/sleep promoted   visitors restricted/screened   single patient room provided     Problem: Adult Inpatient Plan of Care  Goal: Optimal Comfort and Wellbeing  8/4/2022 0427 by Irene Cruz RN  Outcome: Ongoing, Progressing  8/4/2022 0425 by Irene Cruz RN  Outcome: Ongoing, Progressing  Intervention: Provide Person-Centered Care  Recent Flowsheet Documentation  Taken 8/3/2022 1930 by Irene Cruz RN  Trust Relationship/Rapport:   care explained   thoughts/feelings acknowledged   questions encouraged   reassurance provided     Problem: Adult Inpatient Plan of Care  Goal: Optimal Comfort and Wellbeing  Intervention: Provide Person-Centered Care  Recent Flowsheet Documentation  Taken 8/3/2022 1930 by Irene Cruz RN  Trust Relationship/Rapport:   care explained   thoughts/feelings acknowledged   questions encouraged   reassurance provided     Problem: Restraint, Nonbehavioral (Nonviolent)  Goal: Absence of Harm or Injury  Intervention: Protect Skin and Joint Integrity  Recent Flowsheet Documentation  Taken 8/3/2022 1930 by Irene Cruz RN  Body Position: position changed independently           Goal Outcome Evaluation:  Plan of Care  Reviewed With: patient        Progress: improving

## 2022-08-04 NOTE — THERAPY TREATMENT NOTE
Patient Name: Reece Stephen  : 1935    MRN: [id number]                              Today's Date: 2022       Admit Date: 2022    Visit Dx:     ICD-10-CM ICD-9-CM   1. SDH (subdural hematoma) (HCC)  S06.5X9A 432.1   2. Subdural hematoma (HCC)  S06.5X9A 432.1     Patient Active Problem List   Diagnosis   • History of rectal cancer   • Lung nodule, multiple   • History of DVT (deep vein thrombosis)   • Transient cerebral ischemia   • Hyperkalemia   • Hyperosmolar non-ketotic state in patient with type 2 diabetes mellitus (HCC)   • Type 2 diabetes mellitus with complication (HCC)   • Hyponatremia with extracellular fluid depletion   • Hypothyroidism   • Hyperlipidemia   • Diabetic polyneuropathy associated with type 2 diabetes mellitus (HCC)   • Chronic kidney disease, stage 3 (moderate) (CMS/HCC)   • Non compliance w medication regimen   • Essential hypertension   • Upper gastrointestinal hemorrhage   • Osteoarthritis   • Gastroesophageal reflux disease   • Deep vein thrombosis (HCC)   • Colitis due to Clostridium difficile   • Burn injury   • Anemia   • Cerebrovascular accident (CVA) (HCC)   • Other injury of unspecified body region, initial encounter   • Chronic pain of both knees   • Diabetic neuropathy (HCC)   • Altered mental status   • Fall   • Cerebral contusion (HCC)   • Embolic stroke (HCC)   • Acute weakness   • Subdural hematoma (HCC)   • SDH (subdural hematoma) (HCC)   • Surgery, elective     Past Medical History:   Diagnosis Date   • Anemia    • Arthritis    • Clostridium difficile infection 10/2014   • Colostomy present (HCC)    • Diabetes mellitus (HCC)     TYPE 2, IDDM   • Disease of thyroid gland     ACQUIRED HYPOTHYROIDISM   • Duodenal ulcer 2014   • DVT (deep venous thrombosis) (HCC) 2015    PORT ASSOCIATED OF LEFT UPPER EXTREMITY   • Electrocution     HAD TO HAVE PARTIAL AMPUTATION OF LEFT FOOT   • GERD (gastroesophageal reflux disease)    • Hearing loss    • Hemorrhagic  shock (HCC) 2015    SECONDARY TO BLEEDING DUODENAL ULCER, ADMITTED TO [facility]   • Histiocytoma    • History of blood transfusion 2015    D/T BLEEDING DUODENAL ULCER   • History of chemotherapy     FOLLOWED BY DR. NIGEL NOBLE   • History of radiation therapy 2014    FOLLOWED BY DR. RJ HUI   • Hypercholesterolemia    • Hyperkalemia    • Hypertension    • Hypoglycemia 01/04/2018    SEEN AT [facility] ER   • Incontinent of urine    • Mixed hyperlipidemia    • Multiple lung nodules    • Neuropathy    • OA (osteoarthritis)    • ANNE MARIE (obstructive sleep apnea)     NO CPAP   • Peptic ulceration 2014    Duodenal ulcer   • Pneumonia 03/29/2013    ADMITTED TO [facility]-DOUBLE PNEUMONIA   • Rectal cancer (HCC) 06/2014    T3N1M0, S/P CHEMO, XRT, AND RESECTION, FOLLOWED BY DR. TRACE HERRING   • Stage 3 chronic kidney disease (HCC)     DR. FREDDIE GONZALES   • Stroke (HCC) 11/28/2017    TIA, ADMITTED TO [facility]   • Stroke (HCC) 11/17/2019    CVA, ADMITTED TO [facility]   • Subdural hematoma (HCC)     INCREASE IN HEARING LOSS, BALANCE PROBLEMS, SPEECH SLOWED, MEMORY DEFICIT   • Vitamin D deficiency      Past Surgical History:   Procedure Laterality Date   • AMPUTATION FOOT Left 1971    PARTIAL DUE TO AN ELECTRICAL SHOCK INJURY   • ARM TENDON REPAIR Right     DONE BY DR. OLEARY AND KLEINERT   • COLON RESECTION N/A 11/14/2014    LOW ANTERIOR RESECTION WITH CREATION OF COLOSTOMY, DR. TRACE HERRING AT [facility]   • COLONOSCOPY W/ BIOPSIES N/A 06/26/2014    ULCERATED 5CM MASS IN RECTUM, PATH: MODERATELY DIFFERENTIATED ADENOCARCINOMA, MULTIPLE DIVERTICULA IN SIGMOID, DR. LAUREN JAMES AT [facility]   • COLONOSCOPY W/ BIOPSIES N/A 07/09/2014    RECTAL MASS: INVASIVE MODERATLEY DIFFERIENTIATED ADENOCARCINOMA, AREA TATTOOED, DR. TRACE HERRING AT [facility]   • EMBOLIZATION CEREBRAL Left 7/12/2022    Procedure: LEFT KWABENA HOLE FOR EVACUATION OF SUBDURAL HEMATOMA;  Surgeon: Ciaran Reyes MD;  Location: [facility] MAIN OR;  Service: Neurosurgery;  Laterality: Left;   •  EMBOLIZATION CEREBRAL N/A 7/21/2022    Procedure: CEREBRAL ANGIOGRAM with MMA embolization;  Surgeon: Ciaran Reyes MD;  Location: [facility] OR 18/19;  Service: Neurosurgery;  Laterality: N/A;   • ENDOSCOPY  02/09/2015   • ENDOSCOPY N/A 02/09/2015    RESOLVED DUODENAL ULCER, EGD WNL, DR. FREDDIE MALCOLM AT [facility]   • FLEXIBLE SIGMOIDOSCOPY N/A 10/06/2014    MALIGNANT PARTIALLY OBSTRUCTING TUMOR IN MID RECTUM, DR. DELTA HERRING AT [facility]   • PORTACATH PLACEMENT Left 07/24/2014    LEFT SUBCLAVIAN, DR. KWAKU TAY AT [facility]   • RECTAL ULTRASOUND N/A 07/09/2014    ENDORECTAL US FOR CANCER STAGING, T3N1 RECTAL CANCER AT 7 CM, DR. TRACE HERRING AT [facility]   • TRACHEAL SURGERY N/A 08/31/2014    ENDOTRACHEAL INTUBATION, DR. ALMA ROSA HAGAN AT [facility]   • VENOUS ACCESS DEVICE (PORT) REMOVAL Left 03/12/2015    LEFT SUBCLAVIAN, DR. KWAKU TAY AT [facility]      General Information     Row Name 08/04/22 1151          OT Time and Intention    Document Type therapy note (daily note)  -     Mode of Treatment occupational therapy;individual therapy  -     Row Name 08/04/22 1151          General Information    Existing Precautions/Restrictions fall  -     Row Name 08/04/22 1151          Cognition    Orientation Status (Cognition) oriented x 3  -     Row Name 08/04/22 1151          Safety Issues, Functional Mobility    Impairments Affecting Function (Mobility) balance;cognition;endurance/activity tolerance;strength  -           User Key  (r) = Recorded By, (t) = Taken By, (c) = Cosigned By    Initials Name Provider Type     Dona Long, OTR Occupational Therapist                 Mobility/ADL's     Row Name 08/04/22 1152          Bed Mobility    Comment, (Bed Mobility) NT UIC  -KP     Row Name 08/04/22 1152          Transfers    Comment, (Transfers) just UIC . pt fatigued post ex  -KP     Row Name 08/04/22 1152          Activities of Daily Living    BADL Assessment/Intervention grooming  -     Row Name 08/04/22 1152           Grooming Assessment/Training    [place] Level (Grooming) grooming skills;wash face, hands;set up;supervision  -KP     Position (Grooming) supported sitting  -KP           User Key  (r) = Recorded By, (t) = Taken By, (c) = Cosigned By    Initials Name Provider Type    Dona Chacon OTR Occupational Therapist               Obj/Interventions     Row Name 08/04/22 1152          Shoulder (Therapeutic Exercise)    Shoulder AROM (Therapeutic Exercise) right;left;flexion;extension;10 repetitions;2 sets;sitting  -KP     Row Name 08/04/22 1152          Elbow/Forearm (Therapeutic Exercise)    Elbow/Forearm AROM (Therapeutic Exercise) right;left;flexion;pronation;sitting;supination;extension;10 repetitions;2 sets  -     Row Name 08/04/22 1152          Balance    Static Sitting Balance set-up;standby assist  -     Balance Interventions sitting;static;dynamic  -KP           User Key  (r) = Recorded By, (t) = Taken By, (c) = Cosigned By    Initials Name Provider Type    Dona Chacon OTR Occupational Therapist               Goals/Plan    No documentation.                Clinical Impression     Row Name 08/04/22 1153          Pain Assessment    Pretreatment Pain Rating 0/10 - no pain  -KP     Posttreatment Pain Rating 0/10 - no pain  -KP     Row Name 08/04/22 1153          Plan of Care Review    Plan of Care Reviewed With patient  -KP     Progress improving  -     Outcome Evaluation pt worked w OT in tx session. pt completed grooming skills w SBA. completed ROM ex w B UE to incr strength/endurance 10x2 w VC and demo. pt fatigued post ex and tsf NT due to fatigue and just up to chair. cont therapy as pt tolerates.  -     Row Name 08/04/22 1153          Positioning and Restraints    Pre-Treatment Position sitting in chair/recliner  -KP     Post Treatment Position chair  -KP     In Chair reclined;call light within reach;encouraged to call for assist;exit alarm on  -KP           User Key  (r) =  Recorded By, (t) = Taken By, (c) = Cosigned By    Initials Name Provider Type    Dona Chacon OTR Occupational Therapist               Outcome Measures     Row Name 08/04/22 1154          How much help from another is currently needed...    Putting on and taking off regular lower body clothing? 2  -KP     Bathing (including washing, rinsing, and drying) 2  -KP     Toileting (which includes using toilet bed pan or urinal) 3  -KP     Putting on and taking off regular upper body clothing 3  -KP     Taking care of personal grooming (such as brushing teeth) 3  -KP     Eating meals 3  -KP     AM-PAC 6 Clicks Score (OT) 16  -KP           User Key  (r) = Recorded By, (t) = Taken By, (c) = Cosigned By    Initials Name Provider Type    Dona Chacon OTR Occupational Therapist                Occupational Therapy Education                 Title: PT OT SLP Therapies (In Progress)     Topic: Occupational Therapy (In Progress)     Point: ADL training (In Progress)     Description:   Instruct learner(s) on proper safety adaptation and remediation techniques during self care or transfers.   Instruct in proper use of assistive devices.              Learning Progress Summary           Patient Acceptance, E,D, NR by VISHAL at 7/20/2022 1712    Acceptance, E, VU by  at 7/18/2022 1522    Comment: role of OT, safety in transfers, d/c recommendations, plan of care                   Point: Home exercise program (In Progress)     Description:   Instruct learner(s) on appropriate technique for monitoring, assisting and/or progressing therapeutic exercises/activities.              Learning Progress Summary           Patient Acceptance, E,D, NR by VISHAL at 7/20/2022 1712                   Point: Precautions (In Progress)     Description:   Instruct learner(s) on prescribed precautions during self-care and functional transfers.              Learning Progress Summary           Patient Acceptance, E, NR by CLEMENTE at 7/31/2022  1442    Acceptance, E,D, NR by VISHAL at 7/20/2022 1712                   Point: Body mechanics (In Progress)     Description:   Instruct learner(s) on proper positioning and spine alignment during self-care, functional mobility activities and/or exercises.              Learning Progress Summary           Patient Acceptance, E, NR by CLEMENTE at 8/1/2022 1327    Acceptance, E,D, NR by VISHAL at 7/20/2022 1712                               User Key     Initials Effective Dates Name Provider Type Discipline     03/07/18 -  Kim Coleman PTA Physical Therapist Assistant PT     05/23/22 -  Haleigh Pena, OT Student OT Student OT     07/13/22 -  Cesilia Milian RN Registered Nurse Nurse              OT Recommendation and Plan     Plan of Care Review  Plan of Care Reviewed With: patient  Progress: improving  Outcome Evaluation: pt worked w OT in tx session. pt completed grooming skills w SBA. completed ROM ex w B UE to incr strength/endurance 10x2 w VC and demo. pt fatigued post ex and tsf NT due to fatigue and just up to chair. cont therapy as pt tolerates.     Time Calculation:    Time Calculation- OT     Row Name 08/04/22 1154             Time Calculation- OT    OT Start Time 0953  -      OT Stop Time 1003  -      OT Time Calculation (min) 10 min  -      Total Timed Code Minutes- OT 10 minute(s)  -      OT Received On 08/04/22  -      OT - Next Appointment 08/05/22  -              Timed Charges    93525 - OT Therapeutic Exercise Minutes 10  -KP              Total Minutes    Timed Charges Total Minutes 10  -KP       Total Minutes 10  -KP            User Key  (r) = Recorded By, (t) = Taken By, (c) = Cosigned By    Initials Name Provider Type     Dona Long OTR Occupational Therapist              Therapy Charges for Today     Code Description Service Date Service Provider Modifiers Qty    [id number]  OT SELF CARE/MGMT/TRAIN EA 15 MIN 8/3/2022 Dona Long OTR GO 1    [id number]  OT  THER PROC EA 15 MIN 8/4/2022 Dona Long, OTR GO 1               Dona Long, OTR  8/4/2022

## 2022-08-04 NOTE — PROGRESS NOTES
[facility]     Progress Note    Patient Name: Reece Stephen  : 1935  MRN: [id number]  Primary Care Physician:  Ana María Atkinson MD  Date of admission: 2022    Subjective   Subjective     Chief Complaint: ckd III    History of Present Illness    Patient with ckd III and SAH    Review of Systems      Objective   Objective     Vitals:   Temp:  [97.7 °F (36.5 °C)-98.2 °F (36.8 °C)] 98.2 °F (36.8 °C)  Heart Rate:  [63-74] 63  Resp:  [16-18] 18  BP: (112-134)/(60-82) 125/66    Physical Exam  General Appearance:  In no acute distress.    HEENT: Normal HEENT exam.        Neurological: Patient is asleep in no distress      Result Review    Result Review:  I have personally reviewed the results from the time of this admission to 2022 08:13 EDT and agree with these findings:  []  Laboratory list / accordion  []  Microbiology  []  Radiology  []  EKG/Telemetry   []  Cardiology/Vascular   []  Pathology  []  Old records  []  Other:  Most notable findings include:       Assessment & Plan   Assessment / Plan     Brief Patient Summary:  Reece Stephen is a 86 y.o. male who has ckd III-IV with SAH    Active Hospital Problems:  Active Hospital Problems    Diagnosis    • **Subdural hematoma (HCC)    • Surgery, elective    • SDH (subdural hematoma) (HCC)    • Gastroesophageal reflux disease    • Essential hypertension    • Chronic kidney disease, stage 3 (moderate) (CMS/HCC)      Replacing diagnoses that were inactivated after the 10/1 regulatory import     • Type 2 diabetes mellitus with complication (HCC)      Plan:   CKD stage IIIb-IV,     Subdural hematoma, status postevacuation    SDH (subdural hematoma) (HCC)    Surgery, elective  Hypertension, off Cardene drip, on oral medications, BP at goal  Anemia of CKD  Altered mental status  Type 2 diabetes mellitus  Metabolic acidosis    Patient asleep in no distress. Renal function and electrolytes at goal. Chart reviewed. At baseline. Will follow    Willa Martin  MD

## 2022-08-04 NOTE — PLAN OF CARE
Goal Outcome Evaluation:  Plan of Care Reviewed With: patient        Progress: improving  Outcome Evaluation: pt worked w OT in tx session. pt completed grooming skills w SBA. completed ROM ex w B UE to incr strength/endurance 10x2 w VC and demo. pt fatigued post ex and tsf NT due to fatigue and just up to chair. cont therapy as pt tolerates.

## 2022-08-04 NOTE — PROGRESS NOTES
Name: Reece Stephen ADMIT: 2022   : 1935  PCP: Ana María Atkinson MD    MRN: [id number] LOS: 22 days   AGE/SEX: 86 y.o. male  ROOM: [facility]     Subjective   Subjective     No events overnight. Doing well. No complaints.       Objective   Objective   Vital Signs  Temp:  [97.7 °F (36.5 °C)-98.2 °F (36.8 °C)] 97.9 °F (36.6 °C)  Heart Rate:  [63-70] 65  Resp:  [16-18] 18  BP: (102-132)/(61-98) 126/63  SpO2:  [92 %-97 %] 93 %  on   ;   Device (Oxygen Therapy): room air  Body mass index is 23.63 kg/m².  Physical Exam  Constitutional:       General: He is not in acute distress.     Appearance: He is not toxic-appearing.   Cardiovascular:      Rate and Rhythm: Normal rate and regular rhythm.      Heart sounds: Normal heart sounds.   Pulmonary:      Effort: Pulmonary effort is normal.      Breath sounds: Normal breath sounds.   Abdominal:      General: Bowel sounds are normal.      Palpations: Abdomen is soft.   Musculoskeletal:         General: No tenderness.      Right lower leg: No edema.      Left lower leg: No edema.         Results Review     I reviewed the patient's new clinical results.  Results from last 7 days   Lab Units 22  0639   WBC 10*3/mm3 6.49   HEMOGLOBIN g/dL 12.4*   PLATELETS 10*3/mm3 315     Results from last 7 days   Lab Units 22  0850 22  0714 22  0639 22  0711   SODIUM mmol/L 145 141 139 138   POTASSIUM mmol/L 5.1 4.5 4.5 4.8   CHLORIDE mmol/L 112* 109* 108* 108*   CO2 mmol/L 19.6* 18.0* 16.6* 18.0*   BUN mg/dL 45* 40* 42* 40*   CREATININE mg/dL 1.85* 1.84* 1.90* 1.91*   GLUCOSE mg/dL 138* 90 98 127*   Estimated Creatinine Clearance: 28.6 mL/min (A) (by C-G formula based on SCr of 1.85 mg/dL (H)).  Results from last 7 days   Lab Units 22  0850 22  0714 22  0639 22  0711   ALBUMIN g/dL 4.00 3.60 3.70 3.80     Results from last 7 days   Lab Units 22  0850 22  0714 22  0639 22  0711   CALCIUM mg/dL 9.5 9.1 9.0 9.2    ALBUMIN g/dL 4.00 3.60 3.70 3.80   PHOSPHORUS mg/dL 3.2 3.6 3.8 3.7       COVID19   Date Value Ref Range Status   08/04/2022 Not Detected Not Detected - Ref. Range Final   07/20/2022 Not Detected Not Detected - Ref. Range Final   07/11/2022 Not Detected Not Detected - Ref. Range Final   06/06/2022 Not Detected Not Detected - Ref. Range Final   05/18/2022 Not Detected Not Detected - Ref. Range Final     Glucose   Date/Time Value Ref Range Status   08/04/2022 1643 159 (H) 70 - 130 mg/dL Final     Comment:     Meter: [id number] : [id number] Hatchett Sherris NA   08/04/2022 1105 224 (H) 70 - 130 mg/dL Final     Comment:     Meter: [id number] : [id number] Hatchett Sherris NA   08/04/2022 0732 126 70 - 130 mg/dL Final     Comment:     Meter: [id number] : [id number] Hatchett Sherris NA   08/03/2022 2021 347 (H) 70 - 130 mg/dL Final     Comment:     Meter: [id number] : [id number] Lopez Leprimitivo NA   08/03/2022 1638 98 70 - 130 mg/dL Final     Comment:     Meter: [id number] : [id number] Hatchett Sherris NA   08/03/2022 0734 100 70 - 130 mg/dL Final     Comment:     Meter: [id number] : [id number] Hatchett Sherris NA   08/02/2022 2226 133 (H) 70 - 130 mg/dL Final     Comment:     Meter: [id number] : [id number] Jon DOE       EEG  EEG Report          #    Indication: Syncope versus seizure    History: 86-year-old male with history of stroke and more recent subdural   hematoma with history of syncope versus seizure.  The study includes time   locked video.    Medical diagnoses: Stroke.  Embolic stroke.  Cerebral contusion, subdural   hematoma, diabetic neuropathy, hypertension, hyperlipidemia,   hypercholesterolemia, thyroid disease, lung nodules, sleep apnea, stage   III chronic kidney disease    No current facility-administered medications for this visit.     No current outpatient medications on file.     Facility-Administered Medications Ordered in Other Visits   Medication Dose  Route Frequency Provider Last Rate Last Admin   • acetaminophen (TYLENOL) tablet 650 mg  650 mg Oral Q6H PRN Violet Sotelo APRN   650 mg at 07/31/22 2141   • amLODIPine (NORVASC) tablet 2.5 mg  2.5 mg Oral Q24H Derek Jones APRN   2.5 mg at 08/02/22 0811   • benzonatate (TESSALON) capsule 100 mg  100 mg Oral TID PRN Violet Sotelo APRN   100 mg at 07/19/22 0912   • dextrose (D50W) (25 g/50 mL) IV injection 25 g  25 g Intravenous Q15 Min   PRN Violet Sotelo APRN       • dextrose (GLUTOSE) oral gel 15 g  15 g Oral Q15 Min PRN Violet Sotelo APRN       • Divalproex Sodium (DEPAKOTE SPRINKLE) capsule 250 mg  250 mg Oral Q12H   Melinda Duke APRN   250 mg at 08/02/22 0811   • docusate sodium (COLACE) capsule 100 mg  100 mg Oral BID PRN Violet Sotelo APRN       • glucagon (human recombinant) (GLUCAGEN DIAGNOSTIC) injection 1 mg  1 mg   Intramuscular Q15 Min PRN Violet Sotelo APRN       • guaiFENesin (MUCINEX) 12 hr tablet 600 mg  600 mg Oral Q12H Violet Sotelo APRN   600 mg at 08/02/22 0811   • heparin (porcine) 5000 UNIT/ML injection 5,000 Units  5,000 Units   Subcutaneous Q8H Violet Sotelo APRN   5,000 Units at 08/02/22 1446   • hydrALAZINE (APRESOLINE) injection 20 mg  20 mg Intravenous Q6H PRN   Violet Sotelo APRN       • insulin glargine (LANTUS, SEMGLEE) injection 12 Units  12 Units   Subcutaneous Q12H Timi Morrison MD   12 Units at 08/02/22 0808   • insulin lispro (ADMELOG) injection 0-14 Units  0-14 Units Subcutaneous   TID AC Violet Sotelo APRN   2 Units at 08/02/22 1706   • levothyroxine (SYNTHROID, LEVOTHROID) tablet 75 mcg  75 mcg Oral Q AM   Violet Sotelo APRN   75 mcg at 08/02/22 0635   • nitroglycerin (NITROSTAT) SL tablet 0.4 mg  0.4 mg Sublingual Q5 Min PRN   Violet Sotelo APRN       • ondansetron (ZOFRAN) tablet 4 mg  4 mg Oral Q6H PRN Violet Sotelo APRN        Or   • ondansetron (ZOFRAN) injection 4 mg  4 mg  Intravenous Q6H PRN Violet Sotelo APRN   4 mg at 07/21/22 2310   • pantoprazole (PROTONIX) EC tablet 40 mg  40 mg Oral QAM Violet Sotelo APRN   40 mg at 08/02/22 0635   • potassium chloride (K-DUR,KLOR-CON) ER tablet 40 mEq  40 mEq Oral PRN   Violet Sotelo APRN        Or   • potassium chloride (KLOR-CON) packet 40 mEq  40 mEq Oral PRN Violet Sotelo APRN        Or   • potassium chloride 10 mEq in 100 mL IVPB  10 mEq Intravenous Q1H PRN   Violet Sotelo APRN       • sennosides-docusate (PERICOLACE) 8.6-50 MG per tablet 1 tablet  1 tablet   Oral Nightly PRN Violet Sotelo APRN         Time of study: 27 minutes 10 seconds    Technical summary: The 10-20 system was used for electrode placement   Background: Background rhythm was composed of intermittent 7 Hz low to   moderate amplitude poorly regulated and poorly sustained posteriorly   dominant rhythm.  There are slower activities interspersed within the   background.     Sleep: The patient became drowsy as noted by attenuation of the   background rhythm and an increased proportion of slower activities were   seen.  Vertex sharp transients were seen without sleep spindles     Hyperventilation: Not obtained     Photic stimulation: Photic stimulation was performed at various flash   frequencies showing no significant change in the background activity     EKG: Regular rhythm in the 70s     Video: The video portion showed no unusual involuntary movements    Impression:  Abnormal EEG showing mild diffuse slowing in the background activity   consistent with a mild diffuse encephalopathy versus bihemispheric   structural lesions.  No epileptiform activities were seen.    Dictated utilizing Dragon dictation.     Scheduled Medications  amLODIPine, 2.5 mg, Oral, Q24H  Divalproex Sodium, 250 mg, Oral, Q12H  guaiFENesin, 600 mg, Oral, Q12H  heparin (porcine), 5,000 Units, Subcutaneous, Q8H  insulin glargine, 12 Units, Subcutaneous, Q12H  insulin  lispro, 0-14 Units, Subcutaneous, TID AC  levothyroxine, 75 mcg, Oral, Q AM  pantoprazole, 40 mg, Oral, QAM    Infusions   Diet  Diet Soft Texture; Whole Foods; Thin; Cardiac, Consistent Carbohydrate       Assessment/Plan     Active Hospital Problems    Diagnosis  POA   • **Subdural hematoma (HCC) [S06.5X9A]  Yes   • Surgery, elective [Z41.9]  Not Applicable   • SDH (subdural hematoma) (HCC) [S06.5X9A]  Yes   • Gastroesophageal reflux disease [K21.9]  Yes   • Essential hypertension [I10]  Yes   • Chronic kidney disease, stage 3 (moderate) (CMS/HCC) [N18.30]  Yes   • Type 2 diabetes mellitus with complication (HCC) [E11.8]  Yes      Resolved Hospital Problems   No resolved problems to display.       86 y.o. male admitted with Subdural hematoma (HCC).    · Subdural hematoma-s/p left sided lulu holes on 7/12/22 and embolization of the left middle meningeal arteries on 7/21/22 by Dr. Reyes. On Depakote for seizure ppx per neurology recommendations.   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 Creatinine is stabilized around 1.9 which is likely his baseline.  · Hypothyroidism-synthroid  · Dm2-a1c 9.9 in June. Currently on insulin. blood glucose at goal.   · HTN-adequate control acutely  · GERD-PPI  · Heparin SC for DVT prophylaxis.  · Full code.  · Discussed with patient.   · Anticipate discharge to SNU facility whenever deemed appropriate by primary. No objection to discharge from my perspective      Timi Morrison MD  [facility]  08/04/22  17:18 EDT    I wore protective equipment throughout this patient encounter including a face mask, gloves and protective eyewear.  Hand hygiene was performed before donning protective equipment and after removal when leaving the room.

## 2022-08-04 NOTE — NURSING NOTE
Called four times to the facility to give report. Transferred multiple times to give report and unable to. Left a message with my phone number if they had any questions. Paper work and packet with the patient.

## 2022-08-04 NOTE — DISCHARGE SUMMARY
Reece Stephen  1935    Patient Care Team:  Ana María Atkinson MD as PCP - General (Internal Medicine)  Jose Ochoa MD as Consulting Physician (Hematology and Oncology)  Armin Hurtado MD as Referring Physician (Colon and Rectal Surgery)    Date of Admit: 7/12/2022    Date of Discharge:  8/4/2022    Discharge Diagnosis:  Subdural hematoma (HCC)    Type 2 diabetes mellitus with complication (HCC)    Chronic kidney disease, stage 3 (moderate) (CMS/HCC)    Essential hypertension    Gastroesophageal reflux disease    SDH (subdural hematoma) (HCC)    Surgery, elective      Procedures Performed  Procedure(s):  CEREBRAL ANGIOGRAM with MMA embolization     Complications: none    LEFT SIDED KWABENA HOLES FOR EVACUATION OF SUBDURAL HEMATOMA    Complications: none        Consultants:   Consults     Date and Time Order Name Status Description    7/24/2022  2:27 PM Inpatient Neurology Consult General Completed     7/15/2022 10:09 AM Inpatient Internal Medicine Consult Completed     7/13/2022 12:02 PM Inpatient Nephrology Consult Completed     7/13/2022 10:17 AM Inpatient Nephrology Consult Completed     7/12/2022  9:00 PM Inpatient Intensivist Consult Completed           Condition on Discharge: stable    Discharge disposition: SNF      Brief HPI: This is a very pleasant 86-year-old male who was initially evaluated by me on a previous hospital consultation May 18, 2022 for history of mental status change, falls with subdural hematoma.  He had fallen a total of 3 times 1 day prior to his initial hospital consultation on May 18. He has a history of prior stroke and had been taking Plavix and aspirin at that time.  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  There is no evidence of mass-effect or midline shift at that time.  He was discharged May 26, 2022 to subacute rehab facility.   He ultimately returned to the hospital there is no evidence of new stroke on MRI of the brain.  His confusion had cleared and he was doing better.  He was discharged June 6 to subacute rehab.  He had a follow-up head CT performed June 27, 2022 and was to follow-up in Dr. Reyes's office thereafter.  The follow-up CT showed significant expansion of the left-sided subdural hematoma and therefore plans were made for middle meningeal artery embolization.  The patient presented on July 12 for this procedure and upon evaluation of the patient it was determined that he was feeling worse and the decision was made to proceed with left-sided lulu holes to evacuate acute on chronic subdural hematoma.     Hospital Course:      The patient was recovered in the intensive care unit.  And monitored for a few days.  He had some subdural drains that were removed on POD 3.  CT scan at that time showed 3 mm of left-to-right midline shift which was stable.  The patient was subsequently taken to the neuro-interventional radiology suite for the previously planned cerebral angiogram and MMA embolization July 21, 2022.  Due to anesthesia, the patient did have some issues with metabolic encephalopathy.  He was transferred out of intensive care unit July 23.  Continue to have increased drowsiness and encephalopathy.  The decision was made at that time to discontinue the Keppra given that the patient had not experienced any seizures.  The patient was evaluated by the neurology service a few days later for continued encephalopathy.  Inpatient EEG showed diffuse slowing, but no evidence of epileptiform activity.  They agreed with discontinuing the Keppra but as a precaution switch the patient over to a light dose of Depakote.  Over the last several days the patient has done well.  He was slow with awakening and following commands on some days and then on other days he was significantly better.  He was treated by the hospitalist service for  management of diabetes, hypertension.  He was seen by the renal service for his history of chronic kidney disease.  He was also followed by the pulmonary service while he was in the ICU.  Serial follow-up head CTs have been stable.  Over the last couple of days he has been much more alert.  He was up in the chair.  He is feeding himself.  He is more alert and talkative.  The left cranial incision is completely healed.  The sutures have been removed several days ago.  He is voiding without difficulty.  He is remained afebrile and vital signs of been stable.  At this point I do not feel that the patient is in need of any seizure prophylaxis and therefore I have discontinued the Depakote.  He has been cleared for discharge by Dr. Reyes.  He will be transferred to a subacute rehab facility.  His niece is aware of this impending discharge.  Dr. Reyes is also aware of discharge and is in agreement with the plan.  The patient is to follow-up in Dr. Reyes's office as directed on the discharge after visit summary.  Discharge instruction sheet is also attached.      Temp:  [97.7 °F (36.5 °C)-98.2 °F (36.8 °C)] 97.9 °F (36.6 °C)  Heart Rate:  [63-70] 65  Resp:  [16-18] 18  BP: (102-132)/(61-98) 126/63    Current labs:  Lab Results (last 24 hours)     Procedure Component Value Units Date/Time    POC Glucose Once [[id number]]  (Abnormal) Collected: 08/03/22 2021    Specimen: Blood Updated: 08/03/22 2024     Glucose 347 mg/dL      Comment: Meter: [id number] : [id number] John DOE       POC Glucose Once [[id number]]  (Normal) Collected: 08/04/22 0732    Specimen: Blood Updated: 08/04/22 0733     Glucose 126 mg/dL      Comment: Meter: [id number] : [id number] Hatchett Sherrish NA       COVID PRE-OP / PRE-PROCEDURE SCREENING ORDER (NO ISOLATION) - Swab, Nasopharynx [[id number]]  (Normal) Collected: 08/04/22 0743    Specimen: Swab from Nasopharynx Updated: 08/04/22 1241    Narrative:      The following orders were  created for panel order COVID PRE-OP / PRE-PROCEDURE SCREENING ORDER (NO ISOLATION) - Swab, Nasopharynx.  Procedure                               Abnormality         Status                     ---------                               -----------         ------                     COVID-19,APTIMA PANTHER(...[[id number]]  Normal              Final result                 Please view results for these tests on the individual orders.    COVID-19,APTIMA PANTHER(EWA), [facility]MANN/ JAQUAN, NP/OP SWAB IN UTM/VTM/SALINE TRANSPORT MEDIA,24 HR TAT - Swab, Nasopharynx [[id number]]  (Normal) Collected: 08/04/22 0743    Specimen: Swab from Nasopharynx Updated: 08/04/22 1241     COVID19 Not Detected    Narrative:      Fact sheet for providers: https://www.fda.gov/media/254552/download     Fact sheet for patients: https://www.fda.gov/media/463311/download    Test performed by RT PCR.    Renal Function Panel [[id number]]  (Abnormal) Collected: 08/04/22 0850    Specimen: Blood Updated: 08/04/22 1044     Glucose 138 mg/dL      BUN 45 mg/dL      Creatinine 1.85 mg/dL      Sodium 145 mmol/L      Potassium 5.1 mmol/L      Chloride 112 mmol/L      CO2 19.6 mmol/L      Calcium 9.5 mg/dL      Albumin 4.00 g/dL      Phosphorus 3.2 mg/dL      Anion Gap 13.4 mmol/L      BUN/Creatinine Ratio 24.3     eGFR 35.0 mL/min/1.73      Comment: National Kidney Foundation and American Society of Nephrology (ASN) Task Force recommended calculation based on the Chronic Kidney Disease Epidemiology Collaboration (CKD-EPI) equation refit without adjustment for race.       Narrative:      GFR Normal >60  Chronic Kidney Disease <60  Kidney Failure <15      POC Glucose Once [[id number]]  (Abnormal) Collected: 08/04/22 1105    Specimen: Blood Updated: 08/04/22 1106     Glucose 224 mg/dL      Comment: Meter: [id number] : [id number] Hatchett Sherrish NA       POC Glucose Once [[id number]]  (Abnormal) Collected: 08/04/22 1643    Specimen: Blood Updated: 08/04/22 1644      Glucose 159 mg/dL      Comment: Meter: [id number] : [id number] Hatchett Sherrish NA               Discharge Medications  Robbin has been reviewed and narcotic consent is on file in the patient's chart.     Your medication list      START taking these medications      Instructions Last Dose Given Next Dose Due   acetaminophen 325 MG tablet  Commonly known as: TYLENOL      Take 2 tablets by mouth Every 6 (Six) Hours As Needed for Mild Pain  or Fever.       amLODIPine 2.5 MG tablet  Commonly known as: NORVASC  Start taking on: August 5, 2022      Take 1 tablet by mouth Daily.       dextrose 40 % gel  Commonly known as: GLUTOSE      Take 15 g by mouth Every 15 (Fifteen) Minutes As Needed for Low Blood Sugar (Blood sugar less than 70).       guaiFENesin 600 MG 12 hr tablet  Commonly known as: MUCINEX      Take 1 tablet by mouth Every 12 (Twelve) Hours.       insulin glargine 100 UNIT/ML injection  Commonly known as: LANTUS, SEMGLEE      Inject 12 Units under the skin into the appropriate area as directed Every 12 (Twelve) Hours.       nitroglycerin 0.4 MG SL tablet  Commonly known as: NITROSTAT      Place 1 tablet under the tongue Every 5 (Five) Minutes As Needed for Chest Pain (Only if SBP Greater Than 100). Take no more than 3 doses in 15 minutes.       pantoprazole 40 MG EC tablet  Commonly known as: PROTONIX  Start taking on: August 5, 2022  Replaces: omeprazole 40 MG capsule      Take 1 tablet by mouth Every Morning.          CHANGE how you take these medications      Instructions Last Dose Given Next Dose Due   insulin lispro 100 UNIT/ML injection  Commonly known as: ADMELOG  Start taking on: August 5, 2022  What changed:   · how much to take  · when to take this      Inject 0-14 Units under the skin into the appropriate area as directed 3 (Three) Times a Day Before Meals.          CONTINUE taking these medications      Instructions Last Dose Given Next Dose Due   levothyroxine 75 MCG tablet  Commonly known as:  SYNTHROID, LEVOTHROID  Start taking on: August 5, 2022      Take 1 tablet by mouth Every Morning.          STOP taking these medications    lisinopril 20 MG tablet  Commonly known as: PRINIVIL,ZESTRIL        omeprazole 40 MG capsule  Commonly known as: priLOSEC  Replaced by: pantoprazole 40 MG EC tablet              Where to Get Your Medications      Information about where to get these medications is not yet available    Ask your nurse or doctor about these medications  · acetaminophen 325 MG tablet  · amLODIPine 2.5 MG tablet  · dextrose 40 % gel  · guaiFENesin 600 MG 12 hr tablet  · insulin glargine 100 UNIT/ML injection  · insulin lispro 100 UNIT/ML injection  · levothyroxine 75 MCG tablet  · nitroglycerin 0.4 MG SL tablet  · pantoprazole 40 MG EC tablet         Discharge Diet:   Diet Instructions     Diet: Soft Texture, Consistent Carbohydrate, Cardiac; Thin Liquids, No Restrictions; Whole      Discharge Diet:  Soft Texture  Consistent Carbohydrate  Cardiac       Fluid Consistency: Thin Liquids, No Restrictions    Soft Options: Whole        Diet Order   Procedures   • Diet Soft Texture; Whole Foods; Thin; Cardiac, Consistent Carbohydrate       Activity at Discharge:   Activity Instructions     Other Activity Instructions      Activity Instructions: No bending below waist.  No lifting over 5 pounds.  No lifting overhead.    Up WIth Assist            Call for: questions or concerns    Follow-up Appointments  Future Appointments   Date Time Provider Department Center   8/26/2022  2:00 PM Melinda Hi APRN [facility]   9/6/2022 11:30 AM [facility]   9/9/2022  1:00 [facility]      Contact information for follow-up providers     Morales Ambrosio MD Follow up in 1 month(s).    Specialty: Neurology  Why: Hospital Follow Up  Contact information:  [address]  [facility] [address]  [facility] [address] [phone]             Ana María Atkinson MD .   "  Specialty: Internal Medicine  Why: See primary care provider after discharge from rehab.  Contact information:  [address]  [facility] [address] [phone]                   Contact information for after-discharge care     Destination     [facility] .    Service: Skilled Nursing  Contact information:  [address]  [facility] [address] [phone]                           Additional Instructions for the Follow-ups that You Need to Schedule     Discharge Follow-up with PCP   As directed       Currently Documented PCP:    Ana María Atkinson MD    PCP Phone Number:    [phone]     Follow Up Details: See primary care provider after discharge from rehab.         Discharge Follow-up with Specialty: Dr. Ciaran Reyes   As directed      Specialty: Dr. Ciaran Reyes    Follow Up Details: Patient has a follow-up with Dr. Reyes in his office September 9, 2022 at 1:00 PM.  He will need to have the CT scan scheduled at the hospital.  Please see below.         Discharge Follow-up with Specified Provider: Return to [facility] radiology department for outpatient head CT prior to follow-up with Dr. Reyes.   As directed      To: Return to [facility] radiology department for outpatient head CT prior to follow-up with Dr. Reyes.    Follow Up Details: Head CT is scheduled September 6, 2022 at 11:30 AM               Test Results Pending at Discharge     None    I discussed the discharge instructions with patient, family and nursing staff    Randee Paredes, APRN  08/04/22  17:31 EDT      \"Dictated utilizing Dragon dictation\".    "

## 2022-08-04 NOTE — PLAN OF CARE
Problem: Adult Inpatient Plan of Care  Goal: Absence of Hospital-Acquired Illness or Injury  Intervention: Prevent Skin Injury  Recent Flowsheet Documentation  Taken 8/3/2022 1930 by Irene Cruz RN  Body Position: position changed independently     Problem: Adult Inpatient Plan of Care  Goal: Optimal Comfort and Wellbeing  Outcome: Ongoing, Progressing  Intervention: Provide Person-Centered Care  Recent Flowsheet Documentation  Taken 8/3/2022 1930 by Irene Cruz RN  Trust Relationship/Rapport:   care explained   thoughts/feelings acknowledged   questions encouraged   reassurance provided     Problem: Restraint, Nonbehavioral (Nonviolent)  Goal: Absence of Harm or Injury  Intervention: Implement Least Restrictive Safety Strategies  Recent Flowsheet Documentation  Taken 8/3/2022 1930 by Irene Cruz RN  Medical Device Protection:   IV pole/bag removed from visual field   tubing secured   torso covered  Diversional Activities: television  Intervention: Protect Dignity, Rights, and Personal Wellbeing  Recent Flowsheet Documentation  Taken 8/3/2022 1930 by Irene Cruz RN  Trust Relationship/Rapport:   care explained   thoughts/feelings acknowledged   questions encouraged   reassurance provided  Intervention: Protect Skin and Joint Integrity  Recent Flowsheet Documentation  Taken 8/3/2022 1930 by Irene Cruz RN  Body Position: position changed independently     Problem: Fall Injury Risk  Goal: Absence of Fall and Fall-Related Injury  Outcome: Ongoing, Progressing  Intervention: Identify and Manage Contributors  Recent Flowsheet Documentation  Taken 8/3/2022 1930 by Irene Cruz RN  Medication Review/Management: medications reviewed  Intervention: Promote Injury-Free Environment  Recent Flowsheet Documentation  Taken 8/3/2022 1930 by Irene Cruz RN  Safety Promotion/Fall Prevention:   activity supervised   assistive device/personal items within reach   clutter free environment  maintained   safety round/check completed   room organization consistent     Problem: Fall Injury Risk  Goal: Absence of Fall and Fall-Related Injury  Intervention: Identify and Manage Contributors  Recent Flowsheet Documentation  Taken 8/3/2022 1930 by Irene Cruz RN  Medication Review/Management: medications reviewed     Problem: Fall Injury Risk  Goal: Absence of Fall and Fall-Related Injury  Intervention: Promote Injury-Free Environment  Recent Flowsheet Documentation  Taken 8/3/2022 1930 by Irene Cruz, RN  Safety Promotion/Fall Prevention:   activity supervised   assistive device/personal items within reach   clutter free environment maintained   safety round/check completed   room organization consistent   Goal Outcome Evaluation:  Plan of Care Reviewed With: patient        Progress: improving

## 2022-08-19 ENCOUNTER — TELEPHONE (OUTPATIENT)
Dept: NEUROLOGY | Facility: CLINIC | Age: 87
End: 2022-08-19

## 2022-08-29 ENCOUNTER — TELEPHONE (OUTPATIENT)
Dept: NEUROLOGY | Facility: CLINIC | Age: 87
End: 2022-08-29

## 2022-08-30 ENCOUNTER — TELEPHONE (OUTPATIENT)
Dept: NEUROLOGY | Facility: CLINIC | Age: 87
End: 2022-08-30

## 2022-08-30 NOTE — TELEPHONE ENCOUNTER
Tried to call patient to reschedule 08-30-22 mail box full.  Rescheduled sent appt reminder and mychart message

## 2022-09-02 ENCOUNTER — TELEPHONE (OUTPATIENT)
Dept: NEUROSURGERY | Facility: CLINIC | Age: 87
End: 2022-09-02

## 2022-09-02 NOTE — TELEPHONE ENCOUNTER
Caller: MITCH MASON    Relationship to patient: Emergency Contact    Best call back number: [phone]/ [phone]    Chief complaint: FOLLOW UP AFTER CT (4-6 WEEK)    Type of visit: FOLLOW UP EXT    Requested date:      If rescheduling, when is the original appointment: WAS 9/9/2022    Additional notes:PLEASE MAKE SURE TO LEAVE A MESSAGE IF YOU GET VOICE MAIL  PLEASE CONTACT MITCH TO SCHEDULE.    ALTERNAT # TO CALL [phone] (PT SPOUSE)

## 2022-09-06 ENCOUNTER — HOSPITAL ENCOUNTER (OUTPATIENT)
Dept: CT IMAGING | Facility: HOSPITAL | Age: 87
Discharge: HOME | End: 2022-09-06
Admitting: NEUROLOGICAL SURGERY

## 2022-09-06 DIAGNOSIS — S06.5XAA SUBDURAL HEMATOMA: ICD-10-CM

## 2022-09-06 PROCEDURE — 70450 CT HEAD/BRAIN W/O DYE: CPT

## 2022-09-06 NOTE — TELEPHONE ENCOUNTER
I spoke with Mr. Narinder Iniguez, She reports the patient was supposed to have an appointment today at 09:00 with Dr. jennings and now they had told her she does not have an appointment. I explained to her the appointment I am showing is for CT and that is at 11:00, she reports she called radiology and she was told that he did not have an appointment. I let her know the appointment is in [place]. She wanted to know why the appointment was in [place] I advised her that the hospital is overloaded and probably did not have any openings. She wanted to know when his appointment with Dr. Jennings was to follow up and why the patient needed the head CT if he does not have a follow up, I explained to her that the appointment was scheduled for Friday with Dr. Silva and was cancelled and is still open if she wanted me to go ahead and place the appointment back on schedule. She voiced she does not do appointments on Friday. I advised her that I will discuss with Angeline Fong medical assistant and one of us with call her back shortly.

## 2022-09-09 ENCOUNTER — OFFICE VISIT (OUTPATIENT)
Dept: NEUROSURGERY | Facility: CLINIC | Age: 87
End: 2022-09-09

## 2022-09-09 VITALS
BODY MASS INDEX: 24.71 KG/M2 | HEART RATE: 70 BPM | SYSTOLIC BLOOD PRESSURE: 132 MMHG | DIASTOLIC BLOOD PRESSURE: 80 MMHG | WEIGHT: 163 LBS | OXYGEN SATURATION: 95 % | HEIGHT: 68 IN | TEMPERATURE: 98.2 F

## 2022-09-09 DIAGNOSIS — S06.5XAA SUBDURAL HEMATOMA: Primary | ICD-10-CM

## 2022-09-09 PROCEDURE — 99024 POSTOP FOLLOW-UP VISIT: CPT | Performed by: NEUROLOGICAL SURGERY

## 2022-09-09 NOTE — PROGRESS NOTES
Subjective   History of Present Illness: Reece Stephen is a 87 y.o. male is here today for follow-up.He is s/p lulu hole 7/12/22 and MMA 7/21/22. CT head completed on 9/6/22.  He is doing well today.  No new complaints.  Denies any changes in the frequency or severity of her headaches.  Denies any changes in vision.  Denies any strokelike episodes.  Denies any changes in strength or sensation.  He is walking better with the use of a Rollator.      History of Present Illness    The following portions of the patient's history were reviewed and updated as appropriate: allergies, past family history, past medical history, past social history, past surgical history and problem list.    Past Medical History:   Diagnosis Date   • Anemia    • Arthritis    • Clostridium difficile infection 10/2014   • Colostomy present ([facility])    • Diabetes mellitus ([facility])     TYPE 2, IDDM   • Disease of thyroid gland     ACQUIRED HYPOTHYROIDISM   • Duodenal ulcer 08/2014   • DVT (deep venous thrombosis) ([facility]) 01/2015    PORT ASSOCIATED OF LEFT UPPER EXTREMITY   • Electrocution 1971    HAD TO HAVE PARTIAL AMPUTATION OF LEFT FOOT   • GERD (gastroesophageal reflux disease)    • Hearing loss    • Hemorrhagic shock ([facility]) 2015    SECONDARY TO BLEEDING DUODENAL ULCER, ADMITTED TO [facility]   • Histiocytoma    • History of blood transfusion 2015    D/T BLEEDING DUODENAL ULCER   • History of chemotherapy     FOLLOWED BY DR. NIGEL NOBLE   • History of radiation therapy 2014    FOLLOWED BY DR. RJ HUI   • Hypercholesterolemia    • Hyperkalemia    • Hypertension    • Hypoglycemia 01/04/2018    SEEN AT [facility] ER   • Incontinent of urine    • Mixed hyperlipidemia    • Multiple lung nodules    • Neuropathy    • OA (osteoarthritis)    • ANNE MARIE (obstructive sleep apnea)     NO CPAP   • Peptic ulceration 2014    Duodenal ulcer   • Pneumonia 03/29/2013    ADMITTED TO [facility]-DOUBLE PNEUMONIA   • Rectal cancer ([facility]) 06/2014    T3N1M0, S/P CHEMO, XRT, AND RESECTION,  FOLLOWED BY DR. TRACE HERRING   • Stage 3 chronic kidney disease (HCC)     DR. FREDDIE GONZALES   • Stroke (HCC) 11/28/2017    TIA, ADMITTED TO [facility]   • Stroke (HCC) 11/17/2019    CVA, ADMITTED TO [facility]   • Subdural hematoma (HCC)     INCREASE IN HEARING LOSS, BALANCE PROBLEMS, SPEECH SLOWED, MEMORY DEFICIT   • Vitamin D deficiency         Past Surgical History:   Procedure Laterality Date   • AMPUTATION FOOT Left 1971    PARTIAL DUE TO AN ELECTRICAL SHOCK INJURY   • ARM TENDON REPAIR Right     DONE BY DR. KUTZ AND KLEINERT   • COLON RESECTION N/A 11/14/2014    LOW ANTERIOR RESECTION WITH CREATION OF COLOSTOMY, DR. TRACE HERRING AT [facility]   • COLONOSCOPY W/ BIOPSIES N/A 06/26/2014    ULCERATED 5CM MASS IN RECTUM, PATH: MODERATELY DIFFERENTIATED ADENOCARCINOMA, MULTIPLE DIVERTICULA IN SIGMOID, DR. LAUREN JAMES AT [facility]   • COLONOSCOPY W/ BIOPSIES N/A 07/09/2014    RECTAL MASS: INVASIVE MODERATLEY DIFFERIENTIATED ADENOCARCINOMA, AREA TATTOOED, DR. TRACE HERRING AT [facility]   • EMBOLIZATION CEREBRAL N/A 7/21/2022    Procedure: CEREBRAL ANGIOGRAM with MMA embolization;  Surgeon: Ciaran Reyes MD;  [facility]  Service: Neurosurgery;  Laterality: N/A;   • EMBOLIZATION CEREBRAL Left 7/12/2022    Procedure: LEFT KWABENA HOLE FOR EVACUATION OF SUBDURAL HEMATOMA;  Surgeon: Ciaran Reyes MD;  Location: [facility] OR;  Service: Neurosurgery;  Laterality: Left;   • ENDOSCOPY  02/09/2015   • ENDOSCOPY N/A 02/09/2015    RESOLVED DUODENAL ULCER, EGD WNL, DR. FREDDIE MALCOLM AT [facility]   • FLEXIBLE SIGMOIDOSCOPY N/A 10/06/2014    MALIGNANT PARTIALLY OBSTRUCTING TUMOR IN MID RECTUM, DR. DELTA HERRING AT [facility]   • PORTACATH PLACEMENT Left 07/24/2014    LEFT SUBCLAVIAN, DR. KWAKU TAY AT [facility]   • RECTAL ULTRASOUND N/A 07/09/2014    ENDORECTAL US FOR CANCER STAGING, T3N1 RECTAL CANCER AT 7 CM, DR. TRACE HERRING AT [facility]   • TRACHEAL SURGERY N/A 08/31/2014    ENDOTRACHEAL INTUBATION, DR. ALMA ROSA HAGAN AT [facility]   •  VENOUS ACCESS DEVICE (PORT) REMOVAL Left 2015    LEFT SUBCLAVIAN, DR. KWAKU TAY AT [facility]          Current Outpatient Medications:   •  acetaminophen (TYLENOL) 325 MG tablet, Take 2 tablets by mouth Every 6 (Six) Hours As Needed for Mild Pain  or Fever., Disp: , Rfl:   •  amLODIPine (NORVASC) 2.5 MG tablet, Take 1 tablet by mouth Daily., Disp: , Rfl:   •  dextrose (GLUTOSE) 40 % gel, Take 15 g by mouth Every 15 (Fifteen) Minutes As Needed for Low Blood Sugar (Blood sugar less than 70)., Disp: , Rfl:   •  guaiFENesin (MUCINEX) 600 MG 12 hr tablet, Take 1 tablet by mouth Every 12 (Twelve) Hours., Disp: , Rfl:   •  insulin glargine (LANTUS, SEMGLEE) 100 UNIT/ML injection, Inject 12 Units under the skin into the appropriate area as directed Every 12 (Twelve) Hours., Disp: , Rfl: 12  •  insulin lispro (ADMELOG) 100 UNIT/ML injection, Inject 0-14 Units under the skin into the appropriate area as directed 3 (Three) Times a Day Before Meals., Disp: , Rfl: 12  •  levothyroxine (SYNTHROID, LEVOTHROID) 75 MCG tablet, Take 1 tablet by mouth Every Morning., Disp: , Rfl:   •  nitroglycerin (NITROSTAT) 0.4 MG SL tablet, Place 1 tablet under the tongue Every 5 (Five) Minutes As Needed for Chest Pain (Only if SBP Greater Than 100). Take no more than 3 doses in 15 minutes., Disp: , Rfl: 12  •  pantoprazole (PROTONIX) 40 MG EC tablet, Take 1 tablet by mouth Every Morning., Disp: , Rfl:      No Known Allergies     Social History     Socioeconomic History   • Marital status: Single   • Number of children: 0   • Years of education: High school   Tobacco Use   • Smoking status: Former Smoker     Packs/day: 2.00     Years: 3.00     Pack years: 6.00     Types: Cigarettes     Quit date:      Years since quittin.7   • Smokeless tobacco: Never Used   Vaping Use   • Vaping Use: Never used   Substance and Sexual Activity   • Alcohol use: No   • Drug use: No   • Sexual activity: Defer        Family History   Problem Relation Age of  "Onset   • No Known Problems Mother    • No Known Problems Father    • No Known Problems Sister    • Cancer Brother         Lung cancer   • Lung cancer Brother    • No Known Problems Maternal Grandmother    • No Known Problems Maternal Grandfather    • No Known Problems Paternal Grandmother    • No Known Problems Paternal Grandfather    • Diabetes Other    • Malig Hyperthermia Neg Hx         Review of Systems   Eyes: Negative for visual disturbance.   Musculoskeletal: Positive for gait problem.   Neurological: Negative for dizziness and headaches.       Objective     Vitals:    22 1308   BP: 132/80   Pulse: 70   Temp: 98.2 °F (36.8 °C)   SpO2: 95%   Weight: 73.9 kg (163 lb)   Height: 172.7 cm (68\")     Body mass index is 24.78 kg/m².      Physical Exam  Neurologic Exam  Awake, alert, oriented x3  Pupils equal round reactive to light  Extraocular muscles intact  Face symmetric  Speech is fluent and clear  No pronator drift  Motor exam  Bilateral deltoids 5/5, bilateral biceps 5/5, bilateral triceps 5/5, bilateral wrist extension 5/5 bilateral hand  5/5  Bilateral hip flexion 5/5, bilateral knee extension 5/5, bilateral DF/PF 5/5  No clonus  No Charlette's reflex  Steady normal gait  Able to detect  light touch in all 4 extremities      Assessment & Plan   Independent Review of Radiographic Studies:      I personally reviewed the images from the following studies.  CT head without contrast from   The follow-up CT head shows a significant reduction in the overall size of the left-sided chronic appearing subdural hematoma with less mass-effect.    Medical Decision Makin-year-old male s/p left-sided middle meningeal artery embolization on 2022  -The follow-up CT head shows improvement with reduction in the size of the left-sided chronic appearing subdural hematoma with less mass-effect.  He appears to be improving.  He is walking better using a Rollator and reports " that his headaches have resolved  -I will plan to see him back in 3 months with a repeat CT head to evaluate for any changes.    Diagnoses and all orders for this visit:    1. Subdural hematoma (HCC) (Primary)  -     CT Head Without Contrast; Future      Return in about 3 months (around 12/9/2022).

## 2022-09-22 ENCOUNTER — HOSPITAL ENCOUNTER (INPATIENT)
Facility: HOSPITAL | Age: 87
LOS: 5 days | Discharge: NURSING, REHAB OR LONG-TERM CARE | End: 2022-09-27
Attending: EMERGENCY MEDICINE | Admitting: HOSPITALIST

## 2022-09-22 ENCOUNTER — APPOINTMENT (OUTPATIENT)
Dept: GENERAL RADIOLOGY | Facility: HOSPITAL | Age: 87
End: 2022-09-22

## 2022-09-22 ENCOUNTER — APPOINTMENT (OUTPATIENT)
Dept: CT IMAGING | Facility: HOSPITAL | Age: 87
End: 2022-09-22

## 2022-09-22 DIAGNOSIS — R47.01 APHASIA: ICD-10-CM

## 2022-09-22 DIAGNOSIS — E11.65 HYPERGLYCEMIA DUE TO DIABETES MELLITUS: ICD-10-CM

## 2022-09-22 DIAGNOSIS — R47.1 DYSARTHRIA: Primary | ICD-10-CM

## 2022-09-22 LAB
ABO GROUP BLD: NORMAL
ALBUMIN SERPL-MCNC: 4.3 G/DL (ref 3.5–5.2)
ALBUMIN/GLOB SERPL: 1.7 G/DL
ALP SERPL-CCNC: 130 U/L (ref 39–117)
ALT SERPL W P-5'-P-CCNC: 14 U/L (ref 1–41)
ANION GAP SERPL CALCULATED.3IONS-SCNC: 12.8 MMOL/L (ref 5–15)
APTT PPP: 31.4 SECONDS (ref 22.7–35.4)
AST SERPL-CCNC: 13 U/L (ref 1–40)
BASOPHILS # BLD AUTO: 0.07 10*3/MM3 (ref 0–0.2)
BASOPHILS NFR BLD AUTO: 0.9 % (ref 0–1.5)
BILIRUB SERPL-MCNC: 0.3 MG/DL (ref 0–1.2)
BILIRUB UR QL STRIP: NEGATIVE
BLD GP AB SCN SERPL QL: NEGATIVE
BUN SERPL-MCNC: 32 MG/DL (ref 8–23)
BUN/CREAT SERPL: 14.7 (ref 7–25)
CALCIUM SPEC-SCNC: 9.5 MG/DL (ref 8.6–10.5)
CHLORIDE SERPL-SCNC: 96 MMOL/L (ref 98–107)
CLARITY UR: CLEAR
CO2 SERPL-SCNC: 21.2 MMOL/L (ref 22–29)
COLOR UR: YELLOW
CREAT SERPL-MCNC: 2.18 MG/DL (ref 0.76–1.27)
DEPRECATED RDW RBC AUTO: 43.2 FL (ref 37–54)
EGFRCR SERPLBLD CKD-EPI 2021: 28.6 ML/MIN/1.73
EOSINOPHIL # BLD AUTO: 0.17 10*3/MM3 (ref 0–0.4)
EOSINOPHIL NFR BLD AUTO: 2.1 % (ref 0.3–6.2)
ERYTHROCYTE [DISTWIDTH] IN BLOOD BY AUTOMATED COUNT: 12.6 % (ref 12.3–15.4)
GLOBULIN UR ELPH-MCNC: 2.6 GM/DL
GLUCOSE BLDC GLUCOMTR-MCNC: 569 MG/DL (ref 70–130)
GLUCOSE SERPL-MCNC: 712 MG/DL (ref 65–99)
GLUCOSE UR STRIP-MCNC: ABNORMAL MG/DL
HCT VFR BLD AUTO: 40.3 % (ref 37.5–51)
HGB BLD-MCNC: 12.8 G/DL (ref 13–17.7)
HGB UR QL STRIP.AUTO: NEGATIVE
HOLD SPECIMEN: NORMAL
HOLD SPECIMEN: NORMAL
IMM GRANULOCYTES # BLD AUTO: 0.05 10*3/MM3 (ref 0–0.05)
IMM GRANULOCYTES NFR BLD AUTO: 0.6 % (ref 0–0.5)
INR PPP: 1 (ref 0.9–1.1)
KETONES UR QL STRIP: NEGATIVE
LEUKOCYTE ESTERASE UR QL STRIP.AUTO: NEGATIVE
LYMPHOCYTES # BLD AUTO: 1.28 10*3/MM3 (ref 0.7–3.1)
LYMPHOCYTES NFR BLD AUTO: 15.8 % (ref 19.6–45.3)
MCH RBC QN AUTO: 29.9 PG (ref 26.6–33)
MCHC RBC AUTO-ENTMCNC: 31.8 G/DL (ref 31.5–35.7)
MCV RBC AUTO: 94.2 FL (ref 79–97)
MONOCYTES # BLD AUTO: 0.72 10*3/MM3 (ref 0.1–0.9)
MONOCYTES NFR BLD AUTO: 8.9 % (ref 5–12)
NEUTROPHILS NFR BLD AUTO: 5.82 10*3/MM3 (ref 1.7–7)
NEUTROPHILS NFR BLD AUTO: 71.7 % (ref 42.7–76)
NITRITE UR QL STRIP: NEGATIVE
NRBC BLD AUTO-RTO: 0 /100 WBC (ref 0–0.2)
PH UR STRIP.AUTO: 5.5 [PH] (ref 5–8)
PLATELET # BLD AUTO: 224 10*3/MM3 (ref 140–450)
PMV BLD AUTO: 11 FL (ref 6–12)
POTASSIUM SERPL-SCNC: 4.4 MMOL/L (ref 3.5–5.2)
PROT SERPL-MCNC: 6.9 G/DL (ref 6–8.5)
PROT UR QL STRIP: ABNORMAL
PROTHROMBIN TIME: 13.1 SECONDS (ref 11.7–14.2)
RBC # BLD AUTO: 4.28 10*6/MM3 (ref 4.14–5.8)
RH BLD: POSITIVE
SODIUM SERPL-SCNC: 130 MMOL/L (ref 136–145)
SP GR UR STRIP: 1.03 (ref 1–1.03)
T&S EXPIRATION DATE: NORMAL
TROPONIN T SERPL-MCNC: 0.01 NG/ML (ref 0–0.03)
UROBILINOGEN UR QL STRIP: ABNORMAL
WBC NRBC COR # BLD: 8.11 10*3/MM3 (ref 3.4–10.8)
WHOLE BLOOD HOLD COAG: NORMAL
WHOLE BLOOD HOLD SPECIMEN: NORMAL

## 2022-09-22 PROCEDURE — 99285 EMERGENCY DEPT VISIT HI MDM: CPT

## 2022-09-22 PROCEDURE — 93010 ELECTROCARDIOGRAM REPORT: CPT | Performed by: INTERNAL MEDICINE

## 2022-09-22 PROCEDURE — 93005 ELECTROCARDIOGRAM TRACING: CPT | Performed by: PHYSICIAN ASSISTANT

## 2022-09-22 PROCEDURE — 82962 GLUCOSE BLOOD TEST: CPT

## 2022-09-22 PROCEDURE — 82550 ASSAY OF CK (CPK): CPT | Performed by: INTERNAL MEDICINE

## 2022-09-22 PROCEDURE — 82565 ASSAY OF CREATININE: CPT

## 2022-09-22 PROCEDURE — 80053 COMPREHEN METABOLIC PANEL: CPT | Performed by: PHYSICIAN ASSISTANT

## 2022-09-22 PROCEDURE — 84484 ASSAY OF TROPONIN QUANT: CPT | Performed by: PHYSICIAN ASSISTANT

## 2022-09-22 PROCEDURE — 63710000001 INSULIN REGULAR HUMAN PER 5 UNITS: Performed by: PHYSICIAN ASSISTANT

## 2022-09-22 PROCEDURE — 84156 ASSAY OF PROTEIN URINE: CPT | Performed by: INTERNAL MEDICINE

## 2022-09-22 PROCEDURE — 86850 RBC ANTIBODY SCREEN: CPT | Performed by: PHYSICIAN ASSISTANT

## 2022-09-22 PROCEDURE — 82570 ASSAY OF URINE CREATININE: CPT | Performed by: INTERNAL MEDICINE

## 2022-09-22 PROCEDURE — 85025 COMPLETE CBC W/AUTO DIFF WBC: CPT | Performed by: PHYSICIAN ASSISTANT

## 2022-09-22 PROCEDURE — 71045 X-RAY EXAM CHEST 1 VIEW: CPT

## 2022-09-22 PROCEDURE — 81003 URINALYSIS AUTO W/O SCOPE: CPT | Performed by: PHYSICIAN ASSISTANT

## 2022-09-22 PROCEDURE — 86901 BLOOD TYPING SEROLOGIC RH(D): CPT | Performed by: PHYSICIAN ASSISTANT

## 2022-09-22 PROCEDURE — 86900 BLOOD TYPING SEROLOGIC ABO: CPT | Performed by: PHYSICIAN ASSISTANT

## 2022-09-22 PROCEDURE — 70450 CT HEAD/BRAIN W/O DYE: CPT

## 2022-09-22 PROCEDURE — 85730 THROMBOPLASTIN TIME PARTIAL: CPT | Performed by: PHYSICIAN ASSISTANT

## 2022-09-22 PROCEDURE — 85610 PROTHROMBIN TIME: CPT | Performed by: PHYSICIAN ASSISTANT

## 2022-09-22 PROCEDURE — 84300 ASSAY OF URINE SODIUM: CPT | Performed by: INTERNAL MEDICINE

## 2022-09-22 RX ORDER — SODIUM CHLORIDE 0.9 % (FLUSH) 0.9 %
10 SYRINGE (ML) INJECTION AS NEEDED
Status: DISCONTINUED | OUTPATIENT
Start: 2022-09-22 | End: 2022-09-27 | Stop reason: HOSPADM

## 2022-09-22 RX ORDER — ASPIRIN 81 MG/1
324 TABLET, CHEWABLE ORAL ONCE
Status: COMPLETED | OUTPATIENT
Start: 2022-09-22 | End: 2022-09-22

## 2022-09-22 RX ADMIN — SODIUM CHLORIDE 1000 ML: 9 INJECTION, SOLUTION INTRAVENOUS at 20:26

## 2022-09-22 RX ADMIN — ASPIRIN 324 MG: 81 TABLET, CHEWABLE ORAL at 20:51

## 2022-09-22 RX ADMIN — INSULIN HUMAN 10 UNITS: 100 INJECTION, SOLUTION PARENTERAL at 20:25

## 2022-09-23 LAB
CHOLEST SERPL-MCNC: 207 MG/DL (ref 0–200)
CK SERPL-CCNC: 135 U/L (ref 20–200)
CREAT BLDA-MCNC: 2.1 MG/DL (ref 0.6–1.3)
CREAT UR-MCNC: 46.6 MG/DL
GLUCOSE BLDC GLUCOMTR-MCNC: 129 MG/DL (ref 70–130)
GLUCOSE BLDC GLUCOMTR-MCNC: 147 MG/DL (ref 70–130)
GLUCOSE BLDC GLUCOMTR-MCNC: 154 MG/DL (ref 70–130)
GLUCOSE BLDC GLUCOMTR-MCNC: 220 MG/DL (ref 70–130)
GLUCOSE BLDC GLUCOMTR-MCNC: 253 MG/DL (ref 70–130)
GLUCOSE BLDC GLUCOMTR-MCNC: 253 MG/DL (ref 70–130)
GLUCOSE BLDC GLUCOMTR-MCNC: 304 MG/DL (ref 70–130)
HDLC SERPL-MCNC: 40 MG/DL (ref 40–60)
LDLC SERPL CALC-MCNC: 144 MG/DL (ref 0–100)
LDLC/HDLC SERPL: 3.55 {RATIO}
PROT ?TM UR-MCNC: 22.1 MG/DL
QT INTERVAL: 436 MS
SODIUM UR-SCNC: 60 MMOL/L
TRIGL SERPL-MCNC: 126 MG/DL (ref 0–150)
VLDLC SERPL-MCNC: 23 MG/DL (ref 5–40)

## 2022-09-23 PROCEDURE — 80061 LIPID PANEL: CPT | Performed by: HOSPITALIST

## 2022-09-23 PROCEDURE — 97166 OT EVAL MOD COMPLEX 45 MIN: CPT

## 2022-09-23 PROCEDURE — 97530 THERAPEUTIC ACTIVITIES: CPT

## 2022-09-23 PROCEDURE — 82962 GLUCOSE BLOOD TEST: CPT

## 2022-09-23 PROCEDURE — 99222 1ST HOSP IP/OBS MODERATE 55: CPT | Performed by: STUDENT IN AN ORGANIZED HEALTH CARE EDUCATION/TRAINING PROGRAM

## 2022-09-23 PROCEDURE — 63710000001 INSULIN LISPRO (HUMAN) PER 5 UNITS: Performed by: HOSPITALIST

## 2022-09-23 PROCEDURE — 92610 EVALUATE SWALLOWING FUNCTION: CPT

## 2022-09-23 RX ORDER — DEXTROSE MONOHYDRATE 25 G/50ML
25 INJECTION, SOLUTION INTRAVENOUS
Status: DISCONTINUED | OUTPATIENT
Start: 2022-09-23 | End: 2022-09-23

## 2022-09-23 RX ORDER — ASPIRIN 81 MG/1
81 TABLET, CHEWABLE ORAL DAILY
Status: DISCONTINUED | OUTPATIENT
Start: 2022-09-23 | End: 2022-09-23

## 2022-09-23 RX ORDER — INSULIN LISPRO 100 [IU]/ML
0-7 INJECTION, SOLUTION INTRAVENOUS; SUBCUTANEOUS
Status: DISCONTINUED | OUTPATIENT
Start: 2022-09-23 | End: 2022-09-23

## 2022-09-23 RX ORDER — FAMOTIDINE 20 MG/1
20 TABLET, FILM COATED ORAL DAILY
Status: DISCONTINUED | OUTPATIENT
Start: 2022-09-24 | End: 2022-09-27 | Stop reason: HOSPADM

## 2022-09-23 RX ORDER — NICOTINE POLACRILEX 4 MG
15 LOZENGE BUCCAL
Status: DISCONTINUED | OUTPATIENT
Start: 2022-09-23 | End: 2022-09-23

## 2022-09-23 RX ORDER — INSULIN LISPRO 100 [IU]/ML
0-24 INJECTION, SOLUTION INTRAVENOUS; SUBCUTANEOUS
Status: DISCONTINUED | OUTPATIENT
Start: 2022-09-23 | End: 2022-09-23

## 2022-09-23 RX ORDER — ASPIRIN 81 MG/1
324 TABLET, CHEWABLE ORAL DAILY
Status: DISCONTINUED | OUTPATIENT
Start: 2022-09-23 | End: 2022-09-23

## 2022-09-23 RX ORDER — PANTOPRAZOLE SODIUM 40 MG/1
40 TABLET, DELAYED RELEASE ORAL EVERY MORNING
Status: DISCONTINUED | OUTPATIENT
Start: 2022-09-23 | End: 2022-09-23

## 2022-09-23 RX ORDER — SODIUM CHLORIDE 9 MG/ML
75 INJECTION, SOLUTION INTRAVENOUS CONTINUOUS
Status: DISCONTINUED | OUTPATIENT
Start: 2022-09-23 | End: 2022-09-25

## 2022-09-23 RX ORDER — AMLODIPINE BESYLATE 2.5 MG/1
2.5 TABLET ORAL
Status: DISCONTINUED | OUTPATIENT
Start: 2022-09-23 | End: 2022-09-23

## 2022-09-23 RX ORDER — AMLODIPINE BESYLATE 10 MG/1
10 TABLET ORAL
Status: DISCONTINUED | OUTPATIENT
Start: 2022-09-23 | End: 2022-09-27 | Stop reason: HOSPADM

## 2022-09-23 RX ORDER — LEVOTHYROXINE SODIUM 0.07 MG/1
75 TABLET ORAL
Status: DISCONTINUED | OUTPATIENT
Start: 2022-09-23 | End: 2022-09-27 | Stop reason: HOSPADM

## 2022-09-23 RX ORDER — ATORVASTATIN CALCIUM 20 MG/1
40 TABLET, FILM COATED ORAL NIGHTLY
Status: DISCONTINUED | OUTPATIENT
Start: 2022-09-23 | End: 2022-09-27 | Stop reason: HOSPADM

## 2022-09-23 RX ORDER — FAMOTIDINE 20 MG/1
20 TABLET, FILM COATED ORAL 2 TIMES DAILY
Status: DISCONTINUED | OUTPATIENT
Start: 2022-09-23 | End: 2022-09-23

## 2022-09-23 RX ORDER — INSULIN LISPRO 100 [IU]/ML
0-7 INJECTION, SOLUTION INTRAVENOUS; SUBCUTANEOUS
Status: DISCONTINUED | OUTPATIENT
Start: 2022-09-23 | End: 2022-09-27 | Stop reason: HOSPADM

## 2022-09-23 RX ORDER — INSULIN LISPRO 100 [IU]/ML
0-24 INJECTION, SOLUTION INTRAVENOUS; SUBCUTANEOUS EVERY 6 HOURS
Status: DISCONTINUED | OUTPATIENT
Start: 2022-09-23 | End: 2022-09-23

## 2022-09-23 RX ORDER — INSULIN LISPRO 100 [IU]/ML
5 INJECTION, SOLUTION INTRAVENOUS; SUBCUTANEOUS
Status: DISCONTINUED | OUTPATIENT
Start: 2022-09-23 | End: 2022-09-24

## 2022-09-23 RX ADMIN — INSULIN LISPRO 16 UNITS: 100 INJECTION, SOLUTION INTRAVENOUS; SUBCUTANEOUS at 07:54

## 2022-09-23 RX ADMIN — Medication 10 ML: at 21:25

## 2022-09-23 RX ADMIN — INSULIN GLARGINE-YFGN 15 UNITS: 100 INJECTION, SOLUTION SUBCUTANEOUS at 21:24

## 2022-09-23 RX ADMIN — LEVOTHYROXINE SODIUM 75 MCG: 0.07 TABLET ORAL at 12:52

## 2022-09-23 RX ADMIN — FAMOTIDINE 20 MG: 20 TABLET ORAL at 12:16

## 2022-09-23 RX ADMIN — AMLODIPINE BESYLATE 10 MG: 10 TABLET ORAL at 12:16

## 2022-09-23 RX ADMIN — INSULIN LISPRO 5 UNITS: 100 INJECTION, SOLUTION INTRAVENOUS; SUBCUTANEOUS at 17:18

## 2022-09-23 RX ADMIN — SODIUM CHLORIDE 50 ML/HR: 9 INJECTION, SOLUTION INTRAVENOUS at 05:08

## 2022-09-23 RX ADMIN — INSULIN LISPRO 5 UNITS: 100 INJECTION, SOLUTION INTRAVENOUS; SUBCUTANEOUS at 12:16

## 2022-09-23 RX ADMIN — INSULIN LISPRO 2 UNITS: 100 INJECTION, SOLUTION INTRAVENOUS; SUBCUTANEOUS at 17:18

## 2022-09-23 RX ADMIN — ATORVASTATIN CALCIUM 40 MG: 20 TABLET, FILM COATED ORAL at 21:25

## 2022-09-24 ENCOUNTER — APPOINTMENT (OUTPATIENT)
Dept: GENERAL RADIOLOGY | Facility: HOSPITAL | Age: 87
End: 2022-09-24

## 2022-09-24 ENCOUNTER — APPOINTMENT (OUTPATIENT)
Dept: MRI IMAGING | Facility: HOSPITAL | Age: 87
End: 2022-09-24

## 2022-09-24 LAB
ALBUMIN SERPL-MCNC: 3.7 G/DL (ref 3.5–5.2)
ALBUMIN/GLOB SERPL: 1.5 G/DL
ALP SERPL-CCNC: 112 U/L (ref 39–117)
ALT SERPL W P-5'-P-CCNC: 10 U/L (ref 1–41)
ANION GAP SERPL CALCULATED.3IONS-SCNC: 9.9 MMOL/L (ref 5–15)
AST SERPL-CCNC: 11 U/L (ref 1–40)
BASOPHILS # BLD AUTO: 0.06 10*3/MM3 (ref 0–0.2)
BASOPHILS NFR BLD AUTO: 1 % (ref 0–1.5)
BILIRUB SERPL-MCNC: 0.4 MG/DL (ref 0–1.2)
BUN SERPL-MCNC: 27 MG/DL (ref 8–23)
BUN/CREAT SERPL: 16.3 (ref 7–25)
CALCIUM SPEC-SCNC: 9.2 MG/DL (ref 8.6–10.5)
CHLORIDE SERPL-SCNC: 109 MMOL/L (ref 98–107)
CO2 SERPL-SCNC: 22.1 MMOL/L (ref 22–29)
CREAT SERPL-MCNC: 1.66 MG/DL (ref 0.76–1.27)
DEPRECATED RDW RBC AUTO: 40.8 FL (ref 37–54)
EGFRCR SERPLBLD CKD-EPI 2021: 39.7 ML/MIN/1.73
EOSINOPHIL # BLD AUTO: 0.38 10*3/MM3 (ref 0–0.4)
EOSINOPHIL NFR BLD AUTO: 6 % (ref 0.3–6.2)
ERYTHROCYTE [DISTWIDTH] IN BLOOD BY AUTOMATED COUNT: 12.7 % (ref 12.3–15.4)
GLOBULIN UR ELPH-MCNC: 2.5 GM/DL
GLUCOSE BLDC GLUCOMTR-MCNC: 213 MG/DL (ref 70–130)
GLUCOSE BLDC GLUCOMTR-MCNC: 214 MG/DL (ref 70–130)
GLUCOSE BLDC GLUCOMTR-MCNC: 271 MG/DL (ref 70–130)
GLUCOSE BLDC GLUCOMTR-MCNC: 369 MG/DL (ref 70–130)
GLUCOSE BLDC GLUCOMTR-MCNC: 386 MG/DL (ref 70–130)
GLUCOSE SERPL-MCNC: 244 MG/DL (ref 65–99)
HBA1C MFR BLD: 8.4 % (ref 4.8–5.6)
HCT VFR BLD AUTO: 37.6 % (ref 37.5–51)
HGB BLD-MCNC: 12.7 G/DL (ref 13–17.7)
IMM GRANULOCYTES # BLD AUTO: 0.05 10*3/MM3 (ref 0–0.05)
IMM GRANULOCYTES NFR BLD AUTO: 0.8 % (ref 0–0.5)
LYMPHOCYTES # BLD AUTO: 1.43 10*3/MM3 (ref 0.7–3.1)
LYMPHOCYTES NFR BLD AUTO: 22.7 % (ref 19.6–45.3)
MCH RBC QN AUTO: 29.6 PG (ref 26.6–33)
MCHC RBC AUTO-ENTMCNC: 33.8 G/DL (ref 31.5–35.7)
MCV RBC AUTO: 87.6 FL (ref 79–97)
MONOCYTES # BLD AUTO: 0.65 10*3/MM3 (ref 0.1–0.9)
MONOCYTES NFR BLD AUTO: 10.3 % (ref 5–12)
NEUTROPHILS NFR BLD AUTO: 3.72 10*3/MM3 (ref 1.7–7)
NEUTROPHILS NFR BLD AUTO: 59.2 % (ref 42.7–76)
NRBC BLD AUTO-RTO: 0 /100 WBC (ref 0–0.2)
PHOSPHATE SERPL-MCNC: 2.8 MG/DL (ref 2.5–4.5)
PLATELET # BLD AUTO: 220 10*3/MM3 (ref 140–450)
PMV BLD AUTO: 10.7 FL (ref 6–12)
POTASSIUM SERPL-SCNC: 3.8 MMOL/L (ref 3.5–5.2)
PROT SERPL-MCNC: 6.2 G/DL (ref 6–8.5)
RBC # BLD AUTO: 4.29 10*6/MM3 (ref 4.14–5.8)
SODIUM SERPL-SCNC: 141 MMOL/L (ref 136–145)
TSH SERPL DL<=0.05 MIU/L-ACNC: 2.33 UIU/ML (ref 0.27–4.2)
WBC NRBC COR # BLD: 6.29 10*3/MM3 (ref 3.4–10.8)

## 2022-09-24 PROCEDURE — 83036 HEMOGLOBIN GLYCOSYLATED A1C: CPT | Performed by: HOSPITALIST

## 2022-09-24 PROCEDURE — 84443 ASSAY THYROID STIM HORMONE: CPT | Performed by: HOSPITALIST

## 2022-09-24 PROCEDURE — 74230 X-RAY XM SWLNG FUNCJ C+: CPT

## 2022-09-24 PROCEDURE — 97530 THERAPEUTIC ACTIVITIES: CPT

## 2022-09-24 PROCEDURE — 85025 COMPLETE CBC W/AUTO DIFF WBC: CPT | Performed by: HOSPITALIST

## 2022-09-24 PROCEDURE — 63710000001 INSULIN LISPRO (HUMAN) PER 5 UNITS: Performed by: HOSPITALIST

## 2022-09-24 PROCEDURE — 92611 MOTION FLUOROSCOPY/SWALLOW: CPT

## 2022-09-24 PROCEDURE — 82962 GLUCOSE BLOOD TEST: CPT

## 2022-09-24 PROCEDURE — 97162 PT EVAL MOD COMPLEX 30 MIN: CPT

## 2022-09-24 PROCEDURE — 70551 MRI BRAIN STEM W/O DYE: CPT

## 2022-09-24 PROCEDURE — 84100 ASSAY OF PHOSPHORUS: CPT | Performed by: INTERNAL MEDICINE

## 2022-09-24 PROCEDURE — 80053 COMPREHEN METABOLIC PANEL: CPT | Performed by: HOSPITALIST

## 2022-09-24 RX ORDER — INSULIN LISPRO 100 [IU]/ML
7 INJECTION, SOLUTION INTRAVENOUS; SUBCUTANEOUS
Status: DISCONTINUED | OUTPATIENT
Start: 2022-09-24 | End: 2022-09-25

## 2022-09-24 RX ADMIN — FAMOTIDINE 20 MG: 20 TABLET ORAL at 08:52

## 2022-09-24 RX ADMIN — BARIUM SULFATE 50 ML: 400 SUSPENSION ORAL at 10:54

## 2022-09-24 RX ADMIN — INSULIN LISPRO 3 UNITS: 100 INJECTION, SOLUTION INTRAVENOUS; SUBCUTANEOUS at 06:41

## 2022-09-24 RX ADMIN — ATORVASTATIN CALCIUM 40 MG: 20 TABLET, FILM COATED ORAL at 20:51

## 2022-09-24 RX ADMIN — LEVOTHYROXINE SODIUM 75 MCG: 0.07 TABLET ORAL at 06:41

## 2022-09-24 RX ADMIN — INSULIN LISPRO 5 UNITS: 100 INJECTION, SOLUTION INTRAVENOUS; SUBCUTANEOUS at 08:51

## 2022-09-24 RX ADMIN — INSULIN LISPRO 5 UNITS: 100 INJECTION, SOLUTION INTRAVENOUS; SUBCUTANEOUS at 20:51

## 2022-09-24 RX ADMIN — BARIUM SULFATE 1 TEASPOON(S): 0.6 CREAM ORAL at 10:54

## 2022-09-24 RX ADMIN — INSULIN GLARGINE-YFGN 20 UNITS: 100 INJECTION, SOLUTION SUBCUTANEOUS at 20:51

## 2022-09-24 RX ADMIN — BARIUM SULFATE 55 ML: 0.81 POWDER, FOR SUSPENSION ORAL at 10:53

## 2022-09-24 RX ADMIN — INSULIN LISPRO 7 UNITS: 100 INJECTION, SOLUTION INTRAVENOUS; SUBCUTANEOUS at 18:40

## 2022-09-24 RX ADMIN — SODIUM CHLORIDE 75 ML/HR: 9 INJECTION, SOLUTION INTRAVENOUS at 18:45

## 2022-09-24 RX ADMIN — BARIUM SULFATE 135 ML: 980 POWDER, FOR SUSPENSION ORAL at 10:54

## 2022-09-24 RX ADMIN — INSULIN LISPRO 6 UNITS: 100 INJECTION, SOLUTION INTRAVENOUS; SUBCUTANEOUS at 18:38

## 2022-09-24 RX ADMIN — AMLODIPINE BESYLATE 10 MG: 10 TABLET ORAL at 08:51

## 2022-09-25 LAB
ALBUMIN SERPL-MCNC: 3.4 G/DL (ref 3.5–5.2)
ANION GAP SERPL CALCULATED.3IONS-SCNC: 11.3 MMOL/L (ref 5–15)
BASOPHILS # BLD AUTO: 0.07 10*3/MM3 (ref 0–0.2)
BASOPHILS NFR BLD AUTO: 0.9 % (ref 0–1.5)
BUN SERPL-MCNC: 23 MG/DL (ref 8–23)
BUN/CREAT SERPL: 13.9 (ref 7–25)
CALCIUM SPEC-SCNC: 9.2 MG/DL (ref 8.6–10.5)
CHLORIDE SERPL-SCNC: 109 MMOL/L (ref 98–107)
CO2 SERPL-SCNC: 18.7 MMOL/L (ref 22–29)
CREAT SERPL-MCNC: 1.65 MG/DL (ref 0.76–1.27)
DEPRECATED RDW RBC AUTO: 42.8 FL (ref 37–54)
EGFRCR SERPLBLD CKD-EPI 2021: 39.9 ML/MIN/1.73
EOSINOPHIL # BLD AUTO: 0.37 10*3/MM3 (ref 0–0.4)
EOSINOPHIL NFR BLD AUTO: 4.5 % (ref 0.3–6.2)
ERYTHROCYTE [DISTWIDTH] IN BLOOD BY AUTOMATED COUNT: 12.9 % (ref 12.3–15.4)
GLUCOSE BLDC GLUCOMTR-MCNC: 159 MG/DL (ref 70–130)
GLUCOSE BLDC GLUCOMTR-MCNC: 210 MG/DL (ref 70–130)
GLUCOSE BLDC GLUCOMTR-MCNC: 226 MG/DL (ref 70–130)
GLUCOSE BLDC GLUCOMTR-MCNC: 243 MG/DL (ref 70–130)
GLUCOSE SERPL-MCNC: 227 MG/DL (ref 65–99)
HCT VFR BLD AUTO: 40.1 % (ref 37.5–51)
HGB BLD-MCNC: 13.3 G/DL (ref 13–17.7)
IMM GRANULOCYTES # BLD AUTO: 0.06 10*3/MM3 (ref 0–0.05)
IMM GRANULOCYTES NFR BLD AUTO: 0.7 % (ref 0–0.5)
LYMPHOCYTES # BLD AUTO: 1.54 10*3/MM3 (ref 0.7–3.1)
LYMPHOCYTES NFR BLD AUTO: 18.8 % (ref 19.6–45.3)
MCH RBC QN AUTO: 30 PG (ref 26.6–33)
MCHC RBC AUTO-ENTMCNC: 33.2 G/DL (ref 31.5–35.7)
MCV RBC AUTO: 90.3 FL (ref 79–97)
MONOCYTES # BLD AUTO: 0.77 10*3/MM3 (ref 0.1–0.9)
MONOCYTES NFR BLD AUTO: 9.4 % (ref 5–12)
NEUTROPHILS NFR BLD AUTO: 5.38 10*3/MM3 (ref 1.7–7)
NEUTROPHILS NFR BLD AUTO: 65.7 % (ref 42.7–76)
NRBC BLD AUTO-RTO: 0 /100 WBC (ref 0–0.2)
PHOSPHATE SERPL-MCNC: 3.3 MG/DL (ref 2.5–4.5)
PLATELET # BLD AUTO: 224 10*3/MM3 (ref 140–450)
PMV BLD AUTO: 10.7 FL (ref 6–12)
POTASSIUM SERPL-SCNC: 4.5 MMOL/L (ref 3.5–5.2)
RBC # BLD AUTO: 4.44 10*6/MM3 (ref 4.14–5.8)
SODIUM SERPL-SCNC: 139 MMOL/L (ref 136–145)
WBC NRBC COR # BLD: 8.19 10*3/MM3 (ref 3.4–10.8)

## 2022-09-25 PROCEDURE — 80069 RENAL FUNCTION PANEL: CPT | Performed by: INTERNAL MEDICINE

## 2022-09-25 PROCEDURE — 63710000001 INSULIN LISPRO (HUMAN) PER 5 UNITS: Performed by: HOSPITALIST

## 2022-09-25 PROCEDURE — 85025 COMPLETE CBC W/AUTO DIFF WBC: CPT | Performed by: HOSPITALIST

## 2022-09-25 PROCEDURE — 82962 GLUCOSE BLOOD TEST: CPT

## 2022-09-25 PROCEDURE — 99233 SBSQ HOSP IP/OBS HIGH 50: CPT | Performed by: NURSE PRACTITIONER

## 2022-09-25 RX ORDER — LEVETIRACETAM 500 MG/1
500 TABLET ORAL 2 TIMES DAILY
Status: DISCONTINUED | OUTPATIENT
Start: 2022-09-25 | End: 2022-09-27 | Stop reason: HOSPADM

## 2022-09-25 RX ORDER — INSULIN LISPRO 100 [IU]/ML
8 INJECTION, SOLUTION INTRAVENOUS; SUBCUTANEOUS
Status: DISCONTINUED | OUTPATIENT
Start: 2022-09-25 | End: 2022-09-27

## 2022-09-25 RX ADMIN — INSULIN LISPRO 8 UNITS: 100 INJECTION, SOLUTION INTRAVENOUS; SUBCUTANEOUS at 17:09

## 2022-09-25 RX ADMIN — INSULIN LISPRO 7 UNITS: 100 INJECTION, SOLUTION INTRAVENOUS; SUBCUTANEOUS at 12:16

## 2022-09-25 RX ADMIN — INSULIN LISPRO 2 UNITS: 100 INJECTION, SOLUTION INTRAVENOUS; SUBCUTANEOUS at 17:09

## 2022-09-25 RX ADMIN — INSULIN GLARGINE-YFGN 25 UNITS: 100 INJECTION, SOLUTION SUBCUTANEOUS at 21:57

## 2022-09-25 RX ADMIN — LEVETIRACETAM 500 MG: 500 TABLET, FILM COATED ORAL at 21:13

## 2022-09-25 RX ADMIN — INSULIN LISPRO 3 UNITS: 100 INJECTION, SOLUTION INTRAVENOUS; SUBCUTANEOUS at 08:13

## 2022-09-25 RX ADMIN — AMLODIPINE BESYLATE 10 MG: 10 TABLET ORAL at 08:13

## 2022-09-25 RX ADMIN — INSULIN LISPRO 7 UNITS: 100 INJECTION, SOLUTION INTRAVENOUS; SUBCUTANEOUS at 08:14

## 2022-09-25 RX ADMIN — INSULIN LISPRO 3 UNITS: 100 INJECTION, SOLUTION INTRAVENOUS; SUBCUTANEOUS at 12:17

## 2022-09-25 RX ADMIN — LEVETIRACETAM 500 MG: 500 TABLET, FILM COATED ORAL at 12:16

## 2022-09-25 RX ADMIN — INSULIN LISPRO 3 UNITS: 100 INJECTION, SOLUTION INTRAVENOUS; SUBCUTANEOUS at 21:58

## 2022-09-25 RX ADMIN — FAMOTIDINE 20 MG: 20 TABLET ORAL at 08:13

## 2022-09-25 RX ADMIN — ATORVASTATIN CALCIUM 40 MG: 20 TABLET, FILM COATED ORAL at 21:13

## 2022-09-25 RX ADMIN — LEVOTHYROXINE SODIUM 75 MCG: 0.07 TABLET ORAL at 05:51

## 2022-09-26 LAB
ALBUMIN SERPL-MCNC: 3.8 G/DL (ref 3.5–5.2)
ANION GAP SERPL CALCULATED.3IONS-SCNC: 10.7 MMOL/L (ref 5–15)
BASOPHILS # BLD AUTO: 0.03 10*3/MM3 (ref 0–0.2)
BASOPHILS NFR BLD AUTO: 0.4 % (ref 0–1.5)
BUN SERPL-MCNC: 21 MG/DL (ref 8–23)
BUN/CREAT SERPL: 13.5 (ref 7–25)
CALCIUM SPEC-SCNC: 9.1 MG/DL (ref 8.6–10.5)
CHLORIDE SERPL-SCNC: 109 MMOL/L (ref 98–107)
CO2 SERPL-SCNC: 23.3 MMOL/L (ref 22–29)
CREAT SERPL-MCNC: 1.55 MG/DL (ref 0.76–1.27)
DEPRECATED RDW RBC AUTO: 45.3 FL (ref 37–54)
EGFRCR SERPLBLD CKD-EPI 2021: 43.1 ML/MIN/1.73
EOSINOPHIL # BLD AUTO: 0.28 10*3/MM3 (ref 0–0.4)
EOSINOPHIL NFR BLD AUTO: 3.3 % (ref 0.3–6.2)
ERYTHROCYTE [DISTWIDTH] IN BLOOD BY AUTOMATED COUNT: 13.3 % (ref 12.3–15.4)
GLUCOSE BLDC GLUCOMTR-MCNC: 108 MG/DL (ref 70–130)
GLUCOSE BLDC GLUCOMTR-MCNC: 110 MG/DL (ref 70–130)
GLUCOSE BLDC GLUCOMTR-MCNC: 142 MG/DL (ref 70–130)
GLUCOSE BLDC GLUCOMTR-MCNC: 222 MG/DL (ref 70–130)
GLUCOSE BLDC GLUCOMTR-MCNC: 230 MG/DL (ref 70–130)
GLUCOSE BLDC GLUCOMTR-MCNC: >599 MG/DL (ref 70–130)
GLUCOSE SERPL-MCNC: 107 MG/DL (ref 65–99)
HCT VFR BLD AUTO: 38.4 % (ref 37.5–51)
HGB BLD-MCNC: 12.5 G/DL (ref 13–17.7)
IMM GRANULOCYTES # BLD AUTO: 0.04 10*3/MM3 (ref 0–0.05)
IMM GRANULOCYTES NFR BLD AUTO: 0.5 % (ref 0–0.5)
LYMPHOCYTES # BLD AUTO: 1.33 10*3/MM3 (ref 0.7–3.1)
LYMPHOCYTES NFR BLD AUTO: 15.8 % (ref 19.6–45.3)
MCH RBC QN AUTO: 29.8 PG (ref 26.6–33)
MCHC RBC AUTO-ENTMCNC: 32.6 G/DL (ref 31.5–35.7)
MCV RBC AUTO: 91.4 FL (ref 79–97)
MONOCYTES # BLD AUTO: 0.66 10*3/MM3 (ref 0.1–0.9)
MONOCYTES NFR BLD AUTO: 7.8 % (ref 5–12)
NEUTROPHILS NFR BLD AUTO: 6.1 10*3/MM3 (ref 1.7–7)
NEUTROPHILS NFR BLD AUTO: 72.2 % (ref 42.7–76)
NRBC BLD AUTO-RTO: 0 /100 WBC (ref 0–0.2)
PHOSPHATE SERPL-MCNC: 3.4 MG/DL (ref 2.5–4.5)
PLATELET # BLD AUTO: 210 10*3/MM3 (ref 140–450)
PMV BLD AUTO: 10.5 FL (ref 6–12)
POTASSIUM SERPL-SCNC: 3.6 MMOL/L (ref 3.5–5.2)
RBC # BLD AUTO: 4.2 10*6/MM3 (ref 4.14–5.8)
SARS-COV-2 ORF1AB RESP QL NAA+PROBE: NOT DETECTED
SODIUM SERPL-SCNC: 143 MMOL/L (ref 136–145)
WBC NRBC COR # BLD: 8.44 10*3/MM3 (ref 3.4–10.8)

## 2022-09-26 PROCEDURE — 82962 GLUCOSE BLOOD TEST: CPT

## 2022-09-26 PROCEDURE — 85025 COMPLETE CBC W/AUTO DIFF WBC: CPT | Performed by: HOSPITALIST

## 2022-09-26 PROCEDURE — 80069 RENAL FUNCTION PANEL: CPT | Performed by: INTERNAL MEDICINE

## 2022-09-26 PROCEDURE — 97530 THERAPEUTIC ACTIVITIES: CPT

## 2022-09-26 PROCEDURE — U0005 INFEC AGEN DETEC AMPLI PROBE: HCPCS | Performed by: HOSPITALIST

## 2022-09-26 PROCEDURE — U0004 COV-19 TEST NON-CDC HGH THRU: HCPCS | Performed by: HOSPITALIST

## 2022-09-26 PROCEDURE — 63710000001 INSULIN LISPRO (HUMAN) PER 5 UNITS: Performed by: HOSPITALIST

## 2022-09-26 RX ADMIN — AMLODIPINE BESYLATE 10 MG: 10 TABLET ORAL at 09:11

## 2022-09-26 RX ADMIN — ATORVASTATIN CALCIUM 40 MG: 20 TABLET, FILM COATED ORAL at 21:35

## 2022-09-26 RX ADMIN — LEVOTHYROXINE SODIUM 75 MCG: 0.07 TABLET ORAL at 05:44

## 2022-09-26 RX ADMIN — LEVETIRACETAM 500 MG: 500 TABLET, FILM COATED ORAL at 21:33

## 2022-09-26 RX ADMIN — INSULIN LISPRO 8 UNITS: 100 INJECTION, SOLUTION INTRAVENOUS; SUBCUTANEOUS at 12:41

## 2022-09-26 RX ADMIN — INSULIN GLARGINE-YFGN 25 UNITS: 100 INJECTION, SOLUTION SUBCUTANEOUS at 21:36

## 2022-09-26 RX ADMIN — FAMOTIDINE 20 MG: 20 TABLET ORAL at 09:12

## 2022-09-26 RX ADMIN — INSULIN LISPRO 8 UNITS: 100 INJECTION, SOLUTION INTRAVENOUS; SUBCUTANEOUS at 09:11

## 2022-09-26 RX ADMIN — INSULIN LISPRO 8 UNITS: 100 INJECTION, SOLUTION INTRAVENOUS; SUBCUTANEOUS at 17:49

## 2022-09-26 RX ADMIN — INSULIN LISPRO 3 UNITS: 100 INJECTION, SOLUTION INTRAVENOUS; SUBCUTANEOUS at 17:49

## 2022-09-26 RX ADMIN — LEVETIRACETAM 500 MG: 500 TABLET, FILM COATED ORAL at 09:11

## 2022-09-26 RX ADMIN — INSULIN LISPRO 3 UNITS: 100 INJECTION, SOLUTION INTRAVENOUS; SUBCUTANEOUS at 21:39

## 2022-09-27 VITALS
RESPIRATION RATE: 18 BRPM | TEMPERATURE: 97.8 F | DIASTOLIC BLOOD PRESSURE: 73 MMHG | HEART RATE: 67 BPM | OXYGEN SATURATION: 93 % | BODY MASS INDEX: 22.69 KG/M2 | SYSTOLIC BLOOD PRESSURE: 144 MMHG | HEIGHT: 69 IN | WEIGHT: 153.22 LBS

## 2022-09-27 LAB
ALBUMIN SERPL-MCNC: 3.7 G/DL (ref 3.5–5.2)
ANION GAP SERPL CALCULATED.3IONS-SCNC: 13.5 MMOL/L (ref 5–15)
BUN SERPL-MCNC: 20 MG/DL (ref 8–23)
BUN/CREAT SERPL: 12.5 (ref 7–25)
CALCIUM SPEC-SCNC: 9.4 MG/DL (ref 8.6–10.5)
CHLORIDE SERPL-SCNC: 106 MMOL/L (ref 98–107)
CO2 SERPL-SCNC: 19.5 MMOL/L (ref 22–29)
CREAT SERPL-MCNC: 1.6 MG/DL (ref 0.76–1.27)
EGFRCR SERPLBLD CKD-EPI 2021: 41.4 ML/MIN/1.73
GLUCOSE BLDC GLUCOMTR-MCNC: 158 MG/DL (ref 70–130)
GLUCOSE BLDC GLUCOMTR-MCNC: 255 MG/DL (ref 70–130)
GLUCOSE SERPL-MCNC: 219 MG/DL (ref 65–99)
PHOSPHATE SERPL-MCNC: 2.9 MG/DL (ref 2.5–4.5)
POTASSIUM SERPL-SCNC: 4.3 MMOL/L (ref 3.5–5.2)
SODIUM SERPL-SCNC: 139 MMOL/L (ref 136–145)

## 2022-09-27 PROCEDURE — 80069 RENAL FUNCTION PANEL: CPT | Performed by: INTERNAL MEDICINE

## 2022-09-27 PROCEDURE — 63710000001 INSULIN LISPRO (HUMAN) PER 5 UNITS: Performed by: HOSPITALIST

## 2022-09-27 PROCEDURE — 82962 GLUCOSE BLOOD TEST: CPT

## 2022-09-27 PROCEDURE — 92526 ORAL FUNCTION THERAPY: CPT

## 2022-09-27 RX ORDER — INSULIN LISPRO 100 [IU]/ML
10 INJECTION, SOLUTION INTRAVENOUS; SUBCUTANEOUS
Qty: 100 ML | Refills: 0 | Status: SHIPPED | OUTPATIENT
Start: 2022-09-27 | End: 2022-10-27

## 2022-09-27 RX ORDER — AMLODIPINE BESYLATE 10 MG/1
10 TABLET ORAL
Qty: 30 TABLET | Refills: 0 | Status: SHIPPED | OUTPATIENT
Start: 2022-09-28 | End: 2022-10-28

## 2022-09-27 RX ORDER — LEVETIRACETAM 500 MG/1
500 TABLET ORAL 2 TIMES DAILY
Qty: 60 TABLET | Refills: 0 | Status: SHIPPED | OUTPATIENT
Start: 2022-09-27 | End: 2022-10-27

## 2022-09-27 RX ORDER — ATORVASTATIN CALCIUM 40 MG/1
40 TABLET, FILM COATED ORAL NIGHTLY
Qty: 30 TABLET | Refills: 0 | Status: SHIPPED | OUTPATIENT
Start: 2022-09-27 | End: 2022-10-27

## 2022-09-27 RX ORDER — INSULIN LISPRO 100 [IU]/ML
10 INJECTION, SOLUTION INTRAVENOUS; SUBCUTANEOUS
Status: DISCONTINUED | OUTPATIENT
Start: 2022-09-27 | End: 2022-09-27 | Stop reason: HOSPADM

## 2022-09-27 RX ADMIN — INSULIN LISPRO 2 UNITS: 100 INJECTION, SOLUTION INTRAVENOUS; SUBCUTANEOUS at 08:52

## 2022-09-27 RX ADMIN — FAMOTIDINE 20 MG: 20 TABLET ORAL at 08:52

## 2022-09-27 RX ADMIN — INSULIN LISPRO 8 UNITS: 100 INJECTION, SOLUTION INTRAVENOUS; SUBCUTANEOUS at 08:52

## 2022-09-27 RX ADMIN — AMLODIPINE BESYLATE 10 MG: 10 TABLET ORAL at 08:52

## 2022-09-27 RX ADMIN — INSULIN LISPRO 4 UNITS: 100 INJECTION, SOLUTION INTRAVENOUS; SUBCUTANEOUS at 12:45

## 2022-09-27 RX ADMIN — LEVETIRACETAM 500 MG: 500 TABLET, FILM COATED ORAL at 08:52

## 2022-09-27 RX ADMIN — LEVOTHYROXINE SODIUM 75 MCG: 0.07 TABLET ORAL at 05:41

## 2022-10-25 ENCOUNTER — TELEPHONE (OUTPATIENT)
Dept: NEUROLOGY | Facility: CLINIC | Age: 87
End: 2022-10-25

## 2022-10-25 NOTE — TELEPHONE ENCOUNTER
Patients ADRIANNA Iniguez, called the [facility] to cancel patients appointment with Brianna Monroe on 11/1/22. I called and spoke with Geetha to make sure she did in fact want that appointment cancelled. I made her aware that Neurology and Neurosurgery seen patient for separate clinical reasons. Geetha states that Dr. Reyes ordered a CT for patient on 12/13/22. Geetha states if patient has any problems, that Dr. Reyes can take care of it, she doesn't see a reason why she should have to transport patient, from his long term care facility for the appointment with Brianna.

## 2022-12-05 ENCOUNTER — TELEPHONE (OUTPATIENT)
Dept: NEUROSURGERY | Facility: CLINIC | Age: 87
End: 2022-12-05

## 2022-12-13 ENCOUNTER — HOSPITAL ENCOUNTER (OUTPATIENT)
Dept: CT IMAGING | Facility: HOSPITAL | Age: 87
Discharge: HOME | End: 2022-12-13
Admitting: NEUROLOGICAL SURGERY

## 2022-12-13 DIAGNOSIS — S06.5XAA SUBDURAL HEMATOMA: ICD-10-CM

## 2022-12-13 PROCEDURE — 70450 CT HEAD/BRAIN W/O DYE: CPT

## 2023-01-03 NOTE — THERAPY TREATMENT NOTE
Addended by: MICAH WINTER on: 1/3/2023 05:03 PM     Modules accepted: Orders     Patient Name: Reece Stephen  : 1935    MRN: [id number]                              Today's Date: 2022       Admit Date: 2022    Visit Dx:     ICD-10-CM ICD-9-CM   1. SDH (subdural hematoma) (HCC)  S06.5X9A 432.1   2. Subdural hematoma (HCC)  S06.5X9A 432.1     Patient Active Problem List   Diagnosis   • History of rectal cancer   • Lung nodule, multiple   • History of DVT (deep vein thrombosis)   • Transient cerebral ischemia   • Hyperkalemia   • Hyperosmolar non-ketotic state in patient with type 2 diabetes mellitus (HCC)   • Type 2 diabetes mellitus with complication (HCC)   • Hyponatremia with extracellular fluid depletion   • Hypothyroidism   • Hyperlipidemia   • Diabetic polyneuropathy associated with type 2 diabetes mellitus (HCC)   • Chronic kidney disease, stage 3 (moderate) (CMS/HCC)   • Non compliance w medication regimen   • Essential hypertension   • Upper gastrointestinal hemorrhage   • Osteoarthritis   • Gastroesophageal reflux disease   • Deep vein thrombosis (HCC)   • Colitis due to Clostridium difficile   • Burn injury   • Anemia   • Cerebrovascular accident (CVA) (HCC)   • Other injury of unspecified body region, initial encounter   • Chronic pain of both knees   • Diabetic neuropathy (HCC)   • Altered mental status   • Fall   • Cerebral contusion (HCC)   • Embolic stroke (HCC)   • Acute weakness   • Subdural hematoma (HCC)   • SDH (subdural hematoma) (HCC)   • Surgery, elective     Past Medical History:   Diagnosis Date   • Anemia    • Arthritis    • Clostridium difficile infection 10/2014   • Colostomy present (HCC)    • Diabetes mellitus (HCC)     TYPE 2, IDDM   • Disease of thyroid gland     ACQUIRED HYPOTHYROIDISM   • Duodenal ulcer 2014   • DVT (deep venous thrombosis) (HCC) 2015    PORT ASSOCIATED OF LEFT UPPER EXTREMITY   • Electrocution     HAD TO HAVE PARTIAL AMPUTATION OF LEFT FOOT   • GERD (gastroesophageal reflux disease)    • Hearing loss    • Hemorrhagic  shock (HCC) 2015    SECONDARY TO BLEEDING DUODENAL ULCER, ADMITTED TO [facility]   • Histiocytoma    • History of blood transfusion 2015    D/T BLEEDING DUODENAL ULCER   • History of chemotherapy     FOLLOWED BY DR. NIGEL NOBLE   • History of radiation therapy 2014    FOLLOWED BY DR. RJ HUI   • Hypercholesterolemia    • Hyperkalemia    • Hypertension    • Hypoglycemia 01/04/2018    SEEN AT [facility] ER   • Incontinent of urine    • Mixed hyperlipidemia    • Multiple lung nodules    • Neuropathy    • OA (osteoarthritis)    • ANNE MARIE (obstructive sleep apnea)     NO CPAP   • Peptic ulceration 2014    Duodenal ulcer   • Pneumonia 03/29/2013    ADMITTED TO [facility]-DOUBLE PNEUMONIA   • Rectal cancer (HCC) 06/2014    T3N1M0, S/P CHEMO, XRT, AND RESECTION, FOLLOWED BY DR. TRACE HERRING   • Stage 3 chronic kidney disease (HCC)     DR. FREDDIE GONZALES   • Stroke (HCC) 11/28/2017    TIA, ADMITTED TO [facility]   • Stroke (HCC) 11/17/2019    CVA, ADMITTED TO [facility]   • Subdural hematoma (HCC)     INCREASE IN HEARING LOSS, BALANCE PROBLEMS, SPEECH SLOWED, MEMORY DEFICIT   • Vitamin D deficiency      Past Surgical History:   Procedure Laterality Date   • AMPUTATION FOOT Left 1971    PARTIAL DUE TO AN ELECTRICAL SHOCK INJURY   • ARM TENDON REPAIR Right     DONE BY DR. OLEARY AND KLEINERT   • COLON RESECTION N/A 11/14/2014    LOW ANTERIOR RESECTION WITH CREATION OF COLOSTOMY, DR. TRACE HERRING AT [facility]   • COLONOSCOPY W/ BIOPSIES N/A 06/26/2014    ULCERATED 5CM MASS IN RECTUM, PATH: MODERATELY DIFFERENTIATED ADENOCARCINOMA, MULTIPLE DIVERTICULA IN SIGMOID, DR. LAUREN JAMES AT [facility]   • COLONOSCOPY W/ BIOPSIES N/A 07/09/2014    RECTAL MASS: INVASIVE MODERATLEY DIFFERIENTIATED ADENOCARCINOMA, AREA TATTOOED, DR. TRACE HERRING AT [facility]   • EMBOLIZATION CEREBRAL Left 7/12/2022    Procedure: LEFT KWABENA HOLE FOR EVACUATION OF SUBDURAL HEMATOMA;  Surgeon: Ciaran Reyes MD;  Location: [facility] MAIN OR;  Service: Neurosurgery;  Laterality: Left;   •  EMBOLIZATION CEREBRAL N/A 7/21/2022    Procedure: CEREBRAL ANGIOGRAM with MMA embolization;  Surgeon: Ciaran Reyes MD;  Location: [facility] OR 18/19;  Service: Neurosurgery;  Laterality: N/A;   • ENDOSCOPY  02/09/2015   • ENDOSCOPY N/A 02/09/2015    RESOLVED DUODENAL ULCER, EGD WNL, DR. FREDDIE MALCOLM AT [facility]   • FLEXIBLE SIGMOIDOSCOPY N/A 10/06/2014    MALIGNANT PARTIALLY OBSTRUCTING TUMOR IN MID RECTUM, DR. DELTA HERRING AT [facility]   • PORTACATH PLACEMENT Left 07/24/2014    LEFT SUBCLAVIAN, DR. KWAKU TAY AT [facility]   • RECTAL ULTRASOUND N/A 07/09/2014    ENDORECTAL US FOR CANCER STAGING, T3N1 RECTAL CANCER AT 7 CM, DR. TRACE HERRING AT [facility]   • TRACHEAL SURGERY N/A 08/31/2014    ENDOTRACHEAL INTUBATION, DR. ALMA ROSA HAGAN AT [facility]   • VENOUS ACCESS DEVICE (PORT) REMOVAL Left 03/12/2015    LEFT SUBCLAVIAN, DR. KWAKU TAY AT [facility]      General Information     Row Name 08/02/22 1817          OT Time and Intention    Document Type therapy note (daily note)  -MW (r) SA (t) MW (c)     Mode of Treatment individual therapy;occupational therapy  -MW (r) SA (t) MW (c)     Row Name 08/02/22 1345          General Information    Patient Profile Reviewed yes  -MW (r) SA (t) MW (c)     Prior Level of Function independent:;ADL's  -MW (r) SA (t) MW (c)     Existing Precautions/Restrictions fall  -MW (r) SA (t) MW (c)     Row Name 08/02/22 3701          Cognition    Orientation Status (Cognition) oriented x 3  -MW (r) SA (t) MW (c)     Row Name 08/02/22 1349          Safety Issues, Functional Mobility    Safety Issues Affecting Function (Mobility) impulsivity;insight into deficits/self-awareness;judgment;positioning of assistive device;problem-solving;safety precaution awareness  -MW (r) SA (t) MW (c)     Impairments Affecting Function (Mobility) balance;cognition;coordination;endurance/activity tolerance;strength  -MW (r) SA (t) MW (c)     Cognitive Impairments, Mobility Safety/Performance impulsivity;insight into  deficits/self-awareness;judgment;problem-solving/reasoning;safety precaution awareness  -MW (r) SA (t) MW (c)           User Key  (r) = Recorded By, (t) = Taken By, (c) = Cosigned By    Initials Name Provider Type    Anna Thompson, OT Occupational Therapist    Haleigh Fernandez, OT Student OT Student                 Mobility/ADL's     Row Name 08/02/22 1348          Bed Mobility    Bed Mobility sit-supine  -MW (r) SA (t) MW (c)     Sit-Supine [place] (Bed Mobility) contact guard  -MW (r) SA (t) MW (c)     Row Name 08/02/22 1348          Transfers    Transfers sit-stand transfer;stand-sit transfer  -MW (r) SA (t) MW (c)     Sit-Stand [place] (Transfers) contact guard  -MW (r) SA (t) MW (c)     Stand-Sit [place] (Transfers) contact guard  -MW (r) SA (t) MW (c)     Row Name 08/02/22 1348          Sit-Stand Transfer    Assistive Device (Sit-Stand Transfers) walker, front-wheeled  -MW (r) SA (t) MW (c)     Row Name 08/02/22 1348          Stand-Sit Transfer    Assistive Device (Stand-Sit Transfers) walker, front-wheeled  -MW (r) SA (t) MW (c)     Row Name 08/02/22 1348          Functional Mobility    Functional Mobility- Ind. Level contact guard assist;verbal cues required;nonverbal cues required (demo/gesture)  -MW (r) SA (t) MW (c)     Functional Mobility- Device walker, front-wheeled  -MW (r) SA (t) MW (c)     Functional Mobility-Distance (Feet) 25  -MW (r) SA (t) MW (c)     Functional Mobility- Comment pt ambulating around room, no signs of fatigue however pt often hitting L sided objects and not turning rwx to avoid  -MW (r) SA (t) MW (c)     Row Name 08/02/22 1348          Activities of Daily Living    BADL Assessment/Intervention feeding  -MW (r) SA (t) MW (c)     Row Name 08/02/22 1348          Self-Feeding Assessment/Training    [place] Level (Feeding) liquids to mouth;set up  -MW (r) SA (t) MW (c)     Position (Self-Feeding) sitting up in bed  -MW (r) SA (t) MW (c)           User Key   (r) = Recorded By, (t) = Taken By, (c) = Cosigned By    Initials Name Provider Type    MW Anna Briggs, OT Occupational Therapist    Haleigh Fernandez OT Student OT Student               Obj/Interventions     Row Name 08/02/22 [facility]          Elbow/Forearm (Therapeutic Exercise)    Elbow/Forearm (Therapeutic Exercise) AROM (active range of motion)  -MW (r) SA (t) MW (c)     Elbow/Forearm AROM (Therapeutic Exercise) bilateral;flexion;extension;10 repetitions  -MW (r) SA (t) MW (c)     Row Name 08/02/22 [facility]          Hand (Therapeutic Exercise)    Hand (Therapeutic Exercise) strengthening exercise  -MW (r) SA (t) MW (c)     Hand Strengthening (Therapeutic Exercise) bilateral;finger flexion;finger extension;10 repetitions  squeezing washcloth  -MW (r) SA (t) MW (c)     Row Name 08/02/22 [facility]          Motor Skills    Motor Skills functional endurance  -MW (r) SA (t) MW (c)     Functional Endurance fair -  -MW (r) SA (t) MW (c)     Therapeutic Exercise elbow/forearm;hand  -MW (r) SA (t) MW (c)     Row Name 08/02/22 [facility]          Balance    Balance Assessment sitting static balance;sitting dynamic balance;standing static balance;standing dynamic balance  -MW (r) SA (t) MW (c)     Static Sitting Balance independent  -MW (r) SA (t) MW (c)     Dynamic Sitting Balance modified independence  -MW (r) SA (t) MW (c)     Position, Sitting Balance sitting in chair;supported  -MW (r) SA (t) MW (c)     Static Standing Balance contact guard  -MW (r) SA (t) MW (c)     Dynamic Standing Balance contact guard;verbal cues;non-verbal cues (demo/gesture)  -MW (r) SA (t) MW (c)     Position/Device Used, Standing Balance supported;walker, front-wheeled  -MW (r) SA (t) MW (c)     Balance Interventions standing;dynamic;weight shifting activity;minimal challenge;supported  -MW (r) SA (t) MW (c)     Comment, Balance pt asked to shift weight side to side x10 reps  -MW (r) SA (t) MW (c)           User Key  (r) = Recorded By, (t) = Taken By, (c)  = Cosigned By    Initials Name Provider Type    Anna Thompson, OT Occupational Therapist    Haleigh Fernandez, OT Student OT Student               Goals/Plan    No documentation.                Clinical Impression     Row Name 08/02/22 1356          Pain Assessment    Pretreatment Pain Rating 0/10 - no pain  -MW (r) SA (t) MW (c)     Posttreatment Pain Rating 0/10 - no pain  -MW (r) SA (t) MW (c)     Row Name 08/02/22 1358          Plan of Care Review    Plan of Care Reviewed With patient  -MW (r) SA (t) MW (c)     Progress improving  -MW (r) SA (t) MW (c)     Outcome Evaluation Pt agreeable to OT session this afternoon and completes BUE exercises in chair. Pt performs STS with CGA and weight-shifts laterally at rwx x10 reps. Pt walks 25' in room then reports fatigue and moves to sit EOB then supine. Pt demonstrating increased activity tolerance today however would still benefit from SNF d/c and continued OT services.  -MW (r) SA (t) MW (c)     Row Name 08/02/22 1350          Therapy Plan Review/Discharge Plan (OT)    Anticipated Discharge Disposition (OT) skilled nursing facility  -MW (r) SA (t) MW (c)     Row Name 08/02/22 1359          Positioning and Restraints    Pre-Treatment Position sitting in chair/recliner  -MW (r) SA (t) MW (c)     Post Treatment Position bed  -MW (r) SA (t) MW (c)     In Bed notified nsg;fowlers;call light within reach;encouraged to call for assist;exit alarm on  -MW (r) SA (t) MW (c)           User Key  (r) = Recorded By, (t) = Taken By, (c) = Cosigned By    Initials Name Provider Type    Anna Thompson, OT Occupational Therapist    Haleigh Fernandez, OT Student OT Student               Outcome Measures     Row Name 08/02/22 0846          How much help from another is currently needed...    Putting on and taking off regular lower body clothing? 2  -MW (r) SA (t) MW (c)     Bathing (including washing, rinsing, and drying) 2  -MW (r) SA (t) MW (c)     Toileting (which  includes using toilet bed pan or urinal) 2  -MW (r) SA (t) MW (c)     Putting on and taking off regular upper body clothing 2  -MW (r) SA (t) MW (c)     Taking care of personal grooming (such as brushing teeth) 3  -MW (r) SA (t) MW (c)     Eating meals 3  -MW (r) SA (t) MW (c)     AM-PAC 6 Clicks Score (OT) 14  -MW (r) SA (t)     Row Name 08/02/22 0810          How much help from another person do you currently need...    Turning from your back to your side while in flat bed without using bedrails? 4  -BW     Moving from lying on back to sitting on the side of a flat bed without bedrails? 3  -BW     Moving to and from a bed to a chair (including a wheelchair)? 3  -BW     Standing up from a chair using your arms (e.g., wheelchair, bedside chair)? 4  -BW     Row Name 08/02/22 1356          Functional Assessment    Outcome Measure Options AM-PAC 6 Clicks Daily Activity (OT)  -MW (r) SA (t) MW (c)           User Key  (r) = Recorded By, (t) = Taken By, (c) = Cosigned By    Initials Name Provider Type    Anna Thompson OT Occupational Therapist    Haleigh Fernandez OT Student OT Student    Cesilia Mix, RN Registered Nurse                Occupational Therapy Education                 Title: PT OT SLP Therapies (In Progress)     Topic: Occupational Therapy (In Progress)     Point: ADL training (In Progress)     Description:   Instruct learner(s) on proper safety adaptation and remediation techniques during self care or transfers.   Instruct in proper use of assistive devices.              Learning Progress Summary           Patient Acceptance, E,D, NR by VISHAL at 7/20/2022 1712    Acceptance, E, VU by  at 7/18/2022 1522    Comment: role of OT, safety in transfers, d/c recommendations, plan of care                   Point: Home exercise program (In Progress)     Description:   Instruct learner(s) on appropriate technique for monitoring, assisting and/or progressing therapeutic exercises/activities.               Learning Progress Summary           Patient Acceptance, E,D, NR by  at 7/20/2022 1712                   Point: Precautions (In Progress)     Description:   Instruct learner(s) on prescribed precautions during self-care and functional transfers.              Learning Progress Summary           Patient Acceptance, E, NR by  at 7/31/2022 1442    Acceptance, E,D, NR by  at 7/20/2022 1712                   Point: Body mechanics (In Progress)     Description:   Instruct learner(s) on proper positioning and spine alignment during self-care, functional mobility activities and/or exercises.              Learning Progress Summary           Patient Acceptance, E, NR by  at 8/1/2022 1327    Acceptance, E,D, NR by  at 7/20/2022 1712                               User Key     Initials Effective Dates Name Provider Type Discipline     03/07/18 -  Kim Coleman PTA Physical Therapist Assistant PT     05/23/22 -  Haleigh Pena OT Student OT Student OT     07/13/22 -  Cesilia Milian RN Registered Nurse Nurse              OT Recommendation and Plan  Planned Therapy Interventions (OT): activity tolerance training, functional balance retraining, occupation/activity based interventions, patient/caregiver education/training, strengthening exercise, transfer/mobility retraining  Therapy Frequency (OT): 5 times/wk  Plan of Care Review  Plan of Care Reviewed With: patient  Progress: improving  Outcome Evaluation: Pt agreeable to OT session this afternoon and completes BUE exercises in chair. Pt performs STS with CGA and weight-shifts laterally at rwx x10 reps. Pt walks 25' in room then reports fatigue and moves to sit EOB then supine. Pt demonstrating increased activity tolerance today however would still benefit from SNF d/c and continued OT services.     Time Calculation:    Time Calculation- OT     Row Name 08/02/22 1357             Time Calculation- OT    OT Start Time 1249  -MW (r) SA (t) MW (c)      OT Stop Time  1308  -MW (r) SA (t) MW (c)      OT Time Calculation (min) 19 min  -MW (r) SA (t)      Total Timed Code Minutes- OT 19 minute(s)  -MW (r) SA (t) MW (c)      OT Received On 08/02/22  -MW (r) SA (t) MW (c)      OT - Next Appointment 08/03/22  -MW (r) SA (t) MW (c)              Timed Charges    68837 - OT Therapeutic Activity Minutes 19  -MW (r) SA (t) MW (c)              Total Minutes    Timed Charges Total Minutes 19  -MW (r) SA (t)       Total Minutes 19  -MW (r) SA (t)            User Key  (r) = Recorded By, (t) = Taken By, (c) = Cosigned By    Initials Name Provider Type    Anna Thompson, OT Occupational Therapist    Haleigh Fernandez, OT Student OT Student              Therapy Charges for Today     Code Description Service Date Service Provider Modifiers Qty    [id number] HC OT THERAPEUTIC ACT EA 15 MIN 8/2/2022 Haleigh Pena, OT Student GO 1               Haleigh Pena OT Student  8/2/2022

## 2023-01-05 ENCOUNTER — TELEPHONE (OUTPATIENT)
Dept: NEUROSURGERY | Facility: CLINIC | Age: 88
End: 2023-01-05
Payer: MEDICARE
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
## 2023-10-05 NOTE — TELEPHONE ENCOUNTER
----- Message from Tamra Anne sent at 5/27/2022 12:39 PM EDT -----  Regarding: RE: f/up  6 wk CT scheduled as well as appt with CARITO (ok per Alejandra) and mailed to patient  ----- Message -----  From: Angeline Marin MA  Sent: 5/24/2022  11:11 AM EDT  To: Tamra Anne  Subject: FW: f/up                                         Order entered. Please all patient with CT scan and follow up appointment information. Thank you.   ----- Message -----  From: Maritza Gutierrez APRN  Sent: 5/23/2022  12:59 PM EDT  To: Angeline Marin MA  Subject: f/up                                             Will need a f/up in 6 weeks with a head CT.  ICH after fall on blood thinners.  Thank you!         210.1

## 2023-12-20 ENCOUNTER — HOSPITAL ENCOUNTER (EMERGENCY)
Facility: HOSPITAL | Age: 88
Discharge: NURSING, REHAB OR LONG-TERM CARE | End: 2023-12-20
Attending: EMERGENCY MEDICINE | Admitting: EMERGENCY MEDICINE
Payer: MEDICARE

## 2023-12-20 ENCOUNTER — APPOINTMENT (OUTPATIENT)
Dept: GENERAL RADIOLOGY | Facility: HOSPITAL | Age: 88
End: 2023-12-20
Payer: MEDICARE

## 2023-12-20 VITALS
TEMPERATURE: 98.4 F | HEART RATE: 64 BPM | BODY MASS INDEX: 22.22 KG/M2 | HEIGHT: 69 IN | WEIGHT: 150 LBS | RESPIRATION RATE: 16 BRPM | SYSTOLIC BLOOD PRESSURE: 152 MMHG | OXYGEN SATURATION: 95 % | DIASTOLIC BLOOD PRESSURE: 89 MMHG

## 2023-12-20 DIAGNOSIS — K94.09 COLOSTOMY PROLAPSE: Primary | ICD-10-CM

## 2023-12-20 PROCEDURE — 74018 RADEX ABDOMEN 1 VIEW: CPT

## 2023-12-20 PROCEDURE — 99283 EMERGENCY DEPT VISIT LOW MDM: CPT

## 2023-12-20 NOTE — DISCHARGE INSTRUCTIONS
Call to schedule follow-up with your surgeon for outpatient follow-up.      Return to nursing home, resume all previous nursing home orders, follow up with your PCP in 3 to 5 days for recheck.  Notify PCP of condition in the morning.
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
## 2023-12-20 NOTE — ED PROVIDER NOTES
MD ATTESTATION NOTE    The JOSSELYN and I have discussed this patient's history, physical exam, and treatment plan.  I have reviewed the documentation and personally had a face to face interaction with the patient. I affirm the documentation and agree with the treatment and plan.  The attached note describes my personal findings.      I provided a substantive portion of the care of the patient.  I personally performed the physical exam in its entirety, and below are my findings.      Brief HPI: Patient was sent to the ED from the nursing home with reports of a prolapsed ostomy stoma.  Patient denies abdominal pain, vomiting, or fever.    PHYSICAL EXAM  ED Triage Vitals [12/20/23 0033]   Temp Heart Rate Resp BP SpO2   98.4 °F (36.9 °C) 66 16 153/74 94 %      Temp src Heart Rate Source Patient Position BP Location FiO2 (%)   Tympanic Monitor Lying Right arm --         GENERAL: Awake and alert.  Oriented to self.  No acute distress  HENT: nares patent  EYES: no scleral icterus  CV: regular rhythm, normal rate  RESPIRATORY: normal effort, clear to auscultation bilaterally  ABDOMEN: soft, nontender; there is an ostomy in the left mid abdomen with an adjacent ventral hernia, there is a small knuckle of bowel extending into the stoma; gas was expelled into the ostomy bag upon abdominal palpation  MUSCULOSKELETAL: no deformity  NEURO: alert, moves all extremities, follows commands  PSYCH:  calm, cooperative  SKIN: warm, dry    Vital signs and nursing notes reviewed.        Plan: Obtain a KUB    Patient's abdominal exam is benign.  X-rays unremarkable.  Case was discussed with general surgery.  Patient will be discharged.    ED Course as of 12/20/23 0247   Wed Dec 20, 2023   0138 Abdominal x-ray personally interpreted by me.  My personal interpretation is: There is nonspecific bowel gas pattern.  Lungs are clear. [WH]   0215 Patient history, ER presentation and evaluation discussed with Dr. Lubin, general surgery on-call,  states patient is safe for discharge and appropriate for outpatient follow-up. [KAUSHAL]      ED Course User Index  [KAUSHAL] Leroy Franklin PA  [WH] Jonathan Mead MD Holland, William D, MD  12/20/23 024

## 2024-01-25 ENCOUNTER — OFFICE VISIT (OUTPATIENT)
Dept: SURGERY | Facility: CLINIC | Age: 89
End: 2024-01-25
Payer: MEDICARE

## 2024-01-25 VITALS
OXYGEN SATURATION: 95 % | HEIGHT: 69 IN | WEIGHT: 175 LBS | HEART RATE: 84 BPM | DIASTOLIC BLOOD PRESSURE: 84 MMHG | SYSTOLIC BLOOD PRESSURE: 158 MMHG | BODY MASS INDEX: 25.92 KG/M2

## 2024-01-25 DIAGNOSIS — Z85.048 HISTORY OF RECTAL CANCER: ICD-10-CM

## 2024-01-25 DIAGNOSIS — K43.5 PARASTOMAL HERNIA WITHOUT OBSTRUCTION OR GANGRENE: Primary | ICD-10-CM

## 2024-01-25 RX ORDER — MELATONIN
1000 DAILY
COMMUNITY

## 2024-01-25 RX ORDER — INSULIN GLARGINE 100 [IU]/ML
30 INJECTION, SOLUTION SUBCUTANEOUS NIGHTLY
COMMUNITY
Start: 2023-12-28

## 2024-01-25 RX ORDER — AMLODIPINE BESYLATE 10 MG/1
10 TABLET ORAL DAILY
COMMUNITY
Start: 2023-12-28

## 2024-01-25 RX ORDER — FAMOTIDINE 20 MG/1
10 TABLET, FILM COATED ORAL 2 TIMES DAILY
COMMUNITY
Start: 2023-12-28

## 2024-01-25 RX ORDER — ATORVASTATIN CALCIUM 40 MG/1
40 TABLET, FILM COATED ORAL NIGHTLY
COMMUNITY
Start: 2023-12-28

## 2024-01-25 RX ORDER — CHOLECALCIFEROL (VITAMIN D3) 125 MCG
5 CAPSULE ORAL NIGHTLY
COMMUNITY

## 2024-01-25 RX ORDER — CETIRIZINE HYDROCHLORIDE 10 MG/1
10 TABLET ORAL DAILY
COMMUNITY

## 2024-01-25 NOTE — PROGRESS NOTES
Reece Stephen is a 88 y.o. male who is seen as a consult at the request of No ref. provider found for Consult (FOLLOW UP FROM ER).      HPI:    Pt with Hx of rectal cancer (T3N1M0) is S/P chemoradiation and LAR with end colostomy in 2014.   He presented to the [facility] ED 12/20/2023 for a protrusion of his stoma.   During evaluation, he was noted to have a 1 inch protrusion of colon through the stoma. This was easily reducible.   He denies any associated abdominal pain, decreased PO intake, or N/V.   Having ostomy output.   Pt has a large parastomal hernia that has gradually become larger over time.   Had curved ostomy appliance and ostomy belt.   Pt lives at a nursing home and they have been applying the ostomy appliances that they carry, which are flat.   Had 3 ostomy belts in the past, but were not properly maintained.     Past Medical History:   Diagnosis Date    Anemia     Arthritis     Clostridium difficile infection 10/2014    Colostomy present     Dementia     Diabetes mellitus     TYPE 2, IDDM    Disease of thyroid gland     ACQUIRED HYPOTHYROIDISM    Duodenal ulcer 08/2014    DVT (deep venous thrombosis) 01/2015    PORT ASSOCIATED OF LEFT UPPER EXTREMITY    Electrocution 1971    HAD TO HAVE PARTIAL AMPUTATION OF LEFT FOOT    GERD (gastroesophageal reflux disease)     Hearing loss     Hemorrhagic shock 2015    SECONDARY TO BLEEDING DUODENAL ULCER, ADMITTED TO [facility]    Histiocytoma     History of blood transfusion 2015    D/T BLEEDING DUODENAL ULCER    History of chemotherapy     FOLLOWED BY DR. NIGEL NOBLE    History of radiation therapy 2014    FOLLOWED BY DR. RJ HUI    Hypercholesterolemia     Hyperkalemia     Hypertension     Hypoglycemia 01/04/2018    SEEN AT [facility] ER    Incontinent of urine     Mixed hyperlipidemia     Multiple lung nodules     Neuropathy     OA (osteoarthritis)     ANNE MARIE (obstructive sleep apnea)     NO CPAP    Peptic ulceration 2014    Duodenal ulcer    Pneumonia 03/29/2013     ADMITTED TO [facility]-DOUBLE PNEUMONIA    Rectal cancer 06/2014    T3N1M0, S/P CHEMO, XRT, AND RESECTION, FOLLOWED BY DR. TRACE HERRING    Stage 3 chronic kidney disease     DR. FREDDIE GONZALES    Stroke 11/28/2017    TIA, ADMITTED TO [facility]    Stroke 11/17/2019    CVA, ADMITTED TO [facility]    Subdural hematoma     INCREASE IN HEARING LOSS, BALANCE PROBLEMS, SPEECH SLOWED, MEMORY DEFICIT    Vitamin D deficiency        Past Surgical History:   Procedure Laterality Date    AMPUTATION FOOT Left 1971    PARTIAL DUE TO AN ELECTRICAL SHOCK INJURY    ARM TENDON REPAIR Right     DONE BY DR. OLEARY AND KLEINERT    COLON RESECTION N/A 11/14/2014    LOW ANTERIOR RESECTION WITH CREATION OF COLOSTOMY, DR. TRACE HERRING AT [facility]    COLONOSCOPY W/ BIOPSIES N/A 06/26/2014    ULCERATED 5CM MASS IN RECTUM, PATH: MODERATELY DIFFERENTIATED ADENOCARCINOMA, MULTIPLE DIVERTICULA IN SIGMOID, DR. LAUREN JAMES AT [facility]    COLONOSCOPY W/ BIOPSIES N/A 07/09/2014    RECTAL MASS: INVASIVE MODERATLEY DIFFERIENTIATED ADENOCARCINOMA, AREA TATTOOED, DR. TRACE HERRING AT [facility]    EMBOLIZATION CEREBRAL N/A 7/21/2022    Procedure: CEREBRAL ANGIOGRAM with MMA embolization;  Surgeon: Ciaran Reyes MD;  Location: [facility] OR 18/19;  Service: Neurosurgery;  Laterality: N/A;    EMBOLIZATION CEREBRAL Left 7/12/2022    Procedure: LEFT KWABENA HOLE FOR EVACUATION OF SUBDURAL HEMATOMA;  Surgeon: Ciaran Reyes MD;  Location: [facility] OR;  Service: Neurosurgery;  Laterality: Left;    ENDOSCOPY  02/09/2015    ENDOSCOPY N/A 02/09/2015    RESOLVED DUODENAL ULCER, EGD WNL, DR. FREDDIE MALCOLM AT [facility]    FLEXIBLE SIGMOIDOSCOPY N/A 10/06/2014    MALIGNANT PARTIALLY OBSTRUCTING TUMOR IN MID RECTUM, DR. DELTA HERRING AT [facility]    PORTACATH PLACEMENT Left 07/24/2014    LEFT SUBCLAVIAN, DR. KWAKU TAY AT [facility]    RECTAL ULTRASOUND N/A 07/09/2014    ENDORECTAL US FOR CANCER STAGING, T3N1 RECTAL CANCER AT 7 CM, DR. TRACE HERRING AT [facility]    TRACHEAL SURGERY N/A 08/31/2014     ENDOTRACHEAL INTUBATION, DR. ALMA ROSA HAGAN AT [facility]    VENOUS ACCESS DEVICE (PORT) REMOVAL Left 03/12/2015    LEFT SUBCLAVIAN, DR. KWAKU TAY AT [facility]       Social History:   reports that he quit smoking about 44 years ago. His smoking use included cigarettes. He has a 6.00 pack-year smoking history. He has been exposed to tobacco smoke. He has never used smokeless tobacco. He reports that he does not drink alcohol and does not use drugs.      Marriage status: Single    Family History   Problem Relation Age of Onset    No Known Problems Mother     No Known Problems Father     No Known Problems Sister     Cancer Brother         Lung cancer    Lung cancer Brother     No Known Problems Maternal Grandmother     No Known Problems Maternal Grandfather     No Known Problems Paternal Grandmother     No Known Problems Paternal Grandfather     Diabetes Other     Malig Hyperthermia Neg Hx          Current Outpatient Medications:     acetaminophen (TYLENOL) 325 MG tablet, Take 2 tablets by mouth Every 6 (Six) Hours As Needed for Mild Pain  or Fever., Disp: , Rfl:     amLODIPine (NORVASC) 10 MG tablet, Take 1 tablet by mouth Daily., Disp: , Rfl:     atorvastatin (LIPITOR) 40 MG tablet, Take 1 tablet by mouth Every Night., Disp: , Rfl:     cetirizine (zyrTEC) 10 MG tablet, Take 1 tablet by mouth Daily., Disp: , Rfl:     Cholecalciferol 25 MCG (1000 UT) tablet, Take 1 tablet by mouth Daily., Disp: , Rfl:     famotidine (PEPCID) 20 MG tablet, Take 0.5 tablets by mouth 2 (Two) Times a Day., Disp: , Rfl:     insulin lispro (ADMELOG) 100 UNIT/ML injection, Inject 10 Units under the skin into the appropriate area as directed 3 (Three) Times a Day With Meals for 30 days., Disp: 100 mL, Rfl: 0    Lantus 100 UNIT/ML injection, Inject 30 Units under the skin into the appropriate area as directed Every Night., Disp: , Rfl:     levETIRAcetam (KEPPRA) 500 MG tablet, Take 1 tablet by mouth 2 (Two) Times a Day for 30 days., Disp: 60 tablet,  Rfl: 0    levothyroxine (SYNTHROID, LEVOTHROID) 75 MCG tablet, Take 1 tablet by mouth Every Morning., Disp: , Rfl:     melatonin 5 MG tablet tablet, Take 1 tablet by mouth Every Night., Disp: , Rfl:     Allergy  Patient has no known allergies.    Review of Systems   Constitutional: Positive for malaise/fatigue. Negative for decreased appetite and weight gain.   HENT:  Positive for hearing loss and odynophagia. Negative for congestion and hoarse voice.    Eyes:  Negative for blurred vision, discharge and visual disturbance.   Cardiovascular:  Positive for leg swelling. Negative for chest pain and cyanosis.   Respiratory:  Positive for cough and shortness of breath. Negative for sleep disturbances due to breathing and snoring.    Endocrine: Positive for cold intolerance and polydipsia. Negative for heat intolerance.   Hematologic/Lymphatic: Does not bruise/bleed easily.   Skin:  Negative for itching, poor wound healing and skin cancer.   Musculoskeletal:  Negative for arthritis, back pain, joint pain and joint swelling.   Gastrointestinal:  Positive for bloating and abdominal pain. Negative for change in bowel habit, bowel incontinence and constipation.   Genitourinary:  Negative for bladder incontinence, dysuria and hematuria.   Neurological:  Positive for dizziness, headaches, light-headedness, loss of balance and weakness. Negative for brief paralysis, excessive daytime sleepiness and focal weakness.   Psychiatric/Behavioral:  Negative for altered mental status and hallucinations. The patient does not have insomnia.    Allergic/Immunologic: Negative for HIV exposure and persistent infections.   All other systems reviewed and are negative.      Vitals:    01/25/24 1105   BP: 158/84   Pulse: 84   SpO2: 95%     Body mass index is 25.84 kg/m².    Physical Exam  Constitutional:       General: He is not in acute distress.     Appearance: He is well-developed.   HENT:      Head: Normocephalic and atraumatic.      Nose:  Nose normal.   Eyes:      Conjunctiva/sclera: Conjunctivae normal.      Pupils: Pupils are equal, round, and reactive to light.   Neck:      Trachea: No tracheal deviation.   Pulmonary:      Effort: Pulmonary effort is normal. No respiratory distress.      Breath sounds: Normal breath sounds.   Abdominal:      General: Bowel sounds are normal. There is no distension.      Palpations: Abdomen is soft.      Comments: Abdomen soft. Colostomy in LLQ, which is PPP.  There is a slight prolapse of colon, which is easily reducible. Large parastomal hernia. Non-tender to palpation.    Musculoskeletal:         General: No deformity. Normal range of motion.      Cervical back: Normal range of motion.   Skin:     General: Skin is warm and dry.   Neurological:      Mental Status: He is alert and oriented to person, place, and time.      Cranial Nerves: No cranial nerve deficit.      Coordination: Coordination normal.      Gait: Gait normal.   Psychiatric:         Behavior: Behavior normal.         Judgment: Judgment normal.         Review of Medical Record:  I reviewed medical records as detailed in HPI.     Abdominal X-Ray 12/20/2023:  - No free air.     - No acute findings in the abdomen or pelvis.     Assessment:  1. Parastomal hernia without obstruction or gangrene    2. History of rectal cancer    - S/P LAR with end colostomy in 2014    Plan:  - Pt has a large parastomal hernia. Discussed with Pt and his niece, that the only way to repair the hernia is with surgery. Although he does not like the appearance of the hernia, there are no signs of obstruction. Did not recommend surgical intervention given his overall health and multiple comorbidities, which would make surgery risky. Pt and niece express understanding.   - Ambulatory referral to WO to discuss options for new ostomy appliances/belt.   - Follow up as needed.

## 2024-02-07 ENCOUNTER — HOSPITAL ENCOUNTER (OUTPATIENT)
Dept: INFUSION THERAPY | Facility: HOSPITAL | Age: 89
Discharge: HOME | End: 2024-02-07
Admitting: PHYSICIAN ASSISTANT
Payer: MEDICARE

## 2024-02-07 PROCEDURE — G0463 HOSPITAL OUTPT CLINIC VISIT: HCPCS

## 2024-02-07 NOTE — NURSING NOTE
"CWOCN- outpatient visit to reassess parastomal hernia. Patient present with his niece, Geetha. Patient is living at [facility]. They place him in a 2 piece convatec appliance with belt. They were not placing the GARY that Geetha had provided prior. Unsure of compliance with the hernia belt in the facility but it does not seem like they put it on him. He is forgetful at times and hard of hearing. His hernia has gotten larger. He has a protruding stoma. Referral received from Jo Das PA-C.     Ostomy appliance is intact. When patient moves/uses his abdominal muscles the stoma is very large and protruding. It is soft. When he lays flat >5 min it does not fully reduce. The hernia is positioned more from medial stoma towards midline.    Me[facility] for a [facility].  Nu[employer], cool comfort  Hernia width is 7\"  Standing girth 44\", Lying girth 42.5-43\"  Stoma on left side  Belt opening to accommodate the pouch he is wearing is 3\"    Recommend a 6\" belt to cover most of the hernia but not ride up.     Ordering # [id number]    Provided to Geetha for ordering.   Reviewed plan of care. They will call if they have questions or additional needs.   "

## (undated) DEVICE — 0.2 MICRON INTRAVENOUS FILTER FOR 96 HOUR USE WITH MICRO-BORE EXTENSION TUBING AN LUER-LOCK ADAPTER: Brand: PALL POSIDYNE® ELD FILTER

## (undated) DEVICE — BG WAST DISPOSABLE DEPOT W/TBG48IN S/COCK SPK1400

## (undated) DEVICE — STPCK 3/WY HP M/RA W/OFF/HNDL 1050PSI STRL

## (undated) DEVICE — RADIFOCUS GLIDEWIRE: Brand: GLIDEWIRE

## (undated) DEVICE — ADAPT Y ROT GATEWAY PLS

## (undated) DEVICE — DRSNG SURESITE WNDW 4X4.5

## (undated) DEVICE — EACH VASCULAR SOLUTIONS D-STAT® FLOWABLE HEMOSTAT (D-STAT) INCLUDES THE FOLLOWING COMPONENTS: THROMBIN VIAL (5,000 UNITS); COLLAGEN (200MG), CONTAINED IN 10ML SYRINGE WITH ATTACHED MIXING LUER; DILUENT VIAL (5ML); MIXING ACCESSORIES (10ML SYRINGE AND NEEDLELESS, NON-CORING VIAL ACCESS DEVICE); APPLICATOR TIPS: (1) SMALL BORE TIP, (1) 20-GAUGE, 2.75" NEEDLE.THE THROMBIN IS A PROTEIN SUBSTANCE PRODUCED THROUGH A CONVERSION REACTION IN WHICH PROTHROMBIN OF BOVINE ORIGIN IS ACTIVATED BY TISSUE THROMBOPLASTIN OF BOVINE-ORIGIN IN THE PRESENCE OF CALCIUM CHLORIDE. IT IS SUPPLIED AS A STERILE POWDER THAT HAS BEEN FREEZE-DRIED IN THE FINAL CONTAINER. ALSO CONTAINED IN THE THROMBIN VIAL ARE MANNITOL AND SODIUM CHLORIDE. MANNITOL IS INCLUDED TO MAKE THE DRIED PRODUCT FRIABLE AND MORE READILY SOLUBLE. THE MATERIAL CONTAINS NO PRESERVATIVE AND HAS BEEN CHROMATOGRAPHICALLY PURIFIED. THROMBIN REQUIRES NO INTERMEDIATE PHYSIOLOGICAL AGENT FOR ITS REACTION. IT CONVERTS FIBRINOGEN DIRECTLY TO FIBRIN.THE COLLAGEN IS A SOFT, WHITE, PLIABLE, ABSORBENT HEMOSTATIC AGENT DERIVED FROM PURIFIED BOVINE DEEP FLEXOR TENDON. IT IS PREPARED IN A LOOSE FIBROUS FORM. COLLAGEN ATTRACTS PLATELETS THAT ADHERE TO THE FIBRILS AND UNDERGO THE RELEASE PHENOMENON TO TRIGGER AGGREGATION OF PLATELETS INTO THROMBI IN THE INTERSTICES OF THE FIBROUS MASS. THE COLLAGEN PROVIDES A THREE-DIMENSIONAL MATRIX FOR ADDITIONAL STRENGTHENING OF THE CLOT. THE EFFECT ON PLATELET ADHESION AND AGGREGATION IS NOT INHIBITED BY HEPARIN IN VITRO.THE DILUENT IS A STERILE SOLUTION OF CALCIUM CHLORIDE AND WATER, BUFFERED WITH TROMETHAMINE (TRIS). USING THE MIXING ACCESSORIES, BOTH THE THROMBIN AND COLLAGEN ARE RECONSTITUTED WITH THE DILUENT PRIOR TO USE. THE HEMOSTAT IS DELIVERED TO THE INTENDED TREATMENT SITE USING THE PROVIDED APPLICATOR TIPS. HEMOSTASIS IS ACHIEVED BY THE PHYSIOLOGICAL COAGULATION-INDUCING PROPERTIES OF THE D-STAT. THE D-STAT IS BIOCOMPATIBLE, NON-PYROGENIC, AND INTENDED TO BE LEFT IN SITU.: Brand: D-STAT® FLOWABLE HEMOSTAT

## (undated) DEVICE — CVR PROB 96IN LF STRL

## (undated) DEVICE — Device: Brand: FUBUKI

## (undated) DEVICE — GLV SURG BIOGEL LTX PF 8

## (undated) DEVICE — PINNACLE INTRODUCER SHEATH: Brand: PINNACLE

## (undated) DEVICE — SYR MEDALLION LL PLUNGR/WHT 1CC

## (undated) DEVICE — SYR MEDALLION LL PLUNGR/YEL 1CC

## (undated) DEVICE — PK ANGIO CERBRL RAD 40

## (undated) DEVICE — NEURO  GUIDEWIRE WITH HYDROPHILIC COATING: Brand: SYNCHRO 10

## (undated) DEVICE — APPL CHLORAPREP HI/LITE 26ML ORNG

## (undated) DEVICE — COPILOT BLEEDBACK CONTROL VALVE: Brand: COPILOT

## (undated) DEVICE — SOLUTION SET, MALE LUER LOCK ADAPTER

## (undated) DEVICE — SYSTEM 46705 EXACTA DRAINAGE 100ML: Brand: EXACTA™

## (undated) DEVICE — SYR MEDALLION LL PLUNGR/WHT 3ML

## (undated) DEVICE — SYR MEDALLION LL PLUNGR/YEL 3CC

## (undated) DEVICE — CATH MIC MAR FLO DIRECT .013IN 1.5F 25CM

## (undated) DEVICE — RADIFOCUS TORQUE DEVICE MULTI-TORQUE VISE: Brand: RADIFOCUS TORQUE DEVICE

## (undated) DEVICE — PERCLOSE™ PROSTYLE™ SUTURE-MEDIATED CLOSURE AND REPAIR SYSTEM: Brand: PERCLOSE™ PROSTYLE™

## (undated) DEVICE — SPNG GZ WOVN 4X4IN 12PLY 10/BX STRL

## (undated) DEVICE — ST ACC MICROPUNCTURE STFF .018 ECHO/PLDM/TP 4F/10CM 21G/7CM

## (undated) DEVICE — CATH CSF EXT EDV VENTRICLEAR TRANSL 35CM

## (undated) DEVICE — STROKE ACCESS: Brand: SOFIA 5F-115CM STR

## (undated) DEVICE — WIPE INST MEROCEL

## (undated) DEVICE — SYR LL TP 10ML STRL

## (undated) DEVICE — CATH ANGIO BCN .038 5F 125CM VTK

## (undated) DEVICE — SOL NACL 0.9PCT 1000ML